# Patient Record
Sex: FEMALE | Race: ASIAN | NOT HISPANIC OR LATINO | Employment: OTHER | URBAN - METROPOLITAN AREA
[De-identification: names, ages, dates, MRNs, and addresses within clinical notes are randomized per-mention and may not be internally consistent; named-entity substitution may affect disease eponyms.]

---

## 2017-06-07 ENCOUNTER — OFFICE VISIT - HEALTHEAST (OUTPATIENT)
Dept: FAMILY MEDICINE | Facility: CLINIC | Age: 67
End: 2017-06-07

## 2017-06-07 DIAGNOSIS — Z00.00 HEALTHCARE MAINTENANCE: ICD-10-CM

## 2017-06-07 DIAGNOSIS — I10 ESSENTIAL HYPERTENSION, BENIGN: ICD-10-CM

## 2017-06-07 DIAGNOSIS — E11.9 TYPE 2 DIABETES MELLITUS (H): ICD-10-CM

## 2017-06-07 LAB
CHOLEST SERPL-MCNC: 220 MG/DL
FASTING STATUS PATIENT QL REPORTED: YES
HBA1C MFR BLD: 7.9 % (ref 3.5–6)
HDLC SERPL-MCNC: 42 MG/DL
LDLC SERPL CALC-MCNC: 147 MG/DL
TRIGL SERPL-MCNC: 157 MG/DL

## 2017-06-07 ASSESSMENT — MIFFLIN-ST. JEOR: SCORE: 977.52

## 2017-06-08 ENCOUNTER — AMBULATORY - HEALTHEAST (OUTPATIENT)
Dept: FAMILY MEDICINE | Facility: CLINIC | Age: 67
End: 2017-06-08

## 2017-06-08 DIAGNOSIS — E11.9 TYPE 2 DIABETES MELLITUS (H): ICD-10-CM

## 2017-07-21 ENCOUNTER — RECORDS - HEALTHEAST (OUTPATIENT)
Dept: ADMINISTRATIVE | Facility: OTHER | Age: 67
End: 2017-07-21

## 2017-10-11 ENCOUNTER — AMBULATORY - HEALTHEAST (OUTPATIENT)
Dept: FAMILY MEDICINE | Facility: CLINIC | Age: 67
End: 2017-10-11

## 2017-10-11 ENCOUNTER — OFFICE VISIT - HEALTHEAST (OUTPATIENT)
Dept: FAMILY MEDICINE | Facility: CLINIC | Age: 67
End: 2017-10-11

## 2017-10-11 DIAGNOSIS — Z23 NEED FOR INFLUENZA VACCINATION: ICD-10-CM

## 2017-10-11 DIAGNOSIS — E11.9 TYPE 2 DIABETES MELLITUS TREATED WITHOUT INSULIN (H): ICD-10-CM

## 2017-10-11 DIAGNOSIS — I10 ESSENTIAL HYPERTENSION WITH GOAL BLOOD PRESSURE LESS THAN 130/85: ICD-10-CM

## 2017-10-11 DIAGNOSIS — I10 ESSENTIAL HYPERTENSION, BENIGN: ICD-10-CM

## 2017-10-11 LAB — HBA1C MFR BLD: 7 % (ref 3.5–6)

## 2017-10-11 ASSESSMENT — MIFFLIN-ST. JEOR: SCORE: 945.77

## 2017-10-18 ENCOUNTER — COMMUNICATION - HEALTHEAST (OUTPATIENT)
Dept: FAMILY MEDICINE | Facility: CLINIC | Age: 67
End: 2017-10-18

## 2017-11-07 ENCOUNTER — RECORDS - HEALTHEAST (OUTPATIENT)
Dept: ADMINISTRATIVE | Facility: OTHER | Age: 67
End: 2017-11-07

## 2017-12-27 ENCOUNTER — AMBULATORY - HEALTHEAST (OUTPATIENT)
Dept: NURSING | Facility: CLINIC | Age: 67
End: 2017-12-27

## 2017-12-27 ENCOUNTER — AMBULATORY - HEALTHEAST (OUTPATIENT)
Dept: FAMILY MEDICINE | Facility: CLINIC | Age: 67
End: 2017-12-27

## 2017-12-27 DIAGNOSIS — E11.9 TYPE 2 DIABETES MELLITUS TREATED WITHOUT INSULIN (H): ICD-10-CM

## 2017-12-27 DIAGNOSIS — I10 ESSENTIAL HYPERTENSION, BENIGN: ICD-10-CM

## 2018-01-11 ENCOUNTER — OFFICE VISIT - HEALTHEAST (OUTPATIENT)
Dept: FAMILY MEDICINE | Facility: CLINIC | Age: 68
End: 2018-01-11

## 2018-01-11 ENCOUNTER — AMBULATORY - HEALTHEAST (OUTPATIENT)
Dept: FAMILY MEDICINE | Facility: CLINIC | Age: 68
End: 2018-01-11

## 2018-01-11 DIAGNOSIS — Z13.220 SCREENING FOR LIPID DISORDERS: ICD-10-CM

## 2018-01-11 DIAGNOSIS — E11.9 TYPE 2 DIABETES MELLITUS TREATED WITHOUT INSULIN (H): ICD-10-CM

## 2018-01-11 DIAGNOSIS — Z13.820 SCREENING FOR OSTEOPOROSIS: ICD-10-CM

## 2018-01-11 DIAGNOSIS — I10 ESSENTIAL HYPERTENSION, BENIGN: ICD-10-CM

## 2018-01-11 LAB
ALBUMIN SERPL-MCNC: 3.6 G/DL (ref 3.5–5)
ALP SERPL-CCNC: 99 U/L (ref 45–120)
ALT SERPL W P-5'-P-CCNC: 20 U/L (ref 0–45)
ANION GAP SERPL CALCULATED.3IONS-SCNC: 11 MMOL/L (ref 5–18)
AST SERPL W P-5'-P-CCNC: 20 U/L (ref 0–40)
BILIRUB SERPL-MCNC: 0.8 MG/DL (ref 0–1)
BUN SERPL-MCNC: 21 MG/DL (ref 8–22)
CALCIUM SERPL-MCNC: 9.5 MG/DL (ref 8.5–10.5)
CHLORIDE BLD-SCNC: 100 MMOL/L (ref 98–107)
CHOLEST SERPL-MCNC: 225 MG/DL
CO2 SERPL-SCNC: 30 MMOL/L (ref 22–31)
CREAT SERPL-MCNC: 0.96 MG/DL (ref 0.6–1.1)
FASTING STATUS PATIENT QL REPORTED: NO
GFR SERPL CREATININE-BSD FRML MDRD: 58 ML/MIN/1.73M2
GLUCOSE BLD-MCNC: 233 MG/DL (ref 70–125)
HBA1C MFR BLD: 7.2 % (ref 3.5–6)
HDLC SERPL-MCNC: 52 MG/DL
LDLC SERPL CALC-MCNC: 141 MG/DL
POTASSIUM BLD-SCNC: 3.9 MMOL/L (ref 3.5–5)
PROT SERPL-MCNC: 7.5 G/DL (ref 6–8)
SODIUM SERPL-SCNC: 141 MMOL/L (ref 136–145)
TRIGL SERPL-MCNC: 162 MG/DL

## 2018-01-11 ASSESSMENT — MIFFLIN-ST. JEOR: SCORE: 958.24

## 2018-10-03 ENCOUNTER — RECORDS - HEALTHEAST (OUTPATIENT)
Dept: MAMMOGRAPHY | Facility: CLINIC | Age: 68
End: 2018-10-03

## 2018-10-03 ENCOUNTER — RECORDS - HEALTHEAST (OUTPATIENT)
Dept: FAMILY MEDICINE | Facility: CLINIC | Age: 68
End: 2018-10-03

## 2018-10-03 ENCOUNTER — OFFICE VISIT - HEALTHEAST (OUTPATIENT)
Dept: FAMILY MEDICINE | Facility: CLINIC | Age: 68
End: 2018-10-03

## 2018-10-03 DIAGNOSIS — Z12.31 VISIT FOR SCREENING MAMMOGRAM: ICD-10-CM

## 2018-10-03 DIAGNOSIS — I10 ESSENTIAL HYPERTENSION, BENIGN: ICD-10-CM

## 2018-10-03 DIAGNOSIS — E11.9 TYPE 2 DIABETES MELLITUS TREATED WITHOUT INSULIN (H): ICD-10-CM

## 2018-10-03 DIAGNOSIS — Z23 NEED FOR INFLUENZA VACCINATION: ICD-10-CM

## 2018-10-03 DIAGNOSIS — Z12.31 ENCOUNTER FOR SCREENING MAMMOGRAM FOR MALIGNANT NEOPLASM OF BREAST: ICD-10-CM

## 2018-10-03 DIAGNOSIS — I10 ESSENTIAL HYPERTENSION WITH GOAL BLOOD PRESSURE LESS THAN 130/85: ICD-10-CM

## 2018-10-03 DIAGNOSIS — E78.5 HYPERLIPIDEMIA: ICD-10-CM

## 2018-10-03 DIAGNOSIS — F43.21 GRIEF REACTION: ICD-10-CM

## 2018-10-03 LAB — HBA1C MFR BLD: 8.8 % (ref 3.5–6)

## 2018-10-03 ASSESSMENT — MIFFLIN-ST. JEOR: SCORE: 954.56

## 2018-10-04 LAB
ANION GAP SERPL CALCULATED.3IONS-SCNC: 14 MMOL/L (ref 5–18)
BUN SERPL-MCNC: 17 MG/DL (ref 8–22)
CALCIUM SERPL-MCNC: 10.3 MG/DL (ref 8.5–10.5)
CHLORIDE BLD-SCNC: 99 MMOL/L (ref 98–107)
CHOLEST SERPL-MCNC: 231 MG/DL
CO2 SERPL-SCNC: 28 MMOL/L (ref 22–31)
CREAT SERPL-MCNC: 0.9 MG/DL (ref 0.6–1.1)
FASTING STATUS PATIENT QL REPORTED: ABNORMAL
GFR SERPL CREATININE-BSD FRML MDRD: >60 ML/MIN/1.73M2
GLUCOSE BLD-MCNC: 149 MG/DL (ref 70–125)
HDLC SERPL-MCNC: 47 MG/DL
LDLC SERPL CALC-MCNC: 150 MG/DL
POTASSIUM BLD-SCNC: 4.4 MMOL/L (ref 3.5–5)
SODIUM SERPL-SCNC: 141 MMOL/L (ref 136–145)
TRIGL SERPL-MCNC: 169 MG/DL

## 2018-10-10 ENCOUNTER — HOSPITAL ENCOUNTER (OUTPATIENT)
Dept: MAMMOGRAPHY | Facility: CLINIC | Age: 68
Discharge: HOME | End: 2018-10-10
Attending: FAMILY MEDICINE

## 2018-10-10 DIAGNOSIS — N64.89 BREAST ASYMMETRY: ICD-10-CM

## 2018-10-11 ENCOUNTER — RECORDS - HEALTHEAST (OUTPATIENT)
Dept: ADMINISTRATIVE | Facility: OTHER | Age: 68
End: 2018-10-11

## 2018-10-25 ENCOUNTER — HOSPITAL ENCOUNTER (OUTPATIENT)
Dept: MAMMOGRAPHY | Facility: CLINIC | Age: 68
Discharge: HOME | End: 2018-10-25
Attending: FAMILY MEDICINE | Admitting: RADIOLOGY

## 2018-10-25 ENCOUNTER — HOSPITAL ENCOUNTER (OUTPATIENT)
Dept: MAMMOGRAPHY | Facility: CLINIC | Age: 68
Discharge: HOME | End: 2018-10-25
Attending: FAMILY MEDICINE

## 2018-10-25 DIAGNOSIS — N63.20 LEFT BREAST MASS: ICD-10-CM

## 2018-10-25 DIAGNOSIS — R92.8 OTHER ABNORMAL AND INCONCLUSIVE FINDINGS ON DIAGNOSTIC IMAGING OF BREAST: ICD-10-CM

## 2018-10-26 ENCOUNTER — COMMUNICATION - HEALTHEAST (OUTPATIENT)
Dept: MAMMOGRAPHY | Facility: CLINIC | Age: 68
End: 2018-10-26

## 2018-10-26 LAB
LAB AP CHARGES (HE HISTORICAL CONVERSION): NORMAL
PATH REPORT.COMMENTS IMP SPEC: NORMAL
PATH REPORT.COMMENTS IMP SPEC: NORMAL
PATH REPORT.FINAL DX SPEC: NORMAL
PATH REPORT.GROSS SPEC: NORMAL
PATH REPORT.MICROSCOPIC SPEC OTHER STN: NORMAL
PATH REPORT.RELEVANT HX SPEC: NORMAL
RESULT FLAG (HE HISTORICAL CONVERSION): NORMAL

## 2018-11-30 ENCOUNTER — COMMUNICATION - HEALTHEAST (OUTPATIENT)
Dept: FAMILY MEDICINE | Facility: CLINIC | Age: 68
End: 2018-11-30

## 2018-11-30 DIAGNOSIS — I10 ESSENTIAL HYPERTENSION WITH GOAL BLOOD PRESSURE LESS THAN 130/85: ICD-10-CM

## 2019-04-09 ENCOUNTER — COMMUNICATION - HEALTHEAST (OUTPATIENT)
Dept: ADMINISTRATIVE | Facility: CLINIC | Age: 69
End: 2019-04-09

## 2019-04-30 ENCOUNTER — COMMUNICATION - HEALTHEAST (OUTPATIENT)
Dept: FAMILY MEDICINE | Facility: CLINIC | Age: 69
End: 2019-04-30

## 2019-05-16 ENCOUNTER — OFFICE VISIT - HEALTHEAST (OUTPATIENT)
Dept: FAMILY MEDICINE | Facility: CLINIC | Age: 69
End: 2019-05-16

## 2019-05-16 DIAGNOSIS — I10 ESSENTIAL HYPERTENSION, BENIGN: ICD-10-CM

## 2019-05-16 DIAGNOSIS — E11.9 TYPE 2 DIABETES MELLITUS TREATED WITHOUT INSULIN (H): ICD-10-CM

## 2019-05-16 DIAGNOSIS — E78.2 MIXED HYPERLIPIDEMIA: ICD-10-CM

## 2019-05-16 DIAGNOSIS — Z12.11 SCREENING FOR COLON CANCER: ICD-10-CM

## 2019-05-16 LAB
ALBUMIN SERPL-MCNC: 3.9 G/DL (ref 3.5–5)
ALP SERPL-CCNC: 93 U/L (ref 45–120)
ALT SERPL W P-5'-P-CCNC: 23 U/L (ref 0–45)
ANION GAP SERPL CALCULATED.3IONS-SCNC: 16 MMOL/L (ref 5–18)
AST SERPL W P-5'-P-CCNC: 21 U/L (ref 0–40)
BILIRUB SERPL-MCNC: 1.2 MG/DL (ref 0–1)
BUN SERPL-MCNC: 15 MG/DL (ref 8–28)
CALCIUM SERPL-MCNC: 9.3 MG/DL (ref 8.5–10.5)
CHLORIDE BLD-SCNC: 100 MMOL/L (ref 98–107)
CHOLEST SERPL-MCNC: 200 MG/DL
CO2 SERPL-SCNC: 23 MMOL/L (ref 22–31)
CREAT SERPL-MCNC: 0.91 MG/DL (ref 0.6–1.1)
CREAT UR-MCNC: 67.8 MG/DL
FASTING STATUS PATIENT QL REPORTED: NO
GFR SERPL CREATININE-BSD FRML MDRD: >60 ML/MIN/1.73M2
GLUCOSE BLD-MCNC: 252 MG/DL (ref 70–125)
HBA1C MFR BLD: 9.1 % (ref 3.5–6)
HDLC SERPL-MCNC: 45 MG/DL
LDLC SERPL CALC-MCNC: 121 MG/DL
MICROALBUMIN UR-MCNC: 13.85 MG/DL (ref 0–1.99)
MICROALBUMIN/CREAT UR: 204.3 MG/G
POTASSIUM BLD-SCNC: 3.7 MMOL/L (ref 3.5–5)
PROT SERPL-MCNC: 7.2 G/DL (ref 6–8)
SODIUM SERPL-SCNC: 139 MMOL/L (ref 136–145)
TRIGL SERPL-MCNC: 170 MG/DL

## 2019-05-16 ASSESSMENT — MIFFLIN-ST. JEOR: SCORE: 940.66

## 2019-05-31 ENCOUNTER — AMBULATORY - HEALTHEAST (OUTPATIENT)
Dept: PHARMACY | Facility: CLINIC | Age: 69
End: 2019-05-31

## 2019-05-31 DIAGNOSIS — E11.9 TYPE 2 DIABETES MELLITUS TREATED WITHOUT INSULIN (H): ICD-10-CM

## 2019-06-04 ENCOUNTER — AMBULATORY - HEALTHEAST (OUTPATIENT)
Dept: FAMILY MEDICINE | Facility: CLINIC | Age: 69
End: 2019-06-04

## 2019-06-04 DIAGNOSIS — E11.9 TYPE 2 DIABETES MELLITUS TREATED WITHOUT INSULIN (H): ICD-10-CM

## 2019-07-10 ENCOUNTER — COMMUNICATION - HEALTHEAST (OUTPATIENT)
Dept: ADMINISTRATIVE | Facility: CLINIC | Age: 69
End: 2019-07-10

## 2019-07-12 ENCOUNTER — COMMUNICATION - HEALTHEAST (OUTPATIENT)
Dept: FAMILY MEDICINE | Facility: CLINIC | Age: 69
End: 2019-07-12

## 2019-07-15 ENCOUNTER — COMMUNICATION - HEALTHEAST (OUTPATIENT)
Dept: ADMINISTRATIVE | Facility: CLINIC | Age: 69
End: 2019-07-15

## 2019-10-15 ENCOUNTER — COMMUNICATION - HEALTHEAST (OUTPATIENT)
Dept: FAMILY MEDICINE | Facility: CLINIC | Age: 69
End: 2019-10-15

## 2019-10-24 ENCOUNTER — COMMUNICATION - HEALTHEAST (OUTPATIENT)
Dept: FAMILY MEDICINE | Facility: CLINIC | Age: 69
End: 2019-10-24

## 2019-10-24 DIAGNOSIS — I10 ESSENTIAL HYPERTENSION, BENIGN: ICD-10-CM

## 2019-11-11 ENCOUNTER — COMMUNICATION - HEALTHEAST (OUTPATIENT)
Dept: FAMILY MEDICINE | Facility: CLINIC | Age: 69
End: 2019-11-11

## 2019-11-11 DIAGNOSIS — I10 ESSENTIAL HYPERTENSION, BENIGN: ICD-10-CM

## 2020-01-27 ENCOUNTER — COMMUNICATION - HEALTHEAST (OUTPATIENT)
Dept: FAMILY MEDICINE | Facility: CLINIC | Age: 70
End: 2020-01-27

## 2020-05-10 ENCOUNTER — COMMUNICATION - HEALTHEAST (OUTPATIENT)
Dept: FAMILY MEDICINE | Facility: CLINIC | Age: 70
End: 2020-05-10

## 2020-05-10 DIAGNOSIS — I10 ESSENTIAL HYPERTENSION, BENIGN: ICD-10-CM

## 2020-07-20 ENCOUNTER — COMMUNICATION - HEALTHEAST (OUTPATIENT)
Dept: FAMILY MEDICINE | Facility: CLINIC | Age: 70
End: 2020-07-20

## 2020-07-20 DIAGNOSIS — I10 ESSENTIAL HYPERTENSION, BENIGN: ICD-10-CM

## 2020-07-21 ENCOUNTER — AMBULATORY - HEALTHEAST (OUTPATIENT)
Dept: FAMILY MEDICINE | Facility: CLINIC | Age: 70
End: 2020-07-21

## 2020-07-21 DIAGNOSIS — I10 ESSENTIAL HYPERTENSION, BENIGN: ICD-10-CM

## 2020-08-05 ENCOUNTER — COMMUNICATION - HEALTHEAST (OUTPATIENT)
Dept: FAMILY MEDICINE | Facility: CLINIC | Age: 70
End: 2020-08-05

## 2020-08-07 ENCOUNTER — COMMUNICATION - HEALTHEAST (OUTPATIENT)
Dept: FAMILY MEDICINE | Facility: CLINIC | Age: 70
End: 2020-08-07

## 2020-08-07 DIAGNOSIS — I10 ESSENTIAL HYPERTENSION, BENIGN: ICD-10-CM

## 2020-10-27 ENCOUNTER — OFFICE VISIT - HEALTHEAST (OUTPATIENT)
Dept: FAMILY MEDICINE | Facility: CLINIC | Age: 70
End: 2020-10-27

## 2020-10-27 ENCOUNTER — COMMUNICATION - HEALTHEAST (OUTPATIENT)
Dept: FAMILY MEDICINE | Facility: CLINIC | Age: 70
End: 2020-10-27

## 2020-10-27 DIAGNOSIS — R41.3 MEMORY PROBLEM: ICD-10-CM

## 2020-10-27 DIAGNOSIS — I10 ESSENTIAL HYPERTENSION, BENIGN: ICD-10-CM

## 2020-10-27 DIAGNOSIS — Z23 NEED FOR IMMUNIZATION AGAINST INFLUENZA: ICD-10-CM

## 2020-10-27 DIAGNOSIS — Z00.00 ROUTINE GENERAL MEDICAL EXAMINATION AT A HEALTH CARE FACILITY: ICD-10-CM

## 2020-10-27 DIAGNOSIS — E78.2 MIXED HYPERLIPIDEMIA: ICD-10-CM

## 2020-10-27 DIAGNOSIS — E11.9 TYPE 2 DIABETES MELLITUS TREATED WITHOUT INSULIN (H): ICD-10-CM

## 2020-10-27 LAB
ALBUMIN SERPL-MCNC: 4.3 G/DL (ref 3.5–5)
ALP SERPL-CCNC: 115 U/L (ref 45–120)
ALT SERPL W P-5'-P-CCNC: 29 U/L (ref 0–45)
ANION GAP SERPL CALCULATED.3IONS-SCNC: 15 MMOL/L (ref 5–18)
AST SERPL W P-5'-P-CCNC: 22 U/L (ref 0–40)
BILIRUB SERPL-MCNC: 1.2 MG/DL (ref 0–1)
BUN SERPL-MCNC: 18 MG/DL (ref 8–28)
CALCIUM SERPL-MCNC: 9.5 MG/DL (ref 8.5–10.5)
CHLORIDE BLD-SCNC: 95 MMOL/L (ref 98–107)
CHOLEST SERPL-MCNC: 188 MG/DL
CO2 SERPL-SCNC: 28 MMOL/L (ref 22–31)
CREAT SERPL-MCNC: 1.32 MG/DL (ref 0.6–1.1)
CREAT UR-MCNC: 35.8 MG/DL
FASTING STATUS PATIENT QL REPORTED: YES
GFR SERPL CREATININE-BSD FRML MDRD: 40 ML/MIN/1.73M2
GLUCOSE BLD-MCNC: 507 MG/DL (ref 70–125)
HBA1C MFR BLD: 11.4 %
HDLC SERPL-MCNC: 43 MG/DL
LDLC SERPL CALC-MCNC: 113 MG/DL
MICROALBUMIN UR-MCNC: 16.35 MG/DL (ref 0–1.99)
MICROALBUMIN/CREAT UR: 456.7 MG/G
POTASSIUM BLD-SCNC: 4.6 MMOL/L (ref 3.5–5)
PROT SERPL-MCNC: 7.8 G/DL (ref 6–8)
SODIUM SERPL-SCNC: 138 MMOL/L (ref 136–145)
TRIGL SERPL-MCNC: 160 MG/DL

## 2020-10-27 RX ORDER — GLUCOSAMINE HCL/CHONDROITIN SU 500-400 MG
1 CAPSULE ORAL 2 TIMES DAILY
Qty: 200 STRIP | Refills: 11 | Status: SHIPPED | OUTPATIENT
Start: 2020-10-27 | End: 2021-10-05

## 2020-10-27 ASSESSMENT — ANXIETY QUESTIONNAIRES
2. NOT BEING ABLE TO STOP OR CONTROL WORRYING: NOT AT ALL
1. FEELING NERVOUS, ANXIOUS, OR ON EDGE: NOT AT ALL

## 2020-10-27 ASSESSMENT — MIFFLIN-ST. JEOR: SCORE: 936.41

## 2020-10-28 ENCOUNTER — COMMUNICATION - HEALTHEAST (OUTPATIENT)
Dept: FAMILY MEDICINE | Facility: CLINIC | Age: 70
End: 2020-10-28

## 2020-10-28 ENCOUNTER — AMBULATORY - HEALTHEAST (OUTPATIENT)
Dept: FAMILY MEDICINE | Facility: CLINIC | Age: 70
End: 2020-10-28

## 2020-10-28 DIAGNOSIS — E11.9 TYPE 2 DIABETES MELLITUS TREATED WITHOUT INSULIN (H): ICD-10-CM

## 2020-11-06 ENCOUNTER — COMMUNICATION - HEALTHEAST (OUTPATIENT)
Dept: FAMILY MEDICINE | Facility: CLINIC | Age: 70
End: 2020-11-06

## 2020-11-06 DIAGNOSIS — I10 ESSENTIAL HYPERTENSION, BENIGN: ICD-10-CM

## 2020-11-12 ENCOUNTER — OFFICE VISIT - HEALTHEAST (OUTPATIENT)
Dept: PHARMACY | Facility: CLINIC | Age: 70
End: 2020-11-12

## 2020-11-12 DIAGNOSIS — Z79.4 TYPE 2 DIABETES MELLITUS WITHOUT COMPLICATION, WITH LONG-TERM CURRENT USE OF INSULIN (H): ICD-10-CM

## 2020-11-12 DIAGNOSIS — E11.9 TYPE 2 DIABETES MELLITUS WITHOUT COMPLICATION, WITH LONG-TERM CURRENT USE OF INSULIN (H): ICD-10-CM

## 2020-12-03 ENCOUNTER — AMBULATORY - HEALTHEAST (OUTPATIENT)
Dept: OTHER | Facility: CLINIC | Age: 70
End: 2020-12-03

## 2020-12-03 ENCOUNTER — OFFICE VISIT - HEALTHEAST (OUTPATIENT)
Dept: FAMILY MEDICINE | Facility: CLINIC | Age: 70
End: 2020-12-03

## 2020-12-03 DIAGNOSIS — I10 ESSENTIAL HYPERTENSION, BENIGN: ICD-10-CM

## 2020-12-03 DIAGNOSIS — E78.5 HYPERLIPIDEMIA, UNSPECIFIED HYPERLIPIDEMIA TYPE: ICD-10-CM

## 2020-12-03 DIAGNOSIS — E11.9 TYPE 2 DIABETES MELLITUS TREATED WITHOUT INSULIN (H): ICD-10-CM

## 2020-12-03 DIAGNOSIS — R94.4 ABNORMAL RENAL FUNCTION TEST: ICD-10-CM

## 2020-12-03 LAB
ANION GAP SERPL CALCULATED.3IONS-SCNC: 16 MMOL/L (ref 5–18)
BUN SERPL-MCNC: 21 MG/DL (ref 8–28)
CALCIUM SERPL-MCNC: 9.5 MG/DL (ref 8.5–10.5)
CHLORIDE BLD-SCNC: 107 MMOL/L (ref 98–107)
CO2 SERPL-SCNC: 22 MMOL/L (ref 22–31)
CREAT SERPL-MCNC: 0.93 MG/DL (ref 0.6–1.1)
GFR SERPL CREATININE-BSD FRML MDRD: 59 ML/MIN/1.73M2
GLUCOSE BLD-MCNC: 185 MG/DL (ref 70–125)
POTASSIUM BLD-SCNC: 3.9 MMOL/L (ref 3.5–5)
SODIUM SERPL-SCNC: 145 MMOL/L (ref 136–145)

## 2020-12-03 ASSESSMENT — MIFFLIN-ST. JEOR: SCORE: 932.16

## 2021-01-05 ENCOUNTER — OFFICE VISIT - HEALTHEAST (OUTPATIENT)
Dept: FAMILY MEDICINE | Facility: CLINIC | Age: 71
End: 2021-01-05

## 2021-01-05 DIAGNOSIS — Z13.29 SCREENING FOR THYROID DISORDER: ICD-10-CM

## 2021-01-05 DIAGNOSIS — I10 ESSENTIAL HYPERTENSION, BENIGN: ICD-10-CM

## 2021-01-05 DIAGNOSIS — E11.9 TYPE 2 DIABETES MELLITUS TREATED WITHOUT INSULIN (H): ICD-10-CM

## 2021-01-05 DIAGNOSIS — R41.3 MEMORY PROBLEM: ICD-10-CM

## 2021-01-05 LAB
ANION GAP SERPL CALCULATED.3IONS-SCNC: 15 MMOL/L (ref 5–18)
BUN SERPL-MCNC: 30 MG/DL (ref 8–28)
CALCIUM SERPL-MCNC: 9.6 MG/DL (ref 8.5–10.5)
CHLORIDE BLD-SCNC: 98 MMOL/L (ref 98–107)
CO2 SERPL-SCNC: 26 MMOL/L (ref 22–31)
CREAT SERPL-MCNC: 1.07 MG/DL (ref 0.6–1.1)
GFR SERPL CREATININE-BSD FRML MDRD: 51 ML/MIN/1.73M2
GLUCOSE BLD-MCNC: 289 MG/DL (ref 70–125)
HBA1C MFR BLD: 8.6 %
POTASSIUM BLD-SCNC: 4 MMOL/L (ref 3.5–5)
SODIUM SERPL-SCNC: 139 MMOL/L (ref 136–145)
TSH SERPL DL<=0.005 MIU/L-ACNC: 1.49 UIU/ML (ref 0.3–5)

## 2021-01-05 ASSESSMENT — MIFFLIN-ST. JEOR: SCORE: 882.83

## 2021-01-14 ENCOUNTER — COMMUNICATION - HEALTHEAST (OUTPATIENT)
Dept: FAMILY MEDICINE | Facility: CLINIC | Age: 71
End: 2021-01-14

## 2021-02-23 ENCOUNTER — OFFICE VISIT - HEALTHEAST (OUTPATIENT)
Dept: FAMILY MEDICINE | Facility: CLINIC | Age: 71
End: 2021-02-23

## 2021-02-23 DIAGNOSIS — R41.3 MEMORY PROBLEM: ICD-10-CM

## 2021-02-23 DIAGNOSIS — E78.2 MIXED HYPERLIPIDEMIA: ICD-10-CM

## 2021-02-23 DIAGNOSIS — I10 ESSENTIAL HYPERTENSION, BENIGN: ICD-10-CM

## 2021-02-23 DIAGNOSIS — E11.9 TYPE 2 DIABETES MELLITUS TREATED WITHOUT INSULIN (H): ICD-10-CM

## 2021-02-23 ASSESSMENT — MIFFLIN-ST. JEOR: SCORE: 909.19

## 2021-02-27 ENCOUNTER — AMBULATORY - HEALTHEAST (OUTPATIENT)
Dept: NURSING | Facility: CLINIC | Age: 71
End: 2021-02-27

## 2021-03-09 ENCOUNTER — RECORDS - HEALTHEAST (OUTPATIENT)
Dept: MAMMOGRAPHY | Facility: CLINIC | Age: 71
End: 2021-03-09

## 2021-03-09 DIAGNOSIS — Z12.31 ENCOUNTER FOR SCREENING MAMMOGRAM FOR MALIGNANT NEOPLASM OF BREAST: ICD-10-CM

## 2021-03-16 ENCOUNTER — HOSPITAL ENCOUNTER (OUTPATIENT)
Dept: MAMMOGRAPHY | Facility: CLINIC | Age: 71
Discharge: HOME | End: 2021-03-16
Attending: FAMILY MEDICINE

## 2021-03-16 DIAGNOSIS — N64.89 BREAST ASYMMETRY: ICD-10-CM

## 2021-03-20 ENCOUNTER — AMBULATORY - HEALTHEAST (OUTPATIENT)
Dept: NURSING | Facility: CLINIC | Age: 71
End: 2021-03-20

## 2021-04-20 ENCOUNTER — COMMUNICATION - HEALTHEAST (OUTPATIENT)
Dept: FAMILY MEDICINE | Facility: CLINIC | Age: 71
End: 2021-04-20

## 2021-04-20 ENCOUNTER — OFFICE VISIT - HEALTHEAST (OUTPATIENT)
Dept: FAMILY MEDICINE | Facility: CLINIC | Age: 71
End: 2021-04-20

## 2021-04-20 DIAGNOSIS — Z91.148 NONCOMPLIANCE WITH MEDICATION REGIMEN: ICD-10-CM

## 2021-04-20 DIAGNOSIS — R41.3 MEMORY PROBLEM: ICD-10-CM

## 2021-04-20 DIAGNOSIS — E78.5 HYPERLIPIDEMIA, UNSPECIFIED HYPERLIPIDEMIA TYPE: ICD-10-CM

## 2021-04-20 DIAGNOSIS — E11.9 TYPE 2 DIABETES MELLITUS TREATED WITHOUT INSULIN (H): ICD-10-CM

## 2021-04-20 LAB
ALBUMIN SERPL-MCNC: 3.8 G/DL (ref 3.5–5)
ALP SERPL-CCNC: 106 U/L (ref 45–120)
ALT SERPL W P-5'-P-CCNC: 17 U/L (ref 0–45)
ANION GAP SERPL CALCULATED.3IONS-SCNC: 9 MMOL/L (ref 5–18)
AST SERPL W P-5'-P-CCNC: 17 U/L (ref 0–40)
BILIRUB SERPL-MCNC: 1.1 MG/DL (ref 0–1)
BUN SERPL-MCNC: 18 MG/DL (ref 8–28)
CALCIUM SERPL-MCNC: 9 MG/DL (ref 8.5–10.5)
CHLORIDE BLD-SCNC: 103 MMOL/L (ref 98–107)
CHOLEST SERPL-MCNC: 156 MG/DL
CO2 SERPL-SCNC: 29 MMOL/L (ref 22–31)
CREAT SERPL-MCNC: 1.14 MG/DL (ref 0.6–1.1)
FASTING STATUS PATIENT QL REPORTED: NO
GFR SERPL CREATININE-BSD FRML MDRD: 47 ML/MIN/1.73M2
GLUCOSE BLD-MCNC: 344 MG/DL (ref 70–125)
HBA1C MFR BLD: 9.5 %
HDLC SERPL-MCNC: 50 MG/DL
LDLC SERPL CALC-MCNC: 77 MG/DL
POTASSIUM BLD-SCNC: 4.5 MMOL/L (ref 3.5–5)
PROT SERPL-MCNC: 7.3 G/DL (ref 6–8)
SODIUM SERPL-SCNC: 141 MMOL/L (ref 136–145)
TRIGL SERPL-MCNC: 147 MG/DL

## 2021-04-20 ASSESSMENT — MIFFLIN-ST. JEOR: SCORE: 932.72

## 2021-05-25 ENCOUNTER — OFFICE VISIT - HEALTHEAST (OUTPATIENT)
Dept: FAMILY MEDICINE | Facility: CLINIC | Age: 71
End: 2021-05-25

## 2021-05-25 DIAGNOSIS — E11.9 TYPE 2 DIABETES MELLITUS TREATED WITHOUT INSULIN (H): ICD-10-CM

## 2021-05-25 DIAGNOSIS — R41.3 MEMORY PROBLEM: ICD-10-CM

## 2021-05-25 DIAGNOSIS — E78.2 MIXED HYPERLIPIDEMIA: ICD-10-CM

## 2021-05-25 DIAGNOSIS — I10 ESSENTIAL HYPERTENSION, BENIGN: ICD-10-CM

## 2021-05-25 RX ORDER — ATORVASTATIN CALCIUM 20 MG/1
20 TABLET, FILM COATED ORAL AT BEDTIME
Qty: 90 TABLET | Refills: 1 | Status: ON HOLD | OUTPATIENT
Start: 2021-05-25 | End: 2021-08-19

## 2021-05-25 RX ORDER — ATENOLOL 50 MG/1
50 TABLET ORAL DAILY
Qty: 90 TABLET | Refills: 1 | Status: ON HOLD | OUTPATIENT
Start: 2021-05-25 | End: 2021-07-30

## 2021-05-25 RX ORDER — LISINOPRIL 20 MG/1
TABLET ORAL
Qty: 90 TABLET | Refills: 1 | Status: ON HOLD | OUTPATIENT
Start: 2021-05-25 | End: 2021-08-02

## 2021-05-25 ASSESSMENT — MIFFLIN-ST. JEOR: SCORE: 953.14

## 2021-05-26 ENCOUNTER — RECORDS - HEALTHEAST (OUTPATIENT)
Dept: ADMINISTRATIVE | Facility: CLINIC | Age: 71
End: 2021-05-26

## 2021-05-27 ENCOUNTER — RECORDS - HEALTHEAST (OUTPATIENT)
Dept: ADMINISTRATIVE | Facility: CLINIC | Age: 71
End: 2021-05-27

## 2021-05-27 VITALS — DIASTOLIC BLOOD PRESSURE: 86 MMHG | SYSTOLIC BLOOD PRESSURE: 148 MMHG | HEART RATE: 68 BPM

## 2021-05-27 NOTE — TELEPHONE ENCOUNTER
Called to reschedule no show DM, HTN, Lipid appt with Dr. Smith on 04/09/2019 no answer and no vm will reattempted at a later date to reschedule.

## 2021-05-28 ENCOUNTER — RECORDS - HEALTHEAST (OUTPATIENT)
Dept: ADMINISTRATIVE | Facility: CLINIC | Age: 71
End: 2021-05-28

## 2021-05-28 NOTE — PROGRESS NOTES
ASSESMENT AND PLAN:  Diagnoses and all orders for this visit:    Type 2 diabetes mellitus treated without insulin (H)  -     Glycosylated Hemoglobin A1c  -     Microalbumin, Random Urine  -     Ambulatory referral to Ophthalmology  A1c 9.1 today, was 8.8 in 10/19.  Instructed to take metformin 500 mg twice a day, not once a day.  Does not want to check blood sugar at home.  Follow-up in 3 months.    Mixed hyperlipidemia  -     Lipid Clare  Recheck lipids today.    Benign Essential Hypertension  -     Comprehensive Metabolic Panel  Controlled.  No med changes.    Screening for colon cancer  -     Ambulatory referral for Colonoscopy    This transcription uses voice recognition software, which may contain typographical errors.      SUBJECTIVE: Paradise Yuan is here for follow up.    DM- last A1c was 8.8 in October 2018.  Was prescribed metformin to take 500 mg twice a day but she is taking only once a day.  She is not taking blood sugar at home.  States she feels well.  No hypoglycemic symptoms since last visit.    HTN-on lisinopril 10 mg and atenolol 25 mg daily.  Has been on this regimen for several years before she started coming to this clinic.  No medication side effects reported.    Hyperlipidemia- on Lipitor 20 mg daily.  No side effects reported including muscle aches and pain.    Past Medical History:   Diagnosis Date     Hypertension      Patient Active Problem List   Diagnosis     Stomatitis Herpetiformis     Benign Essential Hypertension     Type 2 diabetes mellitus treated without insulin (H)     Hyperlipidemia       Allergies:  No Known Allergies    Social History     Tobacco Use   Smoking Status Never Smoker   Smokeless Tobacco Never Used       Review of systems otherwise negative except as listed in HPI.   Social History     Tobacco Use   Smoking Status Never Smoker   Smokeless Tobacco Never Used       OBJECTICE: BP (!) 172/106 (Patient Site: Left Arm, Patient Position: Sitting, Cuff Size: Adult  "Regular)   Pulse 95   Temp 98.2  F (36.8  C) (Oral)   Ht 5' 0.25\" (1.53 m)   Wt 111 lb 6 oz (50.5 kg)   SpO2 97%   BMI 21.57 kg/m      DATA REVIEWED:  Additional History from Old Records Summarized (2):   Labs Reviewed or Ordered (1):      GEN-alert,  in no apparent distress.  HEENT-mucous membranes are moist, neck is supple.  CV-regular rate and rhythm with no murmur.   RESP-lungs clear to auscultation .  ABDOMEN- Soft , not tender.  EXTREM- Sensation intact. No open lesions or ulcers.   SKIN-normal        Laurel Sarah   5/16/2019   "

## 2021-05-28 NOTE — TELEPHONE ENCOUNTER
Doing DM quality and pt has apt on 5/16/19.  Has cx or ns for last 3 scheduled apts.      Pt NEEDS the following:      A1C  URINE ALBUMIN  COLONOSCOPY  EYE DM EXAM  SHINGRIX    Thanks.

## 2021-05-30 NOTE — TELEPHONE ENCOUNTER
Called pt to schedule appt with Diabetic educator for consult per Hanny Miller. She will be on vacation and would like us to call back in a month.    Per Ben Miller patient would benefit from visiting for additional diabetes help based on A1C. Unfortunately, patients current insurance does not cover MTM pharmacist visits, but recommend that they be seen by a diabetic educator in addition to their primary physician.

## 2021-05-30 NOTE — TELEPHONE ENCOUNTER
Per 7/3/19  fax from [facility], patient cancelled 6/19/19 appt and has not rescheduled.    Spoke with patient.  Requested she call [facility], next week if possible,  (closed now) at [phone] to reschedule the appointment.    Patient agreed to reschedule.  Informed her referral from Dr. Smith has already been sent to clinic.

## 2021-05-30 NOTE — TELEPHONE ENCOUNTER
Called and left message for patient.  If patient calls back see message below about DM educator and schedule.  Also please schedule for DM/ HTN/ Colon cancer screening with Dr. Smith.

## 2021-05-31 VITALS — BODY MASS INDEX: 23.46 KG/M2 | HEIGHT: 60 IN | WEIGHT: 119.5 LBS

## 2021-05-31 VITALS — BODY MASS INDEX: 22.09 KG/M2 | HEIGHT: 60 IN | WEIGHT: 112.5 LBS

## 2021-05-31 VITALS — WEIGHT: 115.25 LBS | HEIGHT: 60 IN | BODY MASS INDEX: 22.63 KG/M2

## 2021-05-31 NOTE — TELEPHONE ENCOUNTER
Called and spoke to patient.  Patient declined appointment due to going on a trip.  Patient will call and schedule once they return.

## 2021-06-02 VITALS — WEIGHT: 114.44 LBS | HEIGHT: 60 IN | BODY MASS INDEX: 22.47 KG/M2

## 2021-06-02 NOTE — TELEPHONE ENCOUNTER
See 7/12 and 8/20 telephone encounters.  Last PCP appointment 5/16/19.  No future appointments noted.    Left voice message asking Paradise to please call [facility]  [facility].  Included clinic phone number and date of call in message.    If call is returned, assist patient in scheduling appointment with PCP for diabetes follow up and AWV.    Unclear if patient has had [facility] appointment.

## 2021-06-02 NOTE — TELEPHONE ENCOUNTER
See message string.    Left voice message asking patient to call clinic re: diabetic eye exam and scheduling follow up appt with PCP.  No future appts noted.  Included clinic phone number, date of call, and new name in message.    When patient calls, request copy of eye exam faxed to [phone].  If referral needed, we can request from PCP.    Assist in scheduling F/U with PCP for HTN and diabetes.  Last visit May 2019.

## 2021-06-02 NOTE — TELEPHONE ENCOUNTER
Refill Approved    Rx renewed per Medication Renewal Policy. Medication was last renewed on 5/16/19  #90 R-1.    Last OV 5/16/19    Ayanna Bradshaw, [facility] Triage/Med Refill 10/25/2019     Requested Prescriptions   Pending Prescriptions Disp Refills     lisinopril (PRINIVIL,ZESTRIL) 20 MG tablet [Pharmacy Med Name: LISINOPRIL TABS 20MG] 90 tablet 4     Sig: TAKE 1 TABLET DAILY (DOSE INCREASE)       Ace Inhibitors Refill Protocol Passed - 10/24/2019 11:38 PM        Passed - PCP or prescribing provider visit in past 12 months       Last office visit with prescriber/PCP: 5/16/2019 Kodak Smith MD OR same dept: 5/16/2019 Kodak Smith MD OR same specialty: 5/16/2019 Kodak Smith MD  Last physical: Visit date not found Last MTM visit: Visit date not found   Next visit within 3 mo: Visit date not found  Next physical within 3 mo: Visit date not found  Prescriber OR PCP: Kodak Smith MD  Last diagnosis associated with med order: 1. Benign Essential Hypertension  - lisinopril (PRINIVIL,ZESTRIL) 20 MG tablet [Pharmacy Med Name: LISINOPRIL TABS 20MG]; TAKE 1 TABLET DAILY (DOSE INCREASE)  Dispense: 90 tablet; Refill: 4    If protocol passes may refill for 12 months if within 3 months of last provider visit (or a total of 15 months).             Passed - Serum Potassium in past 12 months     Lab Results   Component Value Date    Potassium 3.7 05/16/2019             Passed - Blood pressure filed in past 12 months     BP Readings from Last 1 Encounters:   05/16/19 148/86             Passed - Serum Creatinine in past 12 months     Creatinine   Date Value Ref Range Status   05/16/2019 0.91 0.60 - 1.10 mg/dL Final

## 2021-06-03 VITALS — WEIGHT: 111.38 LBS | BODY MASS INDEX: 21.87 KG/M2 | HEIGHT: 60 IN

## 2021-06-03 NOTE — TELEPHONE ENCOUNTER
Refill Approved    Rx renewed per Medication Renewal Policy. Medication was last renewed on 5/16/19.    Josy Snowden, [facility] Triage/Med Refill 11/12/2019     Requested Prescriptions   Pending Prescriptions Disp Refills     atenolol (TENORMIN) 25 MG tablet [Pharmacy Med Name: ATENOLOL TABS 25MG] 90 tablet 4     Sig: TAKE 1 TABLET DAILY       Beta-Blockers Refill Protocol Passed - 11/11/2019 11:37 PM        Passed - PCP or prescribing provider visit in past 12 months or next 3 months     Last office visit with prescriber/PCP: 5/16/2019 Kodak Smith MD OR same dept: 5/16/2019 Kodak Smith MD OR same specialty: 5/16/2019 Kodak Smith MD  Last physical: Visit date not found Last MTM visit: Visit date not found   Next visit within 3 mo: Visit date not found  Next physical within 3 mo: Visit date not found  Prescriber OR PCP: Kodak Smith MD  Last diagnosis associated with med order: 1. Benign Essential Hypertension  - atenolol (TENORMIN) 25 MG tablet [Pharmacy Med Name: ATENOLOL TABS 25MG]; TAKE 1 TABLET DAILY  Dispense: 90 tablet; Refill: 4    If protocol passes may refill for 12 months if within 3 months of last provider visit (or a total of 15 months).             Passed - Blood pressure filed in past 12 months     BP Readings from Last 1 Encounters:   05/16/19 148/86

## 2021-06-05 VITALS
WEIGHT: 111.31 LBS | OXYGEN SATURATION: 97 % | TEMPERATURE: 99 F | DIASTOLIC BLOOD PRESSURE: 94 MMHG | HEIGHT: 60 IN | HEART RATE: 140 BPM | SYSTOLIC BLOOD PRESSURE: 158 MMHG | BODY MASS INDEX: 21.85 KG/M2 | RESPIRATION RATE: 16 BRPM

## 2021-06-05 VITALS
WEIGHT: 110.38 LBS | HEART RATE: 102 BPM | HEIGHT: 60 IN | RESPIRATION RATE: 16 BRPM | SYSTOLIC BLOOD PRESSURE: 158 MMHG | OXYGEN SATURATION: 93 % | TEMPERATURE: 98.3 F | BODY MASS INDEX: 21.67 KG/M2 | DIASTOLIC BLOOD PRESSURE: 92 MMHG

## 2021-06-05 VITALS
SYSTOLIC BLOOD PRESSURE: 136 MMHG | OXYGEN SATURATION: 93 % | RESPIRATION RATE: 16 BRPM | BODY MASS INDEX: 21.69 KG/M2 | HEART RATE: 81 BPM | DIASTOLIC BLOOD PRESSURE: 88 MMHG | TEMPERATURE: 98.1 F | WEIGHT: 110.5 LBS | HEIGHT: 60 IN

## 2021-06-05 VITALS
HEIGHT: 60 IN | SYSTOLIC BLOOD PRESSURE: 128 MMHG | WEIGHT: 99.5 LBS | DIASTOLIC BLOOD PRESSURE: 86 MMHG | HEART RATE: 96 BPM | RESPIRATION RATE: 16 BRPM | OXYGEN SATURATION: 96 % | TEMPERATURE: 98 F | BODY MASS INDEX: 19.53 KG/M2

## 2021-06-05 VITALS
HEIGHT: 60 IN | HEART RATE: 93 BPM | TEMPERATURE: 98.1 F | WEIGHT: 105.31 LBS | DIASTOLIC BLOOD PRESSURE: 82 MMHG | BODY MASS INDEX: 20.68 KG/M2 | RESPIRATION RATE: 16 BRPM | OXYGEN SATURATION: 96 % | SYSTOLIC BLOOD PRESSURE: 138 MMHG

## 2021-06-09 NOTE — TELEPHONE ENCOUNTER
Patient Quality Outreach 2nd Attempt      Summary:      Type of outreach:    Phone, left message for patient/parent to call back. If patient calls back please offer telephone or video visit with     Next Steps:  Reach out within 90 days via Phone.    Max Number of attempts reached: Yes . Will try again in 90 days if patient still on fail list.    Questions for provider review:    None                                               Nini Bruno      Chart routed to Care Team.

## 2021-06-09 NOTE — TELEPHONE ENCOUNTER
Refill sent but needs follow up appt. Please help schedule office visit.    Dr. Kodak Smith  7/21/2020 9:34 AM

## 2021-06-09 NOTE — TELEPHONE ENCOUNTER
RN cannot approve Refill Request    RN can NOT refill this medication Protocol failed and NO refill given    Padmini Vazquez, [facility] Triage/Med Refill 7/21/2020    Requested Prescriptions   Pending Prescriptions Disp Refills     lisinopriL (PRINIVIL,ZESTRIL) 20 MG tablet [Pharmacy Med Name: LISINOPRIL TABS 20MG] 90 tablet 3     Sig: TAKE 1 TABLET DAILY (DOSE INCREASE)       Ace Inhibitors Refill Protocol Failed - 7/20/2020 11:45 PM        Failed - PCP or prescribing provider visit in past 12 months       Last office visit with prescriber/PCP: 5/16/2019 Kodak Smith MD OR same dept: Visit date not found OR same specialty: 5/16/2019 Kodak Smith MD  Last physical: Visit date not found Last MTM visit: Visit date not found   Next visit within 3 mo: Visit date not found  Next physical within 3 mo: Visit date not found  Prescriber OR PCP: Kodak Smith MD  Last diagnosis associated with med order: 1. Benign Essential Hypertension  - lisinopriL (PRINIVIL,ZESTRIL) 20 MG tablet [Pharmacy Med Name: LISINOPRIL TABS 20MG]; TAKE 1 TABLET DAILY (DOSE INCREASE)  Dispense: 90 tablet; Refill: 3    If protocol passes may refill for 12 months if within 3 months of last provider visit (or a total of 15 months).             Failed - Serum Potassium in past 12 months     No results found for: LN-POTASSIUM          Failed - Blood pressure filed in past 12 months     BP Readings from Last 1 Encounters:   05/16/19 148/86             Failed - Serum Creatinine in past 12 months     Creatinine   Date Value Ref Range Status   05/16/2019 0.91 0.60 - 1.10 mg/dL Final

## 2021-06-10 NOTE — TELEPHONE ENCOUNTER
Called patient left message to call clinic back. Please help schedule patient for telephone visit if patient does not want to come in. Thank you

## 2021-06-10 NOTE — TELEPHONE ENCOUNTER
RN cannot approve Refill Request    RN can NOT refill this medication Protocol failed and NO refill given. Last office visit: 5/16/2019 Kodak Smith MD Last Physical: Visit date not found Last MTM visit: Visit date not found Last visit same specialty: 5/16/2019 Kodak Smith MD.  Next visit within 3 mo: Visit date not found  Next physical within 3 mo: Visit date not found      Becky Donaldson, [facility] Triage/Med Refill 8/8/2020    Requested Prescriptions   Pending Prescriptions Disp Refills     atenoloL (TENORMIN) 25 MG tablet [Pharmacy Med Name: ATENOLOL TABS 25MG] 90 tablet 3     Sig: TAKE 1 TABLET DAILY       Beta-Blockers Refill Protocol Failed - 8/7/2020 11:47 PM        Failed - PCP or prescribing provider visit in past 12 months or next 3 months     Last office visit with prescriber/PCP: 5/16/2019 Kodak Smith MD OR same dept: Visit date not found OR same specialty: 5/16/2019 Kodak Smith MD  Last physical: Visit date not found Last MTM visit: Visit date not found   Next visit within 3 mo: Visit date not found  Next physical within 3 mo: Visit date not found  Prescriber OR PCP: Kodak Smith MD  Last diagnosis associated with med order: 1. Benign Essential Hypertension  - atenoloL (TENORMIN) 25 MG tablet [Pharmacy Med Name: ATENOLOL TABS 25MG]; TAKE 1 TABLET DAILY  Dispense: 90 tablet; Refill: 3    If protocol passes may refill for 12 months if within 3 months of last provider visit (or a total of 15 months).             Failed - Blood pressure filed in past 12 months     BP Readings from Last 1 Encounters:   05/16/19 148/86

## 2021-06-11 NOTE — PROGRESS NOTES
ASSESMENT AND PLAN:  Diagnoses and all orders for this visit:    Benign Essential Hypertension  -     Comprehensive Metabolic Panel  Continue lisinopril and atenolol current dose.  No refill needed today.    Type 2 diabetes mellitus  -     Glycosylated Hemoglobin A1c  -     Lipid Cascade  -     Microalbumin, Random Urine  -     Ambulatory referral to Optometry  Diet controlled up until she was last seen.  Discussed if A1c higher than previous number will likely need medication.  Patient agreed.  Will call with A1c results and instructions.    Healthcare maintenance  Declined mammogram today, last done in 2013.  Declined colonoscopy today, but will consider.  Will discuss again at next visit.      SUBJECTIVE: Paradise Yuan is a very pleasant 67-year-old female with history of hypertension and diabetes here to establish care.  I am seeing her for the first time today.  He was last seen by her previous PCP in September 2016.    She is taking lisinopril 10 mg and atenolol 25 mg for hypertension.  Has been on these medications for a while now.  No side effects reported.  She has been trying to cut back on salt intake.  No acute chest pain, acute vision changes, irritations or headaches.    Her diabetes was diet-controlled until she was last seen at her previous clinic.  A1c was 6.7 in September 2016.  Met with diabetic educator both times.  She has not been checking her blood sugar lately.  Denies hypoglycemic symptoms.  Denies episodes of abdominal pain, nausea, vomiting or dizziness.  Has been trying to watch her diet but has not been able to exercise regularly.  She is single, lives with her sister.  He does not smoke or drink alcohol.            Past Medical History:   Diagnosis Date     Hypertension      Patient Active Problem List   Diagnosis     Paroxysmal Supraventricular Tachycardia     Stomatitis Herpetiformis     Tachycardia     Benign Essential Hypertension       Allergies:  No Known Allergies    History  "  Smoking Status     Never Smoker   Smokeless Tobacco     Never Used       Review of systems otherwise negative except as listed in HPI.   History   Smoking Status     Never Smoker   Smokeless Tobacco     Never Used       OBJECTICE:   Vitals:    06/07/17 1019 06/07/17 1023   BP: 146/88 138/86   Pulse: 90    Resp: 14    Temp: 98.1  F (36.7  C)    TempSrc: Oral    SpO2: 94%    Weight: 119 lb 8 oz (54.2 kg)    Height: 5' 0.25\" (1.53 m)          DATA REVIEWED:    Labs Reviewed or Ordered (1):       GEN-alert,  in no apparent distress.  HEENT-mucous membranes are moist, neck is supple.  CV-regular rate and rhythm with no murmur.   RESP-lungs clear to auscultation .  ABDOMEN- Soft , not tender.  EXTREM- No edema.  Sensation intact.  No open lesions or ulcers.  SKIN-no unusual rashes.        Kodak Smith   6/7/2017   This transcription uses voice recognition software, which may contain typographical errors.      "

## 2021-06-12 NOTE — TELEPHONE ENCOUNTER
If she already stopped taking Metformin and using insulin, she can keep her appointment on 11/24/2020.  ( The reason for coming in early is to discuss plan since we were not able to reach her after multiple attempts)    Please update patient's contact numbers.  Please include the nephews number too.      Dr. Kodak Smith  11/3/2020 1:35 PM

## 2021-06-12 NOTE — TELEPHONE ENCOUNTER
Called pt and relayed PCP message.  Pt needs to update with contact numbers including her nephews as well,  Thanks,

## 2021-06-12 NOTE — PROGRESS NOTES
Assessment and Plan:     1. Routine general medical examination at a health care facility  Declined mammogram.    2. Type 2 diabetes mellitus treated without insulin (H)  A1c 11.4 today.  Off of Metformin for many months (unable to recall).  Restarted Metformin, last we will start with 500 mg twice daily and increase if kidney function is normal.  Will likely need additional medication in/or insulin.  Recent meter and testing supplies.  Spoke to her nephew who is also a diabetic.  Nephew will teach her how to use the meter.  Close follow-up in 4 weeks.  Restarted Lipitor.  - Glycosylated Hemoglobin A1C  - Comprehensive Metabolic Panel  - Microalbumin, Random Urine  - metFORMIN (GLUCOPHAGE) 500 MG tablet; Take 1 tablet (500 mg total) by mouth 2 (two) times a day with meals.  Dispense: 180 tablet; Refill: 3  - blood-glucose meter Misc; Test blood sugar twice a day  Dispense: 1 each; Refill: 0  - generic lancets; Use 1 each As Directed 2 (two) times a day. Test blood sugar twice a day  Dispense: 200 each; Refill: 11  - blood glucose test strips; Use 1 each As Directed 2 (two) times a day. Test blood sugar twice a day  Dispense: 200 strip; Refill: 11    3. Mixed hyperlipidemia  - Lipid [place] FASTING    4. Benign Essential Hypertension  Out of control.  Increase atenolol to 50 mg daily.  She will continue lisinopril 40 mg daily.  Follow-up in 4 weeks.  - atenoloL (TENORMIN) 50 MG tablet; Take 1 tablet (50 mg total) by mouth daily.  Dispense: 90 tablet; Refill: 1    5. Need for immunization against influenza   - Influenza,Quad,High Dose,PF 65 YR+    6.  Memory problems  Offered referral to memory clinic.  Patient declined.  Spoke to nephew who lives with the patient.  Nephew will discuss with patient's sister.  They will help her with her medications and help her monitor her blood sugar.    The patient's current medical problems were reviewed.    I have had an Advance Directives discussion with the patient.  The  following health maintenance schedule was reviewed with the patient and provided in printed form in the after visit summary:   Health Maintenance   Topic Date Due     HEPATITIS C SCREENING  1950     HEPATITIS B VACCINES (1 of 3 - Risk 3-dose series) 02/11/1968     ZOSTER VACCINES (1 of 2) 02/11/1999     DXA SCAN  02/11/2014     DIABETIC EYE EXAM  07/21/2018     COLORECTAL CANCER SCREENING  01/08/2019     DIABETIC FOOT EXAM  01/11/2019     MAMMOGRAM  10/10/2019     BMP  05/16/2020     LIPID  05/16/2020     MICROALBUMIN  05/16/2020     A1C  04/27/2021     ADVANCE CARE PLANNING  06/16/2021     MEDICARE ANNUAL WELLNESS VISIT  10/27/2021     FALL RISK ASSESSMENT  10/27/2021     TD 18+ HE  05/24/2022     Pneumococcal Vaccine: 65+ Years  Completed     INFLUENZA VACCINE RULE BASED  Completed     Pneumococcal Vaccine: Pediatrics (0 to 5 Years) and At-Risk Patients (6 to 64 Years)  Aged Out        Subjective:   Chief Complaint: Paradise Yuan is an 70 y.o. female here for an Annual Wellness visit.   HPI: The patient is a 70-year-old female with history of diabetes and hypertension here for follow-up.  She brought in to pill bottles, lisinopril 20 mg and atenolol only 5 mg.  She is not taking Lipitor.  She is not taking Metformin.  She was unable to recall 1 she ran out of Lipitor and Metformin.  Diabetic testing supplies were sent to her pharmacy last year.  She does not know if she still has it.  She is not checking her blood sugar.     She lives with her sister and nephew.  I spoke to her nephew who is also diabetic.  They are not sure if she has Metformin at home or not.  Her memory is been declining but not rapidly per nephew.  Nephew thinks that she has been forgetting to take her medications.    No known exposure to COVID-19.  No symptoms.    Review of Systems:   Please see above.  The rest of the review of systems are negative for all systems.    Patient Care Team:  Kodak Smith MD as PCP - General (Family  Medicine)  Kodak Smith MD as Assigned PCP     Patient Active Problem List   Diagnosis     Stomatitis Herpetiformis     Benign Essential Hypertension     Type 2 diabetes mellitus treated without insulin (H)     Hyperlipidemia     Past Medical History:   Diagnosis Date     Hypertension       No past surgical history on file.   Family History   Problem Relation Age of Onset     Breast cancer Neg Hx       Social History     Socioeconomic History     Marital status: Single     Spouse name: Not on file     Number of children: Not on file     Years of education: Not on file     Highest education level: Not on file   Occupational History     Not on file   Social Needs     Financial resource strain: Not on file     Food insecurity     Worry: Not on file     Inability: Not on file     Transportation needs     Medical: Not on file     Non-medical: Not on file   Tobacco Use     Smoking status: Never Smoker     Smokeless tobacco: Never Used   Substance and Sexual Activity     Alcohol use: No     Drug use: No     Sexual activity: Not on file   Lifestyle     Physical activity     Days per week: Not on file     Minutes per session: Not on file     Stress: Not on file   Relationships     Social connections     Talks on phone: Not on file     Gets together: Not on file     Attends Denominational service: Not on file     Active member of club or organization: Not on file     Attends meetings of clubs or organizations: Not on file     Relationship status: Not on file     Intimate partner violence     Fear of current or ex partner: Not on file     Emotionally abused: Not on file     Physically abused: Not on file     Forced sexual activity: Not on file   Other Topics Concern     Not on file   Social History Narrative     Not on file      Current Outpatient Medications   Medication Sig Dispense Refill     atenoloL (TENORMIN) 50 MG tablet Take 1 tablet (50 mg total) by mouth daily. 90 tablet 1     lisinopriL (PRINIVIL,ZESTRIL) 20 MG tablet Take  one tab daily 90 tablet 0     atorvastatin (LIPITOR) 20 MG tablet Take 1 tablet (20 mg total) by mouth at bedtime. 90 tablet 1     blood glucose test strips Use 1 each As Directed as needed. Dispense brand per patient's insurance at pharmacy discretion. 100 each 11     blood glucose test strips Use 1 each As Directed 2 (two) times a day. Test blood sugar twice a day 200 strip 11     blood-glucose meter Misc Test blood glucose twice a day 1 each 0     blood-glucose meter Misc Test blood sugar twice a day 1 each 0     generic lancets Use 1 each As Directed as needed. Dispense brand per patient's insurance at pharmacy discretion. 100 each 11     generic lancets Use 1 each As Directed 2 (two) times a day. Test blood sugar twice a day 200 each 11     metFORMIN (GLUCOPHAGE) 500 MG tablet Take 1 tablet (500 mg total) by mouth 2 (two) times a day with meals. 180 tablet 3     No current facility-administered medications for this visit.       Objective:   Vital Signs:   Vitals:    10/27/20 0758 10/27/20 1105 10/27/20 1106   BP: (!) 174/98 (!) 162/92 (!) 158/94   Patient Site: Left Arm     Patient Position: Sitting     Cuff Size: Adult Regular     Pulse: (!) 140     Resp: 16     Temp: 99  F (37.2  C)     TempSrc: Oral     SpO2: 97%     Weight: 111 lb 5 oz (50.5 kg)     Height: 5' (1.524 m)         VisionScreening:      PHYSICAL EXAM  Gen - alert, orientated, NAD  Eyes - fundascopic exam limited by the undialated pupil but looks symmetric  ENT - oropharynx clear, TMs clear  Neck - supple, no palpable mass or lymphadenopathy  CV - RRR, no murmur  Breast - no dominant mass on either side, no axillary mass.  Resp - lungs CTA  Ab - soft, nontender, no palpable mass or organomegaly   - Declined.   Extrem - warm, no edema  Neuro - CN II-XII intact, strength, sensation, reflexes intact and symmetric  Skin - no rash.  No atypical appearing lesions seen.       Assessment Results 10/27/2020   Activities of Daily Living No help needed    Instrumental Activities of Daily Living No help needed   Mini Cog Total Score 4   Some recent data might be hidden     A Mini-Cog score of 0-2 suggests the possibility of dementia, score of 3-5 suggests no dementia        Identified Health Risks:     She is at risk for lack of exercise and has been provided with information to increase physical activity for the benefit of her well-being.  Information regarding advance directives (living tong), including where she can download the appropriate form, was provided to the patient via the AVS.

## 2021-06-12 NOTE — TELEPHONE ENCOUNTER
Please send a letter to call the clinic to make appt ASAP.    Dr. Kodak Smith  11/2/2020 2:14 PM

## 2021-06-12 NOTE — TELEPHONE ENCOUNTER
Called and left message. ( called pt and sister )    Metformin restarted yesterday but kidney function test came back abnormal.  Needs to STOP metformin and start insulin, lantus 10 U daily. New rx for insulin sent.  Please call patient.    Thank you,    Dr. Kodak Smith  10/28/2020 9:10 AM

## 2021-06-12 NOTE — TELEPHONE ENCOUNTER
Patient Returning Call  Reason for call:  Return call   Information relayed to patient:  Patient and nephew was informed of the message below.   Patient has additional questions:  Yes  If YES, what are your questions/concerns:  Patient and nephew stated she had already stopped the metformin and taking insulin as stated below. wally Telles stated that she already has an appointment scheduled for 11/24/2020 and if Kodak Smith MD needs to see her sooner to please call back.  Okay to leave a detailed message?: No

## 2021-06-13 NOTE — TELEPHONE ENCOUNTER
Pt has apt with PCP on 11/24.  Have been unable to reach pt.  Or pt nephew.  Closing task. Thanks.

## 2021-06-13 NOTE — PROGRESS NOTES
ASSESMENT AND PLAN:  Diagnoses and all orders for this visit:    Type 2 diabetes mellitus treated without insulin (H)  -     Basic Metabolic Panel  Recheck BMP today.   Continue lantus 10 U daily, will not restart metformin.    Abnormal renal function test  -     Basic Metabolic Panel    Hyperlipidemia, unspecified hyperlipidemia type    Benign Essential Hypertension  Did not take med this morning.  Will schedule virtual visit in 2 weeks.        SUBJECTIVE: Paradise Yuan is here to follow up on DM.  Insulin started in 10/20/20, Metformin discontinued due to abnormal creatinine.   Creatinine was 1.32, was normal in 5/2019.  She is here with her sister, who helps her check BG.BG in low 100 most days but was > 200 after thanksgiving dinner.   Patient states she is doing well, no concerns.   No fever, sore throat, cough or chest pain. No lost of taste or smell.    Past Medical History:   Diagnosis Date     Hypertension      Patient Active Problem List   Diagnosis     Stomatitis Herpetiformis     Benign Essential Hypertension     Type 2 diabetes mellitus treated without insulin (H)     Hyperlipidemia       Allergies:  No Known Allergies    Social History     Tobacco Use   Smoking Status Never Smoker   Smokeless Tobacco Never Used       Review of systems otherwise negative except as listed in HPI.   Social History     Tobacco Use   Smoking Status Never Smoker   Smokeless Tobacco Never Used       OBJECTICE:     Vitals:    12/03/20 1018 12/03/20 1102   BP: (!) 162/96 (!) 158/92   Patient Site: Left Arm    Patient Position: Sitting    Cuff Size: Adult Regular    Pulse: (!) 102    Resp: 16    Temp: 98.3  F (36.8  C)    TempSrc: Oral    SpO2: 93%    Weight: 110 lb 6 oz (50.1 kg)    Height: 5' (1.524 m)      DATA REVIEWED:    Labs Reviewed or Ordered (1):       GEN-alert,  in no apparent distress.  HEENT-neck is supple.  CV-regular rate and rhythm with no murmur.   RESP-lungs clear to auscultation .  ABDOMEN- Soft , not  tender.  EXTREM- No open lesions or ulcers.  SKIN-normal        Chappell Za   12/3/2020

## 2021-06-13 NOTE — PROGRESS NOTES
MTM Consult Encounter    Paradise Yuan is a 70 y.o. female referred for a clinical pharmacist consult from Dr. Smith for insulin teaching. Patients sister and nephew were also present during visit. Based on differing responses to questions from patient and family members, patient appeared confused throughout the visit today.   Medication adherence: Patients nephew sets up her mediations in a pillbox, her sister makes sure that she takes them as prescribed. Sometimes will forget to take her nighttime medications, but does not miss insulin doses.     Discussion: Patient states that she gets help with administering her insulin, sister administers her lantus 10 units daily. She is not taking her metformin, was told not to start this. Her sister helps with checking her BG. She just got a new glucometer - started using it about a week ago.   Nighttime sugars - 102, 139, 145, 165 - she has not been checking AM sugars yet.   She has cut down a lot on her sugar and rice intake. Denies any hypoglycemic episodes but aware of what to watch for.   Lab Results   Component Value Date    HGBA1C 11.4 (H) 10/27/2020    HGBA1C 9.1 (H) 05/16/2019    HGBA1C 8.8 (H) 10/03/2018     Lab Results   Component Value Date    GLUF 98 10/14/2013    MICROALBUR 16.35 (H) 10/27/2020    LDLCALC 113 10/27/2020    CREATININE 1.32 (H) 10/27/2020     BP Readings from Last 3 Encounters:   10/27/20 (!) 158/94   05/16/19 148/86   10/03/18 138/78     Plan:  1. [facility] pharmacist provided blood sugar monitoring booklet today  2. Patient to check blood sugar twice daily  3. Continue to hold metformin until kidney function improves    - If A1c elevated in the future, would recommend adding tradjenta 5mg daily or mealtime insulin.     Follow up:   As directed with PCP.     Ivon Marmolejo), PharmD  Medication Therapy Management Pharmacist  [facility]    Current Outpatient Medications   Medication Sig Dispense Refill     atenoloL  "(TENORMIN) 50 MG tablet Take 1 tablet (50 mg total) by mouth daily. 90 tablet 1     atorvastatin (LIPITOR) 20 MG tablet Take 1 tablet (20 mg total) by mouth at bedtime. 90 tablet 1     blood glucose test strips Use 1 each As Directed as needed. Dispense brand per patient's insurance at pharmacy discretion. 100 each 11     blood glucose test strips Use 1 each As Directed 2 (two) times a day. Test blood sugar twice a day 200 strip 11     blood-glucose meter Misc Test blood glucose twice a day 1 each 0     blood-glucose meter Misc Test blood sugar twice a day 1 each 0     generic lancets Use 1 each As Directed as needed. Dispense brand per patient's insurance at pharmacy discretion. 100 each 11     generic lancets Use 1 each As Directed 2 (two) times a day. Test blood sugar twice a day 200 each 11     insulin glargine (LANTUS SOLOSTAR PEN) 100 unit/mL (3 mL) pen Inject 10 Units under the skin at bedtime. 15 mL 11     lisinopriL (PRINIVIL,ZESTRIL) 20 MG tablet Take one tab daily 90 tablet 0     metFORMIN (GLUCOPHAGE) 500 MG tablet Take 1 tablet (500 mg total) by mouth 2 (two) times a day with meals. 180 tablet 3     pen needle, diabetic 31 gauge x 1/4\" Ndle Use one daily as directed 100 each 11     No current facility-administered medications for this visit.                         "

## 2021-06-13 NOTE — PROGRESS NOTES
"ASSESMENT AND PLAN:  Diagnoses and all orders for this visit:    Type 2 diabetes mellitus treated without insulin  -     Glycosylated Hemoglobin A1c  Will increase metformin if needed.  Does not want to be on lipid medication.  Recheck at next visit.    Benign Essential Hypertension  Out of meds, has refills.  Called pharmacy to deliver meds.  Continue lisinopril 10 mg and atenolol 25 mg     Need for influenza vaccination  -     Influenza High Dose, Seasonal 65+ yrs    Other orders  -     aspirin 81 MG EC tablet; Take 1 tablet (81 mg total) by mouth daily.  Dispense: 90 tablet; Refill: 2        SUBJECTIVE: Paradise Yuan is a very pleasant 67-year-old female with history of hypertension and diabetes here for follow-up.  She was started on metformin 500 XR mg daily about 3 months ago.  She is not checking her blood sugar at home.  States she is doing fine.  She is retired, has not been exercise regularly.  States she has not been able to watch her diet closely.  A1c 7.9 in June to starting metformin. Had eye exam in July 2017, recommended to follow-up in 1 year.      She takes lisinopril 10 mg and atenolol 25 mg for hypertension.  She ran out of her medications 2 days ago.  No acute headache, acute vision changes, chest pain or shortness of breath today.      No new problem today.    Past Medical History:   Diagnosis Date     Hypertension      Patient Active Problem List   Diagnosis     Stomatitis Herpetiformis     Benign Essential Hypertension     Type 2 diabetes mellitus treated without insulin       Allergies:  No Known Allergies    History   Smoking Status     Never Smoker   Smokeless Tobacco     Never Used       Review of systems otherwise negative except as listed in HPI.   History   Smoking Status     Never Smoker   Smokeless Tobacco     Never Used       OBJECTICE: BP (!) 150/98  Pulse 84  Temp 97.7  F (36.5  C) (Oral)   Resp 16  Ht 5' 0.25\" (1.53 m)  Wt 112 lb 8 oz (51 kg)  BMI 21.79 kg/m2    DATA " REVIEWED:  Labs Reviewed or Ordered (1):       GEN-alert,  in no apparent distress.  HEENT-mucous membranes are moist, neck is supple.  CV-regular rate and rhythm with no murmur.   RESP-lungs clear to auscultation .  ABDOMEN- Soft , not tender.  EXTREM- No open lesions or ulcers. Sensation intact.   SKIN-normal        [place] Sarah   10/11/2017

## 2021-06-14 NOTE — PROGRESS NOTES
ASSESMENT AND PLAN:  Diagnoses and all orders for this visit:    Type 2 diabetes mellitus treated without insulin (H)  -     Glycosylated Hemoglobin A1c  -     [facility] RED  -     Basic Metabolic Panel  A1c is 8.6 today, was 11.4 in 10/20.  Advised to use insulin daily and take all oral medications daily.    Benign Essential Hypertension  BP at target today.    Screening for thyroid disorder  -     Thyroid [place]    Memory problem  -     Ambulatory referral to Dementia/Memory Loss Clinic  Offered evaluation for PCA but patient declined.  She lives with her sister and sister is helping her with medications including insulin.    Advised to bring pill bottles and blood sugar log next time.  Follow-up in 2 weeks.      SUBJECTIVE: Paradise Yuan is here to follow-up on diabetes.  She is here with her sister who is her primary caregiver.  I also spoke to her nephew, Desmond who brought them to the clinic.  Per both sister and nephew, patient has been refusing to use insulin.  She has not been taking her oral medications for a few days now.  Sister states she was sleeping a lot last week, refusing to eat.  She is eating again now but has not restarted insulin yet. Off insulin for 1 week per sister. Her nephew was covid positive  ~ a month ago but patient was never tested.     She has been having significant problems with recent memories but very good remote memories per nephew.  This started after her sister passed away 2 years ago but getting worse lately.  She does not appear to be depressed per nephew and sister.    Past Medical History:   Diagnosis Date     Hypertension      Patient Active Problem List   Diagnosis     Stomatitis Herpetiformis     Benign Essential Hypertension     Type 2 diabetes mellitus treated without insulin (H)     Hyperlipidemia       Allergies:  No Known Allergies    Social History     Tobacco Use   Smoking Status Never Smoker   Smokeless Tobacco Never Used       Review of systems otherwise negative  except as listed in HPI.   Social History     Tobacco Use   Smoking Status Never Smoker   Smokeless Tobacco Never Used       OBJECTICE: /86 (Patient Site: Left Arm, Patient Position: Sitting, Cuff Size: Adult Regular)   Pulse 96   Temp 98  F (36.7  C) (Oral)   Resp 16   Ht 5' (1.524 m)   Wt 99 lb 8 oz (45.1 kg)   SpO2 96%   BMI 19.43 kg/m      DATA REVIEWED:  Additional History from Old Records Summarized (2):   Labs Reviewed or Ordered (1):       GEN-alert,  in no apparent distress.  HEENT-mucous membranes are moist, neck is supple.  CV-regular rate and rhythm with no murmur.   RESP-lungs clear to auscultation .  ABDOMEN- Soft , not tender.  EXTREM- Feet- No open lesions or ulcers but very dry.  SKIN- No rashes.      This transcription uses voice recognition software, which may contain typographical errors.      Kodak Smith   1/5/2021

## 2021-06-15 NOTE — PROGRESS NOTES
ASSESMENT AND PLAN:  Diagnoses and all orders for this visit:    Type 2 diabetes mellitus treated without insulin (H)  -     insulin glargine (LANTUS SOLOSTAR PEN) 100 unit/mL (3 mL) pen; Inject 10 Units under the skin at bedtime.  Dispense: 15 mL; Refill: 11  A1c at next visit.    Benign Essential Hypertension  -     atenoloL (TENORMIN) 50 MG tablet; Take 1 tablet (50 mg total) by mouth daily.  Dispense: 90 tablet; Refill: 1  -     lisinopriL (PRINIVIL,ZESTRIL) 20 MG tablet; Take one tab daily  Dispense: 90 tablet; Refill: 0    Mixed hyperlipidemia  -     atorvastatin (LIPITOR) 20 MG tablet; Take 1 tablet (20 mg total) by mouth at bedtime.  Dispense: 90 tablet; Refill: 1    Memory problem  Refer to memory clinic.  No appointment scheduled yet.  We will follow-up on that.     This transcription uses voice recognition software, which may contain typographical errors.      SUBJECTIVE: Paradise Yuan is a 71-year-old female with history of diabetes, hypertension, hyperlipidemia and significant memory problems here for follow-up.  She is here with her sister.  Sister checks her fasting blood sugar daily.  The numbers for the past 2 weeks are 156, 147, 144, 136, 136, 115, 103, 110, 117, 177, 105, 145, 145, 138, 122, 135 and 170.  Sister states she is having trouble cutting back on her daily rice.  Patient states she is doing well, no new concerns or complaints since last visit.  She did not bring her pill bottles but states she takes 3 medications daily.  She uses insulin 10 units daily.  Metformin was discontinued due to abnormal creatinine few months ago.    No known exposure to COVID-19 since last visit.  No COVID-19 symptoms.    Past Medical History:   Diagnosis Date     Hypertension      Patient Active Problem List   Diagnosis     Stomatitis Herpetiformis     Benign Essential Hypertension     Type 2 diabetes mellitus treated without insulin (H)     Hyperlipidemia     Memory problem       Allergies:  No Known  Allergies    Social History     Tobacco Use   Smoking Status Never Smoker   Smokeless Tobacco Never Used       Review of systems otherwise negative except as listed in HPI.   Social History     Tobacco Use   Smoking Status Never Smoker   Smokeless Tobacco Never Used       OBJECTICE:   Vitals:    02/23/21 1047 02/23/21 1118   BP: (!) 148/96 138/82   Patient Site: Left Arm    Patient Position: Sitting    Cuff Size: Adult Regular    Pulse: 93    Resp: 16    Temp: 98.1  F (36.7  C)    TempSrc: Oral    SpO2: 96%    Weight: 105 lb 5 oz (47.8 kg)    Height: 5' (1.524 m)          GEN-alert,  in no apparent distress.  HEENT- neck is supple.  CV-regular rate and rhythm with no murmur.   RESP-lungs clear to auscultation .  ABDOMEN- Soft , not tender.  EXTREM- Sensation intact. No open lesions or ulcers.         Kodak Smith   2/23/2021

## 2021-06-15 NOTE — PROGRESS NOTES
"ASSESMENT AND PLAN:  Diagnoses and all orders for this visit:    Benign Essential Hypertension  -     Comprehensive Metabolic Panel    Type 2 diabetes mellitus treated without insulin  -     Glycosylated Hemoglobin A1c    Screening for lipid disorders  -     Lipid Cascade    Screening for osteoporosis  -     DXA Bone Density Scan; Future; Expected date: 1/11/18    Continue all current meds for now. Will call if needs changes after test results.   F/U in 3 months.    SUBJECTIVE: Paradise Yuan  is a very pleasant 67-year-old female with history of hypertension and diabetes here for follow-up.    She is on metformin 500 XR  mg daily . She is not checking her blood sugar at home.  States she is doing fine.  She is retired, has been trying to watch her diet closely.  A1c 7.0 in 10/17.     Had eye exam in July 2017, recommended to follow-up in 1 year.      She takes lisinopril 10 mg and atenolol 25 mg for hypertension.  No medication SE reported. No acute headache, acute vision changes, chest pain or shortness of breath today.      No new concerns today.       Past Medical History:   Diagnosis Date     Hypertension      Patient Active Problem List   Diagnosis     Stomatitis Herpetiformis     Benign Essential Hypertension     Type 2 diabetes mellitus treated without insulin       Allergies:  No Known Allergies    History   Smoking Status     Never Smoker   Smokeless Tobacco     Never Used       Review of systems otherwise negative except as listed in HPI.   History   Smoking Status     Never Smoker   Smokeless Tobacco     Never Used       OBJECTICE: /84 (Patient Site: Left Arm, Patient Position: Sitting, Cuff Size: Adult Regular)  Pulse 92  Temp 98  F (36.7  C) (Oral)   Resp 16  Ht 5' 0.25\" (1.53 m)  Wt 115 lb 4 oz (52.3 kg)  BMI 22.32 kg/m2    DATA REVIEWED:    Labs Reviewed or Ordered (1):       GEN-alert,  in no apparent distress.  HEENT-mucous membranes are moist, neck is supple.  CV-regular rate and " rhythm with no murmur.   RESP-lungs clear to auscultation .  ABDOMEN- Soft , not tender.  EXTREM- No open lesions or ulcers. Sensation intact.   SKIN-normal        Darby Sarah   1/11/2018

## 2021-06-15 NOTE — PROGRESS NOTES
I met with Paradise Yuan at the request of Dr Smith to recheck her blood pressure.  Blood pressure medications on the MAR were reviewed with patient.    Patient has taken all medications as per usual regimen: Yes  Patient reports tolerating them without any issues or concerns: Yes    There were no vitals filed for this visit.    Blood pressure was taken, previous encounter was reviewed bp was above 140/90  Pt did not take bp meds this am. Will recheck bp in 5 minutes

## 2021-06-15 NOTE — PROGRESS NOTES
I met with Paradise Yuan at the request Mary Lou gallegos to recheck her blood pressure.  Blood pressure medications on the MAR were reviewed with patient.    Patient has taken all medications as per usual regimen: No forgot to take this am   Patient reports tolerating them without any issues or concerns: Yes    Vitals:    12/27/17 1039 12/27/17 1049   BP: 160/70 128/70   Pulse: (!) 117        After 5 minutes, the patient's blood pressure was < 140/90, the previous encounter was reviewed, recorded blood pressure below 140/90.  Patient was discharged and the note will be sent to the provider for final review.

## 2021-06-16 PROBLEM — E78.5 HYPERLIPIDEMIA: Status: ACTIVE | Noted: 2018-10-03

## 2021-06-16 PROBLEM — E11.9 TYPE 2 DIABETES MELLITUS TREATED WITHOUT INSULIN (H): Status: ACTIVE | Noted: 2017-10-11

## 2021-06-16 PROBLEM — R41.3 MEMORY PROBLEM: Status: ACTIVE | Noted: 2021-02-23

## 2021-06-16 NOTE — PROGRESS NOTES
ASSESMENT AND PLAN:  Diagnoses and all orders for this visit:    Type 2 diabetes mellitus treated without insulin (H)  -     Glycosylated Hemoglobin A1C  -     Comprehensive Metabolic Panel  -     Lipid Cascade RANDOM  -     insulin glargine (LANTUS SOLOSTAR PEN) 100 unit/mL (3 mL) pen; Inject 16 Units under the skin at bedtime.  Dispense: 15 mL; Refill: 11  Uncontrolled.  A1c 9.5 today.  Currently on Lantus 10 units daily.  Will increase Lantus to 16 units daily.  Continue other oral medications.  Follow-up with blood sugar log in 4 weeks.    Hyperlipidemia, unspecified hyperlipidemia type  Continue current medication.    Memory problem  -     Ambulatory referral to Dementia/Memory Loss Clinic    Noncompliance with medication regimen  Due to memory problems.  Her sister is helping her with insulin and regular blood sugar check.  Declined home care referral.    This transcription uses voice recognition software, which may contain typographical errors.      SUBJECTIVE: Paradise Yuan is 71-year-old female with history of uncontrolled diabetes, hypertension, hyperlipidemia and memory problem here for follow-up.  Her sister is managing her insulin, checking blood sugar and oral medications.  She tends to forget to take her medications due to recent worsening memory problem.  Sister has been giving her Lantus 10 units daily but not sure if she is taking her oral medications regularly.  Patient denied concerns or complaints today.  States she is doing well.  She was referred to memory clinic for her worsening memory problems.  No appointment scheduled yet.    Past Medical History:   Diagnosis Date     Hypertension      Patient Active Problem List   Diagnosis     Stomatitis Herpetiformis     Benign Essential Hypertension     Type 2 diabetes mellitus treated without insulin (H)     Hyperlipidemia     Memory problem       Allergies:  No Known Allergies    Social History     Tobacco Use   Smoking Status Never Smoker    Smokeless Tobacco Never Used       Review of systems otherwise negative except as listed in HPI.   Social History     Tobacco Use   Smoking Status Never Smoker   Smokeless Tobacco Never Used       OBJECTICE: /88 (Patient Site: Left Arm, Patient Position: Sitting, Cuff Size: Adult Regular)   Pulse 81   Temp 98.1  F (36.7  C) (Oral)   Resp 16   Ht 5' (1.524 m)   Wt 110 lb 8 oz (50.1 kg)   SpO2 93%   BMI 21.58 kg/m      DATA REVIEWED:    Labs Reviewed or Ordered (1):       GEN-alert,  in no apparent distress.  HEENT- neck is supple.  CV-regular rate and rhythm with no murmur.   RESP-lungs clear to auscultation .  ABDOMEN- Soft , not tender.  EXTREM- No open lesions or ulcers.  Sensation intact.  SKIN-normal        [facility]   4/20/2021

## 2021-06-16 NOTE — TELEPHONE ENCOUNTER
Telephone Encounter by Nini Bruno CMA at 1/27/2020  5:57 PM     Author: Nini Bruno CMA Service: -- Author Type: Certified Medical Assistant    Filed: 1/31/2020  4:43 PM Encounter Date: 1/27/2020 Status: Signed    : Nini Bruno CMA (Certified Medical Assistant)         Patient Quality Outreach      Summary:    Patient is due/failing the following:   A1C, Hypertension follow-up visit, Breast Cancer Screening - Mammogram, Annual Wellness and Immunizations    Type of outreach:    Phone, left message for patient to call back.    Questions for provider review:    None                                                                                   **Start Working phrase here:**  Patient has the following on her problem list:     Diabetes    Last A1C:   Lab Results   Component Value Date    HGBA1C 9.1 (H) 05/16/2019       Last LDL:   Lab Results   Component Value Date    LDLCALC 121 05/16/2019       Is the patient on a Statin? Yes            Is the patient on Aspirin? No    Medications     Antihyperlipidemic - HMG CoA Reductase Inhibitors (statins) Disp Start End     atorvastatin (LIPITOR) 20 MG tablet    90 tablet 5/16/2019     Sig: Take 1 tablet (20 mg total) by mouth at bedtime.    Route: Oral           Last three blood pressure readings:  BP Readings from Last 3 Encounters:   05/16/19 148/86   10/03/18 138/78   01/11/18 136/84            Tobacco Use      Smoking status: Never Smoker      Smokeless tobacco: Never Used  ,   Hypertension   Last three blood pressure readings:  BP Readings from Last 3 Encounters:   05/16/19 148/86   10/03/18 138/78   01/11/18 136/84     Blood pressure: Failed    HTN Guidelines:  ? 139/89 ,   Immunizations     Flu Vaccine(1) and   Composite cancer screening  Chart review shows that this patient is due/due soon for the following: Mammogram and Colonoscopy  Patient previously declined cancer screening? Yes                                                       Nini  Damion     Chart routed to Care Team.

## 2021-06-16 NOTE — TELEPHONE ENCOUNTER
She had metformin bottle with her when she came in, needs to stop metformin only.    Dr. Kodak Smith  4/22/2021 8:42 AM

## 2021-06-16 NOTE — TELEPHONE ENCOUNTER
----- Message from Kodak Smith MD sent at 4/20/2021  3:42 PM CDT -----  Kidney test worse than before. She needs to stop Metformin and use Insulin 16 U as discussed today. Please call the patient and advise tot stop metformin.    Dr. Kodak Smith  4/20/2021 3:42 PM

## 2021-06-16 NOTE — TELEPHONE ENCOUNTER
Pt returned call. I relay message below. Pt states she hasn't been taking the metformin for 6 months now. Can Dr. Smith clarify if there's other meds she's supposed to stop? Please advise.

## 2021-06-17 NOTE — PROGRESS NOTES
ASSESMENT AND PLAN:  Diagnoses and all orders for this visit:    Type 2 diabetes mellitus treated without insulin (H)  Fasting blood sugar readings improving after increasing Lantus to 16 units daily.  Metformin was discontinued due to abnormal kidney function.  Continue Lantus 16 units daily and follow-up in 2 months.    Mixed hyperlipidemia  -     atorvastatin (LIPITOR) 20 MG tablet; Take 1 tablet (20 mg total) by mouth at bedtime.  Dispense: 90 tablet; Refill: 1  Recheck lipid at next visit.    Benign Essential Hypertension  -     atenoloL (TENORMIN) 50 MG tablet; Take 1 tablet (50 mg total) by mouth daily.  Dispense: 90 tablet; Refill: 1  -     lisinopriL (PRINIVIL,ZESTRIL) 20 MG tablet; Take one tab daily  Dispense: 90 tablet; Refill: 1  Continue current medications.    Memory problem  We will follow-up on memory clinic referral.    This transcription uses voice recognition software, which may contain typographical errors.        SUBJECTIVE: Paradise Yuan is here to follow-up on diabetes.  She was last seen a month ago, Lantus was increased to 16 units from 10 units.  A1c was 9.5 at that time.  She is here with her sister who checks her blood sugar and gives her insulin.  Her fasting blood sugar are in low 100s to 150s most days after increasing insulin.  The highest was 170 in the past 2 weeks.  States she is feeling well, no concerns or complaints since last visit.  Her sister states that she cannot cut back on her rice portion.  She has significant memory problems, was referred to memory clinic.  No appointment scheduled yet.     Past Medical History:   Diagnosis Date     Hypertension      Patient Active Problem List   Diagnosis     Stomatitis Herpetiformis     Benign Essential Hypertension     Type 2 diabetes mellitus treated without insulin (H)     Hyperlipidemia     Memory problem       Allergies:  No Known Allergies    Social History     Tobacco Use   Smoking Status Never Smoker   Smokeless Tobacco  Never Used       Review of systems otherwise negative except as listed in HPI.   Social History     Tobacco Use   Smoking Status Never Smoker   Smokeless Tobacco Never Used       OBJECTICE: /90 (Patient Site: Left Arm, Patient Position: Sitting, Cuff Size: Adult Regular)   Pulse 81   Temp 98.5  F (36.9  C) (Oral)   Resp 16   Ht 5' (1.524 m)   Wt 115 lb (52.2 kg)   SpO2 94%   BMI 22.46 kg/m        GEN-alert,  in no apparent distress.  HEENT- neck is supple.  CV-regular rate and rhythm with no murmur.   RESP-lungs clear to auscultation .  ABDOMEN- Soft , not tender.  EXTREM- No open lesions or ulcers. Sensation intact.  SKIN-normal        [facility]   5/25/2021

## 2021-06-18 NOTE — PATIENT INSTRUCTIONS - HE
Patient Instructions by Kodak Smith MD at 10/27/2020  7:40 AM     Author: Kodak Smith MD Service: -- Author Type: Physician    Filed: 10/27/2020 11:02 AM Encounter Date: 10/27/2020 Status: Signed    : Kodak Smith MD (Physician)         Patient Education     Exercise for a Healthier Heart  You may wonder how you can improve the health of your heart. If youre thinking about exercise, youre on the right track. You dont need to become an athlete, but you do need a certain amount of brisk exercise to help strengthen your heart. If you have been diagnosed with a heart condition, your doctor may recommend exercise to help stabilize your condition. To help make exercise a habit, choose safe, fun activities.       Be sure to check with your health care provider before starting an exercise program.    Why exercise?  Exercising regularly offers many healthy rewards. It can help you do all of the following:    Improve your blood cholesterol levels to help prevent further heart trouble    Lower your blood pressure to help prevent a stroke or heart attack    Control diabetes, or reduce your risk of getting this disease    Improve your heart and lung function    Reach and maintain a healthy weight    Make your muscles stronger and more limber so you can stay active    Prevent falls and fractures by slowing the loss of bone mass (osteoporosis)    Manage stress better  Exercise tips  Ease into your routine. Set small goals. Then build on them.  Exercise on most days. Aim for a total of 150 or more minutes of moderate to  vigorous intensity activity each week. Consider 40 minutes, 3 to 4 times a week. For best results, activity should last for 40 minutes on average. It is OK to work up to the 40 minute period over time. Examples of moderate-intensity activity is walking one mile in 15 minutes or 30 to 45 minutes of yard work.  Step up your daily activity level. Along with your exercise program, try being more active throughout the  day. Walk instead of drive. Do more household tasks or yard work.  Choose one or more activities you enjoy. Walking is one of the easiest things you can do. You can also try swimming, riding a bike, or taking an exercise class.  Stop exercising and call your doctor if you:    Have chest pain or feel dizzy or lightheaded    Feel burning, tightness, pressure, or heaviness in your chest, neck, shoulders, back, or arms    Have unusual shortness of breath    Have increased joint or muscle pain    Have palpitations or an irregular heartbeat      7628-8391 [employer]. [address] [place] [address]2018. All rights reserved. This information is not intended as a substitute for professional medical care. Always follow your healthcare professional's instructions.         Patient Education   Understanding Advance Care Planning  Advance care planning is the process of deciding ones own future medical care. It helps ensure that if you cant speak for yourself, your wishes can still be carried out. The plan is a series of legal documents that note a persons wishes. The documents vary by state. Advance care planning may be done when a person has a serious illness that is expected to get worse. It may be done before major surgery. And it can help you and your family be prepared in case of a major illness or injury. Advance care planning helps with making decisions at these times.       A health care proxy is a person who acts as the voice of a patient when the patient cant speak for himself or herself. The name of this role varies by state. It may be called a Durable Medical Power of  or Durable Power of  for Healthcare. It may be called an agent, surrogate, or advocate. Or it may be called a representative or decision maker. It is an official duty that is identified by a legal document. The document also varies by state.    Why Is Advance Care Planning Important?  If a person communicates their  healthcare wishes:    They will be given medical care that matches their values and goals.    Their family members will not be forced to make decisions in a crisis with no guidance.  Creating a Plan  Making an advance care plan is often done in 3 steps:    Thinking about ones wishes. To create an advance care plan, you should think about what kind of medical treatment you would want if you lose the ability to communicate. Are there any situations in which you would refuse or stop treatment? Are there therapies you would want or not want? And whom do you want to make decisions for you? There are many places to learn more about how to plan for your care. Ask your doctor or  for resources.    Picking a health care proxy. This means choosing a trusted person to speak for you only when you cant speak for yourself. When you cannot make medical decisions, your proxy makes sure the instructions in your advance care plan are followed. A proxy does not make decisions based on his or her own opinions. They must put aside those opinions and values if needed, and carry out your wishes.    Filling out the legal documents. There are several kinds of legal documents for advance care planning. Each one tells health care providers your wishes. The documents may vary by state. They must be signed and may need to be witnessed or notarized. You can cancel or change them whenever you wish. Depending on your state, the documents may include a Healthcare Proxy form, Living Will, Durable Medical Power of , Advance Directive, or others.  The Familys Role  The best help a family can give is to support their loved ones wishes. Open and honest communication is vital. Family should express any concerns they have about the patients choices while the patient can still make decisions.    3748-5316 [employer]. [address] [place] [address], [place] [address]. All rights reserved. This information is not intended as a  substitute for professional medical care. Always follow your healthcare professional's instructions.         Also, [facility] offers a free, downloadable health care directive that allows you to share your treatment choices and personal preferences if you cannot communicate your wishes. It also allows you to appoint another person (called a health care agent) to make health care decisions if you are unable to do so. You can download an advance directive by going here: [email or web address]     Patient Education   Personalized Prevention Plan  You are due for the preventive services outlined below.  Your care team is available to assist you in scheduling these services.  If you have already completed any of these items, please share that information with your care team to update in your medical record.  Health Maintenance   Topic Date Due   ? HEPATITIS C SCREENING  1950   ? HEPATITIS B VACCINES (1 of 3 - Risk 3-dose series) 02/11/1968   ? ZOSTER VACCINES (1 of 2) 02/11/1999   ? DXA SCAN  02/11/2014   ? DIABETIC EYE EXAM  07/21/2018   ? COLORECTAL CANCER SCREENING  01/08/2019   ? DIABETIC FOOT EXAM  01/11/2019   ? MAMMOGRAM  10/10/2019   ? BMP  05/16/2020   ? LIPID  05/16/2020   ? MICROALBUMIN  05/16/2020   ? A1C  04/27/2021   ? ADVANCE CARE PLANNING  06/16/2021   ? MEDICARE ANNUAL WELLNESS VISIT  10/27/2021   ? FALL RISK ASSESSMENT  10/27/2021   ? TD 18+ HE  05/24/2022   ? Pneumococcal Vaccine: 65+ Years  Completed   ? INFLUENZA VACCINE RULE BASED  Completed   ? Pneumococcal Vaccine: Pediatrics (0 to 5 Years) and At-Risk Patients (6 to 64 Years)  Aged Out

## 2021-06-20 NOTE — LETTER
Letter by Kodak Smith MD at      Author: Kodak Smith MD Service: -- Author Type: --    Filed:  Encounter Date: 8/5/2020 Status: (Other)         Paradise Yuan  [address] [place] [address]                     August 5, 2020    Dear Paradise:    At the [facility], we care about you and your health. When diagnosed early, many diseases and illness can be treated and possibly prevented. That's why it is important to see your primary care provider regularly for routine examinations and to maintain your overall health.We are trying to contact you to ensure you receive the best level of care. Our records show that you are overdue for a medication check follow up appointment .  Please contact our office at [phone] to schedule an appointment.  If you are receiving care elsewhere, please contact us so that we can update our records.   Thank you for trusting us with your care.     If you have any questions or concerns, please don't hesitate to call.    Sincerely,        Electronically signed by Kodak Smith MD

## 2021-06-20 NOTE — PROGRESS NOTES
ASSESMENT AND PLAN:  Diagnoses and all orders for this visit:    Type 2 diabetes mellitus treated without insulin (H)  -     Lipid Cascade RANDOM  -     Basic Metabolic Panel  A1c 8.8 today, was 7.2 in 1/18.   Increased metformin to 1000 mg daily.  Advised to check blood sugar at least once a day.  Follow-up in 3 months with blood sugar log.    Benign Essential Hypertension  Controlled with current medications.    BMP today.    Hyperlipidemia  Started on Lipitor 20 mg daily.  Recheck in 3 months.    Grief reaction  Dates she is doing well.     Visit for screening mammogram  Mammogram today.    Need for influenza vaccination  -     Influenza High Dose, Seasonal 65+ yrs            SUBJECTIVE: Paradise Yuan is a 68-year-old female with history of diabetes, hypertension and hyperlipidemia here for follow-up.  She is currently on metformin  mg daily.  She is not checking her blood sugar at home, has meter.  She denied hypoglycemic symptoms.  She denied abdominal pain, nausea or vomiting.  She has been trying to watch her diet but unable to give a white rice.  She is not exercising regularly.    Her sister passed away a few weeks ago.  She states she is doing relatively well.  She is sleeping okay.  She is currently living with her other sister.    Past Medical History:   Diagnosis Date     Hypertension      Patient Active Problem List   Diagnosis     Stomatitis Herpetiformis     Benign Essential Hypertension     Type 2 diabetes mellitus treated without insulin (H)     Hyperlipidemia       Allergies:  No Known Allergies    History   Smoking Status     Never Smoker   Smokeless Tobacco     Never Used       Review of systems otherwise negative except as listed in HPI.   History   Smoking Status     Never Smoker   Smokeless Tobacco     Never Used       OBJECTICE:  Vitals:    10/03/18 0954 10/03/18 1030   BP: 148/90 138/78   Patient Site: Left Arm    Patient Position: Sitting    Cuff Size: Adult Regular    Pulse: 88   "  Resp: 16    Temp: 97.9  F (36.6  C)    TempSrc: Oral    Weight: 114 lb 7 oz (51.9 kg)    Height: 5' 0.25\" (1.53 m)      DATA REVIEWED:    Labs Reviewed or Ordered (1):       GEN-alert,  in no apparent distress.  HEENT-mucous membranes are moist, neck is supple.  CV-regular rate and rhythm with no murmur.   RESP-lungs clear to auscultation .  ABDOMEN- Soft , not tender.  EXTREM- No edema.  SKIN-no open lesions or ulcers.  Sensation intact.    This transcription uses voice recognition software, which may contain typographical errors.        Kodak Smith   10/3/2018     "

## 2021-06-21 NOTE — LETTER
Letter by Kodak Smith MD at      Author: Kodak Smith MD Service: -- Author Type: --    Filed:  Encounter Date: 1/14/2021 Status: (Other)         Paradise Yuan  [address] [place] [address]                     January 14, 2021    Dear Paradise:    We've been unable to reach you by telephone to reschedule your doctor's appointment with Dr. Kodak Smith.  Number is dissconnected.  Due to schedule changes, we are asking that you call back at your earliest convenience to reschedule your appointment on 01/19/2020 at 12:20pm.    Please disregard this letter if you've already reschedule.  Thank you for your cooperation.    If you have any questions or concerns, please don't hesitate to call.    Sincerely,        Electronically signed by Kodak Smith MD

## 2021-06-21 NOTE — LETTER
Letter by Kodak Smith MD at      Author: Kodak Smith MD Service: -- Author Type: --    Filed:  Encounter Date: 1/14/2021 Status: (Other)         Paradise Yuan  [address] [place] [address]                     January 14, 2021    Dear Paradise:    We've been unable to reach you by telephone to reschedule your doctor's appointment with Dr. Kodak Smith.  Your phone number on file is disconnected.  Due to schedule changes, we are asking that you call back at your earliest convenience to reschedule your appointment on 01/19/2021 at 12:20pm.    Please disregard this letter if you've already reschedule.  Thank you for your cooperation.    If you have any questions or concerns, please don't hesitate to call.    Sincerely,        Electronically signed by Kodak Smith MD

## 2021-06-21 NOTE — LETTER
Letter by Kodak Smith MD at      Author: Kodak Smith MD Service: -- Author Type: --    Filed:  Encounter Date: 10/27/2020 Status: (Other)         Paradisekenrick Yuan  [address] [place] [address]                 November 2, 2020       Dear Ms. Yuan,    Please call the clinic per Dr. Smith's request and make an in-person appointment no later than next week.      Please call with questions or contact us using [employer].      Sincerely,        Electronically signed by Kodak Smith MD

## 2021-06-26 ENCOUNTER — HEALTH MAINTENANCE LETTER (OUTPATIENT)
Age: 71
End: 2021-06-26

## 2021-07-03 NOTE — ADDENDUM NOTE
Addendum Note by Nini Bautista CMA at 10/3/2018  9:43 AM     Author: Nini Buatista CMA Service: -- Author Type: Certified Medical Assistant    Filed: 10/3/2018  9:43 AM Encounter Date: 10/3/2018 Status: Signed    : Nini Bautista CMA (Certified Medical Assistant)    Addended by: NINI BAUTISTA on: 10/3/2018 09:43 AM        Modules accepted: Orders

## 2021-07-06 VITALS
HEIGHT: 60 IN | HEART RATE: 81 BPM | RESPIRATION RATE: 16 BRPM | OXYGEN SATURATION: 94 % | BODY MASS INDEX: 22.58 KG/M2 | TEMPERATURE: 98.5 F | SYSTOLIC BLOOD PRESSURE: 146 MMHG | DIASTOLIC BLOOD PRESSURE: 84 MMHG | WEIGHT: 115 LBS

## 2021-07-22 ENCOUNTER — HOSPITAL ENCOUNTER (EMERGENCY)
Dept: MRI IMAGING | Facility: CLINIC | Age: 71
DRG: 065 | End: 2021-07-22
Attending: EMERGENCY MEDICINE
Payer: COMMERCIAL

## 2021-07-22 ENCOUNTER — HOSPITAL ENCOUNTER (EMERGENCY)
Dept: CT IMAGING | Facility: CLINIC | Age: 71
DRG: 065 | End: 2021-07-22
Attending: EMERGENCY MEDICINE
Payer: COMMERCIAL

## 2021-07-22 ENCOUNTER — HOSPITAL ENCOUNTER (INPATIENT)
Facility: HOSPITAL | Age: 71
LOS: 11 days | Discharge: NURSING, REHAB OR LONG-TERM CARE | DRG: 064 | End: 2021-08-02
Attending: HOSPITALIST | Admitting: HOSPITALIST
Payer: COMMERCIAL

## 2021-07-22 ENCOUNTER — HOSPITAL ENCOUNTER (INPATIENT)
Facility: CLINIC | Age: 71
LOS: 1 days | Discharge: ANOTHER HOSPITAL | DRG: 065 | End: 2021-07-22
Attending: EMERGENCY MEDICINE | Admitting: EMERGENCY MEDICINE
Payer: COMMERCIAL

## 2021-07-22 ENCOUNTER — OFFICE VISIT (OUTPATIENT)
Dept: FAMILY MEDICINE | Facility: CLINIC | Age: 71
End: 2021-07-22
Payer: COMMERCIAL

## 2021-07-22 VITALS
SYSTOLIC BLOOD PRESSURE: 164 MMHG | OXYGEN SATURATION: 99 % | TEMPERATURE: 98.2 F | RESPIRATION RATE: 16 BRPM | DIASTOLIC BLOOD PRESSURE: 84 MMHG | HEART RATE: 80 BPM

## 2021-07-22 VITALS
WEIGHT: 107.44 LBS | TEMPERATURE: 98 F | HEIGHT: 60 IN | HEART RATE: 68 BPM | RESPIRATION RATE: 20 BRPM | DIASTOLIC BLOOD PRESSURE: 78 MMHG | BODY MASS INDEX: 21.09 KG/M2 | SYSTOLIC BLOOD PRESSURE: 132 MMHG

## 2021-07-22 DIAGNOSIS — E11.9 TYPE 2 DIABETES MELLITUS TREATED WITHOUT INSULIN (H): ICD-10-CM

## 2021-07-22 DIAGNOSIS — I10 ESSENTIAL HYPERTENSION, BENIGN: ICD-10-CM

## 2021-07-22 DIAGNOSIS — E78.5 HYPERLIPIDEMIA, UNSPECIFIED HYPERLIPIDEMIA TYPE: ICD-10-CM

## 2021-07-22 DIAGNOSIS — F51.01 PRIMARY INSOMNIA: ICD-10-CM

## 2021-07-22 DIAGNOSIS — E11.9 TYPE 2 DIABETES MELLITUS TREATED WITHOUT INSULIN (H): Primary | ICD-10-CM

## 2021-07-22 DIAGNOSIS — K59.01 SLOW TRANSIT CONSTIPATION: ICD-10-CM

## 2021-07-22 DIAGNOSIS — R29.90 STROKE-LIKE SYMPTOMS: ICD-10-CM

## 2021-07-22 DIAGNOSIS — I63.9 ISCHEMIC STROKE (H): Primary | ICD-10-CM

## 2021-07-22 DIAGNOSIS — R41.3 MEMORY PROBLEM: ICD-10-CM

## 2021-07-22 DIAGNOSIS — R29.898 RIGHT LEG WEAKNESS: ICD-10-CM

## 2021-07-22 DIAGNOSIS — N18.30 STAGE 3 CHRONIC KIDNEY DISEASE, UNSPECIFIED WHETHER STAGE 3A OR 3B CKD (H): ICD-10-CM

## 2021-07-22 DIAGNOSIS — R29.898 RIGHT ARM WEAKNESS: ICD-10-CM

## 2021-07-22 DIAGNOSIS — Z11.59 SCREENING FOR VIRAL DISEASE: ICD-10-CM

## 2021-07-22 DIAGNOSIS — R29.810 FACIAL DROOP: ICD-10-CM

## 2021-07-22 DIAGNOSIS — I63.512 CEREBRAL INFARCTION DUE TO OCCLUSION OF LEFT MIDDLE CEREBRAL ARTERY (H): Primary | ICD-10-CM

## 2021-07-22 LAB
ALBUMIN SERPL-MCNC: 3.9 G/DL (ref 3.5–5)
ALP SERPL-CCNC: 78 U/L (ref 45–120)
ALT SERPL W P-5'-P-CCNC: 16 U/L (ref 0–45)
ANION GAP SERPL CALCULATED.3IONS-SCNC: 12 MMOL/L (ref 5–18)
ANION GAP SERPL CALCULATED.3IONS-SCNC: 15 MMOL/L (ref 5–18)
APTT PPP: 26 SECONDS (ref 22–38)
AST SERPL W P-5'-P-CCNC: 24 U/L (ref 0–40)
ATRIAL RATE - MUSE: 88 BPM
BILIRUB SERPL-MCNC: 1.4 MG/DL (ref 0–1)
BUN SERPL-MCNC: 29 MG/DL (ref 8–28)
BUN SERPL-MCNC: 30 MG/DL (ref 8–28)
CALCIUM SERPL-MCNC: 9.2 MG/DL (ref 8.5–10.5)
CALCIUM SERPL-MCNC: 9.7 MG/DL (ref 8.5–10.5)
CHLORIDE BLD-SCNC: 105 MMOL/L (ref 98–107)
CHLORIDE BLD-SCNC: 106 MMOL/L (ref 98–107)
CHOLEST SERPL-MCNC: 141 MG/DL
CO2 SERPL-SCNC: 23 MMOL/L (ref 22–31)
CO2 SERPL-SCNC: 23 MMOL/L (ref 22–31)
CREAT SERPL-MCNC: 1.15 MG/DL (ref 0.6–1.1)
CREAT SERPL-MCNC: 1.19 MG/DL (ref 0.6–1.1)
DIASTOLIC BLOOD PRESSURE - MUSE: 86 MMHG
ERYTHROCYTE [DISTWIDTH] IN BLOOD BY AUTOMATED COUNT: 13.1 % (ref 10–15)
FASTING STATUS PATIENT QL REPORTED: YES
GFR SERPL CREATININE-BSD FRML MDRD: 46 ML/MIN/1.73M2
GFR SERPL CREATININE-BSD FRML MDRD: 48 ML/MIN/1.73M2
GLUCOSE BLD-MCNC: 135 MG/DL (ref 70–125)
GLUCOSE BLD-MCNC: 142 MG/DL (ref 70–125)
GLUCOSE BLDC GLUCOMTR-MCNC: 165 MG/DL (ref 70–125)
GLUCOSE BLDC GLUCOMTR-MCNC: 216 MG/DL (ref 70–125)
GLUCOSE BLDC GLUCOMTR-MCNC: 232 MG/DL (ref 70–125)
HBA1C MFR BLD: 10 % (ref 0–5.6)
HCT VFR BLD AUTO: 40.8 % (ref 35–47)
HDLC SERPL-MCNC: 34 MG/DL
HGB BLD-MCNC: 13.3 G/DL (ref 11.7–15.7)
INR PPP: 1.03 (ref 0.85–1.15)
INTERPRETATION ECG - MUSE: NORMAL
LACTATE SERPL-SCNC: 1.1 MMOL/L (ref 0.7–2)
LDLC SERPL CALC-MCNC: 84 MG/DL
MAGNESIUM SERPL-MCNC: 2.1 MG/DL (ref 1.8–2.6)
MCH RBC QN AUTO: 29.2 PG (ref 26.5–33)
MCHC RBC AUTO-ENTMCNC: 32.6 G/DL (ref 31.5–36.5)
MCV RBC AUTO: 90 FL (ref 78–100)
P AXIS - MUSE: 78 DEGREES
PLATELET # BLD AUTO: 318 10E3/UL (ref 150–450)
POTASSIUM BLD-SCNC: 4.1 MMOL/L (ref 3.5–5)
POTASSIUM BLD-SCNC: 4.2 MMOL/L (ref 3.5–5)
PR INTERVAL - MUSE: 146 MS
PROT SERPL-MCNC: 7.1 G/DL (ref 6–8)
QRS DURATION - MUSE: 64 MS
QT - MUSE: 362 MS
QTC - MUSE: 438 MS
R AXIS - MUSE: 32 DEGREES
RBC # BLD AUTO: 4.56 10E6/UL (ref 3.8–5.2)
SARS-COV-2 RNA RESP QL NAA+PROBE: NEGATIVE
SODIUM SERPL-SCNC: 141 MMOL/L (ref 136–145)
SODIUM SERPL-SCNC: 143 MMOL/L (ref 136–145)
SYSTOLIC BLOOD PRESSURE - MUSE: 169 MMHG
T AXIS - MUSE: 67 DEGREES
TRIGL SERPL-MCNC: 117 MG/DL
TROPONIN I SERPL-MCNC: 0.01 NG/ML (ref 0–0.29)
TSH SERPL DL<=0.005 MIU/L-ACNC: 0.9 UIU/ML (ref 0.3–5)
VENTRICULAR RATE- MUSE: 88 BPM
WBC # BLD AUTO: 12.4 10E3/UL (ref 4–11)

## 2021-07-22 PROCEDURE — G0378 HOSPITAL OBSERVATION PER HR: HCPCS

## 2021-07-22 PROCEDURE — 80048 BASIC METABOLIC PNL TOTAL CA: CPT | Performed by: EMERGENCY MEDICINE

## 2021-07-22 PROCEDURE — 255N000002 HC RX 255 OP 636: Performed by: EMERGENCY MEDICINE

## 2021-07-22 PROCEDURE — 99285 EMERGENCY DEPT VISIT HI MDM: CPT | Mod: 25

## 2021-07-22 PROCEDURE — 99223 1ST HOSP IP/OBS HIGH 75: CPT | Performed by: PSYCHIATRY & NEUROLOGY

## 2021-07-22 PROCEDURE — 120N000001 HC R&B MED SURG/OB

## 2021-07-22 PROCEDURE — 80061 LIPID PANEL: CPT | Performed by: FAMILY MEDICINE

## 2021-07-22 PROCEDURE — 72125 CT NECK SPINE W/O DYE: CPT

## 2021-07-22 PROCEDURE — C9803 HOPD COVID-19 SPEC COLLECT: HCPCS

## 2021-07-22 PROCEDURE — 84484 ASSAY OF TROPONIN QUANT: CPT | Performed by: PSYCHIATRY & NEUROLOGY

## 2021-07-22 PROCEDURE — 83735 ASSAY OF MAGNESIUM: CPT | Performed by: EMERGENCY MEDICINE

## 2021-07-22 PROCEDURE — 70553 MRI BRAIN STEM W/O & W/DYE: CPT

## 2021-07-22 PROCEDURE — 36415 COLL VENOUS BLD VENIPUNCTURE: CPT | Performed by: EMERGENCY MEDICINE

## 2021-07-22 PROCEDURE — 80048 BASIC METABOLIC PNL TOTAL CA: CPT | Performed by: FAMILY MEDICINE

## 2021-07-22 PROCEDURE — 99222 1ST HOSP IP/OBS MODERATE 55: CPT | Performed by: HOSPITALIST

## 2021-07-22 PROCEDURE — 99207 PR FOOT EXAM NO CHARGE: CPT | Performed by: FAMILY MEDICINE

## 2021-07-22 PROCEDURE — A9585 GADOBUTROL INJECTION: HCPCS | Performed by: EMERGENCY MEDICINE

## 2021-07-22 PROCEDURE — 70450 CT HEAD/BRAIN W/O DYE: CPT

## 2021-07-22 PROCEDURE — 250N000012 HC RX MED GY IP 250 OP 636 PS 637: Performed by: HOSPITALIST

## 2021-07-22 PROCEDURE — 85730 THROMBOPLASTIN TIME PARTIAL: CPT | Performed by: EMERGENCY MEDICINE

## 2021-07-22 PROCEDURE — 83036 HEMOGLOBIN GLYCOSYLATED A1C: CPT | Performed by: FAMILY MEDICINE

## 2021-07-22 PROCEDURE — 36415 COLL VENOUS BLD VENIPUNCTURE: CPT | Performed by: PSYCHIATRY & NEUROLOGY

## 2021-07-22 PROCEDURE — 250N000013 HC RX MED GY IP 250 OP 250 PS 637: Performed by: HOSPITALIST

## 2021-07-22 PROCEDURE — 99214 OFFICE O/P EST MOD 30 MIN: CPT | Performed by: FAMILY MEDICINE

## 2021-07-22 PROCEDURE — 86803 HEPATITIS C AB TEST: CPT | Performed by: FAMILY MEDICINE

## 2021-07-22 PROCEDURE — 250N000011 HC RX IP 250 OP 636: Performed by: HOSPITALIST

## 2021-07-22 PROCEDURE — 85027 COMPLETE CBC AUTOMATED: CPT | Performed by: EMERGENCY MEDICINE

## 2021-07-22 PROCEDURE — 83605 ASSAY OF LACTIC ACID: CPT | Performed by: HOSPITALIST

## 2021-07-22 PROCEDURE — 36415 COLL VENOUS BLD VENIPUNCTURE: CPT | Performed by: FAMILY MEDICINE

## 2021-07-22 PROCEDURE — 85610 PROTHROMBIN TIME: CPT | Performed by: EMERGENCY MEDICINE

## 2021-07-22 PROCEDURE — 84443 ASSAY THYROID STIM HORMONE: CPT | Performed by: EMERGENCY MEDICINE

## 2021-07-22 PROCEDURE — 250N000012 HC RX MED GY IP 250 OP 636 PS 637

## 2021-07-22 PROCEDURE — 93005 ELECTROCARDIOGRAM TRACING: CPT | Performed by: EMERGENCY MEDICINE

## 2021-07-22 PROCEDURE — 87635 SARS-COV-2 COVID-19 AMP PRB: CPT | Performed by: EMERGENCY MEDICINE

## 2021-07-22 PROCEDURE — 36415 COLL VENOUS BLD VENIPUNCTURE: CPT | Performed by: HOSPITALIST

## 2021-07-22 RX ORDER — DEXTROSE MONOHYDRATE 25 G/50ML
25-50 INJECTION, SOLUTION INTRAVENOUS
Status: DISCONTINUED | OUTPATIENT
Start: 2021-07-22 | End: 2021-08-02 | Stop reason: HOSPADM

## 2021-07-22 RX ORDER — ASPIRIN 325 MG
325 TABLET ORAL ONCE
Status: DISCONTINUED | OUTPATIENT
Start: 2021-07-22 | End: 2021-07-22 | Stop reason: HOSPADM

## 2021-07-22 RX ORDER — AMOXICILLIN 250 MG
1 CAPSULE ORAL 2 TIMES DAILY PRN
Status: DISCONTINUED | OUTPATIENT
Start: 2021-07-22 | End: 2021-07-30

## 2021-07-22 RX ORDER — ATORVASTATIN CALCIUM 10 MG/1
20 TABLET, FILM COATED ORAL AT BEDTIME
Status: DISCONTINUED | OUTPATIENT
Start: 2021-07-22 | End: 2021-07-23

## 2021-07-22 RX ORDER — ONDANSETRON 4 MG/1
4 TABLET, ORALLY DISINTEGRATING ORAL EVERY 6 HOURS PRN
Status: DISCONTINUED | OUTPATIENT
Start: 2021-07-22 | End: 2021-08-02 | Stop reason: HOSPADM

## 2021-07-22 RX ORDER — LIDOCAINE 40 MG/G
CREAM TOPICAL
Status: DISCONTINUED | OUTPATIENT
Start: 2021-07-22 | End: 2021-08-02 | Stop reason: HOSPADM

## 2021-07-22 RX ORDER — AMOXICILLIN 250 MG
2 CAPSULE ORAL 2 TIMES DAILY PRN
Status: DISCONTINUED | OUTPATIENT
Start: 2021-07-22 | End: 2021-08-02 | Stop reason: HOSPADM

## 2021-07-22 RX ORDER — LIDOCAINE 40 MG/G
CREAM TOPICAL
Status: DISCONTINUED | OUTPATIENT
Start: 2021-07-22 | End: 2021-07-30

## 2021-07-22 RX ORDER — GADOBUTROL 604.72 MG/ML
5 INJECTION INTRAVENOUS ONCE
Status: COMPLETED | OUTPATIENT
Start: 2021-07-22 | End: 2021-07-22

## 2021-07-22 RX ORDER — ONDANSETRON 2 MG/ML
4 INJECTION INTRAMUSCULAR; INTRAVENOUS EVERY 6 HOURS PRN
Status: DISCONTINUED | OUTPATIENT
Start: 2021-07-22 | End: 2021-08-02 | Stop reason: HOSPADM

## 2021-07-22 RX ORDER — NICOTINE POLACRILEX 4 MG
15-30 LOZENGE BUCCAL
Status: DISCONTINUED | OUTPATIENT
Start: 2021-07-22 | End: 2021-08-02 | Stop reason: HOSPADM

## 2021-07-22 RX ADMIN — ENOXAPARIN SODIUM 40 MG: 100 INJECTION SUBCUTANEOUS at 23:32

## 2021-07-22 RX ADMIN — GADOBUTROL 5 ML: 604.72 INJECTION INTRAVENOUS at 13:01

## 2021-07-22 RX ADMIN — INSULIN GLARGINE 12 UNITS: 100 INJECTION, SOLUTION SUBCUTANEOUS at 22:25

## 2021-07-22 RX ADMIN — ATORVASTATIN CALCIUM 20 MG: 10 TABLET, FILM COATED ORAL at 23:32

## 2021-07-22 ASSESSMENT — MIFFLIN-ST. JEOR
SCORE: 962.21
SCORE: 918.83

## 2021-07-22 ASSESSMENT — ENCOUNTER SYMPTOMS
SPEECH DIFFICULTY: 1
SHORTNESS OF BREATH: 0
DYSURIA: 0
VOMITING: 0
DIARRHEA: 0
WEAKNESS: 1
HEADACHES: 0
FEVER: 0
NAUSEA: 0
ABDOMINAL PAIN: 0
CONFUSION: 0
COUGH: 0

## 2021-07-22 ASSESSMENT — ACTIVITIES OF DAILY LIVING (ADL): DEPENDENT_IADLS:: INDEPENDENT

## 2021-07-22 NOTE — ED NOTES
Patient son brought her food, she passed dysphagia screening prior to eating and drinking with no complaints.

## 2021-07-22 NOTE — ED TRIAGE NOTES
Pt is here with her sister and her nephew.  She speaks English but speaks Tigalo (phillipino) primarily.  She denies pain.  She has right arm deficit and dysphasia.  Her nephew states he noticed she was different on July 6th, when he returned from out of town.  Family took patient to PMD this AM and was sent here

## 2021-07-22 NOTE — CONSULTS
"[facility]      Assessment /Plan:    Subacute stroke on the left causing right-sided weakness    Start daily aspirin  Stroke work-up to be ordered--telemetry, echocardiogram, lipid panel    Therapies to start tomorrow, may be a good rehab candidate with mostly subcortical stroke  MRA of neck vessels is fine  Intracranially there is stenosis or occlusion of the distal M1 on the left which corresponds with the territory of ischemia.  Thank you for this consult, we will follow up tomorrow.    Subjective:    This patient is a 71-year-old woman seen at [facility] for neurologic evaluation.  She initially presented to [facility] earlier today with about 48 hours of right-sided weakness.  She has been unable to walk.  Family have been taking care of her at home, she presented to the hospital as symptoms were not improving significantly.  She is right-handed.  MRI was completed over at [facility] and shows some patchy subcortical ischemia on the left, acute to subacute.  MRA shows stenosis or occlusion of the distal M1 on the left, but no other significant intracranial stenoses.  She does not normally take a daily aspirin.  She has not had previous known strokes.  MRI and CT are consistent with a meningioma in the left posterior fossa that looks well calcified.      Objective:      BP (!) 169/88 (BP Location: Left arm)   Pulse 90   Temp 98.1  F (36.7  C) (Oral)   Resp 20   Ht 1.575 m (5' 2\")   SpO2 94%   BMI 19.65 kg/m       Supine in bed, no acute distress, breathing comfortably  Sclera are anicteric, no conjunctival injection  Oropharynx shows no lesions or exudate  Heart has regular rate and rhythm, no rub  Breath sounds are clear  Abdomen is soft and nontender, bowel sounds present  Skin shows no bruising or rash     Awake, alert, no aphasia, there is moderate dysarthria, but she can get words out okay  She clearly understands to follow commands.  Oriented x3, good remote " and recent recall, attention is fine  Cranial nerves II - XII tested and intact, fundi are normal, no nystagmus  Neck is supple, no bruits  There is 4- out of 5 strength throughout the right arm (proximal and distal)  There is mild weakness mainly proximally in the right leg.  She has normal strength and coordination in the left arm and leg  Reflexes are normal and symmetric  Finger nose finger testing is normal on the left  Rapid alternating movements are markedly diminished on the right  Tone is normal on both sides  Sensation is present on both sides        Patient Active Problem List   Diagnosis     Stomatitis Herpetiformis     Benign Essential Hypertension     Type 2 diabetes mellitus treated without insulin (H)     Hyperlipidemia     Memory problem     Chronic kidney disease, stage 3     Facial droop     Right leg weakness     Right arm weakness     Ischemic stroke (H)       No current outpatient medications on file.       Past Medical History:   Diagnosis Date     Hypertension        Social History     Socioeconomic History     Marital status: Single     Spouse name: Not on file     Number of children: Not on file     Years of education: Not on file     Highest education level: Not on file   Occupational History     Not on file   Tobacco Use     Smoking status: Never Smoker     Smokeless tobacco: Never Used   Substance and Sexual Activity     Alcohol use: No     Drug use: No     Sexual activity: Not on file   Other Topics Concern     Not on file   Social History Narrative     Not on file     Social Determinants of Health     Financial Resource Strain:      Difficulty of Paying Living Expenses:    Food Insecurity:      Worried About Running Out of Food in the Last Year:      Ran Out of Food in the Last Year:    Transportation Needs:      Lack of Transportation (Medical):      Lack of Transportation (Non-Medical):    Physical Activity:      Days of Exercise per Week:      Minutes of Exercise per Session:     Stress:      Feeling of Stress :    Social Connections:      Frequency of Communication with Friends and Family:      Frequency of Social Gatherings with Friends and Family:      Attends Yazidism Services:      Active Member of Clubs or Organizations:      Attends Club or Organization Meetings:      Marital Status:    Intimate Partner Violence:      Fear of Current or Ex-Partner:      Emotionally Abused:      Physically Abused:      Sexually Abused:        Family History   Problem Relation Age of Onset     Breast Cancer No family hx of        No Known Allergies    Review of Systems - Other than the above-mentioned symptoms a complete systems review is negative.

## 2021-07-22 NOTE — PROGRESS NOTES
"ASSESMENT AND PLAN:  Diagnoses and all orders for this visit:  Type 2 diabetes mellitus treated without insulin (H)  -     Adult Eye Referral; Future  -     FOOT EXAM  -     Hemoglobin A1c; Future  -     Comprehensive metabolic panel (BMP + Alb, Alk Phos, ALT, AST, Total. Bili, TP); Future        Stage 3 chronic kidney disease, unspecified whether stage 3a or 3b CKD  Recheck today.    Stroke-like symptoms  Slurred speech, right-sided weakness.  Symptoms started about 2 weeks ago per her sister.  Discussed with sister and her nephew.  Advised the patient to go to the hospital today.  Nephew will take her straight to the ED from here  Per chart review patient went to [facility] this morning.    Memory problem  Referred to memory clinic, appointment pending.    Benign Essential Hypertension  Uncontrolled.  No major adjustment today since patient is going to the ED today.    Hyperlipidemia, unspecified hyperlipidemia type  -     Lipid panel; Future    Screening for viral disease  -     Hepatitis C antibody; Future    This transcription uses voice recognition software, which may contain typographical errors.      SUBJECTIVE: Paradise Yuan is a 71-year-old female with history of uncontrolled diabetes, hypertension and hyperlipidemia here with a complaint \" weakness \" and slurred speech.  She is here with her sister, history obtained mainly from the sister.  The symptoms started about 2 weeks ago.  Sister thinks it is a side effect from her medications lisinopril and atorvastatin.  Memory is also declining significantly.  She has history of memory problems but significantly declined in the past few weeks.      Past Medical History:   Diagnosis Date     Hypertension      Patient Active Problem List   Diagnosis     Stomatitis Herpetiformis     Benign Essential Hypertension     Type 2 diabetes mellitus treated without insulin (H)     Hyperlipidemia     Memory problem     Chronic kidney disease, stage 3 "       Allergies:  No Known Allergies    History   Smoking Status     Never Smoker   Smokeless Tobacco     Never Used       Review of systems otherwise negative except as listed in HPI.   History   Smoking Status     Never Smoker   Smokeless Tobacco     Never Used       OBJECTICE: There were no vitals taken for this visit.    DATA REVIEWED:    Labs Reviewed or Ordered (1):       GEN-alert,  in no apparent distress. Slurred speech. Sitting in a wheelchair.  HEENT-neck is supple. Left angle mouth drooping.   CV-regular rate and rhythm with no murmur.   RESP-lungs clear to auscultation .  ABDOMEN- Soft , not tender.  EXTREM- strength 3/5 right upper and lower ext.   SKIN-normal        Kodak Smith MD   7/22/2021

## 2021-07-22 NOTE — PHARMACY-ADMISSION MEDICATION HISTORY
Medication history was completed at [facility] 7/22/21 earlier this morning. Please see previous note for additional information.     Ny Lam RPH  July 22, 2021 5:33 PM

## 2021-07-22 NOTE — CONSULTS
Care Management Initial Consult    General Information  Assessment completed with: Patient, Family, Fish  Type of CM/SW Visit: Initial Assessment    Primary Care Provider verified and updated as needed: Yes   Readmission within the last 30 days: no previous admission in last 30 days      Reason for Consult: discharge planning  Advance Care Planning: Advance Care Planning Reviewed: no concerns identified          Communication Assessment  Patient's communication style: spoken language (English or Bilingual) (Limited English)             Cognitive  Cognitive/Neuro/Behavioral: WDL  Level of Consciousness: alert     Orientation: oriented x 4  Mood/Behavior: flat affect, cooperative, calm, behavior appropriate to situation  Best Language: 0 - No aphasia  Speech: slow    Living Environment:   People in home: sibling(s)     Current living Arrangements: house      Able to return to prior arrangements: yes       Family/Social Support:  Care provided by: self  Provides care for: no one  Marital Status: Single  Sibling(s)          Description of Support System: Involved, Supportive    Support Assessment: Adequate social supports    Current Resources:   Patient receiving home care services: No     Community Resources: None  Equipment currently used at home: none  Supplies currently used at home: None    Employment/Financial:  Employment Status: retired        Financial Concerns:     Referral to Financial Counselor: No       Lifestyle & Psychosocial Needs:  Social Determinants of Health     Tobacco Use: Low Risk      Smoking Tobacco Use: Never Smoker     Smokeless Tobacco Use: Never Used   Alcohol Use:      Frequency of Alcohol Consumption:      Average Number of Drinks:      Frequency of Binge Drinking:    Financial Resource Strain:      Difficulty of Paying Living Expenses:    Food Insecurity:      Worried About Running Out of Food in the Last Year:      Ran Out of Food in the Last Year:    Transportation  Needs:      Lack of Transportation (Medical):      Lack of Transportation (Non-Medical):    Physical Activity:      Days of Exercise per Week:      Minutes of Exercise per Session:    Stress:      Feeling of Stress :    Social Connections:      Frequency of Communication with Friends and Family:      Frequency of Social Gatherings with Friends and Family:      Attends Yazdanism Services:      Active Member of Clubs or Organizations:      Attends Club or Organization Meetings:      Marital Status:    Intimate Partner Violence:      Fear of Current or Ex-Partner:      Emotionally Abused:      Physically Abused:      Sexually Abused:    Depression: Not at risk     PHQ-2 Score: 0   Housing Stability:      Unable to Pay for Housing in the Last Year:      Number of Places Lived in the Last Year:      Unstable Housing in the Last Year:        Functional Status:  Prior to admission patient needed assistance:   Dependent ADLs:: Independent  Dependent IADLs:: Independent  Assesssment of Functional Status: At functional baseline    Mental Health Status:  Mental Health Status: No Current Concerns       Chemical Dependency Status:  Chemical Dependency Status: No Current Concerns             Values/Beliefs:  Spiritual, Cultural Beliefs, Yazdanism Practices, Values that affect care:                 Additional Information:  JOJO assessed. Pt speaks Tigalo and limited english. She requires some processing time to respond to questions. Pt's sister is also present but is hard of hearing. Pt resides at home with her sister, Alec. Pt reports independence with ADLs at baseline, and her nephew, Elfego provides transportation. Pt denies using any medical equipment or supplies. Pt discharge goal to return home with nephew to transport. Likely to require TCU or home therapies.     Denice Nielson, LGSW

## 2021-07-22 NOTE — PHARMACY-ADMISSION MEDICATION HISTORY
"Pharmacy Note - Admission Medication History    Pertinent Provider Information: N/A     ______________________________________________________________________    Prior To Admission (PTA) med list completed and updated in EMR.       Prior to Admission Medications   Prescriptions Last Dose Informant Patient Reported? Taking?   atenoloL (TENORMIN) 50 MG tablet 7/21/2021 at PM  No Yes   Sig: [ATENOLOL (TENORMIN) 50 MG TABLET] Take 1 tablet (50 mg total) by mouth daily.   Patient taking differently: Take 25 mg by mouth 2 times daily    atorvastatin (LIPITOR) 20 MG tablet 7/21/2021 at PM  No Yes   Sig: [ATORVASTATIN (LIPITOR) 20 MG TABLET] Take 1 tablet (20 mg total) by mouth at bedtime.   blood glucose test strips   No No   Sig: [BLOOD GLUCOSE TEST STRIPS] Use 1 each As Directed 2 (two) times a day. Test blood sugar twice a day   blood-glucose meter Misc   No No   Sig: [BLOOD-GLUCOSE METER MISC] Test blood sugar twice a day   generic lancets   No No   Sig: [GENERIC LANCETS] Use 1 each As Directed 2 (two) times a day. Test blood sugar twice a day   insulin glargine (LANTUS SOLOSTAR PEN) 100 unit/mL (3 mL) pen 7/21/2021 at PM  No Yes   Sig: [INSULIN GLARGINE (LANTUS SOLOSTAR PEN) 100 UNIT/ML (3 ML) PEN] Inject 16 Units under the skin at bedtime.   lisinopriL (PRINIVIL,ZESTRIL) 20 MG tablet 7/21/2021 at AM  No Yes   Sig: [LISINOPRIL (PRINIVIL,ZESTRIL) 20 MG TABLET] Take one tab daily   pen needle, diabetic 31 gauge x 1/4\" Ndle   No No   Sig: [PEN NEEDLE, DIABETIC 31 GAUGE X 1/4\" NDLE] Use one daily as directed      Facility-Administered Medications: None       Information source(s): Family member and CareEverywhere/SureScripts  Method of interview communication: in-person    Summary of Changes to PTA Med List  New: N/A  Discontinued: N/A  Changed: atenolol dosing    Patient was asked about OTC/herbal products specifically.  PTA med list reflects this.    In the past week, patient estimated taking medication this percent of " the time:  greater than 90%.    Allergies were reviewed, assessed, and updated with the patient.      Patient does not use any multi-dose medications prior to admission.    The information provided in this note is only as accurate as the sources available at the time of the update(s).    Thank you for the opportunity to participate in the care of this patient.    Kevin Chong [facility]  7/22/2021 10:28 AM

## 2021-07-23 ENCOUNTER — APPOINTMENT (OUTPATIENT)
Dept: CARDIOLOGY | Facility: HOSPITAL | Age: 71
DRG: 064 | End: 2021-07-23
Attending: PSYCHIATRY & NEUROLOGY
Payer: COMMERCIAL

## 2021-07-23 ENCOUNTER — APPOINTMENT (OUTPATIENT)
Dept: PHYSICAL THERAPY | Facility: HOSPITAL | Age: 71
DRG: 064 | End: 2021-07-23
Attending: PSYCHIATRY & NEUROLOGY
Payer: COMMERCIAL

## 2021-07-23 ENCOUNTER — APPOINTMENT (OUTPATIENT)
Dept: OCCUPATIONAL THERAPY | Facility: HOSPITAL | Age: 71
DRG: 064 | End: 2021-07-23
Attending: PSYCHIATRY & NEUROLOGY
Payer: COMMERCIAL

## 2021-07-23 ENCOUNTER — APPOINTMENT (OUTPATIENT)
Dept: SPEECH THERAPY | Facility: HOSPITAL | Age: 71
DRG: 064 | End: 2021-07-23
Attending: PSYCHIATRY & NEUROLOGY
Payer: COMMERCIAL

## 2021-07-23 PROBLEM — D32.0: Status: ACTIVE | Noted: 2021-07-23

## 2021-07-23 PROBLEM — I63.512 CEREBRAL INFARCTION DUE TO OCCLUSION OF LEFT MIDDLE CEREBRAL ARTERY (H): Status: ACTIVE | Noted: 2021-07-22

## 2021-07-23 LAB
ANION GAP SERPL CALCULATED.3IONS-SCNC: 9 MMOL/L (ref 5–18)
BUN SERPL-MCNC: 18 MG/DL (ref 8–28)
CALCIUM SERPL-MCNC: 9.1 MG/DL (ref 8.5–10.5)
CHLORIDE BLD-SCNC: 110 MMOL/L (ref 98–107)
CO2 SERPL-SCNC: 25 MMOL/L (ref 22–31)
CREAT SERPL-MCNC: 0.99 MG/DL (ref 0.6–1.1)
ERYTHROCYTE [DISTWIDTH] IN BLOOD BY AUTOMATED COUNT: 13.1 % (ref 10–15)
GFR SERPL CREATININE-BSD FRML MDRD: 57 ML/MIN/1.73M2
GLUCOSE BLD-MCNC: 133 MG/DL (ref 70–125)
GLUCOSE BLDC GLUCOMTR-MCNC: 123 MG/DL (ref 70–125)
GLUCOSE BLDC GLUCOMTR-MCNC: 141 MG/DL (ref 70–125)
GLUCOSE BLDC GLUCOMTR-MCNC: 151 MG/DL (ref 70–125)
HCT VFR BLD AUTO: 41.4 % (ref 35–47)
HCV AB SERPL QL IA: NEGATIVE
HGB BLD-MCNC: 13 G/DL (ref 11.7–15.7)
MCH RBC QN AUTO: 28.3 PG (ref 26.5–33)
MCHC RBC AUTO-ENTMCNC: 31.4 G/DL (ref 31.5–36.5)
MCV RBC AUTO: 90 FL (ref 78–100)
PLATELET # BLD AUTO: 332 10E3/UL (ref 150–450)
POTASSIUM BLD-SCNC: 3.6 MMOL/L (ref 3.5–5)
RBC # BLD AUTO: 4.59 10E6/UL (ref 3.8–5.2)
SODIUM SERPL-SCNC: 144 MMOL/L (ref 136–145)
WBC # BLD AUTO: 10.8 10E3/UL (ref 4–11)

## 2021-07-23 PROCEDURE — 92526 ORAL FUNCTION THERAPY: CPT | Mod: GN

## 2021-07-23 PROCEDURE — 99233 SBSQ HOSP IP/OBS HIGH 50: CPT | Performed by: FAMILY MEDICINE

## 2021-07-23 PROCEDURE — 92523 SPEECH SOUND LANG COMPREHEN: CPT | Mod: GN

## 2021-07-23 PROCEDURE — 97162 PT EVAL MOD COMPLEX 30 MIN: CPT | Mod: GP

## 2021-07-23 PROCEDURE — 97116 GAIT TRAINING THERAPY: CPT | Mod: GP

## 2021-07-23 PROCEDURE — 97166 OT EVAL MOD COMPLEX 45 MIN: CPT | Mod: GO

## 2021-07-23 PROCEDURE — 36415 COLL VENOUS BLD VENIPUNCTURE: CPT | Performed by: HOSPITALIST

## 2021-07-23 PROCEDURE — 92610 EVALUATE SWALLOWING FUNCTION: CPT | Mod: GN

## 2021-07-23 PROCEDURE — 97535 SELF CARE MNGMENT TRAINING: CPT | Mod: GO

## 2021-07-23 PROCEDURE — 250N000012 HC RX MED GY IP 250 OP 636 PS 637: Performed by: FAMILY MEDICINE

## 2021-07-23 PROCEDURE — 80048 BASIC METABOLIC PNL TOTAL CA: CPT | Performed by: HOSPITALIST

## 2021-07-23 PROCEDURE — 92507 TX SP LANG VOICE COMM INDIV: CPT | Mod: GN

## 2021-07-23 PROCEDURE — 99232 SBSQ HOSP IP/OBS MODERATE 35: CPT | Performed by: PSYCHIATRY & NEUROLOGY

## 2021-07-23 PROCEDURE — 250N000013 HC RX MED GY IP 250 OP 250 PS 637: Performed by: FAMILY MEDICINE

## 2021-07-23 PROCEDURE — 250N000011 HC RX IP 250 OP 636: Performed by: HOSPITALIST

## 2021-07-23 PROCEDURE — 93306 TTE W/DOPPLER COMPLETE: CPT | Mod: 26 | Performed by: INTERNAL MEDICINE

## 2021-07-23 PROCEDURE — 85027 COMPLETE CBC AUTOMATED: CPT | Performed by: HOSPITALIST

## 2021-07-23 PROCEDURE — 258N000003 HC RX IP 258 OP 636: Performed by: PSYCHIATRY & NEUROLOGY

## 2021-07-23 PROCEDURE — 250N000013 HC RX MED GY IP 250 OP 250 PS 637: Performed by: PSYCHIATRY & NEUROLOGY

## 2021-07-23 PROCEDURE — 250N000012 HC RX MED GY IP 250 OP 636 PS 637: Performed by: HOSPITALIST

## 2021-07-23 PROCEDURE — 93306 TTE W/DOPPLER COMPLETE: CPT

## 2021-07-23 PROCEDURE — 120N000001 HC R&B MED SURG/OB

## 2021-07-23 RX ORDER — LISINOPRIL 20 MG/1
20 TABLET ORAL DAILY
Status: DISCONTINUED | OUTPATIENT
Start: 2021-07-23 | End: 2021-07-24

## 2021-07-23 RX ORDER — ASPIRIN 81 MG/1
81 TABLET ORAL DAILY
Status: DISCONTINUED | OUTPATIENT
Start: 2021-07-23 | End: 2021-08-02 | Stop reason: HOSPADM

## 2021-07-23 RX ORDER — ATORVASTATIN CALCIUM 40 MG/1
40 TABLET, FILM COATED ORAL AT BEDTIME
Status: DISCONTINUED | OUTPATIENT
Start: 2021-07-23 | End: 2021-08-02 | Stop reason: HOSPADM

## 2021-07-23 RX ORDER — SODIUM CHLORIDE 9 MG/ML
INJECTION, SOLUTION INTRAVENOUS CONTINUOUS
Status: DISCONTINUED | OUTPATIENT
Start: 2021-07-23 | End: 2021-07-24

## 2021-07-23 RX ORDER — CLOPIDOGREL BISULFATE 75 MG/1
75 TABLET ORAL DAILY
Status: DISCONTINUED | OUTPATIENT
Start: 2021-07-23 | End: 2021-08-02 | Stop reason: HOSPADM

## 2021-07-23 RX ADMIN — INSULIN ASPART 1 UNITS: 100 INJECTION, SOLUTION INTRAVENOUS; SUBCUTANEOUS at 12:09

## 2021-07-23 RX ADMIN — INSULIN ASPART 1 UNITS: 100 INJECTION, SOLUTION INTRAVENOUS; SUBCUTANEOUS at 07:45

## 2021-07-23 RX ADMIN — CLOPIDOGREL BISULFATE 75 MG: 75 TABLET ORAL at 08:16

## 2021-07-23 RX ADMIN — ENOXAPARIN SODIUM 40 MG: 100 INJECTION SUBCUTANEOUS at 22:46

## 2021-07-23 RX ADMIN — ATORVASTATIN CALCIUM 40 MG: 40 TABLET, FILM COATED ORAL at 21:38

## 2021-07-23 RX ADMIN — SODIUM CHLORIDE: 9 INJECTION, SOLUTION INTRAVENOUS at 23:59

## 2021-07-23 RX ADMIN — SODIUM CHLORIDE: 9 INJECTION, SOLUTION INTRAVENOUS at 07:44

## 2021-07-23 RX ADMIN — INSULIN ASPART 1 UNITS: 100 INJECTION, SOLUTION INTRAVENOUS; SUBCUTANEOUS at 21:38

## 2021-07-23 RX ADMIN — INSULIN GLARGINE 16 UNITS: 100 INJECTION, SOLUTION SUBCUTANEOUS at 21:38

## 2021-07-23 RX ADMIN — INSULIN ASPART 1 UNITS: 100 INJECTION, SOLUTION INTRAVENOUS; SUBCUTANEOUS at 17:47

## 2021-07-23 RX ADMIN — ASPIRIN 81 MG: 81 TABLET, COATED ORAL at 08:16

## 2021-07-23 RX ADMIN — LISINOPRIL 20 MG: 20 TABLET ORAL at 17:49

## 2021-07-23 ASSESSMENT — ACTIVITIES OF DAILY LIVING (ADL): DEPENDENT_IADLS:: INDEPENDENT

## 2021-07-23 NOTE — PROGRESS NOTES
NEUROLOGY INPATIENT PROGRESS NOTE       [facility] NEUROLOGY [place]  [address] [place] [address]  Tel: [phone]  Fax: [phone]  [email or web address]     ASSESSMENT & PLAN   Hospital Day#: 1  Visit diagnosis: CVA    Left MCA infarction  71-year-old female with history of CKD stage III, HLD, DM, HTN brought to [facility] with 48 hours of right-sided weakness with inability to walk.  CT of the head did not show any ischemia a left cerebellar meningioma noted  MRI of the brain shows infarct in the left MCA territory involving the frontal white matter and cortex at the frontoparietal junction also involving left corona radiata and deep white matter.  There was diminished flow within the left MCA beyond distal M1 segment suggesting occlusion  Echocardiogram  Patient was not a candidate for thrombolytics due to time of onset contraindication dual antiplatelet therapy with aspirin and Plavix  LDL is 84, patient started on Lipitor 20 mg daily.  Goal LDL less than 70  Telemetry monitoring and if no cardiac arrhythmias noted during current hospitalization schedule patient for outpatient 30-day event monitor.  If that is unrevealing consider implantable loop recorder  IV hydration with normal saline.  Keep systolic blood pressure less than 160  Physical, occupational, speech and pharmacy consult.  Due to significant weakness I suspect she will need inpatient rehab    Left cerebellar meningioma  Patient noted to have incidentally left cerebellar meningioma.  No intervention needed at present.  She can follow-up with neurosurgery as an outpatient    Neurology Discharge Planning :   LUIGI Daniels MD  [facility]  ([facility], [facility]     PROBLEM LIST      Patient Active Problem List   Diagnosis Code     Stomatitis Herpetiformis K12.0     Benign Essential Hypertension I10     Type 2 diabetes mellitus treated without  insulin (H) E11.9     Hyperlipidemia E78.5     Memory problem R41.3     Chronic kidney disease, stage 3 N18.30     Facial droop R29.810     Right leg weakness R29.898     Right arm weakness R29.898     Cerebral infarction due to occlusion of left middle cerebral artery (H) I63.512     Left cerebellar meningioma D32.0       Past Medical History:   Patient  has a past medical history of Hypertension.     SUBJECTIVE     Patient continues to have right-sided weakness.  Denies any new complaint     OBJECTIVE     Vital signs in last 24 hours  Temp:  [97.4  F (36.3  C)-98.2  F (36.8  C)] 97.5  F (36.4  C)  Pulse:  [68-93] 93  Resp:  [7-28] 22  BP: (132-169)/() 161/89  SpO2:  [94 %-99 %] 95 %    Weight:   Wt Readings from Last 1 Encounters:   07/22/21 49.9 kg (110 lb)         I/O last 24 hours  Intake/Output Summary (Last 24 hours) at 7/23/2021 0654  Last data filed at 7/22/2021 1840  Gross per 24 hour   Intake 120 ml   Output --   Net 120 ml     Review of Systems   Pertinent items are noted in HPI.    General Physical Exam: Patient is alert and oriented x 3. Vital signs were reviewed and are documented in EMR. Neck was supple, no Carotid bruit, thyromegaly, JVD or lymphadenopathy noted.  Neurological Exam:  Patient is alert and oriented x3.  No aphasia but patient has moderate dysarthria.  Comprehension is intact.  Cranial nerves II through XII are intact she has 2/5 strength on the right 5/5 on the left.  In the right lower extremity strength is 3/5 left lower extremity 5/5.  Reflexes 1+ upgoing toe on the right downgoing toe on the left.  Sensation intact with no double simultaneous extinction.  Minimal dysmetria on right finger-nose testing.  Gait testing deferred.     DIAGNOSTIC STUDIES     Pertinent Radiology   Following imaging studies were reviewed:    CT  1.  Presumed large partially calcified meningioma in the left side of the posterior fossa measuring 3.4 x 3.0 cm.  2.  No CT evidence for acute intracranial  process.  3.  Brain atrophy and presumed chronic microvascular ischemic changes as above.    MRI  HEAD MRI:  1.  There are recent infarcts of varying age within the left middle cerebral artery territory affecting the deep frontal white matter and cortex at the frontoparietal junction.  2.  Infarcts involving the left corona radiata/deep white matter and to lesser extent frontoparietal cortex appear to be acute to early subacute in age.  3.  Infarct affecting the left insula and to lesser extent frontoparietal cortex appear to represent slightly older subacute infarcts.  4.  No acute hemorrhage.  5.  A 3 cm calcified mass within the left cerebellum could represent a meningioma or cavernoma. It demonstrates at most minimal associated enhancement. No adjacent edema.     HEAD MRA:  1.  Diminished flow within the left middle cerebral artery beyond the distal M1 segment favored to reflect occlusion or high-grade stenosis of the superior/anterior division and slowed flow within the posterior/inferior division.  2.  Otherwise patent intracranial circulation.     NECK MRA:  1.  No evidence for dissection or hemodynamically significant narrowing in the neck based on NASCET criteria.    Echocardiogram  No results found.     Pertinent Labs   Lab Results: Personally Reviewed  Recent Results (from the past 24 hour(s))   Hemoglobin A1c    Collection Time: 07/22/21  7:23 AM   Result Value Ref Range    Hemoglobin A1C 10.0 (H) 0.0 - 5.6 %   Lipid panel    Collection Time: 07/22/21  7:23 AM   Result Value Ref Range    Cholesterol 141 <=199 mg/dL    Triglycerides 117 <=149 mg/dL    Direct Measure HDL 34 (L) >=50 mg/dL    LDL Cholesterol Calculated 84 <=129 mg/dL    Patient Fasting > 8hrs? Yes    Comprehensive metabolic panel (BMP + Alb, Alk Phos, ALT, AST, Total. Bili, TP)    Collection Time: 07/22/21  7:23 AM   Result Value Ref Range    Sodium 143 136 - 145 mmol/L    Potassium 4.1 3.5 - 5.0 mmol/L    Chloride 105 98 - 107 mmol/L     Carbon Dioxide (CO2) 23 22 - 31 mmol/L    Anion Gap 15 5 - 18 mmol/L    Urea Nitrogen 30 (H) 8 - 28 mg/dL    Creatinine 1.19 (H) 0.60 - 1.10 mg/dL    Calcium 9.7 8.5 - 10.5 mg/dL    Glucose 142 (H) 70 - 125 mg/dL    Alkaline Phosphatase 78 45 - 120 U/L    AST 24 0 - 40 U/L    ALT 16 0 - 45 U/L    Protein Total 7.1 6.0 - 8.0 g/dL    Albumin 3.9 3.5 - 5.0 g/dL    Bilirubin Total 1.4 (H) 0.0 - 1.0 mg/dL    GFR Estimate 46 (L) >60 mL/min/1.73m2   ECG 12-LEAD WITH MUSE (LHE)    Collection Time: 07/22/21  8:28 AM   Result Value Ref Range    Systolic Blood Pressure 169 mmHg    Diastolic Blood Pressure 86 mmHg    Ventricular Rate 88 BPM    Atrial Rate 88 BPM    IN Interval 146 ms    QRS Duration 64 ms     ms    QTc 438 ms    P Axis 78 degrees    R AXIS 32 degrees    T Axis 67 degrees    Interpretation ECG       Sinus rhythm  Normal ECG  When compared with ECG of 08-MAR-2003 17:49,  Vent. rate has decreased BY  59 BPM  Confirmed by SEE ED PROVIDER NOTE FOR, ECG INTERPRETATION ([id number]),  PRACHI FLORES ([id number]) on 7/22/2021 9:01:22 PM     CBC (+ platelets, no diff)    Collection Time: 07/22/21  9:47 AM   Result Value Ref Range    WBC Count 12.4 (H) 4.0 - 11.0 10e3/uL    RBC Count 4.56 3.80 - 5.20 10e6/uL    Hemoglobin 13.3 11.7 - 15.7 g/dL    Hematocrit 40.8 35.0 - 47.0 %    MCV 90 78 - 100 fL    MCH 29.2 26.5 - 33.0 pg    MCHC 32.6 31.5 - 36.5 g/dL    RDW 13.1 10.0 - 15.0 %    Platelet Count 318 150 - 450 10e3/uL   Basic metabolic panel    Collection Time: 07/22/21  9:47 AM   Result Value Ref Range    Sodium 141 136 - 145 mmol/L    Potassium 4.2 3.5 - 5.0 mmol/L    Chloride 106 98 - 107 mmol/L    Carbon Dioxide (CO2) 23 22 - 31 mmol/L    Anion Gap 12 5 - 18 mmol/L    Urea Nitrogen 29 (H) 8 - 28 mg/dL    Creatinine 1.15 (H) 0.60 - 1.10 mg/dL    Calcium 9.2 8.5 - 10.5 mg/dL    Glucose 135 (H) 70 - 125 mg/dL    GFR Estimate 48 (L) >60 mL/min/1.73m2   INR    Collection Time: 07/22/21  9:47 AM   Result Value Ref Range     INR 1.03 0.85 - 1.15   PTT    Collection Time: 07/22/21  9:47 AM   Result Value Ref Range    aPTT 26 22 - 38 Seconds   Magnesium    Collection Time: 07/22/21  9:47 AM   Result Value Ref Range    Magnesium 2.1 1.8 - 2.6 mg/dL   TSH with free T4 reflex    Collection Time: 07/22/21  9:47 AM   Result Value Ref Range    TSH 0.90 0.30 - 5.00 uIU/mL   SARS-COV2 (COVID-19) Virus RT-PCR    Collection Time: 07/22/21  2:42 PM    Specimen: Nasopharyngeal; Swab   Result Value Ref Range    SARS CoV2 PCR Negative Negative   Troponin I    Collection Time: 07/22/21  6:41 PM   Result Value Ref Range    Troponin I 0.01 0.00 - 0.29 ng/mL   Glucose by meter    Collection Time: 07/22/21  6:47 PM   Result Value Ref Range    GLUCOSE BY METER POCT 216 (H) 70 - 125 mg/dL   Lactic Acid STAT    Collection Time: 07/22/21  8:43 PM   Result Value Ref Range    Lactic Acid 1.1 0.7 - 2.0 mmol/L   Glucose by meter    Collection Time: 07/22/21  9:25 PM   Result Value Ref Range    GLUCOSE BY METER POCT 232 (H) 70 - 125 mg/dL   Glucose by meter    Collection Time: 07/22/21 11:30 PM   Result Value Ref Range    GLUCOSE BY METER POCT 165 (H) 70 - 125 mg/dL   Basic metabolic panel    Collection Time: 07/23/21  4:40 AM   Result Value Ref Range    Sodium 144 136 - 145 mmol/L    Potassium 3.6 3.5 - 5.0 mmol/L    Chloride 110 (H) 98 - 107 mmol/L    Carbon Dioxide (CO2) 25 22 - 31 mmol/L    Anion Gap 9 5 - 18 mmol/L    Urea Nitrogen 18 8 - 28 mg/dL    Creatinine 0.99 0.60 - 1.10 mg/dL    Calcium 9.1 8.5 - 10.5 mg/dL    Glucose 133 (H) 70 - 125 mg/dL    GFR Estimate 57 (L) >60 mL/min/1.73m2   CBC with platelets    Collection Time: 07/23/21  4:40 AM   Result Value Ref Range    WBC Count 10.8 4.0 - 11.0 10e3/uL    RBC Count 4.59 3.80 - 5.20 10e6/uL    Hemoglobin 13.0 11.7 - 15.7 g/dL    Hematocrit 41.4 35.0 - 47.0 %    MCV 90 78 - 100 fL    MCH 28.3 26.5 - 33.0 pg    MCHC 31.4 (L) 31.5 - 36.5 g/dL    RDW 13.1 10.0 - 15.0 %    Platelet Count 332 150 - 450 10e3/uL          HOSPITAL MEDICATIONS       aspirin  81 mg Oral Daily     atorvastatin  20 mg Oral At Bedtime     clopidogrel  75 mg Oral Daily     enoxaparin ANTICOAGULANT  40 mg Subcutaneous Q24H     insulin aspart   Subcutaneous Daily with supper     insulin aspart   Subcutaneous Daily with lunch     insulin aspart   Subcutaneous QAM AC     insulin aspart  1-7 Units Subcutaneous TID AC     insulin aspart  1-5 Units Subcutaneous At Bedtime     insulin glargine  12 Units Subcutaneous At Bedtime     sodium chloride (PF)  3 mL Intracatheter Q8H     sodium chloride (PF)  3 mL Intracatheter Q8H        Total time spent for face to face visit, reviewing labs/imaging studies, counseling and coordination of care was: 30 Minutes More than 50% of this time was spent on counseling and coordination of care.      This note was dictated using voice recognition software.  Any grammatical or context distortions are unintentional and inherent to the software.

## 2021-07-23 NOTE — PROGRESS NOTES
Occupational Therapy     07/23/21 1340       Present yes   Language tigalo   Living Environment   People in home other relative(s)  (Pt. lives with her sister)   Current Living Arrangements house   Home Accessibility stairs within home   Number of Stairs, Within Home, Primary greater than 10 stairs  (multi-level split home, bedroom/bath on top 3rd floor. )   Stair Railings, Within Home, Primary railings safe and in good condition   Transportation Anticipated family or friend will provide   Living Environment Comments Pt. has a tub/shower combo no grab bars or chair, standard toilet   Self-Care   Usual Activity Tolerance good   Current Activity Tolerance fair   Instrumental Activities of Daily Living (IADL)   IADL Comments Pt. was independent with functional mobililty wiithout device, ADL's and IADL's   Disability/Function   Hearing Difficulty or Deaf no   Wear Glasses or Blind yes   Vision Management glasses   Difficulty Communicating no   Walking or Climbing Stairs Difficulty no   Dressing/Bathing Difficulty no   Toileting issues no   General Information   Onset of Illness/Injury or Date of Surgery 07/22/21   Referring Physician Dr. Jose Blevins   Patient/Family Therapy Goal Statement (OT) None stated   Additional Occupational Profile Info/Pertinent History of Current Problem moderate complexity   Existing Precautions/Restrictions fall   General Observations and Info Admit with rt. side weakness, L MCA CVA, left posterior fossa meningioma   Cognitive Status Examination   Orientation Status person;place   Affect/Mental Status (Cognitive) WFL   Follows Commands WFL   Visual Perception   Visual Impairment/Limitations WFL  (Pt. able to track in all planes, pt. reports no changes. )   Pain Assessment   Patient Currently in Pain No   Posture   Posture not impaired   Range of Motion Comprehensive   General Range of Motion upper extremity range of motion deficits identified;hand range of motion deficits  identified   Comment, General Range of Motion partial range against gravity shd. elevation, flex, abduction.     General Upper Extremity Assessment (Range of Motion)   Comment: Upper Extremity ROM partial range against gravity shd. elevation, flex, abduction.  Elb. flex/ext full range gravity eliminated, partial range gravity eliminated supination/pronation, wrist flex/ext.    General Hand Assessment (Range of Motion)   Comment, Hand ROM impaired digit flexion/extenion   Strength Comprehensive (MMT)   Comment, General Manual Muscle Testing (MMT) Assessment RUE shd, elblow 2+/5.  Wrist 2/5   Coordination   Upper Extremity Coordination Right UE impaired   Gross Motor Coordination impaired RUE   Fine Motor Coordination impaired RUE   Coordination Comments Patient is left hand dominant   Bed Mobility   Bed Mobility rolling left;supine-sit   Rolling Left [place] (Bed Mobility) verbal cues;minimum assist (75% patient effort)   Supine-Sit [place] (Bed Mobility) verbal cues;minimum assist (75% patient effort)   Assistive Device (Bed Mobility) bed rails   Comment (Bed Mobility) HOB elevated   Transfers   Transfers bed-chair transfer;other (see comments)  (commode)   Transfer Skill: Bed to Chair/Chair to Bed   Bed-Chair [place] (Transfers) verbal cues;moderate assist (50% patient effort)   Transfer Comments Hand held assist and moderate support to maintain balance and facilitate weight shift     Activities of Daily Living   BADL Assessment lower body dressing;toileting;grooming   Lower Body Dressing Assessment   [place] Level (Lower Body Dressing) minimum assist (75% patient effort)   Position (Lower Body Dressing)   (sitting in chair)   Comment (Lower Body Dressing) cues for jana technique, limited by decreased coordination and strength.    Grooming Assessment   [place] Level (Grooming) minimum assist (75% patient effort)   Position (Grooming) unsupported sitting   Comment (Grooming) assist to comb  hair and place ponytail    Toileting   [place] Level (Toileting) moderate assist (50% patient effort)   Assistive Devices (Toileting) commode chair   Position (Toileting) unsupported sitting   Clinical Impression   Criteria for Skilled Therapeutic Interventions Met (OT) yes   OT Diagnosis impaired self cares and functional mobility   OT Problem List-Impairments impacting ADL activity tolerance impaired;balance;coordination;mobility;motor control;range of motion (ROM);strength   Assessment of Occupational Performance 5 or more Performance Deficits   Identified Performance Deficits grooming, dressing, toileting, transfers, home management   Planned Therapy Interventions (OT) ADL retraining;fine motor coordination training;motor coordination training;neuromuscular re-education;strengthening;ROM;transfer training   Clinical Decision Making Complexity (OT) moderate complexity   Therapy Frequency (OT) 2x/day   Predicted Duration of Therapy 7 days   Anticipated Equipment Needs Upon Discharge (OT) shower chair   Risk & Benefits of therapy have been explained evaluation/treatment results reviewed;care plan/treatment goals reviewed   OT Discharge Planning    OT Discharge Recommendation (DC Rec) Acute Rehab Center-Motivated patient will benefit from intensive, interdisciplinary therapy.  Anticipate will be able to tolerate 3 hours of therapy per day   OT Rationale for DC Rec Patient is active and independent at her baseline.  Patient presents with impaired balance, impaired coordination, RUE weakness and decreased activity tolerance hindering self cares and functional mobility.  Further rehab is indicated after discharge.    Total Evaluation Time (Minutes)   Total Evaluation Time (Minutes) 15

## 2021-07-23 NOTE — PHARMACY-CONSULT NOTE
Pharmacy Consult to evaluate for medication related stroke core measures    Paradise Yuan, 71 year old female admitted for L MCA infarction on 7/22/2021.    Thrombolytic was not given because of Time from onset contraindications    VTE Prophylaxis Enoxaparin given on 7/22 as appropriate prior to end of hospital day 2.    Antithrombotic: aspirin and clopidogrel started on 7/23, as appropriate by end of hospital day 2. Continue antithrombotic therapy on discharge to meet quality measures, unless contraindicated.    Anticoagulation if history of A-fib/flutter: Patient does not have history of A-fib/flutter - anticoagulation not required for medication related stroke core measures.     LDL Cholesterol Calculated   Date Value Ref Range Status   07/22/2021 84 <=129 mg/dL Final       Patient currently receiving Lipitor (atorvastatin) continue statin on discharge to meet quality measures, unless contraindicated.    Recommendations: None at this time    Thank you for the consult.    Armand Green [facility] 7/23/2021 10:57 AM

## 2021-07-23 NOTE — H&P
"[facility]    History and Physical - Hospitalist Service       Date of Admission:  7/22/2021    Assessment & Plan      Paradise Yuan is a 71 year old female admitted on 7/22/2021. She     Stroke: First symptoms about 2 weeks ago then appears to have more recent strokes on imaging as well.  Quite a bit of right sided weakness.  Appreciate neurology consultation.  Echo, lipids    Type 2 diabetes: A1c of 10.  Give glargine 12 units tonight, generally on 16 at home.  1: 15 carbohydrate count and sliding scale insulin as needed tonight.    Hypertension: Allow for permissive hypertension, hold home lisinopril 20 and atenolol 25 twice daily for now.    Hyperlipidemia: Continue atorvastatin 20 for now while awaiting lipids       Diet: Combination Diet Regular Diet Adult    DVT Prophylaxis: Enoxaparin (Lovenox) SQ  Choi Catheter: Not present  Central Lines: None  Code Status: Full Code    Risk Factors Present on Admission                   Disposition Plan   Expected discharge:  recommended to prior living arrangement once .     The patient's care was discussed with the patient and sister.    Jose Blevins MD  [facility]  Securely message with the Vocera Web Console (learn more here)  Text page via [employer] Paging/Directory      ______________________________________________________________________    Chief Complaint   stroke    History is obtained from the patient    History of Present Illness   Paradise Yuan is a 71 year old female who as per ER \"Since returning from a vacation on 7/6/21, nephew reports patient has had slurred speech, right sided facial droop, and weakness of her arms and legs. Patient's family reports they had not noticed patient's symptoms before the vacation and are unsure when symptoms first started.  Several members of the family were home during this time of early July but none of them noticed the symptoms until the nephew returned.  Patient " "was seeing her PCP earlier this morning and was referred to the ED for probably stroke. Nephew reports he believes patient is currently seeing a neurologist for memory issues. Patient reports a fall on 7/11/21, and states she thinks it was due to tripping. Patient denies headache, chest pain, abdominal pain, and infectious symptoms. She has no other complaints at this time.   \"    Patient interviewed with her sister and [place] citizen speaking nurse at bedside.  She is known about the same as admission.  She complains of weakness mostly in her right side.  She is adamant that she is full code.  No other complaints at this time.  No fevers no chills.    Review of Systems    The 5 point Review of Systems is negative other than noted in the HPI or here.     Past Medical History    I have reviewed this patient's medical history and updated it with pertinent information if needed.   Past Medical History:   Diagnosis Date     Hypertension        Past Surgical History   I have reviewed this patient's surgical history and updated it with pertinent information if needed.  Past Surgical History:   Procedure Laterality Date     IR MISCELLANEOUS PROCEDURE  3/8/2003     IR MISCELLANEOUS PROCEDURE  3/8/2003     IR MISCELLANEOUS PROCEDURE  3/8/2003     IR MISCELLANEOUS PROCEDURE  3/8/2003     IR MISCELLANEOUS PROCEDURE  3/8/2003     IR MISCELLANEOUS PROCEDURE  3/10/2003     IR MISCELLANEOUS PROCEDURE  3/11/2003     IR MISCELLANEOUS PROCEDURE  3/11/2003     IR MISCELLANEOUS PROCEDURE  3/11/2003     IR MISCELLANEOUS PROCEDURE  3/11/2003     IR MISCELLANEOUS PROCEDURE  3/12/2003     IR MISCELLANEOUS PROCEDURE  3/12/2003     IR MISCELLANEOUS PROCEDURE  3/12/2003     IR MISCELLANEOUS PROCEDURE  3/12/2003     IR MISCELLANEOUS PROCEDURE  3/16/2003     IR MISCELLANEOUS PROCEDURE  3/16/2003     IR MISCELLANEOUS PROCEDURE  3/16/2003     IR MISCELLANEOUS PROCEDURE  3/16/2003     IR MISCELLANEOUS PROCEDURE  3/16/2003     IR MISCELLANEOUS PROCEDURE  " 3/16/2003     IR MISCELLANEOUS PROCEDURE  3/16/2003     IR MISCELLANEOUS PROCEDURE  3/16/2003     IR SPINAL ANGIOGRAM  3/16/2003     IR SPINAL ANGIOGRAM  3/16/2003     IR SPINAL ANGIOGRAM  3/16/2003     IR SPINAL ANGIOGRAM  3/16/2003     IR SPINAL ANGIOGRAM  3/16/2003     IR SPINAL ANGIOGRAM  3/16/2003     IR SPINAL ANGIOGRAM  3/16/2003     IR SPINAL ANGIOGRAM  3/16/2003     IR THORACIC AORTOGRAM  3/16/2003     IR VISCERAL ANGIOGRAM  3/8/2003     IR VISCERAL ANGIOGRAM  3/8/2003     IR VISCERAL ANGIOGRAM  3/10/2003     IR VISCERAL ANGIOGRAM  3/11/2003       Social History   I have reviewed this patient's social history and updated it with pertinent information if needed.  Social History     Tobacco Use     Smoking status: Never Smoker     Smokeless tobacco: Never Used   Substance Use Topics     Alcohol use: No     Drug use: No       Family History     No significant family history, including no history of.     Prior to Admission Medications   Prior to Admission Medications   Prescriptions Last Dose Informant Patient Reported? Taking?   atenoloL (TENORMIN) 50 MG tablet   No No   Sig: [ATENOLOL (TENORMIN) 50 MG TABLET] Take 1 tablet (50 mg total) by mouth daily.   Patient taking differently: Take 25 mg by mouth 2 times daily    atorvastatin (LIPITOR) 20 MG tablet   No No   Sig: [ATORVASTATIN (LIPITOR) 20 MG TABLET] Take 1 tablet (20 mg total) by mouth at bedtime.   blood glucose test strips   No No   Sig: [BLOOD GLUCOSE TEST STRIPS] Use 1 each As Directed 2 (two) times a day. Test blood sugar twice a day   blood-glucose meter Misc   No No   Sig: [BLOOD-GLUCOSE METER MISC] Test blood sugar twice a day   generic lancets   No No   Sig: [GENERIC LANCETS] Use 1 each As Directed 2 (two) times a day. Test blood sugar twice a day   insulin glargine (LANTUS SOLOSTAR PEN) 100 unit/mL (3 mL) pen   No No   Sig: [INSULIN GLARGINE (LANTUS SOLOSTAR PEN) 100 UNIT/ML (3 ML) PEN] Inject 16 Units under the skin at bedtime.   lisinopriL  "(PRINIVIL,ZESTRIL) 20 MG tablet   No No   Sig: [LISINOPRIL (PRINIVIL,ZESTRIL) 20 MG TABLET] Take one tab daily   pen needle, diabetic 31 gauge x 1/4\" Ndle   No No   Sig: [PEN NEEDLE, DIABETIC 31 GAUGE X 1/4\" NDLE] Use one daily as directed      Facility-Administered Medications: None     Allergies   No Known Allergies    Physical Exam   Vital Signs: Temp: 98  F (36.7  C) Temp src: Oral BP: 134/77 Pulse: 80   Resp: 28 SpO2: 95 % O2 Device: None (Room air)    Weight: 110 lbs 0 oz    General appearance: alert, appears stated age, cooperative and in no distress  ENT: moist oral mucosa, no adenopathy  Lungs: clear to auscultation bilaterally, good air movement throughout, no wheezing or crackles  Heart: regular rate and rhythm, no murmur appreciated  Abdomen: active bowel sounds, soft, non-tender, non-distended  Extremities: warm and well perfused, strong and symmetric dorsalis pedal pulses, no lower extremity edema  Neuro: Right side weakness, unable to grasp my hands although once I elevate her arm myself she is able to hold it there with some ability.            Data   Data reviewed today: I reviewed all medications, new labs and imaging results over the last 24 hours. I personally reviewed ekg, mri.    Recent Labs   Lab 07/22/21  2125 07/22/21  1847 07/22/21  0947 07/22/21  0723   WBC  --   --  12.4*  --    HGB  --   --  13.3  --    MCV  --   --  90  --    PLT  --   --  318  --    INR  --   --  1.03  --    NA  --   --  141 143   POTASSIUM  --   --  4.2 4.1   CHLORIDE  --   --  106 105   CO2  --   --  23 23   BUN  --   --  29* 30*   CR  --   --  1.15* 1.19*   ANIONGAP  --   --  12 15   CHELA  --   --  9.2 9.7   * 216* 135* 142*   ALBUMIN  --   --   --  3.9   PROTTOTAL  --   --   --  7.1   BILITOTAL  --   --   --  1.4*   ALKPHOS  --   --   --  78   ALT  --   --   --  16   AST  --   --   --  24     "

## 2021-07-23 NOTE — PROGRESS NOTES
07/23/21 0935   General Information   Onset of Illness/Injury or Date of Surgery 07/22/21   Referring Physician Oma Kern   Patient/Family Therapy Goal Statement (SLP) denies difficulty   Pertinent History of Current Problem Paradise Yuan is a 71 year old old female with h/o uncontrolled diabetes, hypertension hyperlipidemia presenting with stuttering stroke symptoms for approximately 2 weeks.  Found to have subacute stroke on the left causing right-sided weakness. Left posterior fossa meningioma.   Past History of Dysphagia none       Present no   Language Some English; native lang is [place], interp not avail   Type of Evaluation   Type of Evaluation Swallow Evaluation;Speech, Language, Cognition   Oral Motor   Dentition (Oral Motor) natural dentition   Facial Symmetry (Oral Motor)   Facial Symmetry (Oral Motor) right side impairment   Right Side Facial Asymmetry moderate impairment   Comment, Facial Symmetry (Oral Motor) Right facial droop, weakness   Lip Function (Oral Motor)   Lip Range of Motion (Oral Motor) protrusion impairment;retraction impairment   Lip Strength (Oral Motor) right side;mild impairment   Lip Coordination (Oral Motor) right side;minimal impairment   Tongue Function (Oral Motor)   Tongue ROM (Oral Motor) lateralization is impaired   Lateralization, Tongue ROM Impairment (Oral Motor) right side;moderate impairment   Tongue Strength (Oral Motor) right side;moderate impairment   Tongue Coordination/Speed (Oral Motor) reduced rate   Jaw Function (Oral Motor)   Jaw Function (Oral Motor) WNL   Vocal Quality/Secretion Management (Oral Motor)   Vocal Quality (Oral Motor)   (quiet volume)   General Swallowing Observations   Current Diet/Method of Nutritional Intake (General Swallowing Observations, NIS) regular diet;thin liquids (level 0)   Swallowing Evaluation Clinical swallow evaluation   Clinical Swallow Evaluation   Feeding Assistance minimal assistance required    Clinical Swallow Evaluation Textures Trialed thin liquids;solid foods   Clinical Swallow Eval: Thin Liquid Texture Trial   Mode of Presentation, Thin Liquids straw;self-fed   Volume of Liquid or Food Presented 3 oz   Oral Phase of Swallow WFL   Pharyngeal Phase of Swallow intact;other (see comments)  (no overt s/s aspiration, able to draw from straw)   Diagnostic Statement no overt s/s aspiration when drinking single sip from straw   Clinical Swallow Evaluation: Solid Food Texture Trial   Mode of Presentation self-fed   Volume Presented 1 saltine cracker   Oral Phase poor AP movement;residue in oral cavity   Oral Residue hard palate;right anterior lateral sulci;other (see comments)  (tongue)   Pharyngeal Phase intact   Diagnostic Statement no overt s/s aspiration, took several bites before swallowing overloading oral cavity.    Swallowing Recommendations   Diet Consistency Recommendations thin liquids (level 0);soft & bite-sized (level 6)   Supervision Level for Intake distant supervision needed   Speech   Speech Intelligibility (Motor Speech) word level;spontaneous speech level;conversational level   Phonation (motor speech) Loudness (soft)   Articulation (Motor Speech) imprecise articulation   Speech Fluency (Motor Speech) WNL   Vocal Loudness (Motor Speech) reduced loudness   Breath Support (Motor Speech) intact   Comment, Motor Speech Assessment moderate dysarthria with right side weakness   Word Level, Speech Intelligibility (Motor Speech) intelligible with some difficulty   Spontaneous speech Level, Speech Intelligibility (Motor Speech) intelligible with some difficulty   Conversational Level, Speech Intelligibility (Motor Speech) intelligible with some difficulty   Auditory Comprehension   Follows Commands (Auditory Comprehension) WNL   Yes/No Questions (Auditory Comprehension) WNL   Object Identification (Auditory Comprehension) WNL   Verbal Expression   Confrontational Naming (Verbal Expression) WNL    Word Finding Skills (Verbal Expression) WNL   Conversational Speech (Verbal Expression) WNL  (no obvious word finding deficits)   Pragmatic Language   Nonverbal Skills (Pragmatic Language) WNL   Cognition   Cognitive Status Exam Comments oriented to self, place   General Therapy Interventions   Planned Therapy Interventions Dysphagia Treatment;Communication   Dysphagia treatment Modified diet education;Instruction of safe swallow strategies   Communication Improve speech intelligibility   SLP Therapy Assessment/Plan   Criteria for Skilled Therapeutic Interventions Met (SLP Eval) yes   SLP Diagnosis dyarthria, oral dysphagia   Rehab Potential (SLP Eval) good, to achieve stated therapy goals   Predicted Duration of Therapy Intervention (SLP Eval) 1 wk   Therapy Plan Review/Discharge Plan (SLP)   Therapy Plan Review (SLP) evaluation/treatment results reviewed;patient   SLP Discharge Planning    SLP Discharge Recommendation (DC Rec)   (ongoing speech therapy at next level of care)   SLP Rationale for DC Rec new communication/swallow deficits    Total Evaluation Time   Total Evaluation Time (Minutes) 20

## 2021-07-23 NOTE — PROGRESS NOTES
07/23/21 1415   Quick Adds   Type of Visit Initial PT Evaluation       Present no   Language pt speaks Tagalog and some English, able to participate in PT w English only today   Living Environment   People in home sibling(s)  (sister, present for PT session)   Living Environment Comments see OT eval   Self-Care   Equipment Currently Used at Home none  (pt reports she does not have any gait aids)   General Information   Onset of Illness/Injury or Date of Surgery 07/22/21   Referring Physician Norman Martin MD   Pertinent History of Current Problem (include personal factors and/or comorbidities that impact the POC) CVA w R weakness   Cognition   Affect/Mental Status (Cognition) WFL   Follows Commands (Cognition)   (language barrier, confusion about right/left)   Cognitive Status Comments pt is quiet, reserved, generally cooperative. confused about right/left, possibly some R neglect.    Pain Assessment   Patient Currently in Pain No   Strength   Strength Comments L LE MMT screen 5/5 for all. R LE MMT screen: decreased dorsiflexion AROM compared to L, knee and hip flexion 4/5, knee extension 5/5   Transfers   Transfers sit-stand transfer   Transfer Safety Comments required cues and reminders for hand placement   Sit-Stand Transfer   Sit-Stand Page (Transfers) contact guard   Assistive Device (Sit-Stand Transfers) walker, jana;cane, quad   Sit/Stand Transfer Comments verbal and manual cues for hand placement   Gait/Stairs (Locomotion)   Page Level (Gait) contact guard;verbal cues;nonverbal cues (demo/gesture);1 person to manage equipment   Assistive Device (Gait) walker, jana;cane, quad   Distance in Feet (Required for LE Total Joints) 20' w hemiwalker, 2 steps forward 2 steps back w quad cane   Pattern (Gait) step-to   Deviations/Abnormal Patterns (Gait) right sided deviations   Right Sided Gait Deviations   (externally rotated)   Comment (Gait/Stairs) inconsistent sequencing  w gait aids. recommend continuing to work w quad cane.   Clinical Impression   Criteria for Skilled Therapeutic Intervention yes, treatment indicated   PT Diagnosis (PT) impaired mobility, coordination   Influenced by the following impairments right sided weakness   Functional limitations due to impairments requires assist w transfers, gait   Clinical Presentation Stable/Uncomplicated   Clinical Presentation Rationale presents as diagnosed   Clinical Decision Making (Complexity) low complexity   Therapy Frequency (PT) Daily   Predicted Duration of Therapy Intervention (days/wks) 7 days   Planned Therapy Interventions (PT) balance training;gait training;motor coordination training;neuromuscular re-education;patient/family education;strengthening;transfer training;progressive activity/exercise   Anticipated Equipment Needs at Discharge (PT) cane, quad   Risk & Benefits of therapy have been explained evaluation/treatment results reviewed;care plan/treatment goals reviewed;participants voiced agreement with care plan;participants included;patient;sibling   PT Discharge Planning    PT Discharge Recommendation (DC Rec) Acute Rehab Center-Motivated patient will benefit from intensive, interdisciplinary therapy.  Anticipate will be able to tolerate 3 hours of therapy per day   PT Rationale for DC Rec patient very independent at baseline, would benefit from intensive therapy during acute stroke recovery   PT Brief overview of current status  pt walked very well for their first time up, follows directions well   Total Evaluation Time   Total Evaluation Time (Minutes) 10

## 2021-07-23 NOTE — PLAN OF CARE
Problem: Functional Ability Impaired (Stroke, Ischemic/Transient Ischemic Attack)  Goal: Optimal Functional Ability  Intervention: Optimize Functional Ability  Recent Flowsheet Documentation  Taken 7/23/2021 0415 by Pari Carcamo RN  Activity Management: activity encouraged  Taken 7/23/2021 0015 by Pari Carcamo, RN  Activity Management: activity encouraged   She has scored a 7 on her NIH due to right arm & leg drift, ataxia, facial droop & dysarthria.  She also couldn't tell me the month or her age, stating she was 69 instead of 71 (per ).  At 04 she scored basically the same except she could correctly state her age.

## 2021-07-23 NOTE — CONSULTS
Care Management Follow Up Note    Length of Stay (days) 1    Patient plan of care discussed at Interdisciplinary Rounds: yes                Expected Discharge Date: 7/25/21      Concerns to be Addressed / Barriers to Discharge: Stroke workup in progress (outside the window for tPA). Neurology following. PT, OT and speech to follow.     Anticipated Discharge Disposition: Discharge goal is home pending response to treatment, medical needs and mobility closer to discharge.   Anticipated Discharge Services:  To be determined by destination, patient/family preferences, medical needs and mobility status closer to the time of discharge.  Anticipated Discharge DME: Per therapy.     Plan:  Patient speaks Tagalog and limited english. She requires some processing time to respond to questions. Patient resides at home with her sister, Alec and states independence with ADLs at baseline except for transportation. Her nephew, Elfego, provides transportation. Patient does not use any medical equipment or supplies. Patient plans to discharge goal to return home with nephew to transport. CM will meet with patient after therapy today to determine patient's discharge planning goals and preferences.   11:06 AM:  Left a message for patient's sister Jade to call with nephew's phone number (not on file).   4:05 PM:  Received a message from PT who stated that they are currently recommending acute rehab for patient. Will need [facility] authorization.     Sharmaine Meng RN    Abbreviation  Code:  [facility] Wheelchair ([facility] WC), [facility] Stretcher ([facility] STR), Patient (pt), Transitional Care Unit (TCU), Skilled Nursing Facility (SNF), Long Term Care (LTC), Assisted Living ([facility] or AL), Care Management (CM), Physical Therapy (PT), Occupational Therapy (OT), Speech Therapy (ST), Respiratory Therapy (RT).

## 2021-07-23 NOTE — PROGRESS NOTES
[facility]    PROGRESS NOTE - Hospitalist Service    Assessment and Plan  Paradise Yuan is a 71 year old old female with h/o uncontrolled diabetes, hypertension, hyperlipidemia presenting with stuttering stroke symptoms for approximately 2 weeks.  Found to have subacute stroke on the left causing right-sided weakness    Subacute CVA  ---Presenting with weakness  ---MRI neck normal  ---PT, OT recommending acute stroke rehab  ---Speech saw; has slurred speech ahd facial droop; on soft and bite sized diet with thin liquis  ---increased statin below  --on ASA and plavix  --neuro recommending 30 day event monitor on discontinue  ---echo with EF 55-60% no significant valvular disease    Meningioma  --Left posterior fossa; stable; seen on imaging    Uncontrolled diabetes  ---A1c 10  ---on 16 units at home - will increase to this  ---continue meal time and sliding scale insulin    Hypertension  ---Allowing permissive hypertension  --- Holding home lisinopril and atenolol  ---with stroke subacute and sx for about two weeks start low dose lisinopril today as neuro states keep sbp less than 160    Hyperlipidemia  --- LDL 84  --- was on statin 20mg at home so increased to 40mg today    VTE prophylaxis:  Enoxaparin (Lovenox) SQ  DIET: Orders Placed This Encounter      Combination Diet Regular Diet Adult  Disposition/Barriers to discharge: stroke therapies  Code Status: Full Code    Subjective:  Patient seen in the presence of the Aki .  She is seen with her family member.  She does speak some English.  She denies headache or pain.  Weakness is about the same.  They are asking me how long they will be in the hospital and overall discharge planning.    PHYSICAL EXAM  B/P: 148/78, T: 98.2, P: 84, R: 18     Intake/Output Summary (Last 24 hours) at 7/23/2021 0755  Last data filed at 7/22/2021 1840  Gross per 24 hour   Intake 120 ml   Output --   Net 120 ml      Vitals:    07/22/21 1644    Weight: 49.9 kg (110 lb)       General Appearance: pleasant, not slurred speech with   HEENT: Facial droop noted on the right.  Respiratory: Regular rate and rhythm  Cardiovascular: Clear to auscultation bilaterally  GI: Soft, nontender without hepatosplenomegaly  Skin: No rashes.  Musculoskeletal: No significant lower extremity edema  Neurologic: Dysarthria noted.  Right weakness in both upper and lower extremity noted.  Finger-to-nose testing mildly off on the right compared to the left.  Saw her walking with assistance of PT today using cane with right-sided weakness noted      PERTINENT LABS/IMAGING:  Recent Labs   Lab 21  0724 21  0440 21  2330 21  0947 21  0723 21  07   WBC  --  10.8  --  12.4*  --   --    HGB  --  13.0  --  13.3  --   --    MCV  --  90  --  90  --   --    PLT  --  332  --  318  --   --    INR  --   --   --  1.03  --   --    NA  --  144  --  141  --  143   POTASSIUM  --  3.6  --  4.2  --  4.1   CHLORIDE  --  110*  --  106  --  105   CO2  --  25  --  23  --  23   BUN  --  18  --  29*  --  30*   CR  --  0.99  --  1.15*  --  1.19*   ANIONGAP  --  9  --  12  --  15   CHELA  --  9.1  --  9.2  --  9.7   * 133* 165* 135*   < > 142*   ALBUMIN  --   --   --   --   --  3.9   PROTTOTAL  --   --   --   --   --  7.1   BILITOTAL  --   --   --   --   --  1.4*   ALKPHOS  --   --   --   --   --  78   ALT  --   --   --   --   --  16   AST  --   --   --   --   --  24    < > = values in this interval not displayed.     Recent Results (from the past 24 hour(s))   Echocardiogram Complete - For age > 60 yrs    Narrative    [id number]  [id number]  [id number]  [id number]^BARBARA^[address] [address]     Name: NAVJOT GARCIA  MRN: [id number]  : 1950  Study Date: 2021 10:46 AM  Age: 71 yrs  Gender: Female  Patient Location: [facility]  Reason For Study: Cerebrovascular Incident  Ordering Physician: BARBARA  GARY  Referring Physician: MORIAH CROWE  Performed By: SHRADDHA     BSA: 1.5 m2  Height: 62 in  Weight: 110 lb  HR: 84  BP: 132/78 mmHg  ______________________________________________________________________________  ______________________________________________________________________________  Interpretation Summary     1. Normal left ventricular size and systolic performance with a visually  estimated ejection fraction of 55-60%.  2. No significant valvular heart disease is identified on this study.  3. Normal right ventricular size and systolic performance.  ______________________________________________________________________________  Left ventricle:  Normal left ventricular size and systolic performance with a visually  estimated ejection fraction of 55-60%. There is normal regional wall motion.  Left ventricular wall thickness is normal.     Assessment of LV Diastolic Function: The cumulative findings suggest normal  diastolic filling [The septal e' velocity is > 7 cm/s & lateral e' velocity  is  < 10 cm/s. The average E/e' is < 14. The TR velocity cannot be determined due  to insufficient tricuspid insufficiency signal. Left atrial volume index is  less than 34 mL/mÂ ].     Right ventricle:  Normal right ventricular size and systolic performance.     Left atrium:  The left atrium is of normal size.     Right atrium:  The right atrium is of normal size.     IVC:  The IVC is of normal caliber.     Aortic valve:  The aortic valve is comprised of three cusps. No significant aortic stenosis  or aortic insufficiency is detected on this study.     Mitral valve:  The mitral valve appears morphologically normal. There is trace mitral  insufficiency.     Tricuspid valve:  The tricuspid valve is grossly morphologically normal. There is trace  tricuspid insufficiency.     Pulmonic valve:  The pulmonic valve is grossly morphologically normal.     Thoracic aorta:  The aortic root and proximal ascending aorta are of  normal dimension.     Pericardium:  There is no significant pericardial effusion.  ______________________________________________________________________________  ______________________________________________________________________________  MMode/2D Measurements & Calculations  RVDd: 2.8 cm  IVSd: 1.1 cm  LVIDd: 2.7 cm  LVIDs: 1.7 cm  LVPWd: 1.0 cm  FS: 35.3 %  LV mass(C)d: 76.7 grams  LV mass(C)dI: 51.7 grams/m2  Ao root diam: 3.1 cm  LA dimension: 2.6 cm  asc Aorta Diam: 3.4 cm  LA/Ao: 0.83  LVOT diam: 1.8 cm  LVOT area: 2.6 cm2  LA Volume Indexed (AL/bp): 21.2 ml/m2     Time Measurements  Aortic HR: 90.0 BPM  MM HR: 84.0 BPM     Doppler Measurements & Calculations  MV E max nakia: 54.8 cm/sec  MV A max nakia: 76.7 cm/sec  MV E/A: 0.71  MV dec slope: 188.8 cm/sec2  MV dec time: 0.29 sec  LV V1 max P.0 mmHg  LV V1 max: 111.6 cm/sec  LV V1 VTI: 20.5 cm  CO(LVOT): 4.8 l/min  CI(LVOT): 3.3 l/min/m2  SV(LVOT): 53.8 ml  SI(LVOT): 36.3 ml/m2  PA acc time: 0.10 sec  E/E' av.6  Lateral E/e': 5.5  Medial E/e': 5.7     ______________________________________________________________________________  Report approved by: Nini Briones 2021 11:52 AM             Oma Kern MD  [facility] Medicine Service  [phone]

## 2021-07-23 NOTE — PLAN OF CARE
Problem: Cognitive Impairment (Stroke, Ischemic/Transient Ischemic Attack)  Goal: Optimal Cognitive Function  Outcome: No Change     Problem: Communication Impairment (Stroke, Ischemic/Transient Ischemic Attack)  Goal: Improved Communication Skills  Outcome: No Change     Problem: Functional Ability Impaired (Stroke, Ischemic/Transient Ischemic Attack)  Goal: Optimal Functional Ability  Outcome: No Change  Intervention: Optimize Functional Ability  Recent Flowsheet Documentation  Taken 7/23/2021 0846 by Aroldo Westbrook, RN  Activity Management:   activity adjusted per tolerance   activity encouraged   Scored NIH for slurred speech and facial droop. Attended PT  well. Able to communicates needs. Asked for help appropriately for bathroom using.

## 2021-07-23 NOTE — PLAN OF CARE
Patient denied pain. Patient scored on NIH for facial droop, dysarthria, limb ataxia, and right-sided weakness. Patient assist of 1 with cares. NSR on telemetry. Sepsis protocol fired; Lactic acid was 1.1. Patient reminded to use call light to ask for assistance. Call light within reach.    Problem: Cognitive Impairment (Stroke, Ischemic/Transient Ischemic Attack)  Goal: Optimal Cognitive Function  Outcome: No Change     Problem: Communication Impairment (Stroke, Ischemic/Transient Ischemic Attack)  Goal: Improved Communication Skills  Outcome: No Change     Problem: Functional Ability Impaired (Stroke, Ischemic/Transient Ischemic Attack)  Goal: Optimal Functional Ability  Outcome: No Change  Intervention: Optimize Functional Ability  Recent Flowsheet Documentation  Taken 7/22/2021 2039 by Agueda Monroe, RN  Activity Management: ambulated to bathroom  Taken 7/22/2021 1722 by Agueda Monroe, RN  Activity Management: ambulated to bathroom

## 2021-07-24 ENCOUNTER — APPOINTMENT (OUTPATIENT)
Dept: OCCUPATIONAL THERAPY | Facility: HOSPITAL | Age: 71
DRG: 064 | End: 2021-07-24
Attending: HOSPITALIST
Payer: COMMERCIAL

## 2021-07-24 ENCOUNTER — APPOINTMENT (OUTPATIENT)
Dept: SPEECH THERAPY | Facility: HOSPITAL | Age: 71
DRG: 064 | End: 2021-07-24
Attending: HOSPITALIST
Payer: COMMERCIAL

## 2021-07-24 LAB
CREAT SERPL-MCNC: 1.01 MG/DL (ref 0.6–1.1)
GFR SERPL CREATININE-BSD FRML MDRD: 56 ML/MIN/1.73M2
GLUCOSE BLDC GLUCOMTR-MCNC: 109 MG/DL (ref 70–125)
GLUCOSE BLDC GLUCOMTR-MCNC: 152 MG/DL (ref 70–125)
GLUCOSE BLDC GLUCOMTR-MCNC: 156 MG/DL (ref 70–125)
GLUCOSE BLDC GLUCOMTR-MCNC: 188 MG/DL (ref 70–125)

## 2021-07-24 PROCEDURE — 250N000013 HC RX MED GY IP 250 OP 250 PS 637: Performed by: FAMILY MEDICINE

## 2021-07-24 PROCEDURE — 36415 COLL VENOUS BLD VENIPUNCTURE: CPT | Performed by: HOSPITALIST

## 2021-07-24 PROCEDURE — 250N000012 HC RX MED GY IP 250 OP 636 PS 637: Performed by: HOSPITALIST

## 2021-07-24 PROCEDURE — 99232 SBSQ HOSP IP/OBS MODERATE 35: CPT | Performed by: PSYCHIATRY & NEUROLOGY

## 2021-07-24 PROCEDURE — 92507 TX SP LANG VOICE COMM INDIV: CPT | Mod: GN

## 2021-07-24 PROCEDURE — 82565 ASSAY OF CREATININE: CPT | Performed by: HOSPITALIST

## 2021-07-24 PROCEDURE — 92526 ORAL FUNCTION THERAPY: CPT | Mod: GN

## 2021-07-24 PROCEDURE — 97535 SELF CARE MNGMENT TRAINING: CPT | Mod: GO

## 2021-07-24 PROCEDURE — 258N000003 HC RX IP 258 OP 636: Performed by: PSYCHIATRY & NEUROLOGY

## 2021-07-24 PROCEDURE — 97110 THERAPEUTIC EXERCISES: CPT | Mod: GO

## 2021-07-24 PROCEDURE — 99233 SBSQ HOSP IP/OBS HIGH 50: CPT | Performed by: FAMILY MEDICINE

## 2021-07-24 PROCEDURE — 250N000012 HC RX MED GY IP 250 OP 636 PS 637: Performed by: FAMILY MEDICINE

## 2021-07-24 PROCEDURE — 250N000013 HC RX MED GY IP 250 OP 250 PS 637: Performed by: PSYCHIATRY & NEUROLOGY

## 2021-07-24 PROCEDURE — 250N000011 HC RX IP 250 OP 636: Performed by: HOSPITALIST

## 2021-07-24 PROCEDURE — 120N000001 HC R&B MED SURG/OB

## 2021-07-24 RX ORDER — LISINOPRIL 5 MG/1
10 TABLET ORAL DAILY
Status: DISCONTINUED | OUTPATIENT
Start: 2021-07-25 | End: 2021-07-24

## 2021-07-24 RX ORDER — ATENOLOL 25 MG/1
25 TABLET ORAL 2 TIMES DAILY
Status: DISCONTINUED | OUTPATIENT
Start: 2021-07-24 | End: 2021-07-24

## 2021-07-24 RX ORDER — LISINOPRIL 20 MG/1
20 TABLET ORAL DAILY
Status: DISCONTINUED | OUTPATIENT
Start: 2021-07-25 | End: 2021-07-25

## 2021-07-24 RX ORDER — ATENOLOL 25 MG/1
25 TABLET ORAL 2 TIMES DAILY
Status: DISCONTINUED | OUTPATIENT
Start: 2021-07-24 | End: 2021-08-01

## 2021-07-24 RX ADMIN — SODIUM CHLORIDE: 9 INJECTION, SOLUTION INTRAVENOUS at 14:58

## 2021-07-24 RX ADMIN — CLOPIDOGREL BISULFATE 75 MG: 75 TABLET ORAL at 08:10

## 2021-07-24 RX ADMIN — INSULIN GLARGINE 16 UNITS: 100 INJECTION, SOLUTION SUBCUTANEOUS at 21:17

## 2021-07-24 RX ADMIN — ATORVASTATIN CALCIUM 40 MG: 40 TABLET, FILM COATED ORAL at 21:18

## 2021-07-24 RX ADMIN — ASPIRIN 81 MG: 81 TABLET, COATED ORAL at 08:10

## 2021-07-24 RX ADMIN — INSULIN ASPART 3 UNITS: 100 INJECTION, SOLUTION INTRAVENOUS; SUBCUTANEOUS at 18:47

## 2021-07-24 RX ADMIN — ATENOLOL 25 MG: 25 TABLET ORAL at 16:53

## 2021-07-24 RX ADMIN — INSULIN ASPART 1 UNITS: 100 INJECTION, SOLUTION INTRAVENOUS; SUBCUTANEOUS at 18:07

## 2021-07-24 RX ADMIN — LISINOPRIL 20 MG: 20 TABLET ORAL at 08:10

## 2021-07-24 RX ADMIN — INSULIN ASPART 1 UNITS: 100 INJECTION, SOLUTION INTRAVENOUS; SUBCUTANEOUS at 12:43

## 2021-07-24 RX ADMIN — ATENOLOL 25 MG: 25 TABLET ORAL at 21:18

## 2021-07-24 RX ADMIN — INSULIN ASPART 3 UNITS: 100 INJECTION, SOLUTION INTRAVENOUS; SUBCUTANEOUS at 10:10

## 2021-07-24 RX ADMIN — ENOXAPARIN SODIUM 40 MG: 100 INJECTION SUBCUTANEOUS at 23:50

## 2021-07-24 NOTE — PLAN OF CARE
Problem: Adult Inpatient Plan of Care  Goal: Absence of Hospital-Acquired Illness or Injury  Intervention: Identify and Manage Fall Risk  Recent Flowsheet Documentation  Taken 7/24/2021 0800 by Aroldo Westbrook RN  Safety Promotion/Fall Prevention:   activity supervised   assistive device/personal items within reach   bed alarm on   chair alarm on   Asking for assistance appropriately for bathroom using  Problem: Fall Injury Risk  Goal: Absence of Fall and Fall-Related Injury  Outcome: Improving  Intervention: Promote Injury-Free Environment  Recent Flowsheet Documentation  Taken 7/24/2021 0800 by Aroldo Westbrook RN  Safety Promotion/Fall Prevention:   activity supervised   assistive device/personal items within reach   bed alarm on   chair alarm on     Problem: Hyperglycemia  Goal: Blood Glucose Level Within Targeted Range  Outcome: No Change   Blood sugar has been in the range of 100-150

## 2021-07-24 NOTE — PLAN OF CARE
Problem: Cognitive Impairment (Stroke, Ischemic/Transient Ischemic Attack)  Goal: Optimal Cognitive Function  Outcome: No Change     Problem: Fall Injury Risk  Goal: Absence of Fall and Fall-Related Injury  Outcome: No Change  Intervention: Promote Injury-Free Environment  Recent Flowsheet Documentation  Taken 7/24/2021 0000 by Adrianna Lawler RN  Safety Promotion/Fall Prevention:   activity supervised   bed alarm on   clutter free environment maintained   fall prevention program maintained   Call light within reach, bed alarm on, 2 siderails up, non-skid slippers on feet.    Problem: Adult Inpatient Plan of Care  Goal: Absence of Hospital-Acquired Illness or Injury  Intervention: Identify and Manage Fall Risk  Recent Flowsheet Documentation  Taken 7/24/2021 0000 by Adrianna Lawler RN  Safety Promotion/Fall Prevention:   activity supervised   bed alarm on   clutter free environment maintained   fall prevention program maintained  Intervention: Prevent Skin Injury  Recent Flowsheet Documentation  Taken 7/24/2021 0000 by Adrianna Lawler RN  Body Position:   supine   position changed independently     Problem: Functional Ability Impaired (Stroke, Ischemic/Transient Ischemic Attack)  Goal: Optimal Functional Ability  Intervention: Optimize Functional Ability  Recent Flowsheet Documentation  Taken 7/24/2021 0000 by Adrianna Lawler RN  Activity Management: up ad ryan   NIHSS every 4 hours, scoring 3 and 2 for facial droop and mild aphasia.

## 2021-07-24 NOTE — PROGRESS NOTES
NEUROLOGY PROGRESS NOTE     ASSESSMENT & PLAN   Visit diagnosis: Left MCA infarct.  Left cerebellar meningioma.    71-year-old woman with history of CKD, hyperlipidemia, diabetes and hypertension who presented with 2-day history of right-sided weakness and difficulties with gait.  CT of the head did not show any ischemia a left cerebellar meningioma noted  MRI of the brain shows infarct in the left MCA territory involving the frontal white matter and cortex at the frontoparietal junction also involving left corona radiata and deep white matter.  There was diminished flow within the left MCA beyond distal M1 segment suggesting occlusion  Echocardiogram shows no significant valvular disease.  Unremarkable.  Patient was not a candidate for thrombolytics due to time of onset contraindication dual antiplatelet therapy with aspirin and Plavix  LDL is 84, patient started on Lipitor 20 mg daily.  Goal LDL less than 70.  Telemetry monitoring and if no cardiac arrhythmias noted during current hospitalization schedule patient for outpatient 30-day event monitor.  If that is unrevealing consider implantable loop recorder.  Goal SBP less than 160  Physical, occupational, speech and pharmacy consulted.  Patient will likely need inpatient rehabilitation upon discharge.  Follow-up with neurosurgery as outpatient for the meningioma.  Neurology will sign off. Follow-up with outpatient Neurology in one month.      Neurology Discharge Planning :  TBD    Patient Active Problem List    Diagnosis Date Noted     Left cerebellar meningioma 07/23/2021     Priority: Medium     Chronic kidney disease, stage 3 07/22/2021     Priority: Medium     Facial droop 07/22/2021     Priority: Medium     Right leg weakness 07/22/2021     Priority: Medium     Right arm weakness 07/22/2021     Priority: Medium     Cerebral infarction due to occlusion of left middle cerebral artery (H) 07/22/2021     Priority: Medium     Memory problem 02/23/2021      Priority: Medium     Hyperlipidemia 10/03/2018     Priority: Medium     Type 2 diabetes mellitus treated without insulin (H) 10/11/2017     Priority: Medium     Benign Essential Hypertension      Priority: Medium     Created by Conversion         Stomatitis Herpetiformis      Priority: Medium     Created by Conversion         Medical History  Past Medical History:   Diagnosis Date     Hypertension         SUBJECTIVE     Paradise says she is feeling better. She still has weakness, especially in the Right arm.      OBJECTIVE     Vital signs in last 24 hours  Temp:  [97  F (36.1  C)-98.4  F (36.9  C)] 98.2  F (36.8  C)  Pulse:  [] 87  Resp:  [18-20] 18  BP: (124-161)/() 140/66  SpO2:  [95 %-98 %] 96 %    Weight:   [unfilled]    Review of Systems   A comprehensive review of systems was negative.    General Physical Exam: Patient is alert and oriented x 3.  Vital signs were reviewed and are documented in EMR. RRR. Neck was supple, no Carotid bruit, thyromegaly, JVD or lymphadenopathy noted.  Neurological Exam:  Mental status: Alert, attentive.   Speech: Mild dysarthria.  Cranial nerves:  II-XII intact.  Motor: 2+/5 in RUE, 4/5 RLE. Normal strength on the Left. Right hand-dominant.  Reflexes: 1+, symmetric. Up-going toes on Right.   Sensory: Intact throughout.  Coordination: Normal on the Left. Cannot do finger-tapping on the Right.  Gait: Deferred for safety.     DIAGNOSTIC STUDIES     Pertinent Radiology   Radiology Results: Personally reviewed image/s      MRI  Brain/MRA (7.22.21):  CONCLUSION:  HEAD MRI:  1.  There are recent infarcts of varying age within the left middle cerebral artery territory affecting the deep frontal white matter and cortex at the frontoparietal junction.  2.  Infarcts involving the left corona radiata/deep white matter and to lesser extent frontoparietal cortex appear to be acute to early subacute in age.  3.  Infarct affecting the left insula and to lesser extent frontoparietal  cortex appear to represent slightly older subacute infarcts.  4.  No acute hemorrhage.  5.  A 3 cm calcified mass within the left cerebellum could represent a meningioma or cavernoma. It demonstrates at most minimal associated enhancement. No adjacent edema.     HEAD MRA:  1.  Diminished flow within the left middle cerebral artery beyond the distal M1 segment favored to reflect occlusion or high-grade stenosis of the superior/anterior division and slowed flow within the posterior/inferior division.  2.  Otherwise patent intracranial circulation.     NECK MRA:  1.  No evidence for dissection or hemodynamically significant narrowing in the neck based on NASCET criteria.    Echocardiogram  (7.22.21):  Interpretation Summary     1. Normal left ventricular size and systolic performance with a visually  estimated ejection fraction of 55-60%.  2. No significant valvular heart disease is identified on this study.  3. Normal right ventricular size and systolic performance.      Pertinent Labs   Lab Results: personally reviewed.   Recent Results (from the past 24 hour(s))   Glucose by meter    Collection Time: 07/23/21 11:11 AM   Result Value Ref Range    GLUCOSE BY METER POCT 123 70 - 125 mg/dL   Glucose by meter    Collection Time: 07/23/21  5:02 PM   Result Value Ref Range    GLUCOSE BY METER POCT 151 (H) 70 - 125 mg/dL   Glucose by meter    Collection Time: 07/24/21  7:20 AM   Result Value Ref Range    GLUCOSE BY METER POCT 109 70 - 125 mg/dL         HOSPITAL MEDICATIONS       aspirin  81 mg Oral Daily     atorvastatin  40 mg Oral At Bedtime     clopidogrel  75 mg Oral Daily     enoxaparin ANTICOAGULANT  40 mg Subcutaneous Q24H     insulin aspart   Subcutaneous Daily with supper     insulin aspart   Subcutaneous Daily with lunch     insulin aspart   Subcutaneous QAM AC     insulin aspart  1-7 Units Subcutaneous TID AC     insulin aspart  1-5 Units Subcutaneous At Bedtime     insulin glargine  16 Units Subcutaneous At  Bedtime     lisinopril  20 mg Oral Daily     sodium chloride (PF)  3 mL Intracatheter Q8H     sodium chloride (PF)  3 mL Intracatheter Q8H        Total time spent for face to face visit, reviewing labs/imaging studies, counseling and coordination of care was: 30 Minutes More than 50% of this time was spent on counseling and coordination of care.    Zora Hurst MD  [facility] Neurology [facility] - [address], [address]  [place] [address]

## 2021-07-24 NOTE — PLAN OF CARE
Problem: Cognitive Impairment (Stroke, Ischemic/Transient Ischemic Attack)  Goal: Optimal Cognitive Function  Outcome: No Change   NIH scoring for slightly slurred speech and facial droop.     Assist of 1 with gait belt to bathroom. Ivf infusing. Denies pain or nausea. Was up in chair most of shift and tolerated well. Falls precaution.

## 2021-07-24 NOTE — PROGRESS NOTES
[facility]    Medicine Progress Note - Hospitalist Service       Date of Admission:  7/22/2021    Assessment & Plan           Paradise Yuan is a 71 year old old female with h/o uncontrolled diabetes, hypertension, hyperlipidemia presenting with stuttering stroke symptoms for approximately 2 weeks.  Found to have subacute stroke on the left causing right-sided weakness, partial aphasia     Subacute CVA  2-week history of right sided facial droop, partial right hemiparesis.  MRI of head shows recent infarcts of varying age within the left MCA territory affecting the deep frontal white matter and cortex at the frontoparietal junction. Also in the left corona radiata and deep white matter and to a lesser extent of the frontoparietal cortex. Also infarct affecting the left insula and to lesser extent frontal parietal complex. No acute hemorrhage.  MRA of the head shows diminished flow within the left MCA beyond the distal M1 segment, normal MRA of the neck  Seen by speech therapy, on soft diet with thin liquids, tolerating  Seen by neurology, on aspirin and Plavix, statin increased  Cardiac echocardiogram shows EF of 55 to 60% with no signs of thrombus or significant valvular disease  Given multifocal infarcts neurology is recommending a 30-day event monitor at discharge.  ET OT  Will benefit by acute rehab     Meningioma  --Left posterior fossa; stable; seen on imaging     Uncontrolled diabetes  A1c 10  Blood sugars controlled with home Lantus, 16 units, continue carb counting in medium sliding scale. Question if she was really taking her insulin at home.     Hypertension, essential  ---Allowing permissive hypertension  --- Holding home lisinopril and atenolol  ---with stroke subacute and sx for about two weeks neuro states keep sbp less than 160  Will continue lisinopril at lower dose and restart home atenolol    Atrial tachycardia/SVT-  No A. fib. Restart atenolol.     Hyperlipidemia  ---  LDL 84  --- was on statin 20mg at home so increased to 40mg      VTE prophylaxis:  Enoxaparin (Lovenox) SQ  DIET: Orders Placed This Encounter      Combination Diet Regular Diet Adult  Disposition/Barriers to discharge: stroke therapies  Code Status: Full Code          Diet: Combination Diet Soft and Bite Sized Diet (level 6); Thin Liquids (level 0)    DVT Prophylaxis: Heparin SQ  Choi Catheter: Not present  Central Lines: None  Code Status: Full Code      Disposition Plan   Expected discharge: 07/25/2021 recommended to Acute rehab versus TCU once safe disposition plan/ TCU bed available.     The patient's care was discussed with the Bedside Nurse, Care Coordinator/, Patient and Patient's Family. for total time 38 minutes with greater than 50% of total time spent in counseling and coordination of care.    CHARLI HERRERA MD  Hospitalist Service  [facility]  Securely message with the Vocera Web Console (learn more here)  Text page via [employer] Paging/Directory        Risk Factors Present on Admission               ______________________________________________________________________    Interval History   Remainder of 12 point review of systems negative except as noted below    Subjective:  Patient doing okay. Still has difficulty talking, right arm and right leg weakness. No chest pain or shortness of breath. Patient and sister did not want an . No choking or coughing with food    Data reviewed today: I reviewed all medications, new labs and imaging results over the last 24 hours.     Physical Exam   Vital Signs: Temp: 98.3  F (36.8  C) Temp src: Oral BP: (!) 164/86 Pulse: 89   Resp: 18 SpO2: 96 % O2 Device: None (Room air)    Weight: 110 lbs 0 oz  Physical Exam:  Temp:  [97  F (36.1  C)-98.4  F (36.9  C)] 98.3  F (36.8  C)  Pulse:  [] 89  Resp:  [18-20] 18  BP: (124-164)/() 164/86  SpO2:  [95 %-98 %] 96 %    BP (!) 164/86 (BP Location: Left arm)   Pulse 89   " Temp 98.3  F (36.8  C) (Oral)   Resp 18   Ht 1.575 m (5' 2\")   Wt 49.9 kg (110 lb)   SpO2 96%   BMI 20.12 kg/m    General appearance: alert, appears stated age and cooperative  Head: Normocephalic, without obvious abnormality, atraumatic  Eyes: Clear conjuctiva  Neck: no JVD and supple, symmetrical, trachea midline  Lungs: clear to auscultation bilaterally  Heart: regular rate and rhythm, S1, S2 normal, no murmur, click, rub or gallop  Abdomen: soft, non-tender; bowel sounds normal; no masses,  no organomegaly  Extremities: Ariadna's sign is negative, no sign of DVT  Skin: Skin color, texture, turgor normal. No rashes or lesions  Neurologic: Mental status: alertness: alert  Cranial nerves: II: visual acuity normal bilaterally, II: visual field normal, II: pupils equal, round, reactive to light and accommodation, III,IV,VI: extraocular muscles extra-ocular motions intact  Sensory: normal  Motor:Right  strength 2 out of 5, right hip flexor 4 out of 5. Mild right facial droop          Data   Recent Labs   Lab 07/24/21  1213 07/24/21  0720 07/23/21  1702 07/23/21  0440 07/22/21  0947 07/22/21  0723 07/22/21  0723   WBC  --   --   --  10.8 12.4*  --   --    HGB  --   --   --  13.0 13.3  --   --    MCV  --   --   --  90 90  --   --    PLT  --   --   --  332 318  --   --    INR  --   --   --   --  1.03  --   --    NA  --   --   --  144 141  --  143   POTASSIUM  --   --   --  3.6 4.2  --  4.1   CHLORIDE  --   --   --  110* 106  --  105   CO2  --   --   --  25 23  --  23   BUN  --   --   --  18 29*  --  30*   CR  --   --   --  0.99 1.15*  --  1.19*   ANIONGAP  --   --   --  9 12  --  15   CHELA  --   --   --  9.1 9.2  --  9.7   * 109 151* 133* 135*   < > 142*   ALBUMIN  --   --   --   --   --   --  3.9   PROTTOTAL  --   --   --   --   --   --  7.1   BILITOTAL  --   --   --   --   --   --  1.4*   ALKPHOS  --   --   --   --   --   --  78   ALT  --   --   --   --   --   --  16   AST  --   --   --   --   --   --  " 24    < > = values in this interval not displayed.     No results found for this or any previous visit (from the past 24 hour(s)).  Medications     - MEDICATION INSTRUCTIONS -       - MEDICATION INSTRUCTIONS -         aspirin  81 mg Oral Daily     atenolol  25 mg Oral BID     atorvastatin  40 mg Oral At Bedtime     clopidogrel  75 mg Oral Daily     enoxaparin ANTICOAGULANT  40 mg Subcutaneous Q24H     insulin aspart   Subcutaneous Daily with supper     insulin aspart   Subcutaneous Daily with lunch     insulin aspart   Subcutaneous QAM AC     insulin aspart  1-7 Units Subcutaneous TID AC     insulin glargine  16 Units Subcutaneous At Bedtime     [START ON 7/25/2021] lisinopril  10 mg Oral Daily     sodium chloride (PF)  3 mL Intracatheter Q8H     sodium chloride (PF)  3 mL Intracatheter Q8H

## 2021-07-24 NOTE — PROGRESS NOTES
Care Management Follow Up    Length of Stay (days): 2    Expected Discharge Date: 07/25/2021     Concerns to be Addressed:       Patient plan of care discussed at interdisciplinary rounds: Yes    Anticipated Discharge Disposition:       Anticipated Discharge Services:    Anticipated Discharge DME:      Patient/family educated on Medicare website which has current facility and service quality ratings:    Education Provided on the Discharge Plan:    Patient/Family in Agreement with the Plan:      Referrals Placed by CM/SW:    Private pay costs discussed: Not applicable    Additional Information:  Message left for pt's nephew, Elfego [phone] to discuss discharge plans.      BRE Yusuf

## 2021-07-25 ENCOUNTER — APPOINTMENT (OUTPATIENT)
Dept: OCCUPATIONAL THERAPY | Facility: HOSPITAL | Age: 71
DRG: 064 | End: 2021-07-25
Attending: HOSPITALIST
Payer: COMMERCIAL

## 2021-07-25 ENCOUNTER — APPOINTMENT (OUTPATIENT)
Dept: PHYSICAL THERAPY | Facility: HOSPITAL | Age: 71
DRG: 064 | End: 2021-07-25
Attending: HOSPITALIST
Payer: COMMERCIAL

## 2021-07-25 ENCOUNTER — APPOINTMENT (OUTPATIENT)
Dept: SPEECH THERAPY | Facility: HOSPITAL | Age: 71
DRG: 064 | End: 2021-07-25
Attending: HOSPITALIST
Payer: COMMERCIAL

## 2021-07-25 LAB
ANION GAP SERPL CALCULATED.3IONS-SCNC: 7 MMOL/L (ref 5–18)
BUN SERPL-MCNC: 14 MG/DL (ref 8–28)
CALCIUM SERPL-MCNC: 9.6 MG/DL (ref 8.5–10.5)
CHLORIDE BLD-SCNC: 109 MMOL/L (ref 98–107)
CO2 SERPL-SCNC: 26 MMOL/L (ref 22–31)
CREAT SERPL-MCNC: 0.87 MG/DL (ref 0.6–1.1)
GFR SERPL CREATININE-BSD FRML MDRD: 67 ML/MIN/1.73M2
GLUCOSE BLD-MCNC: 123 MG/DL (ref 70–125)
GLUCOSE BLDC GLUCOMTR-MCNC: 122 MG/DL (ref 70–125)
GLUCOSE BLDC GLUCOMTR-MCNC: 171 MG/DL (ref 70–125)
GLUCOSE BLDC GLUCOMTR-MCNC: 173 MG/DL (ref 70–125)
GLUCOSE BLDC GLUCOMTR-MCNC: 212 MG/DL (ref 70–125)
MAGNESIUM SERPL-MCNC: 1.9 MG/DL (ref 1.8–2.6)
PLATELET # BLD AUTO: 323 10E3/UL (ref 150–450)
POTASSIUM BLD-SCNC: 4.1 MMOL/L (ref 3.5–5)
SODIUM SERPL-SCNC: 142 MMOL/L (ref 136–145)

## 2021-07-25 PROCEDURE — 36415 COLL VENOUS BLD VENIPUNCTURE: CPT | Performed by: FAMILY MEDICINE

## 2021-07-25 PROCEDURE — 250N000013 HC RX MED GY IP 250 OP 250 PS 637: Performed by: FAMILY MEDICINE

## 2021-07-25 PROCEDURE — 250N000012 HC RX MED GY IP 250 OP 636 PS 637: Performed by: FAMILY MEDICINE

## 2021-07-25 PROCEDURE — 80048 BASIC METABOLIC PNL TOTAL CA: CPT | Performed by: FAMILY MEDICINE

## 2021-07-25 PROCEDURE — 99233 SBSQ HOSP IP/OBS HIGH 50: CPT | Performed by: FAMILY MEDICINE

## 2021-07-25 PROCEDURE — 85049 AUTOMATED PLATELET COUNT: CPT | Performed by: HOSPITALIST

## 2021-07-25 PROCEDURE — 250N000013 HC RX MED GY IP 250 OP 250 PS 637: Performed by: PSYCHIATRY & NEUROLOGY

## 2021-07-25 PROCEDURE — 120N000001 HC R&B MED SURG/OB

## 2021-07-25 PROCEDURE — 83735 ASSAY OF MAGNESIUM: CPT | Performed by: FAMILY MEDICINE

## 2021-07-25 PROCEDURE — 99207 PR CDG-CUT & PASTE-POTENTIAL IMPACT ON LEVEL: CPT | Performed by: FAMILY MEDICINE

## 2021-07-25 PROCEDURE — 92507 TX SP LANG VOICE COMM INDIV: CPT | Mod: GN

## 2021-07-25 PROCEDURE — 97116 GAIT TRAINING THERAPY: CPT | Mod: GP

## 2021-07-25 PROCEDURE — 97110 THERAPEUTIC EXERCISES: CPT | Mod: GO

## 2021-07-25 PROCEDURE — 97112 NEUROMUSCULAR REEDUCATION: CPT | Mod: GO

## 2021-07-25 RX ORDER — LISINOPRIL 5 MG/1
10 TABLET ORAL 2 TIMES DAILY
Status: DISCONTINUED | OUTPATIENT
Start: 2021-07-25 | End: 2021-08-02 | Stop reason: HOSPADM

## 2021-07-25 RX ADMIN — INSULIN ASPART 4 UNITS: 100 INJECTION, SOLUTION INTRAVENOUS; SUBCUTANEOUS at 13:54

## 2021-07-25 RX ADMIN — ASPIRIN 81 MG: 81 TABLET, COATED ORAL at 08:41

## 2021-07-25 RX ADMIN — INSULIN ASPART 1 UNITS: 100 INJECTION, SOLUTION INTRAVENOUS; SUBCUTANEOUS at 12:49

## 2021-07-25 RX ADMIN — INSULIN ASPART 2 UNITS: 100 INJECTION, SOLUTION INTRAVENOUS; SUBCUTANEOUS at 17:23

## 2021-07-25 RX ADMIN — ATENOLOL 25 MG: 25 TABLET ORAL at 20:43

## 2021-07-25 RX ADMIN — ATORVASTATIN CALCIUM 40 MG: 40 TABLET, FILM COATED ORAL at 20:43

## 2021-07-25 RX ADMIN — INSULIN ASPART 1 UNITS: 100 INJECTION, SOLUTION INTRAVENOUS; SUBCUTANEOUS at 18:16

## 2021-07-25 RX ADMIN — ATENOLOL 25 MG: 25 TABLET ORAL at 08:41

## 2021-07-25 RX ADMIN — LISINOPRIL 10 MG: 5 TABLET ORAL at 20:43

## 2021-07-25 RX ADMIN — LISINOPRIL 10 MG: 5 TABLET ORAL at 08:41

## 2021-07-25 RX ADMIN — CLOPIDOGREL BISULFATE 75 MG: 75 TABLET ORAL at 08:41

## 2021-07-25 RX ADMIN — INSULIN GLARGINE 16 UNITS: 100 INJECTION, SOLUTION SUBCUTANEOUS at 20:43

## 2021-07-25 NOTE — PLAN OF CARE
Problem: Communication Impairment (Stroke, Ischemic/Transient Ischemic Attack)  Goal: Improved Communication Skills  Outcome: No Change   Still have slurred speech, but verbally communication needs.  Problem: Functional Ability Impaired (Stroke, Ischemic/Transient Ischemic Attack)  Goal: Optimal Functional Ability  Outcome: No Change  Intervention: Optimize Functional Ability  Recent Flowsheet Documentation  Taken 7/25/2021 1000 by Aroldo Westbrook RN  Activity Management:   activity adjusted per tolerance   ambulated to bathroom   up in chair   up ad ryan   up to bedside commode   Attend PT/OT.

## 2021-07-25 NOTE — PROGRESS NOTES
Care Management Follow Up    Length of Stay (days): 3    Expected Discharge Date:       Concerns to be Addressed:       Patient plan of care discussed at interdisciplinary rounds: Yes    Anticipated Discharge Disposition:       Anticipated Discharge Services:    Anticipated Discharge DME:      Patient/family educated on Medicare website which has current facility and service quality ratings:    Education Provided on the Discharge Plan:    Patient/Family in Agreement with the Plan:      Referrals Placed by CM/SW:    Private pay costs discussed: Not applicable    Additional Information:  Met with pt, her sister and nephew Katty to discuss recommendation for TCU.  Pt and family agreeing, provided [facility] list.    TCU preferences are  1. [facility]  2. [facility].    Referrals sent to above facilities.      BRE Yusuf

## 2021-07-25 NOTE — PLAN OF CARE
Problem: Functional Ability Impaired (Stroke, Ischemic/Transient Ischemic Attack)  Goal: Optimal Functional Ability  Intervention: Optimize Functional Ability  Recent Flowsheet Documentation  Taken 7/25/2021 0000 by Kelly Vazquez, RN  Activity Management: ambulated in room  Taken 7/24/2021 2045 by Kelly Vazquez RN  Activity Management: ambulated in room   Pt able to hold RUE up for total of 10 sec at 0400 assessment.  Hand at wrist continues to be flaccid.  Slight slurred speech per ; Lyndsey, [id number].  Right facial droop.    OOB with assist of one, using the BSC.  NSR on telemetry    Kelly Vazquez, RN

## 2021-07-25 NOTE — PROGRESS NOTES
[facility]    Medicine Progress Note - Hospitalist Service       Date of Admission:  7/22/2021    Assessment & Plan           Paradise Yuan is a 71 year old old female with h/o uncontrolled diabetes, hypertension, hyperlipidemia presenting with stuttering stroke symptoms for approximately 2 weeks.  Found to have subacute stroke on the left causing right-sided weakness, partial aphasia     Subacute CVA  2-week history of right sided facial droop, partial right hemiparesis.  MRI of head shows recent infarcts of varying age within the left MCA territory affecting the deep frontal white matter and cortex at the frontoparietal junction. Also in the left corona radiata and deep white matter and to a lesser extent of the frontoparietal cortex. Also infarct affecting the left insula and to lesser extent frontal parietal complex. No acute hemorrhage.  MRA of the head shows diminished flow within the left MCA beyond the distal M1 segment, normal MRA of the neck  Seen by speech therapy, on soft diet with thin liquids, tolerating  Seen by neurology, on aspirin and Plavix, statin increased  Cardiac echocardiogram shows EF of 55 to 60% with no signs of thrombus or significant valvular disease  Given multifocal infarcts neurology is recommending a 30-day event monitor at discharge.  ET OT  Will benefit by acute rehab versus TCU.   looking into this.     Meningioma  Left posterior fossa; stable; seen on imaging     Uncontrolled diabetes  A1c 10  Blood sugars controlled with home Lantus, 16 units, continue carb counting in medium sliding scale. Question if she was really taking her insulin at home.     Hypertension, essential  initially held home lisinopril and atenolol.  Subacute stroke and symptoms for 2 weeks, neuro now says to keep systolic blood pressure less than 160.  Continue lisinopril, restarted home atenolol.  Blood pressure better controlled.  Erratic.    Atrial  tachycardia/SVT-  No A. fib. Restart atenolol.  No further arrhythmias arrhythmias     Hyperlipidemia  --- LDL 84  --- was on statin 20mg at home so increased to 40mg      VTE prophylaxis:  Enoxaparin (Lovenox) SQ  DIET: Orders Placed This Encounter      Combination Diet Regular Diet Adult  Disposition/Barriers to discharge: stroke therapies  Code Status: Full Code          Diet: Combination Diet Soft and Bite Sized Diet (level 6); Thin Liquids (level 0)    DVT Prophylaxis: Heparin SQ  Choi Catheter: Not present  Central Lines: None  Code Status: Full Code      Disposition Plan   Expected discharge:  recommended to Acute rehab versus TCU once safe disposition plan/ TCU bed available.     The patient's care was discussed with the Bedside Nurse, Care Coordinator/, Patient and Patient's Family. for total time 38 minutes with greater than 50% of total time spent in counseling and coordination of care.    CHARLI HERRERA MD  Hospitalist Service  [facility]  Securely message with the Vocera Web Console (learn more here)  Text page via [employer] Paging/Directory        Risk Factors Present on Admission               ______________________________________________________________________    Interval History   Remainder of 12 point review of systems negative except as noted below    Subjective:  Patient seems stronger, able to express herself more.  No chest pain or shortness of breath.  Sister present, agrees patient has improved.    Data reviewed today: I reviewed all medications, new labs and imaging results over the last 24 hours.     Physical Exam   Vital Signs: Temp: 98  F (36.7  C) Temp src: Oral BP: 126/80 Pulse: 86   Resp: 16 SpO2: 96 % O2 Device: None (Room air)    Weight: 110 lbs 0 oz  Physical Exam:  Temp:  [97.6  F (36.4  C)-98.7  F (37.1  C)] 98  F (36.7  C)  Pulse:  [] 86  Resp:  [16-20] 16  BP: (126-186)/() 126/80  SpO2:  [95 %-97 %] 96 %    /80 (BP Location:  "Left arm)   Pulse 86   Temp 98  F (36.7  C) (Oral)   Resp 16   Ht 1.575 m (5' 2\")   Wt 49.9 kg (110 lb)   SpO2 96%   BMI 20.12 kg/m    General appearance: alert, appears stated age and cooperative  Head: Normocephalic, without obvious abnormality, atraumatic  Eyes: Clear conjuctiva  Neck: no JVD and supple, symmetrical, trachea midline  Lungs: clear to auscultation bilaterally  Heart: regular rate and rhythm, S1, S2 normal, no murmur, click, rub or gallop  Abdomen: soft, non-tender; bowel sounds normal; no masses,  no organomegaly  Extremities: Ariadna's sign is negative, no sign of DVT  Skin: Skin color, texture, turgor normal. No rashes or lesions  Neurologic: Mental status: alertness: alert  Cranial nerves: II: visual acuity normal bilaterally, II: visual field normal, II: pupils equal, round, reactive to light and accommodation, III,IV,VI: extraocular muscles extra-ocular motions intact  Sensory: normal  Motor:Right  strength 2 out of 5, right hip flexor 4 out of 5. Mild right facial droop          Data   Recent Labs   Lab 07/25/21  1246 07/25/21  1056 07/25/21  0838 07/24/21  2323 07/23/21  0440 07/22/21  0947 07/22/21  0723 07/22/21  0723   WBC  --   --   --   --  10.8 12.4*  --   --    HGB  --   --   --   --  13.0 13.3  --   --    MCV  --   --   --   --  90 90  --   --    PLT  --  323  --   --  332 318  --   --    INR  --   --   --   --   --  1.03  --   --    NA  --  142  --   --  144 141  --  143   POTASSIUM  --  4.1  --   --  3.6 4.2  --  4.1   CHLORIDE  --  109*  --   --  110* 106  --  105   CO2  --  26  --   --  25 23  --  23   BUN  --  14  --   --  18 29*  --  30*   CR  --  0.87  --  1.01 0.99 1.15*  --  1.19*   ANIONGAP  --  7  --   --  9 12  --  15   CHELA  --  9.6  --   --  9.1 9.2  --  9.7   * 123 122  --  133* 135*   < > 142*   ALBUMIN  --   --   --   --   --   --   --  3.9   PROTTOTAL  --   --   --   --   --   --   --  7.1   BILITOTAL  --   --   --   --   --   --   --  1.4*   ALKPHOS  " --   --   --   --   --   --   --  78   ALT  --   --   --   --   --   --   --  16   AST  --   --   --   --   --   --   --  24    < > = values in this interval not displayed.     No results found for this or any previous visit (from the past 24 hour(s)).  Medications     - MEDICATION INSTRUCTIONS -       - MEDICATION INSTRUCTIONS -         aspirin  81 mg Oral Daily     atenolol  25 mg Oral BID     atorvastatin  40 mg Oral At Bedtime     clopidogrel  75 mg Oral Daily     enoxaparin ANTICOAGULANT  40 mg Subcutaneous Q24H     insulin aspart   Subcutaneous Daily with supper     insulin aspart   Subcutaneous Daily with lunch     insulin aspart   Subcutaneous QAM AC     insulin aspart  1-7 Units Subcutaneous TID AC     insulin glargine  16 Units Subcutaneous At Bedtime     lisinopril  10 mg Oral BID     sodium chloride (PF)  3 mL Intracatheter Q8H     sodium chloride (PF)  3 mL Intracatheter Q8H

## 2021-07-26 ENCOUNTER — APPOINTMENT (OUTPATIENT)
Dept: OCCUPATIONAL THERAPY | Facility: HOSPITAL | Age: 71
DRG: 064 | End: 2021-07-26
Payer: COMMERCIAL

## 2021-07-26 ENCOUNTER — APPOINTMENT (OUTPATIENT)
Dept: PHYSICAL THERAPY | Facility: HOSPITAL | Age: 71
DRG: 064 | End: 2021-07-26
Attending: HOSPITALIST
Payer: COMMERCIAL

## 2021-07-26 ENCOUNTER — APPOINTMENT (OUTPATIENT)
Dept: SPEECH THERAPY | Facility: HOSPITAL | Age: 71
DRG: 064 | End: 2021-07-26
Attending: HOSPITALIST
Payer: COMMERCIAL

## 2021-07-26 LAB
GLUCOSE BLDC GLUCOMTR-MCNC: 129 MG/DL (ref 70–125)
GLUCOSE BLDC GLUCOMTR-MCNC: 151 MG/DL (ref 70–125)
GLUCOSE BLDC GLUCOMTR-MCNC: 173 MG/DL (ref 70–125)
GLUCOSE BLDC GLUCOMTR-MCNC: 180 MG/DL (ref 70–125)

## 2021-07-26 PROCEDURE — 97535 SELF CARE MNGMENT TRAINING: CPT | Mod: GO

## 2021-07-26 PROCEDURE — 97530 THERAPEUTIC ACTIVITIES: CPT | Mod: GP

## 2021-07-26 PROCEDURE — 250N000013 HC RX MED GY IP 250 OP 250 PS 637: Performed by: FAMILY MEDICINE

## 2021-07-26 PROCEDURE — 97110 THERAPEUTIC EXERCISES: CPT | Mod: GP

## 2021-07-26 PROCEDURE — 97110 THERAPEUTIC EXERCISES: CPT | Mod: GO

## 2021-07-26 PROCEDURE — 99207 PR CDG-CODE INCORRECT PER BILLING BASED ON TIME: CPT | Performed by: FAMILY MEDICINE

## 2021-07-26 PROCEDURE — 99232 SBSQ HOSP IP/OBS MODERATE 35: CPT | Performed by: FAMILY MEDICINE

## 2021-07-26 PROCEDURE — 250N000012 HC RX MED GY IP 250 OP 636 PS 637: Performed by: FAMILY MEDICINE

## 2021-07-26 PROCEDURE — 250N000013 HC RX MED GY IP 250 OP 250 PS 637: Performed by: PSYCHIATRY & NEUROLOGY

## 2021-07-26 PROCEDURE — 97116 GAIT TRAINING THERAPY: CPT | Mod: GP

## 2021-07-26 PROCEDURE — 99207 PR CDG-MDM COMPONENT: MEETS MODERATE - DOWN CODED: CPT | Performed by: FAMILY MEDICINE

## 2021-07-26 PROCEDURE — 120N000001 HC R&B MED SURG/OB

## 2021-07-26 PROCEDURE — 92507 TX SP LANG VOICE COMM INDIV: CPT | Mod: GN

## 2021-07-26 PROCEDURE — 250N000011 HC RX IP 250 OP 636: Performed by: HOSPITALIST

## 2021-07-26 PROCEDURE — 92526 ORAL FUNCTION THERAPY: CPT | Mod: GN

## 2021-07-26 RX ADMIN — ATENOLOL 25 MG: 25 TABLET ORAL at 21:40

## 2021-07-26 RX ADMIN — ATORVASTATIN CALCIUM 40 MG: 40 TABLET, FILM COATED ORAL at 21:40

## 2021-07-26 RX ADMIN — INSULIN ASPART 1 UNITS: 100 INJECTION, SOLUTION INTRAVENOUS; SUBCUTANEOUS at 12:35

## 2021-07-26 RX ADMIN — INSULIN ASPART 4 UNITS: 100 INJECTION, SOLUTION INTRAVENOUS; SUBCUTANEOUS at 12:35

## 2021-07-26 RX ADMIN — LISINOPRIL 10 MG: 5 TABLET ORAL at 08:57

## 2021-07-26 RX ADMIN — ENOXAPARIN SODIUM 40 MG: 100 INJECTION SUBCUTANEOUS at 00:02

## 2021-07-26 RX ADMIN — INSULIN ASPART 3 UNITS: 100 INJECTION, SOLUTION INTRAVENOUS; SUBCUTANEOUS at 18:15

## 2021-07-26 RX ADMIN — ATENOLOL 25 MG: 25 TABLET ORAL at 08:57

## 2021-07-26 RX ADMIN — INSULIN ASPART 1 UNITS: 100 INJECTION, SOLUTION INTRAVENOUS; SUBCUTANEOUS at 17:38

## 2021-07-26 RX ADMIN — INSULIN ASPART: 100 INJECTION, SOLUTION INTRAVENOUS; SUBCUTANEOUS at 12:34

## 2021-07-26 RX ADMIN — INSULIN GLARGINE 16 UNITS: 100 INJECTION, SOLUTION SUBCUTANEOUS at 21:40

## 2021-07-26 RX ADMIN — LISINOPRIL 10 MG: 5 TABLET ORAL at 21:41

## 2021-07-26 RX ADMIN — CLOPIDOGREL BISULFATE 75 MG: 75 TABLET ORAL at 08:57

## 2021-07-26 RX ADMIN — ASPIRIN 81 MG: 81 TABLET, COATED ORAL at 08:57

## 2021-07-26 RX ADMIN — ENOXAPARIN SODIUM 40 MG: 100 INJECTION SUBCUTANEOUS at 23:07

## 2021-07-26 NOTE — INTERIM SUMMARY
[facility] Pre-Admission Screen    Referral Source:  [facility] P1 -95  Admit date to referring facility: 7/22/2021    Physical Medicine and Rehab Consult Completed: No    Rehab Diagnosis:    Stroke Ischemic 01.2 (R) Body Involvement (L) Brain: L MCA infarct    Justification for Acute Inpatient Rehabilitation  Paradise Yuan is a 71 year old old female who presented with stuttering stroke symptoms for approximately 2 weeks. Pt was found to have subacute stroke on the left causing right-sided weakness, partial aphasia. MRI of head shows recent infarcts of varying age within the left MCA territory affecting the deep frontal white matter and cortex at the frontoparietal junction, left corona radiata and deep white matter and to a lesser extent of the frontoparietal cortex and the left insula and to lesser extent frontal parietal complex. No acute hemorrhage. Cardiac echocardiogram shows EF of 55 to 60% with no signs of thrombus or significant valvular disease. Given multifocal infarcts neurology is recommending a 30-day event monitor at discharge, will be placed after her IRF stay. Also with an incidental finding of a meningioma in the L posterior fossa, which is stable. Pt is currently stable to transfer to inpatient acute rehab.     Patient requires an intensive inpatient rehab program to address the following acute impairments:impaired ROM, impaired strength, impaired activity tolerance, impaired balance, impaired weight shifting, dysphagia and aphasia.     Current Active Medical Management Needs/Risks for Clinical Complications  The patient requires the high level of rehabilitation physician supervision that accompanies the provision of intensive rehabilitation therapy.  The patient needs the services of the rehabilitation physician to assess the patient medically and functionally and to modify the course of treatment as needed to maximize the patient's  capacity to benefit from the rehabilitation process. Patient requires ongoing medical management and assessment of the following:    Diabetes Mellitus type II - A1c 10; Blood sugars controlled with home Lantus, 16 units, continue carb counting in medium sliding scale. Team question if she was really taking her insulin at home; pt will need ongoing education and management of this while on acute rehab to ensure good control.     Dysphagia - oral dysphagia post stroke and at risk for dehydration and aspiration; pt is currently on a soft and bite sized (level 6) and thin liquids (level 0) diet;     Neurologic - pt will need ongoing assessment for changes in neuro status and intervention as indicated as pt is at risk for further strokes; LDL is 84, patient started on Lipitor 20 mg daily. Goal LDL less than 70. Pt will need 30 day event monitor as well.     Hypertension - Subacute stroke and symptoms for 2 weeks, neuro now says to keep systolic blood pressure less than 160. Continue lisinopril and restarted home atenolol. Blood pressure better controlled but still erratic and will need ongoing assessment and adjustment of blood pressure medications as indicated.     Anticoagulation/antiplatelet therapy - pt will need ongoing management of this; currently on lovenox injections (40mg daily) and dual antiplatelet therapy with ASA (81mg) and Plavix (75mg);     COVID-19 - pt tested positive on 7/30/2021; assess for changes in respiratory needs, at higher risk for thrombolytic event    Patient is at risk for falls with injury, further strokes, and mood/sleep disturbances post stroke.     Past Medical/Surgical History   Surgery in the past 100 days: No   Additional relevant past medical history: CKD, hyperlipidemia, uncontrolled diabetes, and hypertension     Level of Functioning Prior to Admission:    LIVING ENVIRONMENT   People in home: sibling(s) (sister, present for PT session)   Current Living Arrangements: house   Home  Accessibility: stairs within home   Number of Stairs, Within Home, Primary: greater than 10 stairs (multi-level split home, bedroom/bath on top 3rd floor. )   Stair Railings, Within Home, Primary: railings safe and in good condition   Transportation Anticipated: family or friend will provide   Living Environment Comments: Pt has a tub/shower combo, no grab bars or chair, and standard toilet    SELF-CARE   Usual Activity Tolerance: good   Equipment Currently Used at Home: none (pt reports she does not have any gait aids)      Additional Comments: Pt was independent with functional mobililty without device, ADL's and IADL's.    Level of Function: GG Scale (Section GG Functional Ability and Goals; CMS's ARIAS Version 3.0 Manual effective 10.1.2019):  PT Current Function Goals for Rehab   Bed Rolling 3 Partial/moderate assistance 6 Independent   Supine to Sit 3 Partial/moderate assistance 6 Independent   Sit to Stand 4 Supervision or touching assitance 6 Independent   Transfer 3 Partial/moderate assistance 6 Independent   Ambulation 4 Supervision or touching assitance 6 Independent   Stairs Not completed 4 Supervision or touching assitance     OT Current Function Goals for Rehab   Feeding 4 Supervision or touching assitance 6 Independent   Grooming 3 Partial/moderate assistance 6 Independent   Bathing Not completed 4 Supervision or touching assitance   Upper Body Dressing Not completed 6 Independent   Lower Body Dressing 2 Substantial/maximal assistance 6 Independent   Toileting 3 Partial/moderate assistance 6 Independent   Toilet Transfer 3 Partial/moderate assistance 6 Independent   Tub/Shower Transfer Not completed 4 Supervision or touching assitance   Cognition Not Assessed Supervision     SLP Current Function Goals for Rehab   Swallow Impaired Tolerate least restrictive diet without signs & symptoms of aspiration and adhere to safe swallow strategies   Communication Impaired Communicate basic needs effectively        Current Diet: thin liquids (level 0);soft & bite-sized (level 6) - distant supervision needed    Summary Statement:  Patient is participating in daily PT/OT/SLP and is making progress. Currently, pt with moderate dysarthria with right side weakness and oral dysphagia. Pt requires Max A to don Depends - pt able to lift feet off floor and tries to A with bringing Depends over hips and min A to complete grooming/hygiene tasks - pt fatigued trying to comb out hair knots, requesting therapist to finish. Pt is min A using grab bars and quad cane to complete toilet transfer. Patient was able to ambulate 1x up to 160ft with CGA of 1 and cues for sequencing, quad cane placement, frequent cues to lead with right toes to encourage foot clearance and heel strike but has not been able to duplicate that and is not able to ambulate more than 15ft at this time.   Pt does desat to 88% with activities, recovers with rest and cues for pursed lip breathing as indicated.    Expected Therapies and Services Required During Inpatient Rehab Admission  Intensity of Therapy: Patient requires intensive therapies not available in a lesser level of care. Patient is motivated, making gains, and can tolerate 3 hours of therapy a day.  Physical Therapy: 60 minutes per day, 7 days a week for 14 days  Occupational Therapy: 60 minutes per day, 7 days a week for 14 days  Speech and Language Therapy: 60 minutes per day, 7 days a week for 14 days  Rehabilitation Nursing Needs: Patient requires 24 hour Rehab Nursing to manage vitals, medication education, carryover of new rehab techniques, care coordination, skin integrity, blood sugar management, diabetes education, assess neurologic status, stroke education, provide safe environment for patient at falls risk and monitor nutritional intake.    Precautions/restrictions/special needs:   Precautions: fall precautions   Restrictions: none   Special Needs: none    Expected Level of Improvement: mod I  with mobility, transfers, and ADLs; anticipate pt will need assist with IADLs.   Expected Length of time to achieve: 14 days    Anticipated Discharge Needs:  Anticipated Discharge Destination: Home  Anticipated Discharge Support: Family member  24/7 support available : Yes  Identified caregiver(s):  Sister  Anticipated Discharge Needs: Home with outpatient therapy; Follow-up with neurosurgery as outpatient for the meningioma    Identified challenges/barriers:  None    Liaison Signature/date/time:       Physician statement of review and agreement:  I have reviewed and am in agreement of the need for IRF stay to address above functional and medical needs. In addition to above statements address, Patient requires intensive active and ongoing therapeutic intervention and multiple therapies; Patient requires medical supervision; Expected to actively participate in the intensive rehab program; Sufficiently stable to actively participate; Expectation for measurable improvement in functional capacity or adaption to impairments.    MD signature/date/time:

## 2021-07-26 NOTE — PLAN OF CARE
Right side weakness continues able to lift right arm up to chair and   Commode with assist of one and gait belt, sister here takes   Long time to eat small bites no trouble swallowing.  Looking for tcu at discharge. Blood sugars 124 173  NSR no headache.

## 2021-07-26 NOTE — PROGRESS NOTES
"Spiritual Assessment:     Hoping to regain her strength    Continuing to trust God through difficulties    Support from friend whom she lives with    Temple - unable to identify Rastafarian     Welcomes prayers    Care Provided:   Empathic listening and presence  Normalized/validated her feelings of being overwhelmed  Prayer shared   Brought rosary    Full Spiritual Care Note: Saw Paradise due to admission questionairre response. She was accompanied by her friend and roommate. Paradise states that her main concern today is regaining her strength. She lifted her right arm to show me that she has not lost all strength. When asked if there is any specific area or ability that she is most concerned about it, she says \"All of it.\" She states that she believes in God and is able to trust Him in the midst of uncertainty. She is Temple and welcomes prayers. She welcomed my offer to notify her Rastafarian of her admission, but neither Paradise or her friend (who is hard of hearing) is able to tell me the name of her Rastafarian. Her friend shares that Paradise lives with her and her son. Clarified that we are still looking for a TCU bed for her and made her aware that a  is available to see her tomorrow if she would like. Prayer shared. Brought Paradise lavender oil and a rosary.    Plan of Care: Will remain available for further support as patient/family needs/desires.    Jenny Han M.Div.  Staff   [phone]    "

## 2021-07-26 NOTE — PROGRESS NOTES
Care Management Follow Up    Length of Stay (days): 4    Expected Discharge Date:  07/27/2020     Concerns to be Addressed:  Placement:  Acute Rehabilitation Medical Insurance Prior Authorization  Patient plan of care discussed at interdisciplinary rounds: Yes    Anticipated Discharge Disposition:  Acute Rehabilitation     Anticipated Discharge Services:  Acute Rehabilitation  Anticipated Discharge DME:      Patient/family educated on Medicare website which has current facility and service quality ratings:  Not Applicable  Education Provided on the Discharge Plan:  Yes   Patient/Family in Agreement with the Plan:  Yes    Referrals Placed by CM/SW:  [facility] TCU, [facility]CU,  [facility]  Private pay costs discussed: transportation costs    Additional Information:  Therapy is recommending Acute Rehabilitation at discharge.    This writer informed the patient and her sister, Jade, of the above Therapy recommendation, with which they agree.    This writer sent and called a Referral to [facility] and spoke with Morenita Zendejas, who accepted the patient for 07/27/2021 pending [employer] medical insurance prior authorization.    This writer informed the patient and Jade of the above Discharge Plan, with which they agree.    The patient prefers [facility] Wheel[facility] transportation to Acute Rehabilitation and agrees to the Private Pay Transport cost.    Care Management to follow.      Brigida Julian CM

## 2021-07-26 NOTE — PROGRESS NOTES
[facility]    Medicine Progress Note - Hospitalist Service       Date of Admission:  7/22/2021    Assessment & Plan           Paradise Yuan is a 71 year old old female with h/o uncontrolled diabetes, hypertension, hyperlipidemia presenting with stuttering stroke symptoms for approximately 2 weeks.  Found to have subacute stroke on the left causing right-sided weakness, partial aphasia     Subacute CVA  2-week history of right sided facial droop, partial right hemiparesis, partial expressive aphasia.  MRI of head shows recent infarcts of varying age within the left MCA territory affecting the deep frontal white matter and cortex at the frontoparietal junction. Also in the left corona radiata and deep white matter and to a lesser extent of the frontoparietal cortex. Also infarct affecting the left insula and to lesser extent frontal parietal complex. No acute hemorrhage.  MRA of the head shows diminished flow within the left MCA beyond the distal M1 segment, normal MRA of the neck  on aspirin and Plavix, statin increased  Cardiac echocardiogram shows EF of 55 to 60% with no signs of thrombus or significant valvular disease  Given multifocal infarcts neurology is recommending a 30-day event monitor at discharge.  Symptoms gradually improving  Discharge to acute rehab tomorrow     Meningioma  Left posterior fossa; stable; seen on imaging     Uncontrolled diabetes  A1c 10  Blood sugars well controlled with Lantus, 16 units, continue carb counting in medium sliding scale. Question if she was really taking her insulin at home.  Will need to transition to set mealtime dosing at discharge     Hypertension, essential  initially held home lisinopril and atenolol.  Subacute stroke and symptoms for 2 weeks, neuro now says to keep systolic blood pressure less than 160.  Continue lisinopril, restarted home atenolol.  Blood pressure better controlled.  Erratic.    Atrial tachycardia/SVT-  No A. fib.  Restart atenolol.  No further arrhythmias arrhythmias after restarting atenolol     Hyperlipidemia  --- LDL 84  --- was on statin 20mg at home so increased to 40mg      VTE prophylaxis:  Enoxaparin (Lovenox) SQ  DIET: Orders Placed This Encounter      Combination Diet Regular Diet Adult  Disposition/Barriers to discharge: stroke therapies  Code Status: Full Code        Diet: Combination Diet Soft and Bite Sized Diet (level 6); Thin Liquids (level 0)    DVT Prophylaxis: Heparin SQ  Choi Catheter: Not present  Central Lines: None  Code Status: Full Code      Disposition Plan   Expected discharge:  recommended to Rehab, acute once safe disposition plan/ TCU bed available.  Hopefully Tuesday if insurance approves     The patient's care was discussed with the Bedside Nurse, Care Coordinator/, Patient and Patient's Family. for total time 38 minutes with greater than 50% of total time spent in counseling and coordination of care.    CHARLI HERRERA MD  Hospitalist Service  [facility]  Securely message with the Vocera Web Console (learn more here)  Text page via [employer] Paging/Directory        Risk Factors Present on Admission               ______________________________________________________________________    Interval History   Remainder of 12 point review of systems negative except as noted below    Subjective:  Patient continues to improve, able to use her right arm more, talking better. No chest pain or shortness of breath.  Sister present, agrees patient has improved.    Data reviewed today: I reviewed all medications, new labs and imaging results over the last 24 hours.     Physical Exam   Vital Signs: Temp: 98.5  F (36.9  C) Temp src: Oral BP: 120/74 Pulse: 73   Resp: 16 SpO2: 95 % O2 Device: None (Room air)    Weight: 110 lbs 0 oz  Physical Exam:  Temp:  [97.6  F (36.4  C)-98.5  F (36.9  C)] 98.5  F (36.9  C)  Pulse:  [61-78] 73  Resp:  [16] 16  BP: (119-162)/(73-85)  "120/74  SpO2:  [94 %-98 %] 95 %    /74 (BP Location: Right arm)   Pulse 73   Temp 98.5  F (36.9  C) (Oral)   Resp 16   Ht 1.575 m (5' 2\")   Wt 49.9 kg (110 lb)   SpO2 95%   BMI 20.12 kg/m    General appearance: alert, appears stated age and cooperative  Head: Normocephalic, without obvious abnormality, atraumatic  Eyes: Clear conjuctiva  Neck: no JVD and supple, symmetrical, trachea midline  Lungs: clear to auscultation bilaterally  Heart: regular rate and rhythm, S1, S2 normal, no murmur, click, rub or gallop  Abdomen: soft, non-tender; bowel sounds normal; no masses,  no organomegaly  Extremities: Ariadna's sign is negative, no sign of DVT  Skin: Skin color, texture, turgor normal. No rashes or lesions  Neurologic: Mental status: alertness: alert  Cranial nerves: II: visual acuity normal bilaterally, II: visual field normal, II: pupils equal, round, reactive to light and accommodation, III,IV,VI: extraocular muscles extra-ocular motions intact  Sensory: normal  Motor:Right  strength 3 out of 5, right hip flexor 4 out of 5. Mild right facial droop          Data   Recent Labs   Lab 07/26/21  1710 07/26/21  1221 07/26/21  0853 07/25/21  1056 07/24/21  2323 07/23/21  0440 07/22/21  0947 07/22/21  0723 07/22/21  0723   WBC  --   --   --   --   --  10.8 12.4*  --   --    HGB  --   --   --   --   --  13.0 13.3  --   --    MCV  --   --   --   --   --  90 90  --   --    PLT  --   --   --  323  --  332 318  --   --    INR  --   --   --   --   --   --  1.03  --   --    NA  --   --   --  142  --  144 141  --  143   POTASSIUM  --   --   --  4.1  --  3.6 4.2  --  4.1   CHLORIDE  --   --   --  109*  --  110* 106  --  105   CO2  --   --   --  26 -- 25 23  --  23   BUN  --   --   --  14  --  18 29*  --  30*   CR  --   --   --  0.87 1.01 0.99 1.15*  --  1.19*   ANIONGAP  --   --   --  7  --  9 12  --  15   CHELA  --   --   --  9.6  --  9.1 9.2  --  9.7   * 173* 129* 123  --  133* 135*   < > 142*   ALBUMIN  --  "  --   --   --   --   --   --   --  3.9   PROTTOTAL  --   --   --   --   --   --   --   --  7.1   BILITOTAL  --   --   --   --   --   --   --   --  1.4*   ALKPHOS  --   --   --   --   --   --   --   --  78   ALT  --   --   --   --   --   --   --   --  16   AST  --   --   --   --   --   --   --   --  24    < > = values in this interval not displayed.     No results found for this or any previous visit (from the past 24 hour(s)).  Medications     - MEDICATION INSTRUCTIONS -       - MEDICATION INSTRUCTIONS -         aspirin  81 mg Oral Daily     atenolol  25 mg Oral BID     atorvastatin  40 mg Oral At Bedtime     clopidogrel  75 mg Oral Daily     enoxaparin ANTICOAGULANT  40 mg Subcutaneous Q24H     insulin aspart   Subcutaneous Daily with supper     insulin aspart   Subcutaneous Daily with lunch     insulin aspart   Subcutaneous QAM AC     insulin aspart  1-7 Units Subcutaneous TID AC     insulin glargine  16 Units Subcutaneous At Bedtime     lisinopril  10 mg Oral BID     sodium chloride (PF)  3 mL Intracatheter Q8H     sodium chloride (PF)  3 mL Intracatheter Q8H

## 2021-07-26 NOTE — PLAN OF CARE
Problem: Functional Ability Impaired (Stroke, Ischemic/Transient Ischemic Attack)  Goal: Optimal Functional Ability  Intervention: Optimize Functional Ability  Recent Flowsheet Documentation  Taken 7/26/2021 0000 by Kelly Vazquez, RN  Activity Management: up ad ryan  Taken 7/25/2021 2030 by Kelly Vazquez, RN  Activity Management: up ad ryan   See NIH charting.    Pt tired throughout NOC.  Difficulty participating in NIH assessment due to drowsiness.    NSR on telemetry.      Kelly Vazquez, RN

## 2021-07-27 ENCOUNTER — APPOINTMENT (OUTPATIENT)
Dept: OCCUPATIONAL THERAPY | Facility: HOSPITAL | Age: 71
DRG: 064 | End: 2021-07-27
Attending: HOSPITALIST
Payer: COMMERCIAL

## 2021-07-27 ENCOUNTER — APPOINTMENT (OUTPATIENT)
Dept: PHYSICAL THERAPY | Facility: HOSPITAL | Age: 71
DRG: 064 | End: 2021-07-27
Attending: HOSPITALIST
Payer: COMMERCIAL

## 2021-07-27 ENCOUNTER — APPOINTMENT (OUTPATIENT)
Dept: SPEECH THERAPY | Facility: HOSPITAL | Age: 71
DRG: 064 | End: 2021-07-27
Attending: HOSPITALIST
Payer: COMMERCIAL

## 2021-07-27 LAB
GLUCOSE BLDC GLUCOMTR-MCNC: 111 MG/DL (ref 70–125)
GLUCOSE BLDC GLUCOMTR-MCNC: 186 MG/DL (ref 70–125)
GLUCOSE BLDC GLUCOMTR-MCNC: 201 MG/DL (ref 70–125)
GLUCOSE BLDC GLUCOMTR-MCNC: 228 MG/DL (ref 70–125)

## 2021-07-27 PROCEDURE — 92507 TX SP LANG VOICE COMM INDIV: CPT | Mod: GN

## 2021-07-27 PROCEDURE — 250N000012 HC RX MED GY IP 250 OP 636 PS 637: Performed by: FAMILY MEDICINE

## 2021-07-27 PROCEDURE — 97535 SELF CARE MNGMENT TRAINING: CPT | Mod: GO

## 2021-07-27 PROCEDURE — 120N000001 HC R&B MED SURG/OB

## 2021-07-27 PROCEDURE — 250N000013 HC RX MED GY IP 250 OP 250 PS 637: Performed by: FAMILY MEDICINE

## 2021-07-27 PROCEDURE — 97110 THERAPEUTIC EXERCISES: CPT | Mod: GO

## 2021-07-27 PROCEDURE — 250N000013 HC RX MED GY IP 250 OP 250 PS 637: Performed by: PSYCHIATRY & NEUROLOGY

## 2021-07-27 PROCEDURE — 99233 SBSQ HOSP IP/OBS HIGH 50: CPT | Performed by: INTERNAL MEDICINE

## 2021-07-27 PROCEDURE — 97112 NEUROMUSCULAR REEDUCATION: CPT | Mod: GP

## 2021-07-27 PROCEDURE — 250N000011 HC RX IP 250 OP 636: Performed by: HOSPITALIST

## 2021-07-27 PROCEDURE — 97530 THERAPEUTIC ACTIVITIES: CPT | Mod: GP

## 2021-07-27 RX ADMIN — ASPIRIN 81 MG: 81 TABLET, COATED ORAL at 08:34

## 2021-07-27 RX ADMIN — ATORVASTATIN CALCIUM 40 MG: 40 TABLET, FILM COATED ORAL at 21:04

## 2021-07-27 RX ADMIN — ATENOLOL 25 MG: 25 TABLET ORAL at 08:34

## 2021-07-27 RX ADMIN — INSULIN GLARGINE 16 UNITS: 100 INJECTION, SOLUTION SUBCUTANEOUS at 21:20

## 2021-07-27 RX ADMIN — LISINOPRIL 10 MG: 5 TABLET ORAL at 21:03

## 2021-07-27 RX ADMIN — CLOPIDOGREL BISULFATE 75 MG: 75 TABLET ORAL at 08:34

## 2021-07-27 RX ADMIN — ATENOLOL 25 MG: 25 TABLET ORAL at 21:04

## 2021-07-27 RX ADMIN — INSULIN ASPART 1 UNITS: 100 INJECTION, SOLUTION INTRAVENOUS; SUBCUTANEOUS at 13:16

## 2021-07-27 RX ADMIN — INSULIN ASPART 2 UNITS: 100 INJECTION, SOLUTION INTRAVENOUS; SUBCUTANEOUS at 17:12

## 2021-07-27 RX ADMIN — LISINOPRIL 10 MG: 5 TABLET ORAL at 08:35

## 2021-07-27 RX ADMIN — ENOXAPARIN SODIUM 40 MG: 100 INJECTION SUBCUTANEOUS at 22:27

## 2021-07-27 RX ADMIN — INSULIN ASPART 2 UNITS: 100 INJECTION, SOLUTION INTRAVENOUS; SUBCUTANEOUS at 18:19

## 2021-07-27 NOTE — PLAN OF CARE
Right side weakness continues needs assist of one and cane when  Up, sitting in chair most of day. NIH score 5 twice, blood sugars 111,183  Eats very slow no trouble swallowing denies pain or headache.  Waiting for TCU bed, sister here.

## 2021-07-27 NOTE — PLAN OF CARE
Problem: Fall Injury Risk  Goal: Absence of Fall and Fall-Related Injury  Intervention: Promote Injury-Free Environment  Recent Flowsheet Documentation  Taken 7/27/2021 0030 by Ailyn Carlos RN  Safety Promotion/Fall Prevention: bed alarm on     Problem: Functional Ability Impaired (Stroke, Ischemic/Transient Ischemic Attack)  Goal: Optimal Functional Ability  Intervention: Optimize Functional Ability  Recent Flowsheet Documentation  Taken 7/27/2021 0030 by Ailyn Carlos RN  Activity Management: activity encouraged     Problem: Adult Inpatient Plan of Care  Goal: Absence of Hospital-Acquired Illness or Injury  Intervention: Identify and Manage Fall Risk  Recent Flowsheet Documentation  Taken 7/27/2021 0030 by Ailyn Carlos, RN  Safety Promotion/Fall Prevention: bed alarm on   Pt is in bed now sleeping. Was in chair and had declined to go to bed till around 0200. With encouragement, pt was checked and changed and assisted to bed. She seemed content and thankful when she got in bed. Will cont to monitor.

## 2021-07-27 NOTE — PROGRESS NOTES
[facility]    Medicine Progress Note - Hospitalist Service       Date of Admission:  7/22/2021    Assessment & Plan           Paradise Yuan is a 71 year old old female with h/o uncontrolled diabetes, hypertension, hyperlipidemia presenting with stuttering stroke symptoms for approximately 2 weeks.  Found to have subacute stroke on the left causing right-sided weakness, partial aphasia     Subacute CVA  2-week history of right sided facial droop, partial right hemiparesis, partial expressive aphasia.  MRI of head shows recent infarcts of varying age within the left MCA territory affecting the deep frontal white matter and cortex at the frontoparietal junction. Also in the left corona radiata and deep white matter and to a lesser extent of the frontoparietal cortex. Also infarct affecting the left insula and to lesser extent frontal parietal complex. No acute hemorrhage.  MRA of the head shows diminished flow within the left MCA beyond the distal M1 segment, normal MRA of the neck.  On aspirin and Plavix, statin.  Cardiac echocardiogram shows EF of 55 to 60% with no signs of thrombus or significant valvular disease  Given multifocal infarcts neurology is recommending a 30-day event monitor at discharge.  Symptoms gradually improving  Discharge to acute rehab, awaiting insurance approval.     Meningioma  Left posterior fossa; stable; seen on imaging     Uncontrolled diabetes  A1c 10  Blood sugars well controlled with Lantus, 16 units, continue carb counting in medium sliding scale. Question if she was really taking her insulin at home.  Will need to transition to set mealtime dosing at discharge.     Hypertension, essential  initially held home lisinopril and atenolol.  Subacute stroke and symptoms for 2 weeks, neuro now says to keep systolic blood pressure less than 160.  Continue lisinopril, restarted home atenolol.  Blood pressure better controlled.    Atrial tachycardia/SVT-  No A. fib.  Restart atenolol.  No further arrhythmias arrhythmias after restarting atenolol     Hyperlipidemia  --- LDL 84  --- was on statin 20mg at home so increased to 40mg      VTE prophylaxis:  Enoxaparin (Lovenox) SQ  DIET: Orders Placed This Encounter      Combination Diet Regular Diet Adult  Disposition/Barriers to discharge: stroke therapies  Code Status: Full Code        Diet: Combination Diet Soft and Bite Sized Diet (level 6); Thin Liquids (level 0)    DVT Prophylaxis: Heparin SQ  Choi Catheter: Not present  Central Lines: None  Code Status: Full Code      Disposition Plan   Expected discharge:  recommended to Rehab, acute once safe disposition plan/ TCU bed available.  Ending insurance approval.     The patient's care was discussed with the Bedside Nurse, Care Coordinator/, Patient and Patient's Family. for total time 38 minutes with greater than 50% of total time spent in counseling and coordination of care.    Loraine Lay MD  Hospitalist Service  [facility]  Securely message with the Vocera Web Console (learn more here)  Text page via [employer] Paging/Directory    Risk Factors Present on Admission             _____________________________________________________________________    Interval History   Remainder of 12 point review of systems negative except as noted below    Subjective:  Patient is new to me.  Chart reviewed.  Patient seen and examined.  Ongoing right-sided hemiplegia.  Continues to improve, able to use her right arm more, talking better. No chest pain or shortness of breath.  Nursing, patient requiring assistance of 1 in transfer from bed to chair and ambulating very short distance in her room, with assistance of 1.  She continues to be unsteady.  O2 patient insurance for requested one-to-one peer review.  Insurance MD stated that she denied coverage for acute rehab, as per PT notes on 7/25 documented patient was ambulating 150 feet with guard assist.  This  "information has been consistent by patient's bedside nurse, and with assessment patient requires assist of 1 for transfers and ambulation and patient ambulating only very short distance.    Data reviewed today: I reviewed all medications, new labs and imaging results over the last 24 hours.     Physical Exam   Vital Signs: Temp: 98  F (36.7  C) Temp src: Oral BP: 127/69 Pulse: 78   Resp: 16 SpO2: 95 % O2 Device: None (Room air)    Weight: 110 lbs 0 oz  Physical Exam:  Temp:  [97.6  F (36.4  C)-98.5  F (36.9  C)] 98  F (36.7  C)  Pulse:  [68-78] 78  Resp:  [16-18] 16  BP: (113-144)/(67-82) 127/69  SpO2:  [95 %-98 %] 95 %    /69 (BP Location: Right arm)   Pulse 78   Temp 98  F (36.7  C) (Oral)   Resp 16   Ht 1.575 m (5' 2\")   Wt 49.9 kg (110 lb)   SpO2 95%   BMI 20.12 kg/m    General appearance: alert, appears stated age and cooperative  Head: Normocephalic, without obvious abnormality, atraumatic  Eyes: Clear conjuctiva  Neck: no JVD and supple, symmetrical, trachea midline  Lungs: clear to auscultation bilaterally  Heart: regular rate and rhythm, S1, S2 normal, no murmur, click, rub or gallop  Abdomen: soft, non-tender; bowel sounds normal; no masses,  no organomegaly  Extremities: Ariadna's sign is negative, no sign of DVT  Skin: Skin color, texture, turgor normal. No rashes or lesions  Neurologic: Mental status: alertness: alert  Cranial nerves: II: visual acuity normal bilaterally, II: visual field normal, II: pupils equal, round, reactive to light and accommodation, III,IV,VI: extraocular muscles extra-ocular motions intact  Sensory: normal  Motor:Right  strength 3 out of 5, right hip flexor 4 out of 5. Mild right facial droop          Data   Recent Labs   Lab 07/27/21  1310 07/27/21  0832 07/26/21  2120 07/25/21  1056 07/24/21  2323 07/23/21  0440 07/22/21  0947 07/22/21 0723 07/22/21 0723   WBC  --   --   --   --   --  10.8 12.4*  --   --    HGB  --   --   --   --   --  13.0 13.3  --   --  "   MCV  --   --   --   --   --  90 90  --   --    PLT  --   --   --  323  --  332 318  --   --    INR  --   --   --   --   --   --  1.03  --   --    NA  --   --   --  142  --  144 141  --  143   POTASSIUM  --   --   --  4.1  --  3.6 4.2  --  4.1   CHLORIDE  --   --   --  109*  --  110* 106  --  105   CO2  --   --   --  26  --  25 23  --  23   BUN  --   --   --  14  --  18 29*  --  30*   CR  --   --   --  0.87 1.01 0.99 1.15*  --  1.19*   ANIONGAP  --   --   --  7  --  9 12  --  15   CHELA  --   --   --  9.6  --  9.1 9.2  --  9.7   * 111 180* 123  --  133* 135*   < > 142*   ALBUMIN  --   --   --   --   --   --   --   --  3.9   PROTTOTAL  --   --   --   --   --   --   --   --  7.1   BILITOTAL  --   --   --   --   --   --   --   --  1.4*   ALKPHOS  --   --   --   --   --   --   --   --  78   ALT  --   --   --   --   --   --   --   --  16   AST  --   --   --   --   --   --   --   --  24    < > = values in this interval not displayed.     No results found for this or any previous visit (from the past 24 hour(s)).  Medications     - MEDICATION INSTRUCTIONS -       - MEDICATION INSTRUCTIONS -         aspirin  81 mg Oral Daily     atenolol  25 mg Oral BID     atorvastatin  40 mg Oral At Bedtime     clopidogrel  75 mg Oral Daily     enoxaparin ANTICOAGULANT  40 mg Subcutaneous Q24H     insulin aspart   Subcutaneous Daily with supper     insulin aspart   Subcutaneous Daily with lunch     insulin aspart   Subcutaneous QAM AC     insulin aspart  1-7 Units Subcutaneous TID AC     insulin glargine  16 Units Subcutaneous At Bedtime     lisinopril  10 mg Oral BID     sodium chloride (PF)  3 mL Intracatheter Q8H     sodium chloride (PF)  3 mL Intracatheter Q8H

## 2021-07-27 NOTE — PLAN OF CARE
Problem: Functional Ability Impaired (Stroke, Ischemic/Transient Ischemic Attack)  Goal: Optimal Functional Ability  Outcome: Improving  Intervention: Optimize Functional Ability  Recent Flowsheet Documentation  Taken 7/26/2021 1811 by Cherry Hanley RN  Activity Management: up to bedside commode  Taken 7/26/2021 1600 by Cherry Hanley, RN  Activity Management: up in chair     Problem: Hyperglycemia  Goal: Blood Glucose Level Within Targeted Range  Outcome: Improving  Pt is able to make her needs known. Pt denies any pain. Pt's BG was 151 and 180, received due coverage.

## 2021-07-27 NOTE — PROGRESS NOTES
Physical Therapy Progress Note      Paradise is very motivated to participate in therapy. At baseline, she is quite active. She lives with her sister and reports that she was previously independent with all ADLs/IADLs.    On 2021, Paradise walked 160' with a quad cane, but required skilled hands-on assistance with near-constant cueing for quality and safety of ambulation. She is not ready to ambulate household distances with an aide or family member. We have not attempted to repeat this distance due to Paradise's exhaustion afterward. On subsequent days, she has required seated rest breaks after walking 15' or less. Stephys mobility is far below her baseline, and she remains at a high risk of falling due to the variability of her strength and awareness of right UE and LE. She is excited to work toward rebuilding her strength and endurance. She would most likely require a long stay at transitional care before discharging safely home, which puts her at a higher risk of health care associated sequelae, such as infections and future hospitalizations. Maximizing her rehab potential at an acute rehab facility during this period of neuroplasticity with 3 hours of therapies per day will both help her progress toward being able to safely discharge home sooner, and increase her total long term gains and success toward reaching independence. PT strongly recommends acute rehab.    Swati Montgomery, SPT, 2021    Direct onsite supervision of therapy and co-signature completed by Nini Mancini, PT, DPT on 2021    Addendum 2021     Patient completed a Timed Up and Go (TUG) assessment today, which is an objective measure during which a patient stands, ambulates 10 feet, turns around, ambulates 10 feet, and returns to their seat.    Paradise completed three trials of the TU seconds  49 seconds  60 seconds  Average: 58 seconds    Times greater than 13.5 seconds indicate an increased risk of falls. Tamie  performance on the TUG indicates that she is at a high risk of falls and is likely to be dependent on a gait aid.    Swati Montgomery, SPT, 7/29/2021  Flakita Montero, PT 7/29/2021

## 2021-07-28 ENCOUNTER — APPOINTMENT (OUTPATIENT)
Dept: SPEECH THERAPY | Facility: HOSPITAL | Age: 71
DRG: 064 | End: 2021-07-28
Attending: HOSPITALIST
Payer: COMMERCIAL

## 2021-07-28 ENCOUNTER — APPOINTMENT (OUTPATIENT)
Dept: PHYSICAL THERAPY | Facility: HOSPITAL | Age: 71
DRG: 064 | End: 2021-07-28
Attending: HOSPITALIST
Payer: COMMERCIAL

## 2021-07-28 ENCOUNTER — APPOINTMENT (OUTPATIENT)
Dept: OCCUPATIONAL THERAPY | Facility: HOSPITAL | Age: 71
DRG: 064 | End: 2021-07-28
Attending: HOSPITALIST
Payer: COMMERCIAL

## 2021-07-28 LAB
ALBUMIN SERPL-MCNC: 3.1 G/DL (ref 3.5–5)
ALP SERPL-CCNC: 69 U/L (ref 45–120)
ALT SERPL W P-5'-P-CCNC: 23 U/L (ref 0–45)
ANION GAP SERPL CALCULATED.3IONS-SCNC: 9 MMOL/L (ref 5–18)
AST SERPL W P-5'-P-CCNC: 22 U/L (ref 0–40)
BILIRUB SERPL-MCNC: 0.8 MG/DL (ref 0–1)
BUN SERPL-MCNC: 49 MG/DL (ref 8–28)
CALCIUM SERPL-MCNC: 9.3 MG/DL (ref 8.5–10.5)
CHLORIDE BLD-SCNC: 104 MMOL/L (ref 98–107)
CO2 SERPL-SCNC: 27 MMOL/L (ref 22–31)
CREAT SERPL-MCNC: 1.03 MG/DL (ref 0.6–1.1)
GFR SERPL CREATININE-BSD FRML MDRD: 54 ML/MIN/1.73M2
GLUCOSE BLD-MCNC: 187 MG/DL (ref 70–125)
GLUCOSE BLDC GLUCOMTR-MCNC: 130 MG/DL (ref 70–125)
GLUCOSE BLDC GLUCOMTR-MCNC: 144 MG/DL (ref 70–125)
GLUCOSE BLDC GLUCOMTR-MCNC: 167 MG/DL (ref 70–125)
GLUCOSE BLDC GLUCOMTR-MCNC: 195 MG/DL (ref 70–125)
MAGNESIUM SERPL-MCNC: 2.1 MG/DL (ref 1.8–2.6)
PLATELET # BLD AUTO: 347 10E3/UL (ref 150–450)
POTASSIUM BLD-SCNC: 4.5 MMOL/L (ref 3.5–5)
PROT SERPL-MCNC: 6.5 G/DL (ref 6–8)
SODIUM SERPL-SCNC: 140 MMOL/L (ref 136–145)

## 2021-07-28 PROCEDURE — 85049 AUTOMATED PLATELET COUNT: CPT | Performed by: HOSPITALIST

## 2021-07-28 PROCEDURE — 82040 ASSAY OF SERUM ALBUMIN: CPT | Performed by: INTERNAL MEDICINE

## 2021-07-28 PROCEDURE — 83735 ASSAY OF MAGNESIUM: CPT | Performed by: INTERNAL MEDICINE

## 2021-07-28 PROCEDURE — 120N000001 HC R&B MED SURG/OB

## 2021-07-28 PROCEDURE — 99232 SBSQ HOSP IP/OBS MODERATE 35: CPT | Performed by: INTERNAL MEDICINE

## 2021-07-28 PROCEDURE — 250N000012 HC RX MED GY IP 250 OP 636 PS 637: Performed by: FAMILY MEDICINE

## 2021-07-28 PROCEDURE — 97535 SELF CARE MNGMENT TRAINING: CPT | Mod: GO

## 2021-07-28 PROCEDURE — 36415 COLL VENOUS BLD VENIPUNCTURE: CPT | Performed by: INTERNAL MEDICINE

## 2021-07-28 PROCEDURE — 250N000013 HC RX MED GY IP 250 OP 250 PS 637: Performed by: PSYCHIATRY & NEUROLOGY

## 2021-07-28 PROCEDURE — 250N000013 HC RX MED GY IP 250 OP 250 PS 637: Performed by: FAMILY MEDICINE

## 2021-07-28 PROCEDURE — 250N000011 HC RX IP 250 OP 636: Performed by: HOSPITALIST

## 2021-07-28 PROCEDURE — 258N000003 HC RX IP 258 OP 636

## 2021-07-28 PROCEDURE — 258N000003 HC RX IP 258 OP 636: Performed by: INTERNAL MEDICINE

## 2021-07-28 PROCEDURE — 97112 NEUROMUSCULAR REEDUCATION: CPT | Mod: GP

## 2021-07-28 RX ORDER — CODEINE PHOSPHATE AND GUAIFENESIN 10; 100 MG/5ML; MG/5ML
5 SOLUTION ORAL 3 TIMES DAILY
Status: DISCONTINUED | OUTPATIENT
Start: 2021-07-28 | End: 2021-07-28

## 2021-07-28 RX ORDER — SODIUM CHLORIDE 9 MG/ML
INJECTION, SOLUTION INTRAVENOUS CONTINUOUS
Status: ACTIVE | OUTPATIENT
Start: 2021-07-28 | End: 2021-07-29

## 2021-07-28 RX ADMIN — SODIUM CHLORIDE, POTASSIUM CHLORIDE, SODIUM LACTATE AND CALCIUM CHLORIDE 1000 ML: 600; 310; 30; 20 INJECTION, SOLUTION INTRAVENOUS at 22:24

## 2021-07-28 RX ADMIN — INSULIN ASPART 3 UNITS: 100 INJECTION, SOLUTION INTRAVENOUS; SUBCUTANEOUS at 13:53

## 2021-07-28 RX ADMIN — ASPIRIN 81 MG: 81 TABLET, COATED ORAL at 08:19

## 2021-07-28 RX ADMIN — ENOXAPARIN SODIUM 40 MG: 100 INJECTION SUBCUTANEOUS at 21:39

## 2021-07-28 RX ADMIN — SODIUM CHLORIDE, PRESERVATIVE FREE: 5 INJECTION INTRAVENOUS at 13:54

## 2021-07-28 RX ADMIN — INSULIN ASPART 2 UNITS: 100 INJECTION, SOLUTION INTRAVENOUS; SUBCUTANEOUS at 09:49

## 2021-07-28 RX ADMIN — INSULIN ASPART 1 UNITS: 100 INJECTION, SOLUTION INTRAVENOUS; SUBCUTANEOUS at 17:55

## 2021-07-28 RX ADMIN — ATORVASTATIN CALCIUM 40 MG: 40 TABLET, FILM COATED ORAL at 21:39

## 2021-07-28 RX ADMIN — INSULIN ASPART 4 UNITS: 100 INJECTION, SOLUTION INTRAVENOUS; SUBCUTANEOUS at 18:35

## 2021-07-28 RX ADMIN — INSULIN ASPART 1 UNITS: 100 INJECTION, SOLUTION INTRAVENOUS; SUBCUTANEOUS at 12:37

## 2021-07-28 RX ADMIN — INSULIN ASPART 2 UNITS: 100 INJECTION, SOLUTION INTRAVENOUS; SUBCUTANEOUS at 08:18

## 2021-07-28 RX ADMIN — CLOPIDOGREL BISULFATE 75 MG: 75 TABLET ORAL at 08:19

## 2021-07-28 RX ADMIN — INSULIN GLARGINE 16 UNITS: 100 INJECTION, SOLUTION SUBCUTANEOUS at 21:39

## 2021-07-28 NOTE — PLAN OF CARE
Pt slept well overnight. Up to bedside commode with assist x1, belt and cane x1. NIH score of 4 for right sided deficits, facial droop and slurred speech. Tele- NSR. VSS.

## 2021-07-28 NOTE — PROGRESS NOTES
[facility]    Medicine Progress Note - Hospitalist Service       Date of Admission:  7/22/2021    Assessment & Plan           Paradise Yuan is a 71 year old old female with h/o uncontrolled diabetes, hypertension, hyperlipidemia presenting with stuttering stroke symptoms for approximately 2 weeks.  Found to have subacute stroke on the left causing right-sided weakness, partial aphasia     Subacute CVA  2-week history of right sided facial droop, partial right hemiparesis, partial expressive aphasia.  MRI of head shows recent infarcts of varying age within the left MCA territory affecting the deep frontal white matter and cortex at the frontoparietal junction. Also in the left corona radiata and deep white matter and to a lesser extent of the frontoparietal cortex. Also infarct affecting the left insula and to lesser extent frontal parietal complex. No acute hemorrhage.  MRA of the head shows diminished flow within the left MCA beyond the distal M1 segment, normal MRA of the neck.  On aspirin and Plavix, statin.  Cardiac echocardiogram shows EF of 55 to 60% with no signs of thrombus or significant valvular disease  Given multifocal infarcts neurology is recommending a 30-day event monitor at discharge.  Symptoms gradually improving, mostly speech.  PT recommending discharge to acute rehab, awaiting insurance approval.     Meningioma  Left posterior fossa; stable; seen on imaging     Uncontrolled diabetes  A1c 10  Blood sugars as inpatient well controlled with Lantus, 16 units, continue carb counting in medium sliding scale. Question if she was really taking her insulin at home.    Will need to transition to set mealtime dosing of NovoLog at discharge.     Essential hypertension.  Initially held home lisinopril and atenolol, for permissive hypertension.  Subacute stroke and symptoms for 2 weeks, neuro now says to keep systolic blood pressure less than 160.  Resumed home antihypertensive  regimen.  Blood pressure better controlled.    Atrial tachycardia/SVT-  No A. fib. Restarted atenolol.  No further arrhythmias arrhythmias after restarting atenolol.     Hyperlipidemia  --- LDL 84  --- was on statin 20mg at home so increased to 40mg      VTE prophylaxis:  Enoxaparin (Lovenox) SQ  DIET: Orders Placed This Encounter      Combination Diet Regular Diet Adult  Disposition/Barriers to discharge: stroke therapies  Code Status: Full Code        Diet: Combination Diet Soft and Bite Sized Diet (level 6); Thin Liquids (level 0)    DVT Prophylaxis: Heparin SQ  Choi Catheter: Not present  Central Lines: None  Code Status: Full Code      Disposition Plan   Expected discharge:  recommended to Rehab, acute once bed available/insurance approval received.     The patient's care was discussed with the Bedside Nurse, Care Coordinator/, Patient and Patient's Family. for total time 38 minutes with greater than 50% of total time spent in counseling and coordination of care.    Loraine Lay MD  Hospitalist Service  [facility]  Securely message with the Voc[employer] Web Console (learn more here)  Text page via [employer] Paging/Directory    Risk Factors Present on Admission             _____________________________________________________________________    Interval History   Remainder of 12 point review of systems negative except as noted below    Subjective:  Patient sister visiting today.  Patient seen and examined.  Ongoing right-sided hemiplegia.  She is able to lift Her right arm slightly, no  in the right hand.  Patient is left-handed.  Requires assist of 1 to transfer from bed to chair, ambulation.  Able to ambulate very short distance, per nursing.  Patient denies chest pain, cough, dyspnea at rest, fever, abdominal pain, nausea.  Noted slight increased creatinine, and substantial increased BUN.  She doesn't feel thirsty, he has 2 cups of water on the table, taking small sips of  "water.    Data reviewed today: I reviewed all medications, new labs and imaging results over the last 24 hours.     Physical Exam   Vital Signs: Temp: 97.8  F (36.6  C) Temp src: Oral BP: 99/55 Pulse: 89   Resp: 16 SpO2: 98 % O2 Device: None (Room air)    Weight: 110 lbs 0 oz  Physical Exam:  Temp:  [97.5  F (36.4  C)-98.1  F (36.7  C)] 97.8  F (36.6  C)  Pulse:  [65-89] 89  Resp:  [14-18] 16  BP: ()/(55-73) 99/55  SpO2:  [94 %-98 %] 98 %    BP 99/55 (BP Location: Left arm)   Pulse 89   Temp 97.8  F (36.6  C) (Oral)   Resp 16   Ht 1.575 m (5' 2\")   Wt 49.9 kg (110 lb)   SpO2 98%   BMI 20.12 kg/m    General appearance: alert, appears stated age and cooperative  Head: Normocephalic, without obvious abnormality, atraumatic  Eyes: Clear conjuctiva  Neck: no JVD and supple, symmetrical, trachea midline  Lungs: clear to auscultation bilaterally  Heart: regular rate and rhythm, S1, S2 normal, no murmur, click, rub or gallop  Abdomen: soft, non-tender; bowel sounds normal; no masses,  no organomegaly  Extremities: Ariadna's sign is negative, no sign of DVT  Skin: Skin color, texture, turgor normal. No rashes or lesions  Neurologic: Mental status: alertness: alert  Cranial nerves: II: visual acuity normal bilaterally, II: visual field normal, II: pupils equal, round, reactive to light and accommodation, III,IV,VI: extraocular muscles extra-ocular motions intact  Sensory: normal  Motor:Right  strength 3 out of 5, right hip flexor 4 out of 5. Mild right facial droop          Data   Recent Labs   Lab 07/28/21  1227 07/28/21  0842 07/28/21  0800 07/25/21  1056 07/24/21  2323 07/23/21  0440 07/22/21  0947 07/22/21  0723 07/22/21  0723   WBC  --   --   --   --   --  10.8 12.4*  --   --    HGB  --   --   --   --   --  13.0 13.3  --   --    MCV  --   --   --   --   --  90 90  --   --    PLT  --  347  --  323  --  332 318   < >  --    INR  --   --   --   --   --   --  1.03  --   --    NA  --  140  --  142  --  144 141  " --  143   POTASSIUM  --  4.5  --  4.1  --  3.6 4.2  --  4.1   CHLORIDE  --  104  --  109*  --  110* 106  --  105   CO2  --  27  --  26  --  25 23  --  23   BUN  --  49*  --  14  --  18 29*  --  30*   CR  --  1.03  --  0.87 1.01 0.99 1.15*  --  1.19*   ANIONGAP  --  9  --  7  --  9 12  --  15   CHELA  --  9.3  --  9.6  --  9.1 9.2  --  9.7   * 187* 195* 123  --  133* 135*   < > 142*   ALBUMIN  --  3.1*  --   --   --   --   --   --  3.9   PROTTOTAL  --  6.5  --   --   --   --   --   --  7.1   BILITOTAL  --  0.8  --   --   --   --   --   --  1.4*   ALKPHOS  --  69  --   --   --   --   --   --  78   ALT  --  23  --   --   --   --   --   --  16   AST  --  22  --   --   --   --   --   --  24    < > = values in this interval not displayed.     No results found for this or any previous visit (from the past 24 hour(s)).  Medications     - MEDICATION INSTRUCTIONS -       - MEDICATION INSTRUCTIONS -       sodium chloride 75 mL/hr at 07/28/21 1354       aspirin  81 mg Oral Daily     atenolol  25 mg Oral BID     atorvastatin  40 mg Oral At Bedtime     clopidogrel  75 mg Oral Daily     enoxaparin ANTICOAGULANT  40 mg Subcutaneous Q24H     insulin aspart   Subcutaneous Daily with supper     insulin aspart   Subcutaneous Daily with lunch     insulin aspart   Subcutaneous QAM AC     insulin aspart  1-7 Units Subcutaneous TID AC     insulin glargine  16 Units Subcutaneous At Bedtime     lisinopril  10 mg Oral BID     sodium chloride (PF)  3 mL Intracatheter Q8H     sodium chloride (PF)  3 mL Intracatheter Q8H

## 2021-07-28 NOTE — PLAN OF CARE
Problem: Adult Inpatient Plan of Care  Goal: Plan of Care Review  Outcome: Improving  Flowsheets (Taken 7/28/2021 1026)  Plan of Care Reviewed With:   patient   family  Goal: Patient-Specific Goal (Individualized)  Outcome: Improving     Problem: Risk for Delirium  Goal: Improved Sleep  Outcome: Improving     Problem: Cognitive Impairment (Stroke, Ischemic/Transient Ischemic Attack)  Goal: Optimal Cognitive Function  Outcome: Improving     Problem: Functional Ability Impaired (Stroke, Ischemic/Transient Ischemic Attack)  Goal: Optimal Functional Ability  Outcome: Improving     Problem: Hyperglycemia  Goal: Blood Glucose Level Within Targeted Range  Outcome: Improving   Pt is alert and oriented x3. Pt is med complaint. Pt denies pain. Pt was up on the chair for couple of hours. Continue to monitor pt.

## 2021-07-28 NOTE — PLAN OF CARE
Problem: Functional Ability Impaired (Stroke, Ischemic/Transient Ischemic Attack)  Goal: Optimal Functional Ability  Outcome: Improving  Intervention: Optimize Functional Ability  Recent Flowsheet Documentation  Taken 7/27/2021 2000 by Cherry Hanley, RN  Activity Management: up in chair  Taken 7/27/2021 1600 by Cherry Hanley, RN  Activity Management: up in chair   Pt denies any pain, continues to improve her talking and mobility functions. Pt's care needs are met.

## 2021-07-29 ENCOUNTER — APPOINTMENT (OUTPATIENT)
Dept: SPEECH THERAPY | Facility: HOSPITAL | Age: 71
DRG: 064 | End: 2021-07-29
Attending: HOSPITALIST
Payer: COMMERCIAL

## 2021-07-29 ENCOUNTER — APPOINTMENT (OUTPATIENT)
Dept: OCCUPATIONAL THERAPY | Facility: HOSPITAL | Age: 71
DRG: 064 | End: 2021-07-29
Attending: HOSPITALIST
Payer: COMMERCIAL

## 2021-07-29 ENCOUNTER — APPOINTMENT (OUTPATIENT)
Dept: PHYSICAL THERAPY | Facility: HOSPITAL | Age: 71
DRG: 064 | End: 2021-07-29
Attending: HOSPITALIST
Payer: COMMERCIAL

## 2021-07-29 LAB
ANION GAP SERPL CALCULATED.3IONS-SCNC: 7 MMOL/L (ref 5–18)
BUN SERPL-MCNC: 32 MG/DL (ref 8–28)
CALCIUM SERPL-MCNC: 9 MG/DL (ref 8.5–10.5)
CHLORIDE BLD-SCNC: 110 MMOL/L (ref 98–107)
CO2 SERPL-SCNC: 27 MMOL/L (ref 22–31)
CREAT SERPL-MCNC: 0.97 MG/DL (ref 0.6–1.1)
GFR SERPL CREATININE-BSD FRML MDRD: 59 ML/MIN/1.73M2
GLUCOSE BLD-MCNC: 140 MG/DL (ref 70–125)
GLUCOSE BLDC GLUCOMTR-MCNC: 107 MG/DL (ref 70–125)
GLUCOSE BLDC GLUCOMTR-MCNC: 133 MG/DL (ref 70–125)
GLUCOSE BLDC GLUCOMTR-MCNC: 147 MG/DL (ref 70–125)
GLUCOSE BLDC GLUCOMTR-MCNC: 168 MG/DL (ref 70–125)
POTASSIUM BLD-SCNC: 4.6 MMOL/L (ref 3.5–5)
SODIUM SERPL-SCNC: 144 MMOL/L (ref 136–145)

## 2021-07-29 PROCEDURE — 97116 GAIT TRAINING THERAPY: CPT | Mod: GP

## 2021-07-29 PROCEDURE — 250N000012 HC RX MED GY IP 250 OP 636 PS 637: Performed by: FAMILY MEDICINE

## 2021-07-29 PROCEDURE — 250N000011 HC RX IP 250 OP 636: Performed by: HOSPITALIST

## 2021-07-29 PROCEDURE — 250N000013 HC RX MED GY IP 250 OP 250 PS 637: Performed by: FAMILY MEDICINE

## 2021-07-29 PROCEDURE — 258N000003 HC RX IP 258 OP 636: Performed by: INTERNAL MEDICINE

## 2021-07-29 PROCEDURE — 36415 COLL VENOUS BLD VENIPUNCTURE: CPT | Performed by: INTERNAL MEDICINE

## 2021-07-29 PROCEDURE — 120N000001 HC R&B MED SURG/OB

## 2021-07-29 PROCEDURE — 92526 ORAL FUNCTION THERAPY: CPT | Mod: GN | Performed by: SPEECH-LANGUAGE PATHOLOGIST

## 2021-07-29 PROCEDURE — 97535 SELF CARE MNGMENT TRAINING: CPT | Mod: GO

## 2021-07-29 PROCEDURE — 80048 BASIC METABOLIC PNL TOTAL CA: CPT | Performed by: INTERNAL MEDICINE

## 2021-07-29 PROCEDURE — 97112 NEUROMUSCULAR REEDUCATION: CPT | Mod: GP

## 2021-07-29 PROCEDURE — 250N000013 HC RX MED GY IP 250 OP 250 PS 637: Performed by: PSYCHIATRY & NEUROLOGY

## 2021-07-29 PROCEDURE — 99233 SBSQ HOSP IP/OBS HIGH 50: CPT | Performed by: INTERNAL MEDICINE

## 2021-07-29 RX ADMIN — INSULIN GLARGINE 16 UNITS: 100 INJECTION, SOLUTION SUBCUTANEOUS at 22:51

## 2021-07-29 RX ADMIN — CLOPIDOGREL BISULFATE 75 MG: 75 TABLET ORAL at 09:04

## 2021-07-29 RX ADMIN — ATENOLOL 25 MG: 25 TABLET ORAL at 22:50

## 2021-07-29 RX ADMIN — INSULIN ASPART 2 UNITS: 100 INJECTION, SOLUTION INTRAVENOUS; SUBCUTANEOUS at 09:57

## 2021-07-29 RX ADMIN — LISINOPRIL 10 MG: 5 TABLET ORAL at 22:51

## 2021-07-29 RX ADMIN — ASPIRIN 81 MG: 81 TABLET, COATED ORAL at 09:03

## 2021-07-29 RX ADMIN — SODIUM CHLORIDE, PRESERVATIVE FREE: 5 INJECTION INTRAVENOUS at 07:26

## 2021-07-29 RX ADMIN — LISINOPRIL 10 MG: 5 TABLET ORAL at 09:03

## 2021-07-29 RX ADMIN — ATENOLOL 25 MG: 25 TABLET ORAL at 09:04

## 2021-07-29 RX ADMIN — INSULIN ASPART 2 UNITS: 100 INJECTION, SOLUTION INTRAVENOUS; SUBCUTANEOUS at 17:24

## 2021-07-29 RX ADMIN — INSULIN ASPART 3 UNITS: 100 INJECTION, SOLUTION INTRAVENOUS; SUBCUTANEOUS at 14:10

## 2021-07-29 RX ADMIN — ATORVASTATIN CALCIUM 40 MG: 40 TABLET, FILM COATED ORAL at 22:50

## 2021-07-29 RX ADMIN — ENOXAPARIN SODIUM 40 MG: 100 INJECTION SUBCUTANEOUS at 22:51

## 2021-07-29 RX ADMIN — INSULIN ASPART 1 UNITS: 100 INJECTION, SOLUTION INTRAVENOUS; SUBCUTANEOUS at 17:24

## 2021-07-29 NOTE — SIGNIFICANT EVENT
Significant Event Note    Time of event: 9:52 PM July 28, 2021    Description of event:  House was called for BP of 85/50 on multiple checks. Patient noted dizziness with ambulation however this quickly resolved with laying back in bed. She is no longer dizzy and feels well. BP meds were held per nurse. Echo today showed EF of 55-60%. No other symptoms. Remaining vitals stable.    Plan:  1 L LR given and recheck vitals following administration    Discussed with: bedside nurse    Trista Siegel MD

## 2021-07-29 NOTE — PLAN OF CARE
Problem: Functional Ability Impaired (Stroke, Ischemic/Transient Ischemic Attack)  Goal: Optimal Functional Ability  Outcome: Improving  Intervention: Optimize Functional Ability  Recent Flowsheet Documentation  Taken 7/29/2021 0400 by Delia Dorsey, RN  Activity Management:   activity adjusted per tolerance   activity encouraged   up to commode  Nih score 4- slow, slurred speech. Right sided deficits and right sided facial droop present. Assist x1 with gait belt and cane to commode. Continent.        Problem: Adult Inpatient Plan of Care  Goal: Readiness for Transition of Care  Outcome: Improving   VSS- IVF infusing- BP's improved since LR bolus at 2300 last evening. Tele- NSR.

## 2021-07-29 NOTE — PLAN OF CARE
Pt is alert and oriented x3, denies pain this shift. Pt's sister was at bedside and helped with interpreting. NIHSS score was 5.   Pt was up in the chair for most of the evening shift and went back to bed afterwards. Pt had low BP later in the night, 84/50 and recheck was 81/46. House resident was paged, LR 1000ml bolus was ordered and given. NOC RN was notified in report to recheck BP and follow up.   Pt's PIV infiltrated earlier in the shift, new PIV placed in right wrist.     Problem: Fall Injury Risk  Goal: Absence of Fall and Fall-Related Injury  Outcome: Improving  Intervention: Promote Injury-Free Environment  Recent Flowsheet Documentation  Taken 7/28/2021 1800 by Mitzi Weaver RN  Safety Promotion/Fall Prevention:   activity supervised   assistive device/personal items within reach   bed alarm on   chair alarm on     Problem: Hyperglycemia  Goal: Blood Glucose Level Within Targeted Range  Outcome: Improving     Problem: Adult Inpatient Plan of Care  Goal: Absence of Hospital-Acquired Illness or Injury  Intervention: Identify and Manage Fall Risk  Recent Flowsheet Documentation  Taken 7/28/2021 1800 by Mitzi Weaver, RN  Safety Promotion/Fall Prevention:   activity supervised   assistive device/personal items within reach   bed alarm on   chair alarm on

## 2021-07-29 NOTE — PLAN OF CARE
Problem: Hyperglycemia  Goal: Blood Glucose Level Within Targeted Range  Outcome: Improving     Problem: Cognitive Impairment (Stroke, Ischemic/Transient Ischemic Attack)  Goal: Optimal Cognitive Function  Outcome: Improving   Patient's blood glucose checks WNL no sliding scale insulin for breakfast check and lunch.   Patient able to communicate and answer questions for NIH assessment and follow commands for evaluation.   Patient sitting up in recliner chair for meals and working with therapy today. Patient denies any pain or discomfort during shift today.  Required two person with gait belt and ramirez to pivot transfer to bedside commode today. Right sided weakness r/t stroke and needs setup with meals.

## 2021-07-29 NOTE — PROGRESS NOTES
"CLINICAL NUTRITION SERVICES - ASSESSMENT NOTE     Nutrition Prescription    Malnutrition Status:    Patient does not meet two of the established criteria necessary for diagnosing malnutrition    Recommendations already ordered by Registered Dietitian (RD):  None at this time    Future/Additional Recommendations:  Monitor adequacy of PO intake vs need for additional discussion regarding oral nutrition supplements - pt declining at this time      REASON FOR ASSESSMENT  Paradise Yuan is a/an 71 year old female assessed by the dietitian for LOS    Per chart review, PMH significant for HTN, HLD and T2DM. Admitted 2/2 stroke sx for ~2 weeks found to have subacute stroke on the left causing right-sided weakness and partial aphasia.     NUTRITION HISTORY  - Not previously known to clinical nutrition services.   - No known food allergies noted.   - Spoke with patient at bedside this morning who notes that her appetite is good at baseline. Eats 3 meals/day. Lives in a home with her sister - they share cooking/grocery shopping responsibilities.     CURRENT NUTRITION ORDERS  Diet: Soft and Bite Sized Diet (IDDSI level 6) + Thin Liquids   Intake/Tolerance: Variable PO intake documented over admission (0-100% of meals). Receiving 2-3 (mostly 3 meals) daily over admission from the kitchen. Patient reports her appetite is good -- was eating lunch during my visit that consisted of baked chicken, pureed rice with gravy, green beans and vanilla ice cream.     LABS  Labs reviewed  - BUN 32 (H)  - BG x 24 hrs = 107-167  - Hgb A1c 10 (7/22)     MEDICATIONS  Medications reviewed  - medium resistance sliding scale insulin   - novolog 1:15 insulin to CHO ratio with meals   - 16 units lantus daily     ANTHROPOMETRICS  Height: 157.5 cm (5' 2\")  Most Recent Weight: 49.9 kg (110 lb)    IBW: 50 kg  BMI: Normal BMI  Weight History: Weight appears to be relatively stable over the past several months with some fluctuations.   Wt Readings from " Last 10 Encounters:   07/22/21 49.9 kg (110 lb)   07/22/21 48.7 kg (107 lb 7 oz)   05/25/21 52.2 kg (115 lb)   04/20/21 50.1 kg (110 lb 8 oz)   02/23/21 47.8 kg (105 lb 5 oz)   01/05/21 45.1 kg (99 lb 8 oz)   12/03/20 50.1 kg (110 lb 6 oz)   10/27/20 50.5 kg (111 lb 5 oz)   05/16/19 50.5 kg (111 lb 6 oz)   10/03/18 51.9 kg (114 lb 7 oz)     Dosing Weight: 50 kg (based on actual wt 7/22)    ASSESSED NUTRITION NEEDS  Estimated Energy Needs: 1209-5255 kcals/day (25 - 30 kcals/kg)  Justification: Maintenance  Estimated Protein Needs: 50-60 grams protein/day (1 - 1.2 grams of pro/kg)  Justification: Maintenance  Estimated Fluid Needs: 1190-5431 mL/day (1 mL/kcal)   Justification: Maintenance    PHYSICAL FINDINGS  See malnutrition section below.    From chart:  - Last BM was yesterday, WDL   - no edema noted   - skin WDL    MALNUTRITION  % Intake: < 75% for > 7 days (non-severe)  % Weight Loss: None noted  Subcutaneous Fat Loss: None observed - some age related losses though does not meet malnutrition criteria   Muscle Loss: None observed - some age related losses though does not meet malnutrition criteria   Fluid Accumulation/Edema: None noted  Malnutrition Diagnosis: Patient does not meet two of the established criteria necessary for diagnosing malnutrition    NUTRITION DIAGNOSIS  Predicted inadequate nutrient intake (protein-calorie) related to patient stating she has a good appetite and PO intake though variable intakes documented in flowsheets over admission      INTERVENTIONS  Implementation  Nutrition Education: Discussed current diet and intake/tolerance with pt at bedside. Encouraged patient to continue consuming 3 meals daily. Additionally reviewed oral nutrition supplements and indications for use - patient declined at this time as waiting for insurance approval to discharge to ARU.      Goals  Patient to consume % of nutritionally adequate meal trays TID, or the equivalent with supplements/snacks.      Monitoring/Evaluation  Progress toward goals will be monitored and evaluated per protocol.    Margaret Shelton RD, LD

## 2021-07-29 NOTE — PROGRESS NOTES
[facility]    Medicine Progress Note - Hospitalist Service       Date of Admission:  7/22/2021    Assessment & Plan           Paradise Yuan is a 71 year old old female with h/o uncontrolled diabetes, hypertension, hyperlipidemia presenting with stuttering stroke symptoms for approximately 2 weeks.  Found to have subacute stroke on the left causing right-sided weakness, partial aphasia     Subacute CVA  2-week history of right sided facial droop, partial right hemiparesis, partial expressive aphasia.  MRI of head shows recent infarcts of varying age within the left MCA territory affecting the deep frontal white matter and cortex at the frontoparietal junction. Also in the left corona radiata and deep white matter and to a lesser extent of the frontoparietal cortex. Also infarct affecting the left insula and to lesser extent frontal parietal complex. No acute hemorrhage.  MRA of the head shows diminished flow within the left MCA beyond the distal M1 segment, normal MRA of the neck.  On aspirin and Plavix, statin.  Cardiac echocardiogram shows EF of 55 to 60% with no signs of thrombus or significant valvular disease  Given multifocal infarcts neurology is recommending a 30-day event monitor at discharge.  Symptoms gradually improving, mostly speech.  PT recommending discharge to acute rehab, awaiting insurance approval.     Meningioma  Left posterior fossa; stable; seen on imaging     Uncontrolled diabetes  A1c 10  Blood sugars as inpatient well controlled with Lantus, 16 units, continue carb counting in medium sliding scale. Question if she was really taking her insulin at home.    Will need to transition to set mealtime dosing of NovoLog at discharge.     Essential hypertension.  Initially held home lisinopril and atenolol, for permissive hypertension.  Subacute stroke and symptoms for 2 weeks, neuro now says to keep systolic blood pressure less than 160.  Resumed home antihypertensive  regimen.  Blood pressure better controlled.    Atrial tachycardia/SVT-  No A. fib. Restarted atenolol.  No further arrhythmias arrhythmias after restarting atenolol.     Hyperlipidemia  --- LDL 84  --- was on statin 20mg at home so increased to 40mg      VTE prophylaxis:  Enoxaparin (Lovenox) SQ  DIET: Orders Placed This Encounter      Combination Diet Regular Diet Adult  Disposition/Barriers to discharge: stroke therapies  Code Status: Full Code        Diet: Combination Diet Soft and Bite Sized Diet (level 6); Thin Liquids (level 0)    DVT Prophylaxis: Heparin SQ  Choi Catheter: Not present  Central Lines: None  Code Status: Full Code      Disposition Plan   Expected discharge:  recommended to Rehab, acute once bed available/insurance approval received.     The patient's care was discussed with the Bedside Nurse, Care Coordinator/, Patient and Patient's Family. for total time 38 minutes with greater than 50% of total time spent in counseling and coordination of care.    Loraine Lay MD  Hospitalist Service  [facility]  Securely message with the Vocera Web Console (learn more here)  Text page via [employer] Paging/Directory    Clinically Significant Risk Factors Present on Admission             _____________________________________________________________________    Interval History   Remainder of 12 point review of systems negative except as noted below    Subjective:  Noted hypotension 85/50 tachycardia fever last night.  S/p IVF.  Normal BP today.  Noted PT note about patient having some stool and debris.  Questioning incontinence outpatient note asking to help with the bathroom.    Patient seen and examined.  Assisted the bedside.  Language line  used for the visit.  Ongoing right-sided hemiplegia.  Strength and right arm slightly improved, able to make fist, that she was not able to do yesterday.  Good appetite.  Increased p.o. water.  D/W patient nurse, requires  "today assistance of 2 to transfer from bed to chair and walk short distance in the room.  Patient denies chest pain, cough, dyspnea at rest, fever, abdominal pain, nausea.   Reviewed.  BUN/creatinine improved.  Per nursing, intermittent fecal incontinence, onset during this hospitalization.  Good BG control, -1 40s on 16 units of Lantus.  D/W patient sister at the bedside-she checks fingerstick BG and administers insulins at home.  Patient sister lives in the same household.    Data reviewed today: I reviewed all medications, new labs and imaging results over the last 24 hours.     Physical Exam   Vital Signs: Temp: 98.2  F (36.8  C) Temp src: Oral BP: 101/58 Pulse: 72   Resp: 16 SpO2: 98 % O2 Device: None (Room air)    Weight: 110 lbs 0 oz  Physical Exam:  Temp:  [97.5  F (36.4  C)-98.2  F (36.8  C)] 98.2  F (36.8  C)  Pulse:  [72-89] 72  Resp:  [16-18] 16  BP: ()/(46-74) 101/58  SpO2:  [95 %-98 %] 98 %    /58 (BP Location: Left arm)   Pulse 72   Temp 98.2  F (36.8  C) (Oral)   Resp 16   Ht 1.575 m (5' 2\")   Wt 49.9 kg (110 lb)   SpO2 98%   BMI 20.12 kg/m    General appearance: alert, appears stated age and cooperative  Head: Normocephalic, without obvious abnormality, atraumatic  Eyes: Clear conjuctiva  Neck: no JVD and supple, symmetrical, trachea midline  Lungs: clear to auscultation bilaterally  Heart: regular rate and rhythm, S1, S2 normal, no murmur, click, rub or gallop  Abdomen: soft, non-tender; bowel sounds normal; no masses,  no organomegaly  Extremities: Ariadna's sign is negative, no sign of DVT  Skin: Skin color, texture, turgor normal. No rashes or lesions  Neurologic: Mental status: alertness: alert  Cranial nerves: II: visual acuity normal bilaterally, II: visual field normal, II: pupils equal, round, reactive to light and accommodation, III,IV,VI: extraocular muscles extra-ocular motions intact  Sensory: normal  Motor:Right  strength 3 out of 5, right hip flexor 4 out of " 5. Mild right facial droop          Data   Recent Labs   Lab 07/29/21  1256 07/29/21  0942 07/29/21  0910 07/28/21  0842 07/25/21  1056 07/23/21  0724 07/23/21  0440   WBC  --   --   --   --   --   --  10.8   HGB  --   --   --   --   --   --  13.0   MCV  --   --   --   --   --   --  90   PLT  --   --   --  347 323  --  332   NA  --  144  --  140 142  --  144   POTASSIUM  --  4.6  --  4.5 4.1  --  3.6   CHLORIDE  --  110*  --  104 109*  --  110*   CO2  --  27  --  27 26  --  25   BUN  --  32*  --  49* 14  --  18   CR  --  0.97  --  1.03 0.87   < > 0.99   ANIONGAP  --  7  --  9 7  --  9   CHELA  --  9.0  --  9.3 9.6  --  9.1    140* 133* 187* 123  --  133*   ALBUMIN  --   --   --  3.1*  --   --   --    PROTTOTAL  --   --   --  6.5  --   --   --    BILITOTAL  --   --   --  0.8  --   --   --    ALKPHOS  --   --   --  69  --   --   --    ALT  --   --   --  23  --   --   --    AST  --   --   --  22  --   --   --     < > = values in this interval not displayed.     No results found for this or any previous visit (from the past 24 hour(s)).  Medications     - MEDICATION INSTRUCTIONS -       - MEDICATION INSTRUCTIONS -         aspirin  81 mg Oral Daily     atenolol  25 mg Oral BID     atorvastatin  40 mg Oral At Bedtime     clopidogrel  75 mg Oral Daily     enoxaparin ANTICOAGULANT  40 mg Subcutaneous Q24H     insulin aspart   Subcutaneous Daily with supper     insulin aspart   Subcutaneous Daily with lunch     insulin aspart   Subcutaneous QAM AC     insulin aspart  1-7 Units Subcutaneous TID AC     insulin glargine  16 Units Subcutaneous At Bedtime     lisinopril  10 mg Oral BID     sodium chloride (PF)  3 mL Intracatheter Q8H     sodium chloride (PF)  3 mL Intracatheter Q8H

## 2021-07-30 ENCOUNTER — APPOINTMENT (OUTPATIENT)
Dept: RADIOLOGY | Facility: HOSPITAL | Age: 71
DRG: 064 | End: 2021-07-30
Attending: INTERNAL MEDICINE
Payer: COMMERCIAL

## 2021-07-30 ENCOUNTER — APPOINTMENT (OUTPATIENT)
Dept: OCCUPATIONAL THERAPY | Facility: HOSPITAL | Age: 71
DRG: 064 | End: 2021-07-30
Attending: HOSPITALIST
Payer: COMMERCIAL

## 2021-07-30 ENCOUNTER — APPOINTMENT (OUTPATIENT)
Dept: PHYSICAL THERAPY | Facility: HOSPITAL | Age: 71
DRG: 064 | End: 2021-07-30
Attending: HOSPITALIST
Payer: COMMERCIAL

## 2021-07-30 PROBLEM — U07.1 INFECTION DUE TO 2019 NOVEL CORONAVIRUS: Status: ACTIVE | Noted: 2021-07-30

## 2021-07-30 LAB
GLUCOSE BLDC GLUCOMTR-MCNC: 104 MG/DL (ref 70–125)
GLUCOSE BLDC GLUCOMTR-MCNC: 118 MG/DL (ref 70–125)
GLUCOSE BLDC GLUCOMTR-MCNC: 173 MG/DL (ref 70–125)
GLUCOSE BLDC GLUCOMTR-MCNC: 264 MG/DL (ref 70–125)
GLUCOSE BLDC GLUCOMTR-MCNC: 82 MG/DL (ref 70–125)
SARS-COV-2 RNA RESP QL NAA+PROBE: POSITIVE

## 2021-07-30 PROCEDURE — 250N000013 HC RX MED GY IP 250 OP 250 PS 637: Performed by: FAMILY MEDICINE

## 2021-07-30 PROCEDURE — 71045 X-RAY EXAM CHEST 1 VIEW: CPT

## 2021-07-30 PROCEDURE — 250N000011 HC RX IP 250 OP 636: Performed by: HOSPITALIST

## 2021-07-30 PROCEDURE — 97110 THERAPEUTIC EXERCISES: CPT | Mod: GO

## 2021-07-30 PROCEDURE — 250N000013 HC RX MED GY IP 250 OP 250 PS 637: Performed by: PSYCHIATRY & NEUROLOGY

## 2021-07-30 PROCEDURE — 97112 NEUROMUSCULAR REEDUCATION: CPT | Mod: GP

## 2021-07-30 PROCEDURE — 97530 THERAPEUTIC ACTIVITIES: CPT | Mod: GP

## 2021-07-30 PROCEDURE — 250N000012 HC RX MED GY IP 250 OP 636 PS 637: Performed by: FAMILY MEDICINE

## 2021-07-30 PROCEDURE — 250N000012 HC RX MED GY IP 250 OP 636 PS 637: Performed by: INTERNAL MEDICINE

## 2021-07-30 PROCEDURE — 120N000001 HC R&B MED SURG/OB

## 2021-07-30 PROCEDURE — 87635 SARS-COV-2 COVID-19 AMP PRB: CPT | Performed by: INTERNAL MEDICINE

## 2021-07-30 PROCEDURE — 99233 SBSQ HOSP IP/OBS HIGH 50: CPT | Performed by: INTERNAL MEDICINE

## 2021-07-30 PROCEDURE — 97535 SELF CARE MNGMENT TRAINING: CPT | Mod: GO

## 2021-07-30 RX ORDER — AMOXICILLIN 250 MG
1 CAPSULE ORAL 2 TIMES DAILY PRN
Qty: 30 TABLET | Refills: 0 | Status: SHIPPED | OUTPATIENT
Start: 2021-07-30 | End: 2021-08-29

## 2021-07-30 RX ORDER — ATENOLOL 25 MG/1
25 TABLET ORAL 2 TIMES DAILY
Qty: 60 TABLET | Refills: 0 | Status: SHIPPED | OUTPATIENT
Start: 2021-07-30 | End: 2021-08-02

## 2021-07-30 RX ORDER — CLOPIDOGREL BISULFATE 75 MG/1
75 TABLET ORAL DAILY
Qty: 30 TABLET | Refills: 0 | Status: ON HOLD | OUTPATIENT
Start: 2021-07-31 | End: 2021-08-19

## 2021-07-30 RX ADMIN — ATORVASTATIN CALCIUM 40 MG: 40 TABLET, FILM COATED ORAL at 21:39

## 2021-07-30 RX ADMIN — INSULIN GLARGINE 16 UNITS: 100 INJECTION, SOLUTION SUBCUTANEOUS at 21:40

## 2021-07-30 RX ADMIN — CLOPIDOGREL BISULFATE 75 MG: 75 TABLET ORAL at 08:21

## 2021-07-30 RX ADMIN — ASPIRIN 81 MG: 81 TABLET, COATED ORAL at 08:21

## 2021-07-30 RX ADMIN — LISINOPRIL 10 MG: 5 TABLET ORAL at 21:39

## 2021-07-30 RX ADMIN — ATENOLOL 25 MG: 25 TABLET ORAL at 08:21

## 2021-07-30 RX ADMIN — INSULIN ASPART 3 UNITS: 100 INJECTION, SOLUTION INTRAVENOUS; SUBCUTANEOUS at 09:50

## 2021-07-30 RX ADMIN — LISINOPRIL 10 MG: 5 TABLET ORAL at 08:20

## 2021-07-30 RX ADMIN — ENOXAPARIN SODIUM 40 MG: 100 INJECTION SUBCUTANEOUS at 21:39

## 2021-07-30 RX ADMIN — ATENOLOL 25 MG: 25 TABLET ORAL at 21:39

## 2021-07-30 NOTE — PROGRESS NOTES
Care Management Discharge Note    Discharge Date: 07/31/2021       Discharge Disposition:  [facility] Acute Rehab.     Discharge Services:  Acute Rehab    Discharge DME:  Per therapy (if indicated).    Discharge Transportation: other (see comments) ([facility] Wheelchair)    Private pay costs discussed: transportation costs (previous RN CM).     PAS Confirmation Code:  NA  Patient/family educated on Medicare website which has current facility and service quality ratings:  Per previous CM.    Education Provided on the Discharge Plan:  yes  Persons Notified of Discharge Plans: yes  Patient/Family in Agreement with the Plan:  Yes     Handoff Referral Completed:  To be determined (No BPA fired)    Additional Information:  Per previous notes, goal is acute rehab at [facility] pending [facility] authorization. Received an update from [facility] that patient's transfer to acute rehab has been authorized for today (good for 72 hours): [id number]. Ride had been set for 1400 today: confirmed plan with admissions for [facility] Acute [facility]. Text page sent to MD. Will update nursing staff and patient's nephew.   10:34 AM:  Update provided to primary bedside RN, BHARGAV and patient. Writer also left a message for patient's sister Jade, per patient's request, and nephew (he did not answer his phone).   11:47 AM:  Received an update from patient's bedside nurse that patient has tested positive for COVID-19. Update provided to admissions at [facility]. Unfortunately, they are requesting that the discharge be cancelled. They do accept Covid patient's but will need to change their staffing. They will consider patient for admission on Monday 8/2/21. Ride has been cancelled, will update family and text page MD.   12:02 PM:  Update provided to Jade and Art in the hallway. They expressed understanding and plan to seek testing for themselves as well. Text page update was provided to MD.     Sharmaine Meng RN

## 2021-07-30 NOTE — PLAN OF CARE
Patient denies pain this shift.  Continues to have right sided weakness but is improving.  Tested positive for COVID which delayed discharge.  Bed alarm in place, call light within reach.

## 2021-07-30 NOTE — PLAN OF CARE
Problem: Adult Inpatient Plan of Care  Goal: Absence of Hospital-Acquired Illness or Injury  Outcome: Improving  Intervention: Identify and Manage Fall Risk  Recent Flowsheet Documentation  Taken 7/29/2021 1630 by Sharmaine Parks RN  Safety Promotion/Fall Prevention:   chair alarm on   bed alarm on   activity supervised   assistive device/personal items within reach   fall prevention program maintained   nonskid shoes/slippers when out of bed   room door open   safety round/check completed  Intervention: Prevent Skin Injury  Recent Flowsheet Documentation  Taken 7/29/2021 2256 by Sharmaine Parks RN  Body Position:   left   side-lying  Goal: Optimal Comfort and Wellbeing  Outcome: Improving  Goal: Readiness for Transition of Care  Outcome: Improving     Problem: Risk for Delirium  Goal: Optimal Coping  Outcome: Improving  Goal: Improved Behavioral Control  Outcome: Improving  Goal: Improved Attention and Thought Clarity  Outcome: Improving     Problem: Cognitive Impairment (Stroke, Ischemic/Transient Ischemic Attack)  Goal: Optimal Cognitive Function  Outcome: Improving     Problem: Communication Impairment (Stroke, Ischemic/Transient Ischemic Attack)  Goal: Improved Communication Skills  Outcome: Improving     Problem: Functional Ability Impaired (Stroke, Ischemic/Transient Ischemic Attack)  Goal: Optimal Functional Ability  Outcome: Improving  Intervention: Optimize Functional Ability  Recent Flowsheet Documentation  Taken 7/29/2021 2256 by Sharmaine Parks, RN  Activity Management: bedrest  Taken 7/29/2021 2245 by Sharmaine Parks, RN  Activity Management:   up to commode   standing at bedside  Taken 7/29/2021 1700 by Sharmaine Parks RN  Activity Management: up in chair     Problem: Fall Injury Risk  Goal: Absence of Fall and Fall-Related Injury  Outcome: Improving  Intervention: Promote Injury-Free Environment  Recent Flowsheet Documentation  Taken 7/29/2021 1630 by Sharmaine Parks RN  Safety Promotion/Fall  Prevention:   chair alarm on   bed alarm on   activity supervised   assistive device/personal items within reach   fall prevention program maintained   nonskid shoes/slippers when out of bed   room door open   safety round/check completed     Problem: Hyperglycemia  Goal: Blood Glucose Level Within Targeted Range  Outcome: Improving   Pt a/o to self, sister, location, situation. Forgetful re date. Pleasant, cooperative. Up in chair most of shift. Denied pain. Ate all of supper. Sister in room visiting most of shift. Pt incont of urine & voided in bedside commode. Transferred to bed & commode w/ 2 assist and gait belt. Right sided weakness noted. See flowsheets. Reminded pt to use call light for needs. Continue to monitor pt.

## 2021-07-30 NOTE — PROGRESS NOTES
[facility]    Medicine Progress Note - Hospitalist Service       Date of Admission:  7/22/2021    Assessment & Plan           Paradise Yuan is a 71 year old old female with h/o uncontrolled diabetes, hypertension, hyperlipidemia presenting with stuttering stroke symptoms for approximately 2 weeks.  Found to have subacute stroke on the left causing right-sided weakness, partial aphasia     Subacute CVA  2-week history of right sided facial droop, partial right hemiparesis, partial expressive aphasia.  MRI of head shows recent infarcts of varying age within the left MCA territory affecting the deep frontal white matter and cortex at the frontoparietal junction. Also in the left corona radiata and deep white matter and to a lesser extent of the frontoparietal cortex. Also infarct affecting the left insula and to lesser extent frontal parietal complex. No acute hemorrhage.  MRA of the head shows diminished flow within the left MCA beyond the distal M1 segment, normal MRA of the neck.  On aspirin and Plavix, statin.  Cardiac echocardiogram shows EF of 55 to 60% with no signs of thrombus or significant valvular disease  Given multifocal infarcts neurology is recommending a 30-day event monitor at discharge.  Continue telemetry during hospitalization.  Symptoms gradually improving, mostly speech.  PT recommending discharge to acute rehab.     COVID-19 infection.  Tested positive on 7/28/2021, prior to discharge test.  On admission on 7/22/2021 Covid 19 was negative.  Patient is asymptomatic.  Precautions implemented.  Monitor respiratory symptoms closely.    Meningioma  Left posterior fossa; stable; seen on imaging     Uncontrolled diabetes  A1c 10  Blood sugars as inpatient well controlled with Lantus, 16 units, continue carb counting in medium sliding scale. Question if she was really taking her insulin at home.    Will need to transition to set mealtime dosing of NovoLog at  discharge.     Essential hypertension.  Initially held home lisinopril and atenolol, for permissive hypertension.  Subacute stroke and symptoms for 2 weeks, neuro now says to keep systolic blood pressure less than 160.  Resumed home antihypertensive regimen.  Blood pressure better controlled.    Atrial tachycardia/SVT-  No A. fib. Restarted atenolol.  No further arrhythmias arrhythmias after restarting atenolol.     Hyperlipidemia  --- LDL 84  --- was on statin 20mg at home so increased to 40mg      VTE prophylaxis:  Enoxaparin (Lovenox) SQ  DIET: Orders Placed This Encounter      Combination Diet Regular Diet Adult  Disposition/Barriers to discharge: stroke therapies  Code Status: Full Code        Diet: Combination Diet Soft and Bite Sized Diet (level 6); Thin Liquids (level 0)  Advance Diet as Tolerated    DVT Prophylaxis: Heparin SQ  Choi Catheter: Not present  Central Lines: None  Code Status: Full Code      Disposition Plan   Expected discharge: To acute rehab, in 1-2 days.       The patient's care was discussed with the Bedside Nurse, Care Coordinator/, Patient and Patient's Family.  For total 35 minutes.  Loraine Lay MD  Hospitalist Service  [facility]  Securely message with the [employer]oc[employer] Web Console (learn more here)  Text page via [employer] Paging/Directory    Clinically Significant Risk Factors Present on Admission             _____________________________________________________________________    Interval History   Remainder of 12 point review of systems negative except as noted below    Subjective:    Patient seen and examined. Language line  used for the visit.  Ongoing right-sided hemiplegia.  He is left-handed, able to feed himself.  Labs reviewed.  Patient was prepared for discharge today, was accepted to acute rehab at [place].  Predischarge Covid test came back positive.  Previous test was negative on admission 7/22.  Patient denies cough, fever.   "Not hypoxic.  Patient was discussed with ID control-acute rehab unable to take patient today, given care new positive Covid status.    Data reviewed today: I reviewed all medications, new labs and imaging results over the last 24 hours.     Physical Exam   Vital Signs: Temp: 98.4  F (36.9  C) Temp src: Oral BP: 104/60 Pulse: 76   Resp: 16 SpO2: 97 % O2 Device: None (Room air)    Weight: 110 lbs 0 oz  Physical Exam:  Temp:  [97.6  F (36.4  C)-98.4  F (36.9  C)] 98.4  F (36.9  C)  Pulse:  [72-82] 76  Resp:  [16-18] 16  BP: (104-132)/(55-72) 104/60  SpO2:  [95 %-97 %] 97 %    /60 (BP Location: Left arm)   Pulse 76   Temp 98.4  F (36.9  C) (Oral)   Resp 16   Ht 1.575 m (5' 2\")   Wt 49.9 kg (110 lb)   SpO2 97%   BMI 20.12 kg/m    General appearance: alert, appears stated age and cooperative  Head: Normocephalic, without obvious abnormality, atraumatic  Eyes: Clear conjuctiva  Neck: no JVD and supple, symmetrical, trachea midline  Lungs: clear to auscultation bilaterally  Heart: regular rate and rhythm, S1, S2 normal, no murmur, click, rub or gallop  Abdomen: soft, non-tender; bowel sounds normal; no masses,  no organomegaly  Extremities: Ariadna's sign is negative, no sign of DVT  Skin: Skin color, texture, turgor normal. No rashes or lesions  Neurologic: Mental status: alertness: alert  Cranial nerves: II: visual acuity normal bilaterally, II: visual field normal, II: pupils equal, round, reactive to light and accommodation, III,IV,VI: extraocular muscles extra-ocular motions intact  Sensory: normal  Motor:Right  strength 3 out of 5, right hip flexor 4 out of 5. Mild right facial droop          Data   Recent Labs   Lab 07/30/21  1159 07/30/21  0821 07/29/21  2156 07/29/21  0942 07/28/21  0842 07/25/21  1056   PLT  --   --   --   --  347 323   NA  --   --   --  144 140 142   POTASSIUM  --   --   --  4.6 4.5 4.1   CHLORIDE  --   --   --  110* 104 109*   CO2  --   --   --  27 27 26   BUN  --   --   --  32* " 49* 14   CR  --   --   --  0.97 1.03 0.87   ANIONGAP  --   --   --  7 9 7   CHELA  --   --   --  9.0 9.3 9.6   * 118 168* 140* 187* 123   ALBUMIN  --   --   --   --  3.1*  --    PROTTOTAL  --   --   --   --  6.5  --    BILITOTAL  --   --   --   --  0.8  --    ALKPHOS  --   --   --   --  69  --    ALT  --   --   --   --  23  --    AST  --   --   --   --  22  --      No results found for this or any previous visit (from the past 24 hour(s)).  Medications     - MEDICATION INSTRUCTIONS -       - MEDICATION INSTRUCTIONS -         aspirin  81 mg Oral Daily     atenolol  25 mg Oral BID     atorvastatin  40 mg Oral At Bedtime     clopidogrel  75 mg Oral Daily     enoxaparin ANTICOAGULANT  40 mg Subcutaneous Q24H     insulin aspart  3 Units Subcutaneous TID w/meals     insulin aspart  1-7 Units Subcutaneous TID AC     insulin glargine  16 Units Subcutaneous At Bedtime     lisinopril  10 mg Oral BID     sodium chloride (PF)  3 mL Intracatheter Q8H     sodium chloride (PF)  3 mL Intracatheter Q8H

## 2021-07-31 ENCOUNTER — APPOINTMENT (OUTPATIENT)
Dept: SPEECH THERAPY | Facility: HOSPITAL | Age: 71
DRG: 064 | End: 2021-07-31
Attending: HOSPITALIST
Payer: COMMERCIAL

## 2021-07-31 LAB
GLUCOSE BLDC GLUCOMTR-MCNC: 108 MG/DL (ref 70–125)
GLUCOSE BLDC GLUCOMTR-MCNC: 109 MG/DL (ref 70–125)
GLUCOSE BLDC GLUCOMTR-MCNC: 120 MG/DL (ref 70–125)
GLUCOSE BLDC GLUCOMTR-MCNC: 76 MG/DL (ref 70–125)

## 2021-07-31 PROCEDURE — 92507 TX SP LANG VOICE COMM INDIV: CPT | Mod: GN

## 2021-07-31 PROCEDURE — 250N000013 HC RX MED GY IP 250 OP 250 PS 637: Performed by: PSYCHIATRY & NEUROLOGY

## 2021-07-31 PROCEDURE — 120N000001 HC R&B MED SURG/OB

## 2021-07-31 PROCEDURE — 99232 SBSQ HOSP IP/OBS MODERATE 35: CPT | Performed by: INTERNAL MEDICINE

## 2021-07-31 PROCEDURE — 250N000013 HC RX MED GY IP 250 OP 250 PS 637: Performed by: FAMILY MEDICINE

## 2021-07-31 PROCEDURE — 250N000012 HC RX MED GY IP 250 OP 636 PS 637: Performed by: FAMILY MEDICINE

## 2021-07-31 PROCEDURE — 250N000011 HC RX IP 250 OP 636: Performed by: HOSPITALIST

## 2021-07-31 RX ADMIN — INSULIN GLARGINE 16 UNITS: 100 INJECTION, SOLUTION SUBCUTANEOUS at 20:53

## 2021-07-31 RX ADMIN — CLOPIDOGREL BISULFATE 75 MG: 75 TABLET ORAL at 09:31

## 2021-07-31 RX ADMIN — ATENOLOL 25 MG: 25 TABLET ORAL at 09:28

## 2021-07-31 RX ADMIN — INSULIN ASPART 3 UNITS: 100 INJECTION, SOLUTION INTRAVENOUS; SUBCUTANEOUS at 17:01

## 2021-07-31 RX ADMIN — INSULIN ASPART 3 UNITS: 100 INJECTION, SOLUTION INTRAVENOUS; SUBCUTANEOUS at 09:36

## 2021-07-31 RX ADMIN — ENOXAPARIN SODIUM 40 MG: 100 INJECTION SUBCUTANEOUS at 20:54

## 2021-07-31 RX ADMIN — INSULIN ASPART 3 UNITS: 100 INJECTION, SOLUTION INTRAVENOUS; SUBCUTANEOUS at 13:01

## 2021-07-31 RX ADMIN — LISINOPRIL 10 MG: 5 TABLET ORAL at 09:32

## 2021-07-31 RX ADMIN — ASPIRIN 81 MG: 81 TABLET, COATED ORAL at 09:28

## 2021-07-31 RX ADMIN — ATORVASTATIN CALCIUM 40 MG: 40 TABLET, FILM COATED ORAL at 20:57

## 2021-07-31 NOTE — PLAN OF CARE
Problem: Adult Inpatient Plan of Care  Goal: Plan of Care Review  Outcome: Improving   Plan of care explained. She understands she needs to stay due to positive covid test. Will continue with therapy while she is here. Reminded to call for assistance with anything and not to get out of chair without help.  Problem: Functional Ability Impaired (Stroke, Ischemic/Transient Ischemic Attack)  Goal: Optimal Functional Ability  Intervention: Optimize Functional Ability  Recent Flowsheet Documentation  Taken 7/31/2021 0928 by Venecia Salas, RN  Activity Management:   activity adjusted per tolerance   up in chair  Taken 7/31/2021 0900 by Venecia Salas, RN  Activity Management: up in chair   Tolerates activities on room air. Continues to have signifigent right side weakness from cva.

## 2021-07-31 NOTE — PROGRESS NOTES
[facility]    Medicine Progress Note - Hospitalist Service       Date of Admission:  7/22/2021    Assessment & Plan           Paradise Yuan is a 71 year old old female with h/o uncontrolled diabetes, hypertension, hyperlipidemia presenting with stuttering stroke symptoms for approximately 2 weeks.  Found to have subacute stroke on the left causing right-sided weakness, partial aphasia     Asymptomatic COVID-19 infection.  Tested positive on 7/30/2021, prior to discharge test.  On admission on 7/22/2021 Covid 19 was negative.  Chest x-ray-no acute findings number different to differentiate given background of s/p left upper lobectomy, bronchiectases.  Precautions implemented.  Monitor respiratory symptoms closely.    Subacute CVA  2-week history of right sided facial droop, partial right hemiparesis, partial expressive aphasia.  MRI of head shows recent infarcts of varying age within the left MCA territory affecting the deep frontal white matter and cortex at the frontoparietal junction. Also in the left corona radiata and deep white matter and to a lesser extent of the frontoparietal cortex. Also infarct affecting the left insula and to lesser extent frontal parietal complex. No acute hemorrhage.  MRA of the head shows diminished flow within the left MCA beyond the distal M1 segment, normal MRA of the neck.  On aspirin and Plavix, statin.  Cardiac echocardiogram shows EF of 55 to 60% with no signs of thrombus or significant valvular disease  Given multifocal infarcts neurology is recommending a 30-day event monitor at discharge.  Continue telemetry during hospitalization.  Symptoms gradually improving, mostly speech.  PT recommending discharge to acute rehab.    Meningioma  Left posterior fossa; stable; seen on imaging     Uncontrolled diabetes  A1c 10  Blood sugars as inpatient well controlled with Lantus, 16 units, continue carb counting in medium sliding scale. Question if she was  really taking her insulin at home.    Will need to transition to set mealtime dosing of NovoLog at discharge.     Essential hypertension.  Initially held home lisinopril and atenolol, for permissive hypertension.  Subacute stroke and symptoms for 2 weeks, neuro now says to keep systolic blood pressure less than 160.  Resumed home antihypertensive regimen.  Blood pressure better controlled.    Atrial tachycardia/SVT-  No A. fib. Restarted atenolol.  No further arrhythmias arrhythmias after restarting atenolol.     Hyperlipidemia  --- LDL 84  --- was on statin 20mg at home so increased to 40mg      VTE prophylaxis:  Enoxaparin (Lovenox) SQ  DIET: Orders Placed This Encounter      Combination Diet Regular Diet Adult  Disposition/Barriers to discharge: stroke therapies  Code Status: Full Code        Diet: Combination Diet Soft and Bite Sized Diet (level 6); Thin Liquids (level 0)  Advance Diet as Tolerated    DVT Prophylaxis: Heparin SQ  Choi Catheter: Not present  Central Lines: None  Code Status: Full Code      Disposition Plan   Expected discharge: To acute rehab, in 1-2 days.       The patient's care was discussed with the Bedside Nurse, Care Coordinator/, Patient and Patient's Family.  For total 35 minutes.  Loraine Lay MD  Hospitalist Service  [facility]  Securely message with the [employer] Web Console (learn more here)  Text page via [employer] Paging/Directory    Clinically Significant Risk Factors Present on Admission             _____________________________________________________________________    Interval History   Remainder of 12 point review of systems negative except as noted below    Subjective:      Ongoing right-sided hemiplegia.  Patient is left-handed, able to feed himself.  Labs vitals reviewed.  Noted elevated -150, prior to a.m. meds.  After medicated with a.m. antihypertensives, SBP normal 120.  No fever, chills, cough, abdominal pain, diarrhea.  [facility]  "acute care rehab with reviewed patient's case on Monday, per my discussion with .    Data reviewed today: I reviewed all medications, new labs and imaging results over the last 24 hours.     Physical Exam   Vital Signs: Temp: 97.5  F (36.4  C) Temp src: Oral BP: 120/78 Pulse: 68   Resp: 16 SpO2: 100 % O2 Device: None (Room air)    Weight: 110 lbs 0 oz  Physical Exam:  Temp:  [97.5  F (36.4  C)-98.2  F (36.8  C)] 97.5  F (36.4  C)  Pulse:  [62-72] 68  Resp:  [16-18] 16  BP: (117-150)/(62-78) 120/78  SpO2:  [94 %-100 %] 100 %    /78 (BP Location: Left arm)   Pulse 68   Temp 97.5  F (36.4  C) (Oral)   Resp 16   Ht 1.575 m (5' 2\")   Wt 49.9 kg (110 lb)   SpO2 100%   BMI 20.12 kg/m    General appearance: alert, appears stated age and cooperative  Head: Normocephalic, without obvious abnormality, atraumatic  Eyes: Clear conjuctiva  Neck: no JVD and supple, symmetrical, trachea midline  Lungs: clear to auscultation bilaterally  Heart: regular rate and rhythm, S1, S2 normal, no murmur, click, rub or gallop  Abdomen: soft, non-tender; bowel sounds normal; no masses,  no organomegaly  Extremities: Ariadna's sign is negative, no sign of DVT  Skin: Skin color, texture, turgor normal. No rashes or lesions  Neurologic: Mental status: alertness: alert  Cranial nerves: II: visual acuity normal bilaterally, II: visual field normal, II: pupils equal, round, reactive to light and accommodation, III,IV,VI: extraocular muscles extra-ocular motions intact  Sensory: normal  Motor:Right  strength 3 out of 5, right hip flexor 4 out of 5. Mild right facial droop          Data   Recent Labs   Lab 07/31/21  1247 07/31/21  0925 07/30/21  2103 07/29/21  0942 07/28/21  0842 07/25/21  1056   PLT  --   --   --   --  347 323   NA  --   --   --  144 140 142   POTASSIUM  --   --   --  4.6 4.5 4.1   CHLORIDE  --   --   --  110* 104 109*   CO2  --   --   --  27 27 26   BUN  --   --   --  32* 49* 14   CR  --   --   --  0.97 " 1.03 0.87   ANIONGAP  --   --   --  7 9 7   CHELA  --   --   --  9.0 9.3 9.6    108 264* 140* 187* 123   ALBUMIN  --   --   --   --  3.1*  --    PROTTOTAL  --   --   --   --  6.5  --    BILITOTAL  --   --   --   --  0.8  --    ALKPHOS  --   --   --   --  69  --    ALT  --   --   --   --  23  --    AST  --   --   --   --  22  --      Recent Results (from the past 24 hour(s))   XR Chest Port 1 View    Narrative    EXAM: XR CHEST PORT 1 VIEW  LOCATION: [facility]  DATE/TIME: 7/30/2021 5:25 PM    INDICATION: covid infection  COMPARISON: None.      Impression    IMPRESSION:     Clips around the left hilum and aortic arch with opaque left apex likely related to previous left upper lobectomy. Related elevation of the left hemidiaphragm. Probable bronchiectasis of left lower lobe airways.    Cardiac silhouette is enlarged. Moderate atheromatous calcifications of the aortic arch and descending aorta.    Right upper lobe volume loss is also present with high position of the minor fissure. Well-circumscribed somewhat rounded lucencies around the right hilar structures could relate to bulla. Linear foci of subpleural scarring are present in both   paradiaphragmatic lungs.    Complex exam with a number of chronic features present which makes detection of superimposed acute process more challenging. Comparison with prior chest radiographs would be helpful.     Medications     - MEDICATION INSTRUCTIONS -       - MEDICATION INSTRUCTIONS -         aspirin  81 mg Oral Daily     atenolol  25 mg Oral BID     atorvastatin  40 mg Oral At Bedtime     clopidogrel  75 mg Oral Daily     enoxaparin ANTICOAGULANT  40 mg Subcutaneous Q24H     insulin aspart  3 Units Subcutaneous TID w/meals     insulin aspart  1-7 Units Subcutaneous TID AC     insulin glargine  16 Units Subcutaneous At Bedtime     lisinopril  10 mg Oral BID     sodium chloride (PF)  3 mL Intracatheter Q8H     sodium chloride (PF)  3 mL  Intracatheter Q8H

## 2021-07-31 NOTE — PLAN OF CARE
Problem: Risk for Delirium  Goal: Improved Sleep  Outcome: Improving   Pt slept almost all through the night, get up to use the bathroom and back in bed with assist of one.  Problem: Fall Injury Risk  Goal: Absence of Fall and Fall-Related Injury  Outcome: Improving   Pt  a/o x4, nonskid socks on, bed alarm on , and call light within reach, VSS, red normal sinus rhythm with no Pvc at start of shift, had one Pvc this morning with some artifacts which was adjusted as pt was walking in room and sited in chair. Pt asymptomatic. Had a BM last night.

## 2021-07-31 NOTE — PLAN OF CARE
Pt. Denies any pain this pm shift. Pt. Up in chair for meals and most of the shift. Pt. Encouraged to cough and deep breathe. Pt. On RA. VSS. BCG checked and insulin given per order. Medications given per order. Will continue to monitor.

## 2021-08-01 ENCOUNTER — APPOINTMENT (OUTPATIENT)
Dept: PHYSICAL THERAPY | Facility: HOSPITAL | Age: 71
DRG: 064 | End: 2021-08-01
Attending: HOSPITALIST
Payer: COMMERCIAL

## 2021-08-01 ENCOUNTER — APPOINTMENT (OUTPATIENT)
Dept: OCCUPATIONAL THERAPY | Facility: HOSPITAL | Age: 71
DRG: 064 | End: 2021-08-01
Attending: HOSPITALIST
Payer: COMMERCIAL

## 2021-08-01 LAB
GLUCOSE BLDC GLUCOMTR-MCNC: 108 MG/DL (ref 70–125)
GLUCOSE BLDC GLUCOMTR-MCNC: 120 MG/DL (ref 70–125)
GLUCOSE BLDC GLUCOMTR-MCNC: 130 MG/DL (ref 70–125)
GLUCOSE BLDC GLUCOMTR-MCNC: 205 MG/DL (ref 70–125)
GLUCOSE BLDC GLUCOMTR-MCNC: 79 MG/DL (ref 70–125)

## 2021-08-01 PROCEDURE — 250N000011 HC RX IP 250 OP 636: Performed by: HOSPITALIST

## 2021-08-01 PROCEDURE — 99232 SBSQ HOSP IP/OBS MODERATE 35: CPT | Performed by: INTERNAL MEDICINE

## 2021-08-01 PROCEDURE — 250N000013 HC RX MED GY IP 250 OP 250 PS 637: Performed by: FAMILY MEDICINE

## 2021-08-01 PROCEDURE — 120N000001 HC R&B MED SURG/OB

## 2021-08-01 PROCEDURE — 97110 THERAPEUTIC EXERCISES: CPT | Mod: GP | Performed by: PHYSICAL THERAPIST

## 2021-08-01 PROCEDURE — 250N000013 HC RX MED GY IP 250 OP 250 PS 637: Performed by: PSYCHIATRY & NEUROLOGY

## 2021-08-01 PROCEDURE — 97116 GAIT TRAINING THERAPY: CPT | Mod: GP | Performed by: PHYSICAL THERAPIST

## 2021-08-01 PROCEDURE — 250N000012 HC RX MED GY IP 250 OP 636 PS 637

## 2021-08-01 PROCEDURE — 97535 SELF CARE MNGMENT TRAINING: CPT | Mod: GO

## 2021-08-01 PROCEDURE — 97110 THERAPEUTIC EXERCISES: CPT | Mod: GO

## 2021-08-01 PROCEDURE — 250N000013 HC RX MED GY IP 250 OP 250 PS 637: Performed by: INTERNAL MEDICINE

## 2021-08-01 PROCEDURE — 250N000012 HC RX MED GY IP 250 OP 636 PS 637: Performed by: FAMILY MEDICINE

## 2021-08-01 RX ORDER — ATENOLOL 25 MG/1
25 TABLET ORAL AT BEDTIME
Status: DISCONTINUED | OUTPATIENT
Start: 2021-08-01 | End: 2021-08-02 | Stop reason: HOSPADM

## 2021-08-01 RX ADMIN — INSULIN GLARGINE 16 UNITS: 100 INJECTION, SOLUTION SUBCUTANEOUS at 22:16

## 2021-08-01 RX ADMIN — LISINOPRIL 10 MG: 5 TABLET ORAL at 22:16

## 2021-08-01 RX ADMIN — INSULIN ASPART 3 UNITS: 100 INJECTION, SOLUTION INTRAVENOUS; SUBCUTANEOUS at 18:54

## 2021-08-01 RX ADMIN — ENOXAPARIN SODIUM 40 MG: 100 INJECTION SUBCUTANEOUS at 22:16

## 2021-08-01 RX ADMIN — ATENOLOL 25 MG: 25 TABLET ORAL at 09:14

## 2021-08-01 RX ADMIN — LISINOPRIL 10 MG: 5 TABLET ORAL at 09:14

## 2021-08-01 RX ADMIN — ATENOLOL 25 MG: 25 TABLET ORAL at 22:17

## 2021-08-01 RX ADMIN — ATORVASTATIN CALCIUM 40 MG: 40 TABLET, FILM COATED ORAL at 22:16

## 2021-08-01 RX ADMIN — INSULIN ASPART 3 UNITS: 100 INJECTION, SOLUTION INTRAVENOUS; SUBCUTANEOUS at 09:16

## 2021-08-01 RX ADMIN — CLOPIDOGREL BISULFATE 75 MG: 75 TABLET ORAL at 09:15

## 2021-08-01 RX ADMIN — ASPIRIN 81 MG: 81 TABLET, COATED ORAL at 09:14

## 2021-08-01 RX ADMIN — INSULIN ASPART 3 UNITS: 100 INJECTION, SOLUTION INTRAVENOUS; SUBCUTANEOUS at 13:17

## 2021-08-01 NOTE — PLAN OF CARE
NIH deficits: R-facial droop with RUE and RLE weakness/drift. - Assist of 1 w/gait belt to the bedside commode.   Cardiac rhythm: NSR    Asymptomatic with covid- LS are clear in bilateral lobes, sating well RA, denies cough or discomfort.       Problem: Hyperglycemia  Goal: Blood Glucose Level Within Targeted Range  Outcome: No Change   HSB, received scheduled lantus.     BP in the 100's/50's at HS, anti-hypertensive meds held.     Pt had a medium- hard BM, 1 red streak noted.

## 2021-08-01 NOTE — PROGRESS NOTES
Care Management Follow Up    Length of Stay (days): 10    Expected Discharge Date: 08/02/2021  Expected Time of Departure: 1200  (on 8/2/21)  Concerns to be Addressed: all concerns addressed in this encounter     Patient plan of care discussed at interdisciplinary rounds: Yes    Anticipated Discharge Disposition: Acute Rehab     Anticipated Discharge Services:    Anticipated Discharge DME:      Patient/family educated on Medicare website which has current facility and service quality ratings:    Education Provided on the Discharge Plan:    Patient/Family in Agreement with the Plan: yes    Referrals Placed by CM/JOSE:    Private pay costs discussed: Not applicable    Additional Information:  JOSE spoke with Sharmaine at [facility] - pt medically stable and they can take her tomorrow.  JOSE set up ride for pt via [facility] (due to Covid) at 12 PM per facility request.  JOSE updated Dr Lay.      KODI Canales, LGSW 08/01/21 6:50 PM

## 2021-08-01 NOTE — PROGRESS NOTES
[facility]    Medicine Progress Note - Hospitalist Service       Date of Admission:  7/22/2021    Assessment & Plan           Paradise Yuan is a 71 year old old female with h/o uncontrolled diabetes, hypertension, hyperlipidemia presenting with stuttering stroke symptoms for approximately 2 weeks.  Found to have subacute stroke on the left causing right-sided weakness, partial aphasia     Asymptomatic COVID-19 infection.  Tested positive on 7/30/2021, prior to discharge test.  On admission on 7/22/2021 Covid 19 was negative.  Chest x-ray-no acute findings number different to differentiate given background of s/p left upper lobectomy, bronchiectases.  Precautions implemented.  Monitor respiratory symptoms closely.    Subacute CVA  2-week history of right sided facial droop, partial right hemiparesis, partial expressive aphasia.  MRI of head shows recent infarcts of varying age within the left MCA territory affecting the deep frontal white matter and cortex at the frontoparietal junction. Also in the left corona radiata and deep white matter and to a lesser extent of the frontoparietal cortex. Also infarct affecting the left insula and to lesser extent frontal parietal complex. No acute hemorrhage.  MRA of the head shows diminished flow within the left MCA beyond the distal M1 segment, normal MRA of the neck.  On aspirin and Plavix, statin.  Cardiac echocardiogram shows EF of 55 to 60% with no signs of thrombus or significant valvular disease  Given multifocal infarcts neurology is recommending a 30-day event monitor at discharge.  Continue telemetry during hospitalization, no A. fib/flutter noted so far.  Symptoms gradually improving, mostly speech and strength in the right hand.  PT recommending discharge to acute rehab and was accepted, supposed to be discharged on 7/30, but discharge was postponed till 8/2, since diagnosed with positive COVID-19 on the day of  discharge.    Meningioma  Left posterior fossa; stable; seen on imaging     Uncontrolled diabetes  A1c 10  Blood sugars as inpatient well controlled with Lantus, 16 units, continue carb counting in medium sliding scale. Question if she was really taking her insulin at home.  Units mealtime NovoLog added on 7/30, stable BG's.     Essential hypertension.  Initially held home lisinopril and atenolol, for permissive hypertension.  Subacute stroke and symptoms for 2 weeks, neuro now says to keep systolic blood pressure less than 160.  Resumed home antihypertensive regimen.  Blood pressure better controlled.    Atrial tachycardia/SVT-  No A. fib. Restarted atenolol.  No further arrhythmias arrhythmias after restarting atenolol.     Hyperlipidemia  --- LDL 84  --- was on statin 20mg at home so increased to 40mg     H/o left upper lobectomy.  Patient able to provide pertinent medical history of this.     VTE prophylaxis:  Enoxaparin (Lovenox) SQ  DIET: Orders Placed This Encounter      Combination Diet Regular Diet Adult  Disposition/Barriers to discharge: stroke therapies  Code Status: Full Code        Diet: Advance Diet as Tolerated  Regular Diet Adult    DVT Prophylaxis: Heparin SQ  Choi Catheter: Not present  Central Lines: None  Code Status: Full Code      Disposition Plan   Expected discharge: To acute rehab, in 1-2 days.       The patient's care was discussed with the Bedside Nurse, Care Coordinator/, Patient and Patient's Family.  For total 35 minutes.  Loraine Lay MD  Hospitalist Service  [facility]  Securely message with the [employer] Web Console (learn more here)  Text page via [employer] Paging/Directory    Clinically Significant Risk Factors Present on Admission             _____________________________________________________________________    Interval History   Remainder of 12 point review of systems negative except as noted below    Subjective:    Nothing acute overnight.  " No respiratory symptoms.  Good appetite.  No fever, chills, cough, abdominal pain, diarrhea.  He has been accepted to  acute care rehab, for 8/2/2021.    Data reviewed today: I reviewed all medications, new labs and imaging results over the last 24 hours.     Physical Exam   Vital Signs: Temp: 97.8  F (36.6  C) Temp src: Oral BP: 94/53 Pulse: 77   Resp: 18 SpO2: 97 % O2 Device: None (Room air)    Weight: 110 lbs 0 oz  Physical Exam:  Temp:  [97.8  F (36.6  C)-98.3  F (36.8  C)] 97.8  F (36.6  C)  Pulse:  [69-77] 77  Resp:  [16-20] 18  BP: ()/(53-86) 94/53  SpO2:  [94 %-97 %] 97 %    BP 94/53 (BP Location: Left arm)   Pulse 77   Temp 97.8  F (36.6  C) (Oral)   Resp 18   Ht 1.575 m (5' 2\")   Wt 49.9 kg (110 lb)   SpO2 97%   BMI 20.12 kg/m    General appearance: alert, appears stated age and cooperative  Head: Normocephalic, without obvious abnormality, atraumatic  Eyes: Clear conjuctiva  Neck: no JVD and supple, symmetrical, trachea midline  Lungs: clear to auscultation bilaterally  Heart: regular rate and rhythm, S1, S2 normal, no murmur, click, rub or gallop  Abdomen: soft, non-tender; bowel sounds normal; no masses,  no organomegaly  Extremities: Ariadna's sign is negative, no sign of DVT  Skin: Skin color, texture, turgor normal. No rashes or lesions  Neurologic: Mental status: alertness: alert  Cranial nerves: II: visual acuity normal bilaterally, II: visual field normal, II: pupils equal, round, reactive to light and accommodation, III,IV,VI: extraocular muscles extra-ocular motions intact  Sensory: normal  Motor:Right  strength 3 out of 5, right hip flexor 4 out of 5. Mild right facial droop          Data   Recent Labs   Lab 08/01/21  1311 08/01/21  0848 07/31/21 2053 07/29/21  0942 07/28/21  0842   PLT  --   --   --   --  347   NA  --   --   --  144 140   POTASSIUM  --   --   --  4.6 4.5   CHLORIDE  --   --   --  110* 104   CO2  --   --   --  27 27   BUN  --   --   --  32* 49*   CR  --   --  "  --  0.97 1.03   ANIONGAP  --   --   --  7 9   CHELA  --   --   --  9.0 9.3    108 120 140* 187*   ALBUMIN  --   --   --   --  3.1*   PROTTOTAL  --   --   --   --  6.5   BILITOTAL  --   --   --   --  0.8   ALKPHOS  --   --   --   --  69   ALT  --   --   --   --  23   AST  --   --   --   --  22     No results found for this or any previous visit (from the past 24 hour(s)).  Medications     - MEDICATION INSTRUCTIONS -       - MEDICATION INSTRUCTIONS -         aspirin  81 mg Oral Daily     atenolol  25 mg Oral BID     atorvastatin  40 mg Oral At Bedtime     clopidogrel  75 mg Oral Daily     enoxaparin ANTICOAGULANT  40 mg Subcutaneous Q24H     insulin aspart  3 Units Subcutaneous TID w/meals     insulin aspart  1-7 Units Subcutaneous TID AC     insulin glargine  16 Units Subcutaneous At Bedtime     lisinopril  10 mg Oral BID     sodium chloride (PF)  3 mL Intracatheter Q8H     sodium chloride (PF)  3 mL Intracatheter Q8H

## 2021-08-01 NOTE — PLAN OF CARE
Right arm weak with right facial droop alert cooperative   Sits in chair most of day denies pain or headache.  NIH SCORE 2. Blood sugars 108 120   Sinus chelsey  called to advance diet patient  Asking for different foods not offered on her diet.  No trouble swallowing needs assist with opening  Tray and meals cut up, patient is left handed.   Cooperative calm.

## 2021-08-02 ENCOUNTER — APPOINTMENT (OUTPATIENT)
Dept: SPEECH THERAPY | Facility: HOSPITAL | Age: 71
DRG: 064 | End: 2021-08-02
Attending: HOSPITALIST
Payer: COMMERCIAL

## 2021-08-02 ENCOUNTER — HOSPITAL ENCOUNTER (INPATIENT)
Facility: CLINIC | Age: 71
LOS: 18 days | Discharge: HOME HEALTH | DRG: 057 | End: 2021-08-20
Attending: PHYSICAL MEDICINE & REHABILITATION | Admitting: PHYSICAL MEDICINE & REHABILITATION
Payer: COMMERCIAL

## 2021-08-02 VITALS
TEMPERATURE: 98 F | WEIGHT: 110 LBS | BODY MASS INDEX: 20.24 KG/M2 | SYSTOLIC BLOOD PRESSURE: 112 MMHG | OXYGEN SATURATION: 95 % | HEART RATE: 72 BPM | RESPIRATION RATE: 18 BRPM | HEIGHT: 62 IN | DIASTOLIC BLOOD PRESSURE: 70 MMHG

## 2021-08-02 DIAGNOSIS — R41.3 MEMORY PROBLEM: ICD-10-CM

## 2021-08-02 DIAGNOSIS — I63.512 ACUTE ISCHEMIC LEFT MCA STROKE (H): Primary | ICD-10-CM

## 2021-08-02 DIAGNOSIS — R29.898 RIGHT LEG WEAKNESS: ICD-10-CM

## 2021-08-02 DIAGNOSIS — I63.512 CEREBRAL INFARCTION DUE TO OCCLUSION OF LEFT MIDDLE CEREBRAL ARTERY (H): ICD-10-CM

## 2021-08-02 DIAGNOSIS — R29.898 RIGHT ARM WEAKNESS: ICD-10-CM

## 2021-08-02 DIAGNOSIS — R41.89 IMPAIRED COGNITION: ICD-10-CM

## 2021-08-02 DIAGNOSIS — R29.810 FACIAL DROOP: ICD-10-CM

## 2021-08-02 DIAGNOSIS — K21.9 GASTROESOPHAGEAL REFLUX DISEASE, UNSPECIFIED WHETHER ESOPHAGITIS PRESENT: ICD-10-CM

## 2021-08-02 DIAGNOSIS — E11.9 TYPE 2 DIABETES MELLITUS TREATED WITHOUT INSULIN (H): ICD-10-CM

## 2021-08-02 DIAGNOSIS — I10 ESSENTIAL HYPERTENSION, BENIGN: ICD-10-CM

## 2021-08-02 LAB
ALBUMIN SERPL-MCNC: 3.4 G/DL (ref 3.5–5)
ALP SERPL-CCNC: 76 U/L (ref 45–120)
ALT SERPL W P-5'-P-CCNC: 17 U/L (ref 0–45)
ANION GAP SERPL CALCULATED.3IONS-SCNC: 7 MMOL/L (ref 5–18)
AST SERPL W P-5'-P-CCNC: 23 U/L (ref 0–40)
BASOPHILS # BLD AUTO: 0.1 10E3/UL (ref 0–0.2)
BASOPHILS NFR BLD AUTO: 1 %
BILIRUB SERPL-MCNC: 1.2 MG/DL (ref 0–1)
BUN SERPL-MCNC: 17 MG/DL (ref 8–28)
CALCIUM SERPL-MCNC: 9.3 MG/DL (ref 8.5–10.5)
CHLORIDE BLD-SCNC: 106 MMOL/L (ref 98–107)
CO2 SERPL-SCNC: 29 MMOL/L (ref 22–31)
CREAT SERPL-MCNC: 0.9 MG/DL (ref 0.6–1.1)
EOSINOPHIL # BLD AUTO: 0.1 10E3/UL (ref 0–0.7)
EOSINOPHIL NFR BLD AUTO: 2 %
ERYTHROCYTE [DISTWIDTH] IN BLOOD BY AUTOMATED COUNT: 13.5 % (ref 10–15)
GFR SERPL CREATININE-BSD FRML MDRD: 64 ML/MIN/1.73M2
GLUCOSE BLD-MCNC: 111 MG/DL (ref 70–125)
GLUCOSE BLDC GLUCOMTR-MCNC: 102 MG/DL (ref 70–125)
GLUCOSE BLDC GLUCOMTR-MCNC: 129 MG/DL (ref 70–99)
GLUCOSE BLDC GLUCOMTR-MCNC: 91 MG/DL (ref 70–99)
HCT VFR BLD AUTO: 33.1 % (ref 35–47)
HGB BLD-MCNC: 10.2 G/DL (ref 11.7–15.7)
HOLD SPECIMEN: NORMAL
HOLD SPECIMEN: NORMAL
IMM GRANULOCYTES # BLD: 0 10E3/UL
IMM GRANULOCYTES NFR BLD: 1 %
LYMPHOCYTES # BLD AUTO: 1.9 10E3/UL (ref 0.8–5.3)
LYMPHOCYTES NFR BLD AUTO: 24 %
MCH RBC QN AUTO: 28.9 PG (ref 26.5–33)
MCHC RBC AUTO-ENTMCNC: 30.8 G/DL (ref 31.5–36.5)
MCV RBC AUTO: 94 FL (ref 78–100)
MONOCYTES # BLD AUTO: 0.6 10E3/UL (ref 0–1.3)
MONOCYTES NFR BLD AUTO: 8 %
NEUTROPHILS # BLD AUTO: 5.3 10E3/UL (ref 1.6–8.3)
NEUTROPHILS NFR BLD AUTO: 64 %
NRBC # BLD AUTO: 0 10E3/UL
NRBC BLD AUTO-RTO: 0 /100
PLATELET # BLD AUTO: 407 10E3/UL (ref 150–450)
POTASSIUM BLD-SCNC: 4.3 MMOL/L (ref 3.5–5)
PROT SERPL-MCNC: 6.9 G/DL (ref 6–8)
RBC # BLD AUTO: 3.53 10E6/UL (ref 3.8–5.2)
SODIUM SERPL-SCNC: 142 MMOL/L (ref 136–145)
WBC # BLD AUTO: 8 10E3/UL (ref 4–11)

## 2021-08-02 PROCEDURE — 99221 1ST HOSP IP/OBS SF/LOW 40: CPT | Performed by: PHYSICAL MEDICINE & REHABILITATION

## 2021-08-02 PROCEDURE — 99239 HOSP IP/OBS DSCHRG MGMT >30: CPT | Performed by: INTERNAL MEDICINE

## 2021-08-02 PROCEDURE — 92526 ORAL FUNCTION THERAPY: CPT | Mod: GN

## 2021-08-02 PROCEDURE — 250N000013 HC RX MED GY IP 250 OP 250 PS 637: Performed by: PSYCHIATRY & NEUROLOGY

## 2021-08-02 PROCEDURE — 250N000012 HC RX MED GY IP 250 OP 636 PS 637: Performed by: PHYSICIAN ASSISTANT

## 2021-08-02 PROCEDURE — 80053 COMPREHEN METABOLIC PANEL: CPT | Performed by: INTERNAL MEDICINE

## 2021-08-02 PROCEDURE — 36415 COLL VENOUS BLD VENIPUNCTURE: CPT | Performed by: INTERNAL MEDICINE

## 2021-08-02 PROCEDURE — 250N000011 HC RX IP 250 OP 636: Performed by: PHYSICIAN ASSISTANT

## 2021-08-02 PROCEDURE — 128N000003 HC R&B REHAB

## 2021-08-02 PROCEDURE — 250N000013 HC RX MED GY IP 250 OP 250 PS 637: Performed by: PHYSICIAN ASSISTANT

## 2021-08-02 PROCEDURE — 85025 COMPLETE CBC W/AUTO DIFF WBC: CPT | Performed by: INTERNAL MEDICINE

## 2021-08-02 PROCEDURE — 250N000013 HC RX MED GY IP 250 OP 250 PS 637: Performed by: FAMILY MEDICINE

## 2021-08-02 RX ORDER — PANTOPRAZOLE SODIUM 40 MG/1
40 TABLET, DELAYED RELEASE ORAL
Status: DISCONTINUED | OUTPATIENT
Start: 2021-08-03 | End: 2021-08-20 | Stop reason: HOSPADM

## 2021-08-02 RX ORDER — ATENOLOL 25 MG/1
25 TABLET ORAL AT BEDTIME
Status: DISCONTINUED | OUTPATIENT
Start: 2021-08-02 | End: 2021-08-20 | Stop reason: HOSPADM

## 2021-08-02 RX ORDER — ASPIRIN 81 MG/1
81 TABLET ORAL DAILY
Status: DISCONTINUED | OUTPATIENT
Start: 2021-08-02 | End: 2021-08-20 | Stop reason: HOSPADM

## 2021-08-02 RX ORDER — CLOPIDOGREL BISULFATE 75 MG/1
75 TABLET ORAL DAILY
Status: DISCONTINUED | OUTPATIENT
Start: 2021-08-03 | End: 2021-08-13

## 2021-08-02 RX ORDER — LISINOPRIL 10 MG/1
10 TABLET ORAL 2 TIMES DAILY
Status: DISCONTINUED | OUTPATIENT
Start: 2021-08-02 | End: 2021-08-20 | Stop reason: HOSPADM

## 2021-08-02 RX ORDER — ACETAMINOPHEN 325 MG/1
325-650 TABLET ORAL EVERY 6 HOURS PRN
Status: DISCONTINUED | OUTPATIENT
Start: 2021-08-02 | End: 2021-08-20 | Stop reason: HOSPADM

## 2021-08-02 RX ORDER — ATORVASTATIN CALCIUM 40 MG/1
40 TABLET, FILM COATED ORAL AT BEDTIME
Status: DISCONTINUED | OUTPATIENT
Start: 2021-08-02 | End: 2021-08-20 | Stop reason: HOSPADM

## 2021-08-02 RX ORDER — DEXTROSE MONOHYDRATE 25 G/50ML
25-50 INJECTION, SOLUTION INTRAVENOUS
Status: DISCONTINUED | OUTPATIENT
Start: 2021-08-02 | End: 2021-08-20 | Stop reason: HOSPADM

## 2021-08-02 RX ORDER — POLYETHYLENE GLYCOL 3350 17 G/17G
17 POWDER, FOR SOLUTION ORAL DAILY PRN
Status: DISCONTINUED | OUTPATIENT
Start: 2021-08-02 | End: 2021-08-20 | Stop reason: HOSPADM

## 2021-08-02 RX ORDER — AMOXICILLIN 250 MG
1-4 CAPSULE ORAL 2 TIMES DAILY PRN
Status: DISCONTINUED | OUTPATIENT
Start: 2021-08-02 | End: 2021-08-20 | Stop reason: HOSPADM

## 2021-08-02 RX ORDER — LISINOPRIL 10 MG/1
10 TABLET ORAL DAILY
Qty: 30 TABLET | Refills: 0 | Status: ON HOLD | OUTPATIENT
Start: 2021-08-02 | End: 2021-08-19

## 2021-08-02 RX ORDER — NICOTINE POLACRILEX 4 MG
15-30 LOZENGE BUCCAL
Status: DISCONTINUED | OUTPATIENT
Start: 2021-08-02 | End: 2021-08-20 | Stop reason: HOSPADM

## 2021-08-02 RX ORDER — ATENOLOL 25 MG/1
25 TABLET ORAL AT BEDTIME
Qty: 30 TABLET | Refills: 0 | Status: SHIPPED | OUTPATIENT
Start: 2021-08-02 | End: 2021-10-05

## 2021-08-02 RX ADMIN — CLOPIDOGREL BISULFATE 75 MG: 75 TABLET ORAL at 09:25

## 2021-08-02 RX ADMIN — ATORVASTATIN CALCIUM 40 MG: 40 TABLET, FILM COATED ORAL at 21:55

## 2021-08-02 RX ADMIN — LISINOPRIL 10 MG: 5 TABLET ORAL at 09:25

## 2021-08-02 RX ADMIN — ENOXAPARIN SODIUM 40 MG: 40 INJECTION SUBCUTANEOUS at 21:55

## 2021-08-02 RX ADMIN — ASPIRIN 81 MG: 81 TABLET, COATED ORAL at 16:23

## 2021-08-02 RX ADMIN — INSULIN GLARGINE 16 UNITS: 100 INJECTION, SOLUTION SUBCUTANEOUS at 21:55

## 2021-08-02 RX ADMIN — INSULIN ASPART 3 UNITS: 100 INJECTION, SOLUTION INTRAVENOUS; SUBCUTANEOUS at 19:28

## 2021-08-02 RX ADMIN — INSULIN ASPART 3 UNITS: 100 INJECTION, SOLUTION INTRAVENOUS; SUBCUTANEOUS at 09:40

## 2021-08-02 ASSESSMENT — MIFFLIN-ST. JEOR: SCORE: 943.16

## 2021-08-02 NOTE — PLAN OF CARE
FOCUS/GOAL  Safety management    ASSESSMENT, INTERVENTIONS AND CONTINUING PLAN FOR GOAL:  Pt alert and oriented to self, and situation, confused to time, place. Pt second language is english but reports been speaking english since elementary school.  [place] is first language.  Oriented to room and agreed to use call light when getting up, alarm on in bed.  Pt is not depressed or suicidal. Soft and bite sized diet, thin liquids.  Passing rest of admit to evening shift.

## 2021-08-02 NOTE — PLAN OF CARE
FOCUS/GOAL  Bowel management, Bladder management, Medication management, Pain management, Medical management, Mobility, Skin integrity, and Safety management    ASSESSMENT, INTERVENTIONS AND CONTINUING PLAN FOR GOAL:    Patient arrived at ARU today. Active Covid patient. Pt disoriented to time and place. Did not void this shift,  at 8 pm and 169 at 11pm. Ax1 stand pivot transfers. Combination diet level 6 (soft, bite size), thin liquids. Pt has no skin issues except a few bruise marks. BP was 86/54 at 2154 and 116/52 at 2202, encouraged fluids. Denied pain, nausea and vomiting, numbness or tingling, SOB. Alarms on, call light within reach. Will continue with plan of care.

## 2021-08-02 NOTE — TREATMENT PLAN
[facility] called with report  Patient and family called about transfer.  Lungs clear alert cooperative. [facility]  Transport came took patient on strecher.

## 2021-08-02 NOTE — CONSULTS
21 1500   Living Arrangements   People in home sibling(s);other relative(s)  (Nephew (adult))   Able to Return to Prior Arrangements yes   Home Safety   Patient Feels Safe Living in Home? yes   CM/SW Discharge Planning   Patient/Family Anticipates Transition to home with family;home with help/services   Concerns to be Addressed cognitive/perceptual   Concerns Comments Family A with supervision and cognitive impairments PTA    Patient/family educated on Medicare website which has current facility and service quality ratings no   Transportation Anticipated family or friend will provide   Anticipated Discharge Disposition (CM/SW) Home;Home Care;Outpatient Rehab (PT, OT, SLP, Cardiac or Pulmonary)   Patient/Family in Agreement with Plan yes       Social Work: Initial Assessment with Discharge Plan    Patient Name: Paradise Yuan  : 1950  Age: 71 year old  MRN: [id number]  Completed assessment with: Chart review and interview with patient   Admitted to ARU: 2021    Presenting Information   Date of SW assessment: 2021  Health Care Directive: Health Care Directive Agent (if patient not able to make decisions)  Primary Health Care Agent: Patient/self-until further assessed   Secondary Health Care Agent: Pt brother and sister, collectively, NOK   Living Situation: Single, lives with sister and adult nephew in house with >10 stairs (bed/bath on 3rd floor). Cat in the home. Per pt, able to stay on the main level if needed.   Previous Functional Status: Pt was independent with all ADLs/IADLs, transfers, mobility and gait. She was not driving prior to admission. Per H&P--Memory problems  Per family, has had progressive memory issues for quite some time.  PCP had referred to neurology/memory clinic for further evaluation and management. Per PCP note fro 2021--Due to memory problems. Her sister is helping her with insulin and regular blood sugar check.  Pt stated that her sister manages her  finances. Pt not working and declined having an income.    DME available: None reported, see therapy evals for additional information.   Patient and family understanding of hospitalization: Per RN admissions, pt alert and oriented to self, and situation, confused to time, place. During BIMs, pt stated that it was Wed March 2003.   Cultural/Language/Spiritual Considerations: Tagalog is first language. She denies need for , and states that she has been speaking English since childhood and understands well.     Physical Health  Reason for admission: Stroke Ischemic 01.2 (R) Body Involvement (L) Brain: L MCA infarct     Paradise Yuan is a 71 year old Tagolog-speaking right hand dominant female with past medical history of diabetes mellitus type II, hypertension, hyperlipidemia, CKD stage III, left upper lobectomy, memory problems who presented to outside ED on 7/22/21 with a ~2-week history of right-sided weakness, right facial droop, dysarthria, as well as a fall ~10 days prior to admission.  CT head revealed no acute intracranial process, but incidentally showed presumed large partially calcified meningioma in the left side of the posterior fossa.  MRI brain demonstrated recent infarcts of varying age within left MCA territory, acute to early subacute infarcts involving left corona radiata/deep white matter, and 3cm calcified mass within left cerebellum.  MRA head/neck revealed diminished flow within left MCA beyond distal M1 segment favored to represent occlusion or high-grade stenosis.  Patient was not a candidate for thrombolytics due to timing of symptom onset and was transferred to [facility] for ongoing evaluation and management.  Echo demonstrated EF 55-60% and no significant valvular disease.  LDL was 84, patient's PTA statin was increased.  Patient was started on dual antiplatelet therapy with aspirin and Plavix.  Given multifocal infarcts and telemetry without evidence of afib/flutter,  "neurology recommends 30-day cardiac event monitor as an outpatient, or consideration of implantable loop recorder if that is unrevealing.       Patient's hospital course was complicated by diabetes management, hypertension, atrial tachycardia/SVT on telemetry (PTA atenolol restarted), and symptomatic orthostatic hypotension (improved s/p 1 L IV fluids ).  Additionally, patient was planning to discharge to ARU on  when pre-admission COVID test resulted as positive.  Patient remained at hospital for monitoring and to await isolation room, but remains asymptomatic and stable on room air at time of discharge.    Provider Information   Primary Care Physician:Kodak Smith--Saw PCP right before coming to ED for stroke symptoms.   [facility] [address], [place] [address]  PH: [phone]  : None reported. RN CC and SWer at clinic if needed for support.     Mental Health/Chemical Dependency:   Diagnosis: None reported, pt denied   Alcohol/Tobacco/Narcotis: None reported, pt denied   Support/Services in Place: None reported   Services Needed/Recommended: Supportive services available by consult (Health Psychology and [place] services)   Sexuality/Intimacy: Not discussed     Support System  Marital Status: Single/never .   Family support: Lives with sister, Alec who is support (but hard of hearing, per OSH SW note). Lives with nephew, Elfego as well. Elfego provides transportation for pt. Brother Art also listed as emergency contact. Parents are . Pt denied having any other siblings.   Other support available: None reported     Community Resources  Current in home services: None reported   Previous services: None reported     Financial/Employment/Education  Employment Status: Not working. When asked what pt did for work she expressed \"go back and fourth\". Unable to elaborate.   Income Source: Pt denied having income or SSI  Education: College, per pt "   Financial Concerns:  None reported   Insurance: [facility] MEDICARE ADVANTAGE      Discharge Plan   Patient and family discharge goal: Home with HC vs OP, pending progress.   Provided Education on discharge plan: Yes  Patient agreeable to discharge plan:  Yes  Provided education and attained signature for Medicare IM and IRF Patient Rights and Privacy Information provided to patient : YES (will discuss with family as well)   Provided patient with [facility] [place] [facility] Resources: YES-information in AVS. Will discuss, give flyer and offer referral once pt off COVID precautions.   Barriers to discharge: None identified at this time.     Discharge Recommendations   Disposition: See above   Transportation Needs: Family assistance   Name of Transportation Company and Phone: N/A     Additional comments   Discharge TBD, ELOS 14 days.     SW will remain available and continue to follow as needs arise.     Please invite to Care Conference:  Elfego lo [phone] and sister Alec    Jadyn Martinez, [facility], [facility] Acute [facility]   Phone: [phone]  I   Pager: [phone]

## 2021-08-02 NOTE — DISCHARGE SUMMARY
[facility]  Hospitalist Discharge Summary      Date of Admission:  7/22/2021  Date of Discharge:  8/2/2021  Discharging Provider: Loraine Lay MD    Discharge Diagnoses     Subacute stroke in left MCA territory, multifocal infarcts  And angioma  Controlled diabetes mellitus type 2  Essential hypertension  Paroxysm of SVT  Hyperlipidemia  History of left upper lobectomy    Asymptomatic COVID-19 infection, tested positive on 7/30/2021  Follow-ups Needed After Discharge   Follow-up Appointments     Follow Up and recommended labs and tests      Follow up with long term physician.  The following labs/tests are   recommended:  CBC, BMP, Mg in 1 week.  Follow up with neurology in 6-7 weeks.             Unresulted Labs Ordered in the Past 30 Days of this Admission     No orders found from 6/22/2021 to 7/23/2021.        Discharge Disposition   Discharged to home  [facility] acute rehab.  Condition at discharge: Stable    Hospital Course        Paradise Yuan is a 71 year old old female with h/o uncontrolled diabetes, hypertension, hyperlipidemia presenting with stuttering stroke symptoms for approximately 2 weeks.  Found to have subacute stroke on the left causing right-sided weakness, partial aphasia     Subacute CVA.  2-week prior to admission history of right sided facial droop, partial right hemiparesis, partial expressive aphasia.  MRI of head shows recent infarcts of varying age within the left MCA territory affecting the deep frontal white matter and cortex at the frontoparietal junction. Also in the left corona radiata and deep white matter and to a lesser extent of the frontoparietal cortex. Also infarct affecting the left insula and to lesser extent frontal parietal complex. No acute hemorrhage.  MRA of the head shows diminished flow within the left MCA beyond the distal M1 segment, normal MRA of the neck.  On aspirin and Plavix, statin.  Cardiac echocardiogram shows EF of 55 to 60%  with no signs of thrombus or significant valvular disease.  Continue telemetry during hospitalization, no A. fib/flutter noted so far.  Given multifocal infarcts neurology is recommending a 30-day event monitor at discharge.    Symptoms gradually improving, mostly speech and strength in the right hand.    Asymptomatic COVID-19 infection.  Tested positive on 7/30/2021, prior to discharge test.  On admission on 7/22/2021 Covid 19 was negative.  Chest x-ray-no acute findings number different to differentiate given background of s/p left upper lobectomy, bronchiectases.    Meningioma.  Left posterior fossa; stable; seen on imaging     Uncontrolled diabetes, A1c 10.  Blood sugars as inpatient well controlled with PTA Lantus, 16 units.    Essential hypertension. On atenolol, lisinopril.    Atrial tachycardia/SVT- No A. fib. On atenolol.       Hyperlipidemia, LDL 84.  On 40 mg of atorvastatin.      H/o left upper lobectomy I seen on CXR.  Patient able to provide pertinent medical history of this.  No information on this in EMR.    Consultations This Hospital Stay   NEUROLOGY IP CONSULT  SPEECH LANGUAGE PATH ADULT IP CONSULT  PHARMACY IP CONSULT  PHARMACY IP CONSULT  PHARMACY IP CONSULT  PHYSICAL THERAPY ADULT IP CONSULT  OCCUPATIONAL THERAPY ADULT IP CONSULT  CARE MANAGEMENT / SOCIAL WORK IP CONSULT  PHYSICAL THERAPY ADULT IP CONSULT  OCCUPATIONAL THERAPY ADULT IP CONSULT  PHYSICAL THERAPY ADULT IP CONSULT  SMOKING CESSATION PROGRAM IP CONSULT    Code Status   Full Code    Time Spent on this Encounter   I, Loraine Lay MD, personally saw the patient today and spent greater than 30 minutes discharging this patient.       Loraine Lay MD  [address] [address]  Phone: [phone]  Fax: [phone]  ______________________________________________________________________    Physical Exam   Vital Signs: Temp: 97.5  F (36.4  C) Temp src: Oral BP: 115/66  Pulse: 70   Resp: 18 SpO2: 95 % O2 Device: None (Room air)    Weight: 110 lbs 0 oz  General appearance: alert, appears stated age and cooperative  Head: Normocephalic, without obvious abnormality, atraumatic  Eyes: Clear conjuctiva  Neck: no JVD and supple, symmetrical, trachea midline  Lungs: clear to auscultation bilaterally  Heart: regular rate and rhythm, S1, S2 normal, no murmur, click, rub or gallop  Abdomen: soft, non-tender; bowel sounds normal; no masses,  no organomegaly  Extremities: Ariadna's sign is negative, no sign of DVT  Skin: Skin color, texture, turgor normal. No rashes or lesions  Neurologic: Mental status: alertness: alert  Cranial nerves: II: visual acuity normal bilaterally, II: visual field normal, II: pupils equal, round, reactive to light and accommodation, III,IV,VI: extraocular muscles extra-ocular motions intact  Sensory: normal  Motor:Right  strength 3 out of 5, right hip flexor 4 out of 5. Mild right facial droop       Primary Care Physician   Kodak Smith    Discharge Orders      General info for SNF    Length of Stay Estimate: Short Term Care: Estimated # of Days <30  Condition at Discharge: Improving  Level of care:skilled   Rehabilitation Potential: Good  Admission H&P remains valid and up-to-date: Yes  Recent Chemotherapy: N/A  Use Nursing Home Standing Orders: Yes     Mantoux instructions    Give two-step Mantoux (PPD) Per Facility Policy Yes     Follow Up and recommended labs and tests    Follow up with care home physician.  The following labs/tests are recommended:  CBC, BMP, Mg in 1 week.  Follow up with neurology in 6-7 weeks.     Reason for your hospital stay    Acute stroke     Glucose monitor nursing POCT    Before meals and at bedtime     Activity - Up ad ryan     Activity - Up with nursing assistance     Full Code     Fall precautions     Advance Diet as Tolerated    Follow this diet upon discharge:   Orders Placed This Encounter  Combination Diet Soft and Bite Sized  Diet (level 6); Thin Liquids (level 0)       Significant Results and Procedures   Results for orders placed or performed during the hospital encounter of 21   XR Chest Port 1 View    Narrative    EXAM: XR CHEST PORT 1 VIEW  LOCATION: [facility]  DATE/TIME: 2021 5:25 PM    INDICATION: covid infection  COMPARISON: None.      Impression    IMPRESSION:     Clips around the left hilum and aortic arch with opaque left apex likely related to previous left upper lobectomy. Related elevation of the left hemidiaphragm. Probable bronchiectasis of left lower lobe airways.    Cardiac silhouette is enlarged. Moderate atheromatous calcifications of the aortic arch and descending aorta.    Right upper lobe volume loss is also present with high position of the minor fissure. Well-circumscribed somewhat rounded lucencies around the right hilar structures could relate to bulla. Linear foci of subpleural scarring are present in both   paradiaphragmatic lungs.    Complex exam with a number of chronic features present which makes detection of superimposed acute process more challenging. Comparison with prior chest radiographs would be helpful.   Echocardiogram Complete - For age > 60 yrs    Narrative    [id number]  [id number]  [id number]  [id number]^[address]BARA^[place] [address]     Name: NAVJOT GARCIA  MRN: [id number]  : 1950  Study Date: 2021 10:46 AM  Age: 71 yrs  Gender: Female  Patient Location: [facility]  Reason For Study: Cerebrovascular Incident  Ordering Physician: GARY JIMENEZ  Referring Physician: MORIAH CROWE  Performed By: SHRADDHA     BSA: 1.5 m2  Height: 62 in  Weight: 110 lb  HR: 84  BP: 132/78 mmHg  ______________________________________________________________________________  ______________________________________________________________________________  Interpretation Summary     1. Normal left ventricular size and  systolic performance with a visually  estimated ejection fraction of 55-60%.  2. No significant valvular heart disease is identified on this study.  3. Normal right ventricular size and systolic performance.  ______________________________________________________________________________  Left ventricle:  Normal left ventricular size and systolic performance with a visually  estimated ejection fraction of 55-60%. There is normal regional wall motion.  Left ventricular wall thickness is normal.     Assessment of LV Diastolic Function: The cumulative findings suggest normal  diastolic filling [The septal e' velocity is > 7 cm/s & lateral e' velocity  is  < 10 cm/s. The average E/e' is < 14. The TR velocity cannot be determined due  to insufficient tricuspid insufficiency signal. Left atrial volume index is  less than 34 mL/mÂ ].     Right ventricle:  Normal right ventricular size and systolic performance.     Left atrium:  The left atrium is of normal size.     Right atrium:  The right atrium is of normal size.     IVC:  The IVC is of normal caliber.     Aortic valve:  The aortic valve is comprised of three cusps. No significant aortic stenosis  or aortic insufficiency is detected on this study.     Mitral valve:  The mitral valve appears morphologically normal. There is trace mitral  insufficiency.     Tricuspid valve:  The tricuspid valve is grossly morphologically normal. There is trace  tricuspid insufficiency.     Pulmonic valve:  The pulmonic valve is grossly morphologically normal.     Thoracic aorta:  The aortic root and proximal ascending aorta are of normal dimension.     Pericardium:  There is no significant pericardial effusion.  ______________________________________________________________________________  ______________________________________________________________________________  MMode/2D Measurements & Calculations  RVDd: 2.8 cm  IVSd: 1.1 cm  LVIDd: 2.7 cm  LVIDs: 1.7 cm  LVPWd: 1.0 cm  FS: 35.3  %  LV mass(C)d: 76.7 grams  LV mass(C)dI: 51.7 grams/m2  Ao root diam: 3.1 cm  LA dimension: 2.6 cm  asc Aorta Diam: 3.4 cm  LA/Ao: 0.83  LVOT diam: 1.8 cm  LVOT area: 2.6 cm2  LA Volume Indexed (AL/bp): 21.2 ml/m2     Time Measurements  Aortic HR: 90.0 BPM  MM HR: 84.0 BPM     Doppler Measurements & Calculations  MV E max nakia: 54.8 cm/sec  MV A max nakia: 76.7 cm/sec  MV E/A: 0.71  MV dec slope: 188.8 cm/sec2  MV dec time: 0.29 sec  LV V1 max P.0 mmHg  LV V1 max: 111.6 cm/sec  LV V1 VTI: 20.5 cm  CO(LVOT): 4.8 l/min  CI(LVOT): 3.3 l/min/m2  SV(LVOT): 53.8 ml  SI(LVOT): 36.3 ml/m2  PA acc time: 0.10 sec  E/E' av.6  Lateral E/e': 5.5  Medial E/e': 5.7     ______________________________________________________________________________  Report approved by: Nini Briones 2021 11:52 AM               Discharge Medications   Current Discharge Medication List      START taking these medications    Details   aspirin (ASA) 81 MG EC tablet Take 1 tablet (81 mg) by mouth daily  Qty: 30 tablet, Refills: 0    Associated Diagnoses: Cerebral infarction due to occlusion of left middle cerebral artery (H)      clopidogrel (PLAVIX) 75 MG tablet Take 1 tablet (75 mg) by mouth daily  Qty: 30 tablet, Refills: 0    Associated Diagnoses: Cerebral infarction due to occlusion of left middle cerebral artery (H)      insulin aspart (NOVOLOG PEN) 100 UNIT/ML pen Inject 3 Units Subcutaneous 3 times daily (with meals)  Qty: 2.7 mL, Refills: 0    Associated Diagnoses: Type 2 diabetes mellitus treated without insulin (H)      senna-docusate (SENOKOT-S/PERICOLACE) 8.6-50 MG tablet Take 1 tablet by mouth 2 times daily as needed for constipation  Qty: 30 tablet, Refills: 0    Associated Diagnoses: Slow transit constipation         CONTINUE these medications which have CHANGED    Details   atenolol (TENORMIN) 25 MG tablet Take 1 tablet (25 mg) by mouth At Bedtime  Qty: 30 tablet, Refills: 0    Associated Diagnoses: Essential  "hypertension, benign      lisinopril (ZESTRIL) 10 MG tablet Take 1 tablet (10 mg) by mouth daily  Qty: 30 tablet, Refills: 0    Associated Diagnoses: Essential hypertension, benign         CONTINUE these medications which have NOT CHANGED    Details   melatonin 1 MG TABS tablet Take 1 tablet (1 mg) by mouth nightly as needed for sleep  Qty: 30 tablet, Refills: 0    Associated Diagnoses: Primary insomnia      atorvastatin (LIPITOR) 20 MG tablet [ATORVASTATIN (LIPITOR) 20 MG TABLET] Take 1 tablet (20 mg total) by mouth at bedtime.  Qty: 90 tablet, Refills: 1    Associated Diagnoses: Mixed hyperlipidemia      !! blood glucose test strips [BLOOD GLUCOSE TEST STRIPS] Use 1 each As Directed 2 (two) times a day. Test blood sugar twice a day  Qty: 200 strip, Refills: 11    Comments: Dispense brand per patient's insurance at pharmacy discretion.  Associated Diagnoses: Type 2 diabetes mellitus treated without insulin (H)      !! blood-glucose meter Misc [BLOOD-GLUCOSE METER MISC] Test blood sugar twice a day  Qty: 1 each, Refills: 0    Comments: Dispense brand per patient's insurance at pharmacy discretion.  Associated Diagnoses: Type 2 diabetes mellitus treated without insulin (H)      generic lancets [GENERIC LANCETS] Use 1 each As Directed 2 (two) times a day. Test blood sugar twice a day  Qty: 200 each, Refills: 11    Comments: Dispense brand per patient's insurance at pharmacy discretion.  Associated Diagnoses: Type 2 diabetes mellitus treated without insulin (H)      insulin glargine (LANTUS SOLOSTAR PEN) 100 unit/mL (3 mL) pen [INSULIN GLARGINE (LANTUS SOLOSTAR PEN) 100 UNIT/ML (3 ML) PEN] Inject 16 Units under the skin at bedtime.  Qty: 15 mL, Refills: 11    Comments: If LANTUS is not covered by insurance, may substitute BASAGLAR at same dose and frequency.    Associated Diagnoses: Type 2 diabetes mellitus treated without insulin (H)      pen needle, diabetic 31 gauge x 1/4\" Ndle [PEN NEEDLE, DIABETIC 31 GAUGE X 1/4\" " NDLE] Use one daily as directed  Qty: 100 each, Refills: 11    Associated Diagnoses: Type 2 diabetes mellitus treated without insulin (H)       !! - Potential duplicate medications found. Please discuss with provider.        Allergies   No Known Allergies

## 2021-08-02 NOTE — PLAN OF CARE
Physical Therapy Discharge Summary    Reason for therapy discharge:    Discharged to acute rehabilitation facility.    Progress towards therapy goal(s). See [facility] on [facility] record for goal details.  Goals partially met.  Barriers to achieving goals:   discharge from facility.    Therapy recommendation(s):    Continued therapy is recommended.  Rationale/Recommendations:  Recommend continued PT at acute rehab facility.    Sonali Tang, PT  8/2/2021

## 2021-08-02 NOTE — PLAN OF CARE
Occupational Therapy Discharge Summary    Reason for therapy discharge:    Discharged to acute rehabilitation facility.    Progress towards therapy goal(s). See goals on Care Plan in [facility] electronic health record for goal details.  Goals partially met.  Barriers to achieving goals:   discharge from facility.    Therapy recommendation(s):    Recommend continued OT at rehab.

## 2021-08-02 NOTE — PLAN OF CARE
Speech Language Therapy Discharge Summary    Reason for therapy discharge:    Discharged to acute rehabilitation facility.    Progress towards therapy goal(s). See goals on Care Plan in [facility] electronic health record for goal details.  Goals partially met.  Barriers to achieving goals:   discharge from facility.    Therapy recommendation(s):    Continued therapy is recommended.  Rationale/Recommendations:  oral dysphagia.

## 2021-08-02 NOTE — H&P
[facility]   Acute Rehabilitation Unit  Admission History and Physical    CHIEF COMPLAINT   Stroke Ischemic 01.2 (R) Body Involvement (L) Brain: L MCA infarct     HISTORY OF PRESENT ILLNESS  Paradise Yuan is a 71 year old Tagolog-speaking right hand dominant female with past medical history of diabetes mellitus type II, hypertension, hyperlipidemia, CKD stage III, left upper lobectomy, memory problems who presented to outside ED on 7/22/21 with a ~2-week history of right-sided weakness, right facial droop, dysarthria, as well as a fall ~10 days prior to admission.  CT head revealed no acute intracranial process, but incidentally showed presumed large partially calcified meningioma in the left side of the posterior fossa.  MRI brain demonstrated recent infarcts of varying age within left MCA territory, acute to early subacute infarcts involving left corona radiata/deep white matter, and 3cm calcified mass within left cerebellum.  MRA head/neck revealed diminished flow within left MCA beyond distal M1 segment favored to represent occlusion or high-grade stenosis.  Patient was not a candidate for thrombolytics due to timing of symptom onset and was transferred to [facility] for ongoing evaluation and management.  Echo demonstrated EF 55-60% and no significant valvular disease.  LDL was 84, patient's PTA statin was increased.  Patient was started on dual antiplatelet therapy with aspirin and Plavix.  Given multifocal infarcts and telemetry without evidence of afib/flutter, neurology recommends 30-day cardiac event monitor as an outpatient, or consideration of implantable loop recorder if that is unrevealing.      Patient's hospital course was complicated by diabetes management, hypertension, atrial tachycardia/SVT on telemetry (PTA atenolol restarted), and symptomatic orthostatic hypotension (improved s/p 1 L IV fluids 7/28).  Additionally, patient was planning to  discharge to ARU on 7/30 when pre-admission COVID test resulted as positive.  Patient remained at hospital for monitoring and to await isolation room, but remains asymptomatic and stable on room air at time of discharge.    During acute hospitalization, patient was seen and evaluated by PT and OT, who collectively recommended that patient would benefit from ongoing therapies in the acute inpatient rehabilitation setting.      In review of the therapy notes, patient currently with moderate dysarthria with right side weakness and oral dysphagia. She requires max assist to don incontinence brief and min assist to complete grooming/hygiene tasks. Patient needs min assist using grab bars and quad cane to complete toilet transfer. Patient was able to ambulate 1x up to 160ft with contact guard assist of 1 and cues for sequencing, quad cane placement, frequent cues to lead with right toes to encourage foot clearance and heel strike but has not been able to duplicate that and is not able to ambulate more than 15ft at this time.   Patient does desat to 88% with activities, recovers with rest and cues for pursed lip breathing as indicated.    Upon arrival to the rehab unit, she reports that she is feeling well.  She denies need for , and states that she has been speaking English since childhood and understands well.  She notes some difficulties with using her right hand since her stroke, and weakness on right side compared to left.  She denies changes with speech, cognition, or swallowing compared to baseline.  She denies dizziness, headaches, visual changes.  She also denies shortness of breath or cough.  States she is sleeping well.    PAST MEDICAL HISTORY   Reviewed and updated in Epic.  Past Medical History:   Diagnosis Date     Hypertension        SURGICAL HISTORY  Reviewed and updated in Epic.  Past Surgical History:   Procedure Laterality Date     IR MISCELLANEOUS PROCEDURE  3/8/2003     IR MISCELLANEOUS  PROCEDURE  3/8/2003     IR MISCELLANEOUS PROCEDURE  3/8/2003     IR MISCELLANEOUS PROCEDURE  3/8/2003     IR MISCELLANEOUS PROCEDURE  3/8/2003     IR MISCELLANEOUS PROCEDURE  3/10/2003     IR MISCELLANEOUS PROCEDURE  3/11/2003     IR MISCELLANEOUS PROCEDURE  3/11/2003     IR MISCELLANEOUS PROCEDURE  3/11/2003     IR MISCELLANEOUS PROCEDURE  3/11/2003     IR MISCELLANEOUS PROCEDURE  3/12/2003     IR MISCELLANEOUS PROCEDURE  3/12/2003     IR MISCELLANEOUS PROCEDURE  3/12/2003     IR MISCELLANEOUS PROCEDURE  3/12/2003     IR MISCELLANEOUS PROCEDURE  3/16/2003     IR MISCELLANEOUS PROCEDURE  3/16/2003     IR MISCELLANEOUS PROCEDURE  3/16/2003     IR MISCELLANEOUS PROCEDURE  3/16/2003     IR MISCELLANEOUS PROCEDURE  3/16/2003     IR MISCELLANEOUS PROCEDURE  3/16/2003     IR MISCELLANEOUS PROCEDURE  3/16/2003     IR MISCELLANEOUS PROCEDURE  3/16/2003     IR SPINAL ANGIOGRAM  3/16/2003     IR SPINAL ANGIOGRAM  3/16/2003     IR SPINAL ANGIOGRAM  3/16/2003     IR SPINAL ANGIOGRAM  3/16/2003     IR SPINAL ANGIOGRAM  3/16/2003     IR SPINAL ANGIOGRAM  3/16/2003     IR SPINAL ANGIOGRAM  3/16/2003     IR SPINAL ANGIOGRAM  3/16/2003     IR THORACIC AORTOGRAM  3/16/2003     IR VISCERAL ANGIOGRAM  3/8/2003     IR VISCERAL ANGIOGRAM  3/8/2003     IR VISCERAL ANGIOGRAM  3/10/2003     IR VISCERAL ANGIOGRAM  3/11/2003       SOCIAL HISTORY  Reviewed and updated in Epic.  Marital Status: single  Living situation: lives with sister and adult nephew in house with >10 stairs (bed/bath on 3rd floor)  Family support: supportive sister and nephew  Vocational History: not working prior to admission  Tobacco use: denies  Alcohol use: denies  Illicit drug use: denies  Social History     Socioeconomic History     Marital status: Single     Spouse name: Not on file     Number of children: Not on file     Years of education: Not on file     Highest education level: Not on file   Occupational History     Not on file   Tobacco Use     Smoking status:  Never Smoker     Smokeless tobacco: Never Used   Substance and Sexual Activity     Alcohol use: No     Drug use: No     Sexual activity: Not on file   Other Topics Concern     Not on file   Social History Narrative     Not on file     Social Determinants of Health     Financial Resource Strain:      Difficulty of Paying Living Expenses:    Food Insecurity:      Worried About Running Out of Food in the Last Year:      Ran Out of Food in the Last Year:    Transportation Needs:      Lack of Transportation (Medical):      Lack of Transportation (Non-Medical):    Physical Activity:      Days of Exercise per Week:      Minutes of Exercise per Session:    Stress:      Feeling of Stress :    Social Connections:      Frequency of Communication with Friends and Family:      Frequency of Social Gatherings with Friends and Family:      Attends [employer] Services:      Active Member of Clubs or Organizations:      Attends Club or Organization Meetings:      Marital Status:    Intimate Partner Violence:      Fear of Current or Ex-Partner:      Emotionally Abused:      Physically Abused:      Sexually Abused:        FAMILY HISTORY  Reviewed and updated in Epic.  Family History   Problem Relation Age of Onset     Breast Cancer No family hx of          PRIOR FUNCTIONAL HISTORY   Pt was independent with all ADLs/IADLs, transfers, mobility and gait.  She was not driving prior to admission.     MEDICATIONS  Scheduled meds  Medications Prior to Admission   Medication Sig Dispense Refill Last Dose     aspirin (ASA) 81 MG EC tablet Take 1 tablet (81 mg) by mouth daily 30 tablet 0      atenolol (TENORMIN) 25 MG tablet Take 1 tablet (25 mg) by mouth At Bedtime 30 tablet 0      atorvastatin (LIPITOR) 20 MG tablet [ATORVASTATIN (LIPITOR) 20 MG TABLET] Take 1 tablet (20 mg total) by mouth at bedtime. 90 tablet 1      blood glucose test strips [BLOOD GLUCOSE TEST STRIPS] Use 1 each As Directed 2 (two) times a day. Test blood sugar twice a day  "200 strip 11      blood-glucose meter Misc [BLOOD-GLUCOSE METER MISC] Test blood sugar twice a day 1 each 0      clopidogrel (PLAVIX) 75 MG tablet Take 1 tablet (75 mg) by mouth daily 30 tablet 0      generic lancets [GENERIC LANCETS] Use 1 each As Directed 2 (two) times a day. Test blood sugar twice a day 200 each 11      insulin aspart (NOVOLOG PEN) 100 UNIT/ML pen Inject 3 Units Subcutaneous 3 times daily (with meals) 2.7 mL 0      insulin glargine (LANTUS SOLOSTAR PEN) 100 unit/mL (3 mL) pen [INSULIN GLARGINE (LANTUS SOLOSTAR PEN) 100 UNIT/ML (3 ML) PEN] Inject 16 Units under the skin at bedtime. 15 mL 11      lisinopril (ZESTRIL) 10 MG tablet Take 1 tablet (10 mg) by mouth daily 30 tablet 0      melatonin 1 MG TABS tablet Take 1 tablet (1 mg) by mouth nightly as needed for sleep 30 tablet 0      pen needle, diabetic 31 gauge x 1/4\" Ndle [PEN NEEDLE, DIABETIC 31 GAUGE X 1/4\" NDLE] Use one daily as directed 100 each 11      senna-docusate (SENOKOT-S/PERICOLACE) 8.6-50 MG tablet Take 1 tablet by mouth 2 times daily as needed for constipation 30 tablet 0        ALLERGIES   No Known Allergies      REVIEW OF SYSTEMS  A 10 point ROS was performed and negative unless otherwise noted in HPI.     PHYSICAL EXAM  VITAL SIGNS:  /62 (BP Location: Left arm)   Pulse 74   Temp 97.6  F (36.4  C) (Oral)   Resp 20   Ht 1.575 m (5' 2\")   Wt 48 kg (105 lb 12.8 oz)   SpO2 100%   BMI 19.35 kg/m    BMI:  Estimated body mass index is 20.12 kg/m  as calculated from the following:    Height as of 7/22/21: 1.575 m (5' 2\").    Weight as of 7/22/21: 49.9 kg (110 lb).     General: NAD, lying comfortably in bed  HEENT: NC/AT, MMM  Pulmonary: non-labored on room air, lungs CTA bilaterally  Cardiovascular: RRR, no murmurs  Abdominal: soft, non-tender, non-distended, bowel sounds present  Extremities: warm, well perfused, no edema in bilateral lower extremities, no tenderness in calves  MSK/neuro:   Mental Status:  Disoriented to " year (2000), able to name state but not city, oriented to general circumstance   Cranial Nerves:   1. 2nd CN: Pupils equal, round, reactive to light and visual fields intact to confrontation.   2. 3rd,4th,6th CN:  EOMI, appropriate pupillary responses  3. 5th CN: facial sensation intact   4. 7th CN: mild right facial droop  5. 8th CN: functional hearing bilaterally  6. 9th, 10th CN: palate elevates symmetrically   7. 11th CN: sternocleidomastoids and trapezii strong   8. 12th CN: tongue slight deviation to right and without fasciculations     Sensory: Normal to light touch in left upper and lower extremities.  Possible left neglect/inattention though sensory testing limited by language/cognition.    Strength: 5/5 in all muscle groups of left upper and lower extremities   SF  EF  EE  WE  G  HF  KE  DF  EHL  PF   R  4- 4- 4- 3 3 4 4 4 4 4    Reflexes: Present and symmetrical    Mcelroy's test: negative bilaterally   Babinski reflex: upgoing on right, downgoing on left   Tone per modified Blayne Scale: 0   Abnormal movements: None   Coordination: unable to perform finger tapping on right, finger to nose slowed and slight dysmetria on left, heel shin slides incoordinated on left   Speech: +dysarthria, naming intact, difficulty with repetition   Cognition: slowed responses, some difficulty following multistep commands   Gait: not tested  Skin: no ecchymoses, rashes, lesions on visible skin      LABS  CBC RESULTS: Recent Labs   Lab Test 08/02/21  0735 07/28/21  0842 07/25/21  1056 07/23/21  0440 07/22/21  0947   WBC 8.0  --   --  10.8 12.4*   RBC 3.53*  --   --  4.59 4.56   HGB 10.2*  --   --  13.0 13.3   HCT 33.1*  --   --  41.4 40.8   MCV 94  --   --  90 90   MCH 28.9  --   --  28.3 29.2   MCHC 30.8*  --   --  31.4* 32.6   RDW 13.5  --   --  13.1 13.1    347 323 332 318     Last Basic Metabolic Panel:  Recent Labs   Lab Test 08/02/21  0934 08/02/21  0735 08/01/21  2220 07/29/21  0942 07/28/21  0842   NA  --   142  --  144 140   POTASSIUM  --  4.3  --  4.6 4.5   CHLORIDE  --  106  --  110* 104   CO2  --  29  --  27 27   ANIONGAP  --  7  --  7 9    111 205* 140* 187*   BUN  --  17  --  32* 49*   CR  --  0.90  --  0.97 1.03   GFRESTIMATED  --  64  --  59* 54*   CHELA  --  9.3  --  9.0 9.3     Recent Labs   Lab 08/02/21  0934 08/02/21  0735 08/01/21  2220 08/01/21 2026 08/01/21  1658 08/01/21  1311    111 205* 79 130* 120     Hemoglobin A1C   Date Value Ref Range Status   07/22/2021 10.0 (H) 0.0 - 5.6 % Final     Comment:     Normal <5.7%   Prediabetes 5.7-6.4%    Diabetes 6.5% or higher     Note: Adopted from ADA consensus guidelines.     Cholesterol   Date Value Ref Range Status   07/22/2021 141 <=199 mg/dL Final   04/20/2021 156 <=199 mg/dL Final     Direct Measure HDL   Date Value Ref Range Status   07/22/2021 34 (L) >=50 mg/dL Final     Comment:     HDL Cholesterol Reference Range:     0-2 years:   No reference ranges established for patients under 2 years old  at [facility] for lipid analytes.    2-8 years:  Greater than 45 mg/dL     18 years and older:   Female: Greater than or equal to 50 mg/dL   Male:   Greater than or equal to 40 mg/dL   04/20/2021 50 >=50 mg/dL Final     LDL Cholesterol Calculated   Date Value Ref Range Status   07/22/2021 84 <=129 mg/dL Final   04/20/2021 77 <=129 mg/dL Final     Triglycerides   Date Value Ref Range Status   07/22/2021 117 <=149 mg/dL Final   04/20/2021 147 <=149 mg/dL Final     No results found for: CHOLHDLRATIO    CT HEAD W/O CONTRAST: 7/22/2021 9:26 AM  IMPRESSION:  1.  Presumed large partially calcified meningioma in the left side of the posterior fossa measuring 3.4 x 3.0 cm.  2.  No CT evidence for acute intracranial process.  3.  Brain atrophy and presumed chronic microvascular ischemic changes as above.    CT CERVICAL SPINE W/O CONTRAST  7/22/2021 9:25 AM  IMPRESSION:  1.  No fracture or posttraumatic subluxation.  2.  No high-grade spinal canal  or neural foraminal stenosis.    HEAD MRI WITHOUT AND WITH IV CONTRAST  HEAD MRA WITHOUT IV CONTRAST   NECK MRA WITHOUT AND WITH IV CONTRAST   7/22/2021 12:16 PM    CONCLUSION:  HEAD MRI:  1.  There are recent infarcts of varying age within the left middle cerebral artery territory affecting the deep frontal white matter and cortex at the frontoparietal junction.  2.  Infarcts involving the left corona radiata/deep white matter and to lesser extent frontoparietal cortex appear to be acute to early subacute in age.  3.  Infarct affecting the left insula and to lesser extent frontoparietal cortex appear to represent slightly older subacute infarcts.  4.  No acute hemorrhage.  5.  A 3 cm calcified mass within the left cerebellum could represent a meningioma or cavernoma. It demonstrates at most minimal associated enhancement. No adjacent edema.  HEAD MRA:  1.  Diminished flow within the left middle cerebral artery beyond the distal M1 segment favored to reflect occlusion or high-grade stenosis of the superior/anterior division and slowed flow within the posterior/inferior division.  2.  Otherwise patent intracranial circulation.  NECK MRA:  1.  No evidence for dissection or hemodynamically significant narrowing in the neck based on NASCET criteria.    XR CHEST PORT 1 VIEW   7/30/2021 5:25 PM  IMPRESSION:   Clips around the left hilum and aortic arch with opaque left apex likely related to previous left upper lobectomy. Related elevation of the left hemidiaphragm. Probable bronchiectasis of left lower lobe airways.  Cardiac silhouette is enlarged. Moderate atheromatous calcifications of the aortic arch and descending aorta.  Right upper lobe volume loss is also present with high position of the minor fissure. Well-circumscribed somewhat rounded lucencies around the right hilar structures could relate to bulla. Linear foci of subpleural scarring are present in both   paradiaphragmatic lungs.  Complex exam with a number  of chronic features present which makes detection of superimposed acute process more challenging. Comparison with prior chest radiographs would be helpful.    IMPRESSION/PLAN:  Paradise Yuan is a 71 year old right hand dominant female with a past medical history of diabetes mellitus type II, hypertension, hyperlipidemia, CKD stage III, let upper lobectomy, and memory problems who was admitted on 7/22 with subacute left MCA infarction with ongoing right hemiparesis, impaired balance, impaired coordination, right-sided facial weakness, dysarthria, and dysphagia, with hospital course complicated by hypertension, hyperglycemia, anemia, atrial tachycardia, asymptomatic COVID-19 infection, orthostatic hypotension, and incidental finding of left cerebellar meningioma.  She is admitted to acute rehab on 8/2 for multidisciplinary rehabilitation and ongoing medical management.      Admission to acute inpatient rehab 08/02/21.    Impairment group code: Stroke Ischemic 01.2 (R) Body Involvement (L) Brain: L MCA infarct      1. PT, OT and SLP 60 minutes of each on a daily basis, in addition to rehab nursing and close management of physiatrist.      2. Impairment of ADL's: Noted to have impaired ROM, impaired strength, impaired activity tolerance, impaired balance, impaired weight shifting, all affecting her ability to safely and independently perform basic ADLs.  Goal for mod I with basic ADLs, with exception of bathing and tub/shower transfers, for which we expect patient to require standby to contact guard assist.    3. Impairment of mobility:  Noted to have impaired ROM, impaired strength, impaired activity tolerance, impaired balance, impaired weight shifting, all affecting her ability to safely and independently perform basic mobility.  Goal for mod I with basic mobility and standby to contact guard assist with stairs.    4. Impairment of cognition/language/swallow:  Noted to have dysphagia, dysarthria, and aphasia with  goals for improved cognition/communication to meet basic needs, as well as safe tolerance of least restrictive diet.    5. Medical Conditions  Subacute left MCA infarction  Presented with 2-week history of right sided facial droop, partial right hemiparesis, partial expressive aphasia.  MRI of head shows recent infarcts of varying age within the left MCA territory affecting the deep frontal white matter and cortex at the frontoparietal junction.   MRA head shows diminished flow within the left MCA beyond the distal M1 segment, normal MRA of the neck.  - LDL 84, goal <70.  Continue Lipitor 40 mg (increased from PTA 20 mg this admission).  - SBP goal <160, management as below  - Continue dual antiplatelet therapy with ASA 81 mg and Plavix 75 mg daily.  Duration not specified, based on CHANCE and POINT trials, will plan on x3 weeks  - Given multifocal infarcts, neurology recommends outpatient 30-day event monitor.  If unrevealing, consider implantable loop recorder.  - Continue PT/OT/SLP  - Follow up with stroke neurology in 1 month    Asymptomatic COVID-19 infection  Negative on admission 7/22.  Repeated on 7/30 prior to planned ARU discharge and resulted positive.  Chest x-ray-no acute findings number different to differentiate given background of s/p left upper lobectomy, bronchiectases.  Precautions implemented.  No respiratory symptoms, stable on room air.  - Monitor respiratory symptoms closely, supplemental oxygen by NC PRN  - Special precautions     Meningioma, left posterior fossa  Revealed on imaging this admission.  No intervention needed this admission.  - Follow up with neurosurgery as outpatient     Diabetes mellitus, type II, uncontrolled  Had previously been on metformin, discontinued due to abnormal kidney function.  Had most recently been on Lantus 16 units daily.  Given A1c 10.0 on 7/22, question whether taking insulin as prescribed at home.  Blood glucose well-managed this admisison with Lantus 16  units daily, mealtime Novolog (added 7/30), and sliding scale insulin.  - Continue Lantus 16 units at bedtime  - Continue Aspart 3 units TID with meals  - Continue medium intensity sliding scale insulin  - Hypoglycemia protocol  - Monitor blood glucose QID AC & HS, adjust insulin regimen as indicated     Essential hypertension  Initially held home lisinopril and atenolol for permissive hypertension.  Per neurology, given subacute stroke and symptoms for 2 weeks, goal to keep SBP <160. Resumed home antihypertensive regimen and BP better controlled.  - Continue atenolol 25 mg daily  - Continue lisinopril 10 mg BID  - Monitor BP and adjust meds as indicated    CKD stage III  - Monitor BMP     Hyperlipidemia  PTA on Lipitor 20 mg.  LDL 84 this admission, increased statin dose.  - Continue Lipitor 40 mg daily    Normocytic anemia  Stable in 13s at admission, most recent Hgb 10.2 on 8/2.  - Trend CBC    Oral dysphagia  - Continue SLP  - Continue soft and bite-sized diet (IDDSI 6) and thin liquids      H/o left upper lobectomy    Memory problems  Per family, has had progressive memory issues for quite some time.  PCP had referred to neurology/memory clinic for further evaluation and management.    6. Adjustment to disability:  Clinical psychology to eval and treat if indicated  7. FEN: soft and bite-sized diet (IDDSI 6) and thin liquids (IDDSI 0)  8. Bowel: intermittent incontinence, monitor, PRN bowel meds available  9. Bladder: continent, monitor PVRs at admission and ISC as indicated  10. DVT Prophylaxis: Lovenox  11. GI Prophylaxis: Protonix  12. Code: full code  13. Disposition: goal for home  14. ELOS:  14 days  15. Rehab prognosis:  good  16. Follow up Appointments on Discharge: PCP, stroke neurology (1 month), neurosurgery, PM&R        Patient discussed with Dr. Rogelio Cota, PM&R staff physician     Pari Lazo PA-C  Physical Medicine & Rehabilitation

## 2021-08-02 NOTE — PROGRESS NOTES
Care Management Discharge Note    Discharge Date: 08/02/2021  Expected Time of Departure: 1200    Discharge Disposition: Acute Rehab    Discharge Services: None    Discharge DME: None    Discharge Transportation: other (see comments) ([facility] stretcher d/t COVID+)    Private pay costs discussed: Not applicable    PAS Confirmation Code:  N/A  Patient/family educated on Medicare website which has current facility and service quality ratings:  N/A    Education Provided on the Discharge Plan:    Persons Notified of Discharge Plans: [facility], [facility]  Patient/Family in Agreement with the Plan: yes    Handoff Referral Completed: Yes    Additional Information:  Per notes, at baseline, the patient resides with her sister, Jade, and is independent with ADLs, except transport. The patient has been accepted at [facility] for discharge, and will be transported there today, 8/2, via [facility] stretcher services. The patient needs stretcher because she is unable to take a cab due to lack of mobility, and she is COVID+. Of note, the patient will not need a Holter monitor until she is discharged from acute rehab. PCS complete.     Edison Alonzo RN

## 2021-08-03 ENCOUNTER — APPOINTMENT (OUTPATIENT)
Dept: OCCUPATIONAL THERAPY | Facility: CLINIC | Age: 71
DRG: 057 | End: 2021-08-03
Attending: PHYSICAL MEDICINE & REHABILITATION
Payer: COMMERCIAL

## 2021-08-03 ENCOUNTER — APPOINTMENT (OUTPATIENT)
Dept: PHYSICAL THERAPY | Facility: CLINIC | Age: 71
DRG: 057 | End: 2021-08-03
Attending: PHYSICAL MEDICINE & REHABILITATION
Payer: COMMERCIAL

## 2021-08-03 ENCOUNTER — APPOINTMENT (OUTPATIENT)
Dept: SPEECH THERAPY | Facility: CLINIC | Age: 71
DRG: 057 | End: 2021-08-03
Attending: PHYSICIAN ASSISTANT
Payer: COMMERCIAL

## 2021-08-03 LAB
ANION GAP SERPL CALCULATED.3IONS-SCNC: 2 MMOL/L (ref 3–14)
BUN SERPL-MCNC: 23 MG/DL (ref 7–30)
CALCIUM SERPL-MCNC: 8.7 MG/DL (ref 8.5–10.1)
CHLORIDE BLD-SCNC: 108 MMOL/L (ref 94–109)
CO2 SERPL-SCNC: 29 MMOL/L (ref 20–32)
CREAT SERPL-MCNC: 1.26 MG/DL (ref 0.52–1.04)
ERYTHROCYTE [DISTWIDTH] IN BLOOD BY AUTOMATED COUNT: 13.4 % (ref 10–15)
GFR SERPL CREATININE-BSD FRML MDRD: 43 ML/MIN/1.73M2
GLUCOSE BLD-MCNC: 116 MG/DL (ref 70–99)
GLUCOSE BLDC GLUCOMTR-MCNC: 103 MG/DL (ref 70–99)
GLUCOSE BLDC GLUCOMTR-MCNC: 105 MG/DL (ref 70–99)
GLUCOSE BLDC GLUCOMTR-MCNC: 150 MG/DL (ref 70–99)
GLUCOSE BLDC GLUCOMTR-MCNC: 160 MG/DL (ref 70–99)
GLUCOSE BLDC GLUCOMTR-MCNC: 181 MG/DL (ref 70–99)
HCT VFR BLD AUTO: 32.7 % (ref 35–47)
HGB BLD-MCNC: 10.1 G/DL (ref 11.7–15.7)
MCH RBC QN AUTO: 28.7 PG (ref 26.5–33)
MCHC RBC AUTO-ENTMCNC: 30.9 G/DL (ref 31.5–36.5)
MCV RBC AUTO: 93 FL (ref 78–100)
PLATELET # BLD AUTO: 419 10E3/UL (ref 150–450)
POTASSIUM BLD-SCNC: 4.6 MMOL/L (ref 3.4–5.3)
RBC # BLD AUTO: 3.52 10E6/UL (ref 3.8–5.2)
SARS-COV-2 RNA RESP QL NAA+PROBE: NEGATIVE
SODIUM SERPL-SCNC: 139 MMOL/L (ref 133–144)
WBC # BLD AUTO: 8.9 10E3/UL (ref 4–11)

## 2021-08-03 PROCEDURE — 85027 COMPLETE CBC AUTOMATED: CPT | Performed by: PHYSICIAN ASSISTANT

## 2021-08-03 PROCEDURE — 128N000003 HC R&B REHAB

## 2021-08-03 PROCEDURE — 87635 SARS-COV-2 COVID-19 AMP PRB: CPT | Performed by: INTERNAL MEDICINE

## 2021-08-03 PROCEDURE — 92523 SPEECH SOUND LANG COMPREHEN: CPT | Mod: GN

## 2021-08-03 PROCEDURE — 258N000003 HC RX IP 258 OP 636: Performed by: PHYSICIAN ASSISTANT

## 2021-08-03 PROCEDURE — 97535 SELF CARE MNGMENT TRAINING: CPT | Mod: GO | Performed by: OCCUPATIONAL THERAPIST

## 2021-08-03 PROCEDURE — 250N000013 HC RX MED GY IP 250 OP 250 PS 637: Performed by: PHYSICIAN ASSISTANT

## 2021-08-03 PROCEDURE — 80048 BASIC METABOLIC PNL TOTAL CA: CPT | Performed by: PHYSICIAN ASSISTANT

## 2021-08-03 PROCEDURE — 36415 COLL VENOUS BLD VENIPUNCTURE: CPT | Performed by: PHYSICIAN ASSISTANT

## 2021-08-03 PROCEDURE — 92610 EVALUATE SWALLOWING FUNCTION: CPT | Mod: GN

## 2021-08-03 PROCEDURE — 99232 SBSQ HOSP IP/OBS MODERATE 35: CPT | Performed by: PHYSICIAN ASSISTANT

## 2021-08-03 PROCEDURE — 250N000011 HC RX IP 250 OP 636: Performed by: PHYSICIAN ASSISTANT

## 2021-08-03 PROCEDURE — 97167 OT EVAL HIGH COMPLEX 60 MIN: CPT | Mod: GO | Performed by: OCCUPATIONAL THERAPIST

## 2021-08-03 PROCEDURE — 97162 PT EVAL MOD COMPLEX 30 MIN: CPT | Mod: GP | Performed by: PHYSICAL THERAPIST

## 2021-08-03 PROCEDURE — 97530 THERAPEUTIC ACTIVITIES: CPT | Mod: GP | Performed by: PHYSICAL THERAPIST

## 2021-08-03 RX ORDER — HEPARIN SODIUM 5000 [USP'U]/.5ML
5000 INJECTION, SOLUTION INTRAVENOUS; SUBCUTANEOUS EVERY 12 HOURS
Status: DISCONTINUED | OUTPATIENT
Start: 2021-08-03 | End: 2021-08-20 | Stop reason: HOSPADM

## 2021-08-03 RX ADMIN — SODIUM CHLORIDE 1000 ML: 9 INJECTION, SOLUTION INTRAVENOUS at 09:35

## 2021-08-03 RX ADMIN — INSULIN GLARGINE 16 UNITS: 100 INJECTION, SOLUTION SUBCUTANEOUS at 21:33

## 2021-08-03 RX ADMIN — INSULIN ASPART 3 UNITS: 100 INJECTION, SOLUTION INTRAVENOUS; SUBCUTANEOUS at 17:36

## 2021-08-03 RX ADMIN — PANTOPRAZOLE SODIUM 40 MG: 40 TABLET, DELAYED RELEASE ORAL at 06:31

## 2021-08-03 RX ADMIN — CLOPIDOGREL BISULFATE 75 MG: 75 TABLET, FILM COATED ORAL at 08:43

## 2021-08-03 RX ADMIN — ASPIRIN 81 MG: 81 TABLET, COATED ORAL at 08:43

## 2021-08-03 RX ADMIN — HEPARIN SODIUM 5000 UNITS: 5000 INJECTION, SOLUTION INTRAVENOUS; SUBCUTANEOUS at 21:33

## 2021-08-03 RX ADMIN — ATORVASTATIN CALCIUM 40 MG: 40 TABLET, FILM COATED ORAL at 21:33

## 2021-08-03 RX ADMIN — INSULIN ASPART 3 UNITS: 100 INJECTION, SOLUTION INTRAVENOUS; SUBCUTANEOUS at 08:43

## 2021-08-03 RX ADMIN — INSULIN ASPART 3 UNITS: 100 INJECTION, SOLUTION INTRAVENOUS; SUBCUTANEOUS at 12:10

## 2021-08-03 NOTE — PROGRESS NOTES
08/03/21 1527   General Information   Onset of Illness/Injury or Date of Surgery 08/02/21   Referring Physician Pari Lazo PA-C   Patient/Family Therapy Goal Statement (SLP) None stated   Pertinent History of Current Problem Paradise Yuan is a 71 year old right hand dominant female with a past medical history of diabetes mellitus type II, hypertension, hyperlipidemia, CKD stage III, let upper lobectomy, and memory problems who was admitted on 7/22 with subacute left MCA infarction with ongoing right hemiparesis, impaired balance, impaired coordination, right-sided facial weakness, dysarthria, and dysphagia, with hospital course complicated by hypertension, hyperglycemia, anemia, atrial tachycardia, asymptomatic COVID-19 infection, orthostatic hypotension, and incidental finding of left cerebellar meningioma.  She is admitted to acute rehab on 8/2 for multidisciplinary rehabilitation and ongoing medical management.   General Observations Pt was evaluated by SLP at previous facility with recommendations for thin liquids and soft and bite sized solids.    Past History of Dysphagia None apparent    Type of Evaluation   Type of Evaluation Swallow Evaluation   Oral Motor   Oral Musculature anomalies present   Structural Abnormalities none present   Mucosal Quality adequate   Dentition (Oral Motor)   Dentition (Oral Motor) some missing teeth   Facial Symmetry (Oral Motor)   Facial Symmetry (Oral Motor) right side impairment   Left Side Facial Asymmetry minimal impairment;moderate impairment   Lip Function (Oral Motor)   Lip Range of Motion (Oral Motor) protrusion impairment;retraction impairment   Tongue Function (Oral Motor)   Tongue ROM (Oral Motor) protrusion is impaired;retraction is impaired;lateralization is impaired  (right deviation )   Vocal Quality/Secretion Management (Oral Motor)   Vocal Quality (Oral Motor) WFL   Secretion Management (Oral Motor) WNL   General Swallowing Observations   Current  Diet/Method of Nutritional Intake (General Swallowing Observations, NIS) soft & bite-sized (level 6);thin liquids (level 0)   Swallowing Evaluation Clinical swallow evaluation   Respiratory Support (General Swallowing Observations) none   Clinical Swallow Evaluation   Feeding Assistance set up only required   Clinical Swallow Evaluation Textures Trialed thin liquids;pureed;solid foods   Clinical Swallow Eval: Thin Liquid Texture Trial   Mode of Presentation, Thin Liquids straw;self-fed   Volume of Liquid or Food Presented 2 oz   Oral Phase of Swallow WFL   Pharyngeal Phase of Swallow intact   Diagnostic Statement No overt s/sx of aspiration, no changes in breath sounds or vocal quality    Clinical Swallow Evaluation: Puree Solid Texture Trial   Mode of Presentation, Puree spoon;self-fed   Volume of Puree Presented 2oz    Oral Phase, Puree WFL   Pharyngeal Phase, Puree intact   Diagnostic Statement Adequate oral manipulation and clearance, no overt s/sx of aspiration    Clinical Swallow Evaluation: Solid Food Texture Trial   Mode of Presentation self-fed   Volume Presented 1 cracker    Oral Phase poor AP movement;residue in oral cavity   Oral Residue left anterior lateral sulci;right anterior lateral sulci   Pharyngeal Phase intact   Diagnostic Statement Prolonged and disorganized mastication and bolus formation. Mild-moderate amounts of oral residue. no overt s/sx of aspiration. Pt denied any difficulty    Esophageal Phase of Swallow   Patient reports or presents with symptoms of esophageal dysphagia No   Swallowing Recommendations   Diet Consistency Recommendations thin liquids (level 0);soft & bite-sized (level 6)   Supervision Level for Intake distant supervision needed   Mode of Delivery Recommendations bolus size, small;slow rate of intake   Swallowing Maneuver Recommendations alternate food and liquid intake;extra swallow   Monitoring/Assistance Required (Eating/Swallowing) check mouth frequently for oral  residue/pocketing;cue for finger/lingual sweep if oral pocketing present;stop eating activities when fatigue is present;monitor for cough or change in vocal quality with intake   Recommended Feeding/Eating Techniques (Swallow Eval) maintain upright sitting position for eating;maintain upright posture during/after eating for 30 minutes;minimize distractions during oral intake   Medication Administration Recommendations, Swallowing (SLP) As per pt preference    Instrumental Assessment Recommendations instrumental evaluation not recommended at this time   General Therapy Interventions   Planned Therapy Interventions Dysphagia Treatment   Dysphagia treatment Oropharyngeal exercise training;Modified diet education;Instruction of safe swallow strategies   SLP Therapy Assessment/Plan   Criteria for Skilled Therapeutic Interventions Met (SLP Eval) yes;treatment indicated   SLP Diagnosis Mild oropharyngeal dysphagia    Rehab Potential (SLP Eval) good, to achieve stated therapy goals   Therapy Frequency (SLP Eval) daily   Predicted Duration of Therapy Intervention (SLP Eval) 4 weeks   Comment, Therapy Assessment/Plan (SLP) Pt seen for clinical swallow evaluation. Reduced right sided strength and ROM, incoordination noted, mild lingual deviation to the right. No overt s/sx of aspiration with thin liquids via straw sip, no changes in breath sounds or vocal quality. Pt also tolerated 2 oz of puree and 1 cracker without overt s/sx of aspiration. However, mastication and bolus formation with the cracker was prolonged and disorganized deeming soft solids most appropriate. Mild oropharyngeal dysphagia secondary to right sided lingual and labial weakness and incoordination. Recommend soft and bite sized diet (6) and thin liquids with upright position, slow rate, alternate solids and liquids, and small bites/sips. Initiate SLP services for dysphagia.    Therapy Plan Review/Discharge Plan (SLP)   Therapy Plan Review (SLP)  evaluation/treatment results reviewed;care plan/treatment goals reviewed;risks/benefits reviewed;participants voiced agreement with care plan;participants included;patient   SLP Discharge Planning    SLP Discharge Recommendation (DC Rec) home with assist;home with outpatient speech therapy   SLP Rationale for DC Rec Dysphagia, dysarthria   SLP Brief overview of current status  Mild oropharyngeal dysphagia secondary to right sided lingual and labial weakness and incoordination. Recommend soft and bite sized diet (6) and thin liquids with upright position, slow rate, alternate solids and liquids, and small bites/sips. Initiate SLP services for dysphagia.     Total Evaluation Time   Total Evaluation Time (Minutes) 20

## 2021-08-03 NOTE — PHARMACY-MEDICATION REGIMEN REVIEW
Pharmacy Medication Regimen Review  Paradise Yuan is a 71 year old female who is currently in the Acute Rehab Unit.    Assessment: Upon review of the medications and patient chart the following irregularities were found:     Medications without appropriate indications/durations:   -DAPT: Agree with 3 weeks of DAPT treatment for left MCA infarction. Consider confirming ongoing anti-platelet medication plan with Neurology after initial 21-day period. Per admission mediation history, appears patient was ASA naive.     Medications that need other dose adjustment  -Lisinopril: Agree with holding given WILMA  -All other medications appropriate given current renal function.     Significant Drug Interactions:   -ASA + Clopidogrel: Increased risk of bleeding     Plan:   1. Recommend confirmation of ongoing anti-platelet plan with Neurology after 3 weeks of DAPT. Once defined, consider adding stop-dates as appropriate to current DAPT therapy.   2. Agree with holding lisinopril given WILMA.   3. Continue other therapies as ordered.     Attending provider will be sent this note for review.  If there are any emergent issues noted above, pharmacist will contact provider directly by phone.      Pharmacy will periodically review the resident's medication regimen for any PRN medications not administered in > 72 hours and discontinue them. The pharmacist will discuss gradual dose reductions of psychopharmacologic medications with interdisciplinary team on a regular basis.    Please contact pharmacy if the above does not answer specific medication questions/concerns.    Background:  A pharmacist has reviewed all medications and pertinent medical history today.  Medications were reviewed for appropriate use and any irregularities found are listed with recommendations.      Current Facility-Administered Medications:      0.9% sodium chloride BOLUS, 1,000 mL, Intravenous, Once, Pari Lazo PA-C, Last Rate: 333.3 mL/hr at 08/03/21  0935, 1,000 mL at 08/03/21 0935     acetaminophen (TYLENOL) tablet 325-650 mg, 325-650 mg, Oral, Q6H PRN, Pari Lazo PA-C     aspirin EC tablet 81 mg, 81 mg, Oral, Daily, Pari Lazo PA-C, 81 mg at 08/03/21 0843     atenolol (TENORMIN) tablet 25 mg, 25 mg, Oral, At Bedtime, Pari Lazo PA-C     atorvastatin (LIPITOR) tablet 40 mg, 40 mg, Oral, At Bedtime, Pari Lazo PA-C, 40 mg at 08/02/21 2155     clopidogrel (PLAVIX) tablet 75 mg, 75 mg, Oral, Daily, Pari Lazo PA-C, 75 mg at 08/03/21 0843     glucose gel 15-30 g, 15-30 g, Oral, Q15 Min PRN **OR** dextrose 50 % injection 25-50 mL, 25-50 mL, Intravenous, Q15 Min PRN **OR** glucagon injection 1 mg, 1 mg, Subcutaneous, Q15 Min PRN, Pari Lazo PA-C     heparin ANTICOAGULANT injection 5,000 Units, 5,000 Units, Subcutaneous, Q12H, Pari Lazo PA-C     insulin aspart (NovoLOG) injection (RAPID ACTING), 3 Units, Subcutaneous, TID w/meals, Pari Lazo PA-C, 3 Units at 08/03/21 0843     insulin aspart (NovoLOG) injection (RAPID ACTING), 1-7 Units, Subcutaneous, TID AC, Pari Lazo PA-C     insulin aspart (NovoLOG) injection (RAPID ACTING), 1-5 Units, Subcutaneous, At Bedtime, Pari Lazo PA-C     insulin glargine (LANTUS PEN) injection 16 Units, 16 Units, Subcutaneous, At Bedtime, Pari Lazo PA-C, 16 Units at 08/02/21 2155     [Held by provider] lisinopril (ZESTRIL) tablet 10 mg, 10 mg, Oral, BID, Pari Lazo PA-C     melatonin tablet 1 mg, 1 mg, Oral, At Bedtime PRN, Pari Lazo PA-C     pantoprazole (PROTONIX) EC tablet 40 mg, 40 mg, Oral, QAM AC, Pari Lazo PA-C, 40 mg at 08/03/21 0631     polyethylene glycol (MIRALAX) Packet 17 g, 17 g, Oral, Daily PRN, Pari Lazo PA-C     senna-docusate (SENOKOT-S/PERICOLACE) 8.6-50 MG per tablet 1-4 tablet, 1-4 tablet, Oral, BID PRN, Pari Lazo PA-C  No current outpatient prescriptions  on file.   PMH: diabetes, hypertension, hyperlipidemia     Omkar Castro, TiffanieD

## 2021-08-03 NOTE — PROGRESS NOTES
Infectious Diseases Note.   Please note that this is NOT an official consult.     ID is evaluating the patient for potential use of monoclonal AB therapy given the recent positive test for COVID-19 on 7/30/2021 and the patient being asymptomatic.   The patient has been hospitalized since 7/22/2021 with initial negative COVID-19 test, the differential diagnosis is either a false positive test or a true exposure while inpatient.   We should repeat the test to confirm whether it's false positive or whether it's truly a positive test.     Further recommendations to follow depending on the test results.     Stacey Fong MD   8/3/2021.

## 2021-08-03 NOTE — PLAN OF CARE
Discharge Planner Post-Acute Rehab SLP:     Discharge Plan: Home with sister and adult nephew. Home care vs OP PT pending progress. Will need to confirm home set-up with sister.    Precautions: Fall, cognition, language deficits, swallowing    Current Status:  Communication:Mild dysarthria. Mild-mod receptive and expressive language tasks  Cognition: Memory deficits noted at baseline per family report. Not formally assessed.  Swallow: Recommend soft and bite sized diet (6) and thin liquids with upright position, slow rate, alternate solids and liquids, and small bites/sips.     Assessment: Speech/language and swallowing evaluations completed. Initiate SLP services for mild oropharyngeal dysphagia, mid dysarthria and mild-mod language deficits.     Other Barriers to Discharge (Family Training, etc): None anticipated from SLP

## 2021-08-03 NOTE — PROGRESS NOTES
Infectious Diseases Note.   Please note that this is NOT an official consult.     ID is evaluating the patient for potential use of monoclonal AB therapy given the recent positive test for COVID-19 on 7/30/2021 and the patient being asymptomatic.   The patient has been hospitalized since 7/22/2021 with initial negative COVID-19 test.     The positive test on 7/30/2021 is probably a false positive test given the lack of symptoms, patient has been hospitalized since 7/22/2021 with negative COVID-19 test on admission, on room air and unremarkable CXR for acute process in addition to repeat test which was negative for COVID-19 with undetected SARS-CoV-2 by PCR.     No indications for monoclonal AB therapy.   I would discontinue COVID-19 special precautions.       Stacey Fong MD   8/3/2021.

## 2021-08-03 NOTE — PROGRESS NOTES
08/03/21 1600   General Information   Onset of Illness/Injury or Date of Surgery 08/02/21   Referring Physician Pari Lazo PA-C   Patient/Family Therapy Goal Statement (SLP) None stated   Pertinent History of Current Problem Paradise Yuan is a 71 year old right hand dominant female with a past medical history of diabetes mellitus type II, hypertension, hyperlipidemia, CKD stage III, let upper lobectomy, and memory problems who was admitted on 7/22 with subacute left MCA infarction with ongoing right hemiparesis, impaired balance, impaired coordination, right-sided facial weakness, dysarthria, and dysphagia, with hospital course complicated by hypertension, hyperglycemia, anemia, atrial tachycardia, asymptomatic COVID-19 infection, orthostatic hypotension, and incidental finding of left cerebellar meningioma.  She is admitted to acute rehab on 8/2 for multidisciplinary rehabilitation and ongoing medical management.   General Observations Pt seen for speech/language evaluation. Of note, family identified memory deficits at baseline    Type of Evaluation   Type of Evaluation Speech, Language, Cognition   Speech   Comment, Motor Speech Assessment Mild dysarthria    Western Aphasia Battery- Revised Bedside Record From   Spontaneous Speech Content Score (out of 10) 4  (Pt appeared to provide a [facility] address)   Spontaneous Speech Fluency Score (out of 10) 9   Auditory Verbal Comprehension Score (out of 10) 10   Sequential Commands Score (out of 10) 4   Repetition Score (out of 10) 10   Object Naming Score (out of 10) 10   Bedside Aphasia Sum 47   WAB-R Bedside Aphasia Score 78.33   Bedside Aphasia Classification Anomic Aphasia   Aphasia Severity Level Mild Aphasia   Comments Mild dysarthria, mild comprehensive, and mild expressive language deficits    Reading Comprehension   Comment, Assessment (Reading Comprehension) Attempted informal packet for letter/word matching. Unclear if pt had difficulty  understanding the task or if her right neglect was playing a large role but this task was very difficult fo rher.    Written Language   Comment, Assessment (Written Language) Right handed, attempts to use but has difficulty    General Therapy Interventions   Planned Therapy Interventions Language   SLP Therapy Assessment/Plan   Criteria for Skilled Therapeutic Interventions Met (SLP Eval) yes;treatment indicated   SLP Diagnosis Mild dysarthria, mild deficits in language comprehension and expression.    Rehab Potential (SLP Eval) good, to achieve stated therapy goals   Therapy Frequency (SLP Eval) daily   Predicted Duration of Therapy Intervention (SLP Eval) 4 weeks   Comment, Therapy Assessment/Plan (SLP) Language evaluation completed per MD order. Administered the Bedside Western Aphasia Battery - Revised (WAB-R); pt scored 78.33/100, indicating mild anonmic aphasia. Expressive language characterized by occasional word finding and difficultes with comprehension. Suspect her right neglect may have also played a role in the sequential command portion of the assessment. Mild dysarthria. Recommend ongoing ST targeting dysarthria strategies and moderate level comprehension and verbal expression tasks. SLP will follow.   Therapy Plan Review/Discharge Plan (SLP)   Therapy Plan Review (SLP) evaluation/treatment results reviewed;care plan/treatment goals reviewed;participants voiced agreement with care plan;participants included   SLP Discharge Planning    SLP Discharge Recommendation (DC Rec) home with assist;home with outpatient speech therapy   SLP Rationale for DC Rec Dysarthria, language comprehension and expression deficits   SLP Brief overview of current status  Mild anonmic aphasia. Has some baseline memory deficits. Emelia is second language but she has been speaking English for many many years     Total Evaluation Time   Total Evaluation Time (Minutes) 40

## 2021-08-03 NOTE — PROGRESS NOTES
CLINICAL NUTRITION SERVICES - ASSESSMENT NOTE     Nutrition Prescription    RECOMMENDATIONS FOR MDs/PROVIDERS TO ORDER:  None today    Malnutrition Status:    Unable to determine    Recommendations already ordered by Registered Dietitian (RD):  Supplements PRN    Future/Additional Recommendations:  Monitor intakes/wt trends  Consider scheduled supplements should intakes decline  Complete NFPA     REASON FOR ASSESSMENT  Paradise Yuan is a 71 year old female assessed by the dietitian for MST score of 3: positive  for weight loss of unsure and positive  for poor oral intake related to decreased appetite    PMH significant for diabetes mellitus type II, hypertension, hyperlipidemia, CKD stage III, let upper lobectomy, and memory problems who was admitted on 7/22 with subacute left MCA infarction with ongoing right hemiparesis, impaired balance, impaired coordination, right-sided facial weakness, dysarthria, and dysphagia  NUTRITION HISTORY  Information obtained from chart review (initially on COVID precautions, did not answer the phone x2, with SLP after precautions removed). Per 7/29 RD note Spoke with patient at bedside this morning who notes that her appetite is good at baseline. Eats 3 meals/day. Lives in a home with her sister - they share cooking/grocery shopping responsibilities.    Intakes of meals were varied from 0-100% while at [facility]. She was typically orderign 3 meals/day. Reported a good appetite when assessed by RD. Declined supplements while at [facility].   CURRENT NUTRITION ORDERS  Diet: soft and bite sized (level 6)  Intake/Tolerance: Pt has consumed % of meals and ordered 1566kcal/52g protein per day since admit to [facility] which is likely meeting minimum estimated needs. Assessed by SLP today who recommended continued soft and bite sized diet with thin liquids     LABS  Labs reviewed:   Ref. Range 8/3/2021 07:13   Sodium Latest Ref Range: 133 - 144 mmol/L 139   Potassium Latest Ref Range: 3.4 -  "5.3 mmol/L 4.6   Chloride Latest Ref Range: 94 - 109 mmol/L 108   Carbon Dioxide Latest Ref Range: 20 - 32 mmol/L 29   Urea Nitrogen Latest Ref Range: 7 - 30 mg/dL 23   Creatinine Latest Ref Range: 0.52 - 1.04 mg/dL 1.26 (H)   GFR Estimate Latest Ref Range: >60 mL/min/1.73m2 43 (L)   Calcium Latest Ref Range: 8.5 - 10.1 mg/dL 8.7   Anion Gap Latest Ref Range: 3 - 14 mmol/L 2 (L)       MEDICATIONS  Medications reviewed:  novolog  lantus   PRN: miralax, senokot    ANTHROPOMETRICS  Height: 157.5 cm (5' 2\")  Most Recent Weight: 48 kg (105 lb 12.8 oz)    IBW: 50 kg  BMI: 19.35 kg/m^2, Normal BMI  Weight History: 5# (4.5%) wt loss since admit to [facility] (11 days). Stable X6 months   Wt Readings from Last 10 Encounters:   08/02/21 48 kg (105 lb 12.8 oz)   07/22/21 49.9 kg (110 lb)   07/22/21 48.7 kg (107 lb 7 oz)   05/25/21 52.2 kg (115 lb)   04/20/21 50.1 kg (110 lb 8 oz)   02/23/21 47.8 kg (105 lb 5 oz)   01/05/21 45.1 kg (99 lb 8 oz)   12/03/20 50.1 kg (110 lb 6 oz)   10/27/20 50.5 kg (111 lb 5 oz)   05/16/19 50.5 kg (111 lb 6 oz)     Dosing Weight: 48 kg (current)    ASSESSED NUTRITION NEEDS  Estimated Energy Needs: 0418-9100 kcals/day (25 - 30 kcals/kg)  Justification: Maintenance  Estimated Protein Needs: 48-58 grams protein/day (1 - 1.2 grams of pro/kg)  Justification: Maintenance  Estimated Fluid Needs:1 mL/kcal  Justification: Maintenance or Per provider pending fluid status    PHYSICAL FINDINGS  See malnutrition section below.     MALNUTRITION  % Intake: No decreased intake noted  % Weight Loss: Weight loss does not meet criteria  Subcutaneous Fat Loss: Unable to assess  Muscle Loss: Unable to assess  Fluid Accumulation/Edema: None noted  Malnutrition Diagnosis: Unable to determine due to no NFPA completed     NUTRITION DIAGNOSIS  Predicted inadequate protein-energy intake related to variable appetite as evidenced by pt reliant on PO intakes to meet 100% of nutritional needs with potential for variation    "     INTERVENTIONS  Implementation  Nutrition Education: Unable to complete, pt unavailable   Any supplements PRN     Goals  Patient to consume % of nutritionally adequate meal trays TID, or the equivalent with supplements/snacks.     Monitoring/Evaluation  Progress toward goals will be monitored and evaluated per protocol.    Keisha Faustin MS, RD, LDN  Unit Pager [phone]

## 2021-08-03 NOTE — PROGRESS NOTES
"  [facility]   Acute Rehabilitation Unit  Daily progress note    INTERVAL HISTORY  Paradise Yuan was seen and examined at bedside.  No acute events reported overnight.  Nursing did note that she did not urinate for the first ~12 hours at admission and bladder scans not meeting criteria for cathing.  Patient reports to be feeling well this morning.  States she slept fine.  Encourage oral fluid intake, IV fluids running now.  She denies pain, shortness of breath, nausea.        Functionally, therapy evals underway today.    MEDICATIONS    sodium chloride 0.9%  1,000 mL Intravenous Once     aspirin  81 mg Oral Daily     atenolol  25 mg Oral At Bedtime     atorvastatin  40 mg Oral At Bedtime     clopidogrel  75 mg Oral Daily     heparin ANTICOAGULANT  5,000 Units Subcutaneous Q12H     insulin aspart  3 Units Subcutaneous TID w/meals     insulin aspart  1-7 Units Subcutaneous TID AC     insulin aspart  1-5 Units Subcutaneous At Bedtime     insulin glargine  16 Units Subcutaneous At Bedtime     [Held by provider] lisinopril  10 mg Oral BID     pantoprazole  40 mg Oral QAM AC        acetaminophen, glucose **OR** dextrose **OR** glucagon, melatonin, polyethylene glycol, senna-docusate     PHYSICAL EXAM  /62 (BP Location: Left arm)   Pulse 67   Temp 97.7  F (36.5  C) (Oral)   Resp 16   Ht 1.575 m (5' 2\")   Wt 48 kg (105 lb 12.8 oz)   SpO2 94%   BMI 19.35 kg/m    Gen: NAD, flat affect, lying in bed  HEENT: NC/AT  Cardio: RRR, appears well-perfused  Pulm: non-labored on room air  Abd: soft, non-tender, non-distended  Ext: no edema or calf tenderness bilaterally  Neuro/MSK: disoriented, slowed responses, some difficulty following commands, strength at least 3/5 throughout with weakness and incoordination in right upper and low extremities.  No increased tone noted.    LABS  CBC RESULTS: Recent Labs   Lab Test 08/03/21  0713 08/02/21  0735 07/28/21  0842 07/23/21  0440   WBC " 8.9 8.0  --  10.8   RBC 3.52* 3.53*  --  4.59   HGB 10.1* 10.2*  --  13.0   HCT 32.7* 33.1*  --  41.4   MCV 93 94  --  90   MCH 28.7 28.9  --  28.3   MCHC 30.9* 30.8*  --  31.4*   RDW 13.4 13.5  --  13.1    407 347 332     Last Basic Metabolic Panel:  Recent Labs   Lab Test 08/03/21  0833 08/03/21  0713 08/03/21  0213 08/02/21  0735 07/29/21  0942   NA  --  139  --  142 144   POTASSIUM  --  4.6  --  4.3 4.6   CHLORIDE  --  108  --  106 110*   CO2  --  29  --  29 27   ANIONGAP  --  2*  --  7 7   * 116* 160* 111 140*   BUN  --  23  --  17 32*   CR  --  1.26*  --  0.90 0.97   GFRESTIMATED  --  43*  --  64 59*   CHELA  --  8.7  --  9.3 9.0       Rehabilitation - continue comprehensive acute inpatient rehabilitation program with multidisciplinary approach including therapies, rehab nursing, and physiatry following. See interval history for updates.      ASSESSMENT AND PLAN  Paradise Yuan is a 71 year old right hand dominant female with a past medical history of diabetes mellitus type II, hypertension, hyperlipidemia, CKD stage III, let upper lobectomy, and memory problems who was admitted on 7/22 with subacute left MCA infarction with ongoing right hemiparesis, impaired balance, impaired coordination, right-sided facial weakness, dysarthria, and dysphagia, with hospital course complicated by hypertension, hyperglycemia, anemia, atrial tachycardia, asymptomatic COVID-19 infection, orthostatic hypotension, and incidental finding of left cerebellar meningioma.  She is admitted to acute rehab on 8/2 for multidisciplinary rehabilitation and ongoing medical management.    Subacute left MCA infarction  Presented with 2-week history of right sided facial droop, partial right hemiparesis, partial expressive aphasia.  MRI of head shows recent infarcts of varying age within the left MCA territory affecting the deep frontal white matter and cortex at the frontoparietal junction.   MRA head shows diminished flow within  the left MCA beyond the distal M1 segment, normal MRA of the neck.  - LDL 84, goal <70.  Continue Lipitor 40 mg (increased from PTA 20 mg this admission).  - SBP goal <160, management as below  - Continue dual antiplatelet therapy with ASA 81 mg and Plavix 75 mg daily.  Duration not specified, based on CHANCE and POINT trials, will plan on x3 weeks  - Given multifocal infarcts, neurology recommends outpatient 30-day event monitor.  If unrevealing, consider implantable loop recorder.  - Continue PT/OT/SLP  - Follow up with stroke neurology in 1 month     Asymptomatic COVID-19 infection  Negative on admission 7/22.  Repeated on 7/30 prior to planned ARU discharge and resulted positive.  Chest x-ray-no acute findings number different to differentiate given background of s/p left upper lobectomy, bronchiectases.  Precautions implemented.  No respiratory symptoms, stable on room air.  - Monitor respiratory symptoms closely, supplemental oxygen by NC PRN  - Special precautions  - Per ID, recommend repeat test to rule out false positive     Meningioma, left posterior fossa  Revealed on imaging this admission.  No intervention needed this admission.  - Follow up with neurosurgery as outpatient     Diabetes mellitus, type II, uncontrolled  Had previously been on metformin, discontinued due to abnormal kidney function.  Had most recently been on Lantus 16 units daily.  Given A1c 10.0 on 7/22, question whether taking insulin as prescribed at home.  Blood glucose well-managed this admisison with Lantus 16 units daily, mealtime Novolog (added 7/30), and sliding scale insulin.  - Continue Lantus 16 units at bedtime  - Continue Aspart 3 units TID with meals  - Continue medium intensity sliding scale insulin  - Hypoglycemia protocol  - Monitor blood glucose QID AC & HS, adjust insulin regimen as indicated     Essential hypertension  Initially held home lisinopril and atenolol for permissive hypertension.  Per neurology, given  subacute stroke and symptoms for 2 weeks, goal to keep SBP <160. Resumed home antihypertensive regimen and BP better controlled.  - Continue atenolol 25 mg daily  - Continue lisinopril 10 mg BID  - Monitor BP and adjust meds as indicated    WILMA   CKD stage III  Cr increased from 0.9 yesterday to 1.26 today (8/3).  Suspect prerenal in setting of poor oral intake given low urine output on admission and poor initiation.  - Hold lisinopril  - Administer 1L IV fluid bolus today, encourage oral fluids  - Lovenox switched to subcutaneous Heparin  - Repeat BMP tomorrow     Hyperlipidemia  PTA on Lipitor 20 mg.  LDL 84 this admission, increased statin dose.  - Continue Lipitor 40 mg daily     Normocytic anemia  Stable in 13s at admission, most recent Hgb 10.2 on 8/2.  - Hgb stable at 10.1 today (8/3)  - Trend CBC     Oral dysphagia  - Continue SLP  - Continue soft and bite-sized diet (IDDSI 6) and thin liquids      H/o left upper lobectomy     Memory problems  Per family, has had progressive memory issues for quite some time.  PCP had referred to neurology/memory clinic for further evaluation and management.      1. Adjustment to disability:  Clinical psychology to eval and treat if indicated  2. FEN: soft and bite-sized diet (IDDSI 6) and thin liquids (IDDSI 0)  3. Bowel: intermittent incontinence, monitor, PRN bowel meds available  4. Bladder: continent, monitor PVRs at admission and ISC as indicated  5. DVT Prophylaxis: Lovenox switched to subcutaneous Heparin due to WILMA  6. GI Prophylaxis: Protonix  7. Code: full code  8. Disposition: goal for home  9. ELOS: 14 days  10. Follow up Appointments on Discharge: PCP, stroke neurology (1 month), neurosurgery, PM&R        Patient seen and discussed with Dr. Williams Benitez, PM&R Staff Physician    Pari Lazo PA-C  Physical Medicine & Rehabilitation

## 2021-08-03 NOTE — PLAN OF CARE
"Discharge Planner Post-Acute Rehab PT:     Discharge Plan: Home with sister and adult nephew. Home care vs OP PT pending progress. Will need to confirm home set-up with sister.    Precautions: Falls, impaired cognition    Current Status:  Bed Mobility: SBA- cues for attn to R UE  Transfer: CGA with FWW- attn to R hand  Gait: up to 65 ft with FWW, primarily CGA, attn to R hand and intermittent assist to manage FWW  Stairs: 4x6\" step ups with bilat HR, min A through R UE  Balance: LUNDBERG 27/56    Assessment:  Pt presents to PT with impaired R UE>LE strength, balance, and what appears R-sided inattn s/p L MCA CVA. Pt should benefit well from skilled PT during ARU stay; however, may require supervision level of A for mobility at time of discharge, pending cognition. ELOS 14 days.    Other Barriers to Discharge (DME, Family Training, etc):   Family training  Equipment TBD  "

## 2021-08-03 NOTE — PLAN OF CARE
FOCUS/GOAL  Medical management    ASSESSMENT, INTERVENTIONS AND CONTINUING PLAN FOR GOAL:  Pt is alert,disoriented to time and place. Mild dysarthric noted. Speech went well after talking to her w/ tagalog. Rt arm weakness. Denies pain,tingling sensation or sob. No cough noted. Pt does not have the urge to void since yesterday. Encourage to drink water. Bladder scan gradually increase during the shift. Assisted to the commode,voided 22occ,cloudy and odorous urine. Educate pt on importance of drinking fluids and draining the bladder. Denies pain on urination. Will continue to monitor bladder activity.  Offer toileting later on the shift but decline. Latest bladder scan of 84cc.Cont to offer fluids. Cont w/ POC.

## 2021-08-03 NOTE — PLAN OF CARE
"RN [id number]  FOCUS/GOAL  Bowel management, Bladder management, Nutrition/Feeding/Swallowing precautions, Medication management, Medical management, Mobility, Cognition/Memory/Judgment/Problem solving, and Safety management    ASSESSMENT, INTERVENTIONS AND CONTINUING PLAN FOR GOAL:  A/Ox2, disoriented to place/situation, mild dysarthric, slow speech. On RA. Denies pain, CP, SOB, N/V, N/T, dizziness. R sided weakness. Ax1 gb/walker. LBM today in commode. Voiding without difficulty -- admission PVRs completed. Low UO, decreased GFR noted, providers aware. Soft/bite sized diet level 6 thin liquids. Good appetite, staff to encourage increased PO fluids/food. , 103, 150, 181. Sliding scale and carb coverage per orders. NS bolus given this AM. L PIV SL. No skin issues. Neg COVID swab collected, special precautions discontinued at 1300. Pt not always able to make needs known, RN to anticipate needs. Call light within reach, reorientation provided, bed alarm on for safety. Continue POC.    /61   Pulse 76   Temp 98  F (36.7  C) (Oral)   Resp 20   Ht 1.575 m (5' 2\")   Wt 48 kg (105 lb 12.8 oz)   SpO2 100%   BMI 19.35 kg/m    "

## 2021-08-03 NOTE — PROGRESS NOTES
"   08/03/21 1400   Quick Adds   Type of Visit Initial PT Evaluation       Present no   Language English   Living Environment   People in home sibling(s);other relative(s)   Current Living Arrangements house   Transportation Anticipated family or friend will provide   Living Environment Comments Per chart review, pt lives with sister and adult nephew. Attempted to reach sister regarding house set-up, but no answer via phone.   Self-Care   Usual Activity Tolerance good   Current Activity Tolerance moderate   Equipment Currently Used at Home none   Disability/Function   Hearing Difficulty or Deaf no   Wear Glasses or Blind yes   Vision Management glasses   Concentrating, Remembering or Making Decisions Difficulty yes   Difficulty Communicating no   Difficulty Eating/Swallowing no   Walking or Climbing Stairs Difficulty no   Dressing/Bathing Difficulty no   Toileting issues no   Doing Errands Independently Difficulty (such as shopping) yes   Fall history within last six months yes   Number of times patient has fallen within last six months 1   Change in Functional Status Since Onset of Current Illness/Injury yes   General Information   Onset of Illness/Injury or Date of Surgery 07/22/21   Referring Physician Williams Benitez, DO   Patient/Family Therapy Goals Statement (PT) Unable to verbalize   Pertinent History of Current Problem (include personal factors and/or comorbidities that impact the POC) \"Paradise Yuan is a 71 year old right hand dominant female with a past medical history of diabetes mellitus type II, hypertension, hyperlipidemia, CKD stage III, let upper lobectomy, and memory problems who was admitted on 7/22 with subacute left MCA infarction with ongoing right hemiparesis, impaired balance, impaired coordination, right-sided facial weakness, dysarthria, and dysphagia, with hospital course complicated by hypertension, hyperglycemia, anemia, atrial tachycardia, asymptomatic COVID-19 " "infection, orthostatic hypotension, and incidental finding of left cerebellar meningioma.  She is admitted to acute rehab on  for multidisciplinary rehabilitation and ongoing medical management.\"   Existing Precautions/Restrictions fall   Weight-Bearing Status - LUE full weight-bearing   Weight-Bearing Status - RUE full weight-bearing   Weight-Bearing Status - LLE full weight-bearing   Weight-Bearing Status - RLE full weight-bearing   Cognition   Orientation Status (Cognition) oriented to;person;verbal cues/prompts needed for orientation   Affect/Mental Status (Cognition) flat/blunted affect   Follows Commands (Cognition) 50-74% accuracy   Behavioral Issues   (None noted)   Safety Deficit (Cognition)   (Difficult to assess)   Cognitive Status Comments Pleasant and cooperative, although confused and requires cues for initiation. Congitive impairments clearly evident- appreciate OT, SLP input.   Pain Assessment   Patient Currently in Pain No   Integumentary/Edema   Integumentary/Edema no deficits were identifed   Range of Motion (ROM)   ROM Comment Grossly WFL based on functional observation   Strength   Strength Comments Difficult to formally assess with MMT d/t cognitive impairments. Appears >= 3+/5 t/o bilat LE, except R ankle DF: 2/5   ARC Assessment Only   Acute Rehab Functional Assessment See IP Rehab Daily Documentation Flowsheet for Functional Mobility/ADL Assessment   Balance   Balance Comments LUNDBERG/56   Sensory Examination   Sensory Perception Comments Difficult to assess d/t cognitive impairments. Appears intact to light touch t/o bilat LE. Proprioception tests 5/5 at bilat great toe.   Coordination   Coordination Comments Difficult to formally assess d/t difficulty with following multi-step directions.   Oculomotor Exam   Smooth Pursuit Normal   Saccades Normal   VOR Normal   Convergence Testing Normal   Infrared Goggle Exam or Frenzel Lense Exam   Exam completed with Room Light   Spontaneous " Nystagmus Negative   Gaze Evoked Nystagmus Negative   Clinical Impression   Criteria for Skilled Therapeutic Intervention yes, treatment indicated   PT Diagnosis (PT) impaired strength, balance, questionable R-sided inattn s/p L MCA CVA   Influenced by the following impairments strength, balance, sensory organization   Functional limitations due to impairments impaired bed mobility, transfers, ambulation, stairs   Clinical Presentation Evolving/Changing   Clinical Presentation Rationale impaired cognition. hospital course complicated by hypertension, hyperglycemia, anemia, atrial tachycardia, asymptomatic COVID-19 infection, orthostatic hypotension, and incidental finding of left cerebellar meningioma   Clinical Decision Making (Complexity) moderate complexity   Therapy Frequency (PT) Daily   Predicted Duration of Therapy Intervention (days/wks) 14 days   Planned Therapy Interventions (PT) balance training;bed mobility training;gait training;home exercise program;motor coordination training;neuromuscular re-education;patient/family education;stair training;strengthening;stretching;transfer training;home program guidelines;progressive activity/exercise   Anticipated Equipment Needs at Discharge (PT) walker, rolling;cane, quad;cane, straight   Risk & Benefits of therapy have been explained evaluation/treatment results reviewed;care plan/treatment goals reviewed;risks/benefits reviewed;current/potential barriers reviewed;participants voiced agreement with care plan;participants included;patient   Clinical Impression Comments Pt presents to PT with impaired R UE>LE strength, balance, and what appears R-sided inattn s/p L MCA CVA. Pt should benefit well from skilled PT during ARU stay; however, may require supervision level of A for mobility at time of discharge, pending cognition. ELOS 14 days.   Total Evaluation Time   Total Evaluation Time (Minutes) 30

## 2021-08-04 ENCOUNTER — APPOINTMENT (OUTPATIENT)
Dept: SPEECH THERAPY | Facility: CLINIC | Age: 71
DRG: 057 | End: 2021-08-04
Attending: PHYSICAL MEDICINE & REHABILITATION
Payer: COMMERCIAL

## 2021-08-04 ENCOUNTER — APPOINTMENT (OUTPATIENT)
Dept: OCCUPATIONAL THERAPY | Facility: CLINIC | Age: 71
DRG: 057 | End: 2021-08-04
Attending: PHYSICAL MEDICINE & REHABILITATION
Payer: COMMERCIAL

## 2021-08-04 ENCOUNTER — APPOINTMENT (OUTPATIENT)
Dept: PHYSICAL THERAPY | Facility: CLINIC | Age: 71
DRG: 057 | End: 2021-08-04
Attending: PHYSICAL MEDICINE & REHABILITATION
Payer: COMMERCIAL

## 2021-08-04 LAB
ANION GAP SERPL CALCULATED.3IONS-SCNC: 1 MMOL/L (ref 3–14)
BUN SERPL-MCNC: 16 MG/DL (ref 7–30)
CALCIUM SERPL-MCNC: 8.2 MG/DL (ref 8.5–10.1)
CHLORIDE BLD-SCNC: 113 MMOL/L (ref 94–109)
CO2 SERPL-SCNC: 28 MMOL/L (ref 20–32)
CREAT SERPL-MCNC: 0.99 MG/DL (ref 0.52–1.04)
GFR SERPL CREATININE-BSD FRML MDRD: 57 ML/MIN/1.73M2
GLUCOSE BLD-MCNC: 125 MG/DL (ref 70–99)
GLUCOSE BLDC GLUCOMTR-MCNC: 110 MG/DL (ref 70–99)
GLUCOSE BLDC GLUCOMTR-MCNC: 113 MG/DL (ref 70–99)
GLUCOSE BLDC GLUCOMTR-MCNC: 123 MG/DL (ref 70–99)
GLUCOSE BLDC GLUCOMTR-MCNC: 126 MG/DL (ref 70–99)
GLUCOSE BLDC GLUCOMTR-MCNC: 136 MG/DL (ref 70–99)
GLUCOSE BLDC GLUCOMTR-MCNC: 179 MG/DL (ref 70–99)
POTASSIUM BLD-SCNC: 4.6 MMOL/L (ref 3.4–5.3)
SODIUM SERPL-SCNC: 142 MMOL/L (ref 133–144)

## 2021-08-04 PROCEDURE — 99232 SBSQ HOSP IP/OBS MODERATE 35: CPT | Performed by: PHYSICAL MEDICINE & REHABILITATION

## 2021-08-04 PROCEDURE — 97112 NEUROMUSCULAR REEDUCATION: CPT | Mod: GO | Performed by: OCCUPATIONAL THERAPIST

## 2021-08-04 PROCEDURE — 97110 THERAPEUTIC EXERCISES: CPT | Mod: GP

## 2021-08-04 PROCEDURE — 80048 BASIC METABOLIC PNL TOTAL CA: CPT | Performed by: PHYSICIAN ASSISTANT

## 2021-08-04 PROCEDURE — 250N000011 HC RX IP 250 OP 636: Performed by: PHYSICIAN ASSISTANT

## 2021-08-04 PROCEDURE — 36415 COLL VENOUS BLD VENIPUNCTURE: CPT | Performed by: PHYSICIAN ASSISTANT

## 2021-08-04 PROCEDURE — 92507 TX SP LANG VOICE COMM INDIV: CPT | Mod: GN | Performed by: SPEECH-LANGUAGE PATHOLOGIST

## 2021-08-04 PROCEDURE — 97116 GAIT TRAINING THERAPY: CPT | Mod: GP

## 2021-08-04 PROCEDURE — 250N000013 HC RX MED GY IP 250 OP 250 PS 637: Performed by: PHYSICIAN ASSISTANT

## 2021-08-04 PROCEDURE — 97535 SELF CARE MNGMENT TRAINING: CPT | Mod: GO | Performed by: OCCUPATIONAL THERAPIST

## 2021-08-04 PROCEDURE — 128N000003 HC R&B REHAB

## 2021-08-04 PROCEDURE — 97530 THERAPEUTIC ACTIVITIES: CPT | Mod: GP

## 2021-08-04 RX ADMIN — ATORVASTATIN CALCIUM 40 MG: 40 TABLET, FILM COATED ORAL at 21:26

## 2021-08-04 RX ADMIN — HEPARIN SODIUM 5000 UNITS: 5000 INJECTION, SOLUTION INTRAVENOUS; SUBCUTANEOUS at 21:26

## 2021-08-04 RX ADMIN — HEPARIN SODIUM 5000 UNITS: 5000 INJECTION, SOLUTION INTRAVENOUS; SUBCUTANEOUS at 09:13

## 2021-08-04 RX ADMIN — CLOPIDOGREL BISULFATE 75 MG: 75 TABLET, FILM COATED ORAL at 09:15

## 2021-08-04 RX ADMIN — ASPIRIN 81 MG: 81 TABLET, COATED ORAL at 09:15

## 2021-08-04 RX ADMIN — INSULIN ASPART 3 UNITS: 100 INJECTION, SOLUTION INTRAVENOUS; SUBCUTANEOUS at 12:41

## 2021-08-04 RX ADMIN — INSULIN ASPART 3 UNITS: 100 INJECTION, SOLUTION INTRAVENOUS; SUBCUTANEOUS at 18:07

## 2021-08-04 RX ADMIN — ATENOLOL 25 MG: 25 TABLET ORAL at 21:26

## 2021-08-04 RX ADMIN — INSULIN GLARGINE 16 UNITS: 100 INJECTION, SOLUTION SUBCUTANEOUS at 21:50

## 2021-08-04 RX ADMIN — INSULIN ASPART 3 UNITS: 100 INJECTION, SOLUTION INTRAVENOUS; SUBCUTANEOUS at 09:12

## 2021-08-04 RX ADMIN — PANTOPRAZOLE SODIUM 40 MG: 40 TABLET, DELAYED RELEASE ORAL at 06:04

## 2021-08-04 NOTE — PLAN OF CARE
FUGL-KENNEDY ASSESSMENT   UPPER EXTREMITY  Assessment of sensorimotor function   Stroke specific, performance-based impairment index. It is designed to assess motor functioning, sensation, and joint functioning in patients with post-stroke hemiplegia.    Scoring  The FMA is an impairment measure, consisting of 33 motor tasks and 30 sensory/pain observations.   Motor and sensory items are scored on a 0-2 scale: 0 none, 1 partial, and 2 full.  Motor Function   Upper Extremity 25/36   Wrist 5/10   Hand 4/14   Coordination/Speed 3/6   Total 38/66     Sensory Function   Sensation 12/12   Joint Pain 24/24   Passive Joint Motion 24/24     Motor Classification  Very Severe (0-12)  Severe-Moderate (13-30)  Moderate-Mild (31-47)  Mild (48-60)    Sensory/joint observations (light touch, proprioception, PROM, PMHx impacting UE function):  R side inattention  Intact light touch/prioprioception  Distal > proximal weakness    References  FMA-UE PROTOCOL [facility], [facility]  Approved by Rodril-Kennedy AR 2010  Rodril-Kennedy AR, Maurisio L, Kaylee I, Catherine S, Kerwin S: The post-stroke hemiplegic patient. A method for evaluation of physical performance. Scand J Rehabil Med 1975, 7:13-31.  Sneha suazo al.: Determining Levels of Upper Extremity Movement Impairment by Applying a Cluster Analysis to the Fugl-Kennedy Assessment of the Upper Extremity in Chronic Stroke. Archives of Physical Medicine and Rehabilitation 2016, 98: 456-462.

## 2021-08-04 NOTE — PLAN OF CARE
"  VS: /59 (BP Location: Right arm)   Pulse 97   Temp 97.8  F (36.6  C) (Oral)   Resp 16   Ht 1.575 m (5' 2\")   Wt 48 kg (105 lb 12.8 oz)   SpO2 98%   BMI 19.35 kg/m      O2: 98% on RA, denies SOB or respiratory distress    Output: Incontinent of bladder    Last BM: 8/3, passing flatus, abdomen soft and non-distended   Activity: Needs assist of one with transfers with use of walker    Up for meals? Up in chair for meals   Skin: Scattered ecchymotic areas to abdomen from injection sites    Pain: Denies pain or discomfort   CMS: Alert, disoriented to time, place and situation, denies numbness or tingling    Dressing: None    Diet: Soft and bite size with thin liquids, appetite 50% for meals, blood glucose 126 & 179, insulin given per orders    LDA: PIV to left arm, saline locked    Equipment: W/c, walker and personal belongings    Plan: Will continue plan of care    Additional Info:       "

## 2021-08-04 NOTE — PLAN OF CARE
FOCUS/GOAL  Medical management    ASSESSMENT, INTERVENTIONS AND CONTINUING PLAN FOR GOAL:  Pt is alert but dis-oriented to place and situation.Denies pain. Encourage her and assisted to use the commode even she said that she does  not need to go. She sat at the commode and voided 160cc, w/ strong odor. Fluids offered. PIV to left lower arm is patent. BG is 113mg/dl. Cont w/ POC.

## 2021-08-04 NOTE — PROGRESS NOTES
"   08/03/21 1030   Quick Adds   Type of Visit Initial Occupational Therapy Evaluation       Present no   Language English 2nd language, decline  for Emiliano.    Living Environment   People in home sibling(s)   Current Living Arrangements other (see comments);house   Home Accessibility stairs within home   Transportation Anticipated family or friend will provide   Living Environment Comments OT: Per chart review, pt lives with sister and adult nephew in house with >10 stairs (bed/bath on 3rd floor). Pt unable to provide further information and unable to get hold of pt family.   Self-Care   Usual Activity Tolerance good   Current Activity Tolerance moderate   Regular Exercise No   Equipment Currently Used at Home none   Activity/Exercise/Self-Care Comment Per chart review. Pt IND ADLs/IADLs. Pt unable to state previous activity level or equipment use. Will need to confirm with family.   Instrumental Activities of Daily Living (IADL)   IADL Comments Per chart review, family provided assistance for medication management and community transportation.   Disability/Function   Hearing Difficulty or Deaf no   Wear Glasses or Blind yes   Vision Management glasses   Concentrating, Remembering or Making Decisions Difficulty yes   Concentration Management Memory deficits   Difficulty Communicating no   Difficulty Eating/Swallowing no   Walking or Climbing Stairs Difficulty no   Dressing/Bathing Difficulty no   Toileting issues no   Doing Errands Independently Difficulty (such as shopping) yes   Errands Management Family providing assist for med management and community transportation.   Fall history within last six months no   Change in Functional Status Since Onset of Current Illness/Injury yes   General Information   Onset of Illness/Injury or Date of Surgery 07/22/21   Referring Physician Williams Benitez, DO   Patient/Family Therapy Goal Statement (OT) Pt states goal is \"to get stronger\" "   Additional Occupational Profile Info/Pertinent History of Current Problem Paradise Yuan is a 71 year old right hand dominant female with a past medical history of diabetes mellitus type II, hypertension, hyperlipidemia, CKD stage III, let upper lobectomy, and memory problems who was admitted on 7/22 with subacute left MCA infarction with ongoing right hemiparesis, impaired balance, impaired coordination, right-sided facial weakness, dysarthria, and dysphagia, with hospital course complicated by hypertension, hyperglycemia, anemia, atrial tachycardia, asymptomatic COVID-19 infection, orthostatic hypotension, and incidental finding of left cerebellar meningioma.  She is admitted to acute rehab on 8/2 for multidisciplinary rehabilitation and ongoing medical management.   Performance Patterns (Routines, Roles, Habits) Per chart review, pt IND ADLs/IADLs and functional mobility. Did receive family assist for med admin/community transportation due to memory deficits.    Existing Precautions/Restrictions fall   Limitations/Impairments safety/cognitive;swallowing   Left Upper Extremity (Weight-bearing Status) full weight-bearing (FWB)   Right Upper Extremity (Weight-bearing Status) full weight-bearing (FWB)   Left Lower Extremity (Weight-bearing Status) full weight-bearing (FWB)   Right Lower Extremity (Weight-bearing Status) full weight-bearing (FWB)   Cognitive Status Examination   Orientation Status person;other (see comments)  (situation)   Affect/Mental Status (Cognitive) confused   Follows Commands follows one-step commands;delayed response/completion;increased processing time needed;initiation impaired;physical/tactile prompts required   Safety Deficit minimal deficit;ability to follow commands   Memory Deficit other (see comments)   Cognitive Status Comments Per chart review, family reports progressive memory deficits in recent years. Family provided assist for med admin. Pt is A & O to self and situation, not  time or place. Very slow processing time, unable to participate in complex conversation, follows simple 1 step commands but harder responding to 2-3 step commands, flat affect.    Visual Perception   Visual Acuity Wears glasses   Visual Attention R side inattention    Impact of Vision Impairment on Function (Vision) Unable to accurately follow directions for occulomotor tracking.    Sensory   Sensory Comments Unclear, R inattention evident from functional standpoint. May have R side sensory deficits. Unable to follow directions for formal testing.    Integumentary/Edema   Integumentary/Edema Comments LUE IV site   Posture   Posture not impaired   Range of Motion Comprehensive   Comment, General Range of Motion RUE PROM WNL. RUE AROM limited by hemiparesis. LUE PROM/AROM WFL.   Strength Comprehensive (MMT)   General Manual Muscle Testing (MMT) Assessment upper extremity strength deficits identified;hand strength deficits identified   Upper Extremity (Manual Muscle Testing)   Comment, MMT: Upper Extremity LUE 5/5. RUE 3/5 proximally, demo 2/5 in hand/wrist.    Upper Extremity: Manual Muscle Testing (MMT) left UE strength is WNL;right elbow/forearm strength deficit;right shoulder strength deficit;right wrist strength deficit   Hand Strength   Hand Strength Comments Pt briefly under COVID isolation precautions, did not formally test . Pt able to demo partial , unable to hold against resistance, difficulties extending digits.   Muscle Tone Assessment   Muscle Tone Quick Adds RUE;RLE   Muscle Tone Assessment - RUE mildly decreased tone   Muscle Tone Assessment - RLE mildly decreased tone   Coordination   Upper Extremity Coordination Right UE impaired   Coordination Comments Pt briefly under COVID isolation precautions, did not bring materials in to formally test, pt with difficulties following directions. Does demonstrate RUE coordination deficits, mostly due to inattention and hemiparesis.   ARC Assessment Only    Acute Rehab Functional Assessment See IP Rehab Daily Documentation Flowsheet for Functional Mobility/ADL Assessment   Bed Mobility   Comment (Bed Mobility) SBA bed mobility   Transfers   Transfer Comments CGA transfers with fww, assist to initially manage RUE.   Balance   Balance Assessment standing balance: static;standing balance: dynamic;sit to stand balance;sitting balance: dynamic   Sitting Balance: Dynamic mild impairment   Sit-to-Stand Balance fair balance   Standing Balance: Static fair balance   Standing Balance: Dynamic poor balance   Clinical Impression   Criteria for Skilled Therapeutic Interventions Met (OT) yes   OT Diagnosis Decreased IND ADLs/IADLs   OT Problem List-Impairments impacting ADL problems related to;activity tolerance impaired;balance;cognition;communication;mobility;motor control;range of motion (ROM);muscle tone;sensation;strength;sensory feedback;vision   ADL comments/analysis Performance with ADLs/IADL below baseline.   Assessment of Occupational Performance 5 or more Performance Deficits   Identified Performance Deficits Performance deficits include mobility, transfers, dressing, grooming, bathing, toileting, med admin, finances, community transportation, meal prep, household tasks.   Planned Therapy Interventions (OT) ADL retraining;IADL retraining;balance training;cognition;E-stim;ROM;strengthening;stretching;transfer training;visual perception;neuromuscular re-education;motor coordination training;fine motor coordination training;progressive activity/exercise;home program guidelines;risk factor education   Clinical Decision Making Complexity (OT) high complexity   Therapy Frequency (OT) Daily   Predicted Duration of Therapy 2 weeks   Anticipated Equipment Needs Upon Discharge (OT) walker, rolling;shower chair;reacher   Risk & Benefits of therapy have been explained evaluation/treatment results reviewed;care plan/treatment goals reviewed;risks/benefits reviewed;current/potential  barriers reviewed;participants included;patient  (Will need to reinforce or discuss with family member)   Comment-Clinical Impression The is a 71 yr old female with moderately complex medical hx admitted to IP ARU following hospital stay for L MCA CVA. Performance with ADLs/IADL is below baseline, impacted by R inattention, R hemiparesis, impaired cognition, and impaired balance. Pt will benefit from skilled OT intervention to address goals in POC and maximize functional IND and safety in discharge environment. ELOS 2 weeks.   Total Evaluation Time (Minutes)   Total Evaluation Time (Minutes) 40

## 2021-08-04 NOTE — PROGRESS NOTES
Individualized Overall Plan Of Care (IOPOC)      Rehab diagnosis/Impairment Group Code: Stroke ischemic 01.2 (r) body involvement (l) brain: l mca infarct  Acute ischemic left mca stroke (h)       Expected functional outcome:  Reach a level of mod I    Clinical Impression Comments: R hemiparesis 2/2 CVA    Mobility: Pt presents to PT with impaired R UE>LE strength, balance, and what appears R-sided inattn s/p L MCA CVA. Pt should benefit well from skilled PT during ARU stay; however, may require supervision level of A for mobility at time of discharge, pending cognition. ELOS 14 days.    ADL: The is a 71 yr old female with moderately complex medical hx admitted to IP ARU following hospital stay for L MCA CVA. Performance with ADLs/IADL is below baseline, impacted by R inattention, R hemiparesis, impaired cognition, and impaired balance. Pt will benefit from skilled OT intervention to address goals in POC and maximize functional IND and safety in discharge environment. ELOS 2 weeks.    Communication/Cognition/Swallow: Language evaluation completed per MD order. Administered the Bedside Western Aphasia Battery - Revised (WAB-R); pt scored 78.33/100, indicating mild anonmic aphasia. Expressive language characterized by occasional word finding and difficultes with comprehension. Suspect her right neglect may have also played a role in the sequential command portion of the assessment. Mild dysarthria. Recommend ongoing ST targeting dysarthria strategies and moderate level comprehension and verbal expression tasks. SLP will follow.     Intensity of therapy:   PT 60 minutes, Daily, for 14 days  OT 60 minutes, Daily, for 2 weeks  SLP 60 minutes, daily, for 2 weeks      Education stroke  Neuropsychology Testing: No        Medical Prognosis: good       Physician summary statement: R hemiparesis 2/2 CVA goal is to reach a level of mod I     Discharge destination: prior home  Discharge rehabilitation needs: home care, PT, OT  and SLP      Estimated length of stay: 14 days       Rehabilitation Physician Williams Benitez DO

## 2021-08-04 NOTE — PLAN OF CARE
Discharge Planner Post-Acute Rehab SLP:      Discharge Plan: Home with sister and adult nephew. Home care vs OP PT pending progress. Will need to confirm home set-up with sister.     Precautions: Fall, cognition, language deficits, swallowing     Current Status:  Communication:Mild dysarthria. Mild-mod receptive and expressive language tasks  Cognition: Memory deficits noted at baseline per family report. Not formally assessed.  Swallow: Recommend soft and bite sized diet (6) and thin liquids with upright position, slow rate, alternate solids and liquids, and small bites/sips.      Assessment:  Patient seen for communication treatment in AM. Patient responded to spoken yes/no questions (moderate level) with 90% accuracy. Reading comprehension (abstract sentence level): 40%. Patient named objects described with 40% accuracy independently (increased accuracy to 70% given visual cues). Speech intelligibility in oral reading task (sentence length) 40% initial attempt (improved to 80% given cue to repeat). Patient also needed cues to increase loudness and slow rate.    PM session: Auditory comprehension (sentence length abstract) 80% accuracy. Patient needed occasional repetitions. Patient needed mod assist to select menu items on menu for breakfast and lunch meals. Patient benefitted from limiting field of choices. Verbal phrase completion (word level): 80% accuracy. Sentence completion (single word) 90% accuracy. Generative naming 4-8 items per category given additional time and cues to expand responses.     Other Barriers to Discharge (Family Training, etc): None anticipated from SLP

## 2021-08-04 NOTE — PLAN OF CARE
Discharge Planner Post-Acute Rehab OT:     Discharge Plan: Home with sister and adult nephew. Home care vs OP OT pending progress. Will need to confirm home set-up with sister.    Precautions: Falls, impaired cognition    Current Status:  ADLs: CGA transfers and room mobility fww. Min A UB dressing, SBA standing grooming, Mod A LB dressing, and Min A toileting.  IADLs: TBA.   Vision/Cognition: R side inattention. Disoriented, slowed responses, mild difficulty following commands.    Assessment: Initial evaluation completed and POC established. Performance with ADLs/IADLs below baseline, impacted by R side weakness, R side inattention, impaired balance, and impaired cognition. Goals to advance to Mod IND ADLs and functional mobility, however may need supervision pending cognitive status. ELOS 14 days.    Other Barriers to Discharge (DME, Family Training, etc):   Caregiver support   Equipment TBD pending progress

## 2021-08-04 NOTE — PROGRESS NOTES
"  [facility]   Acute Rehabilitation Unit  Daily progress note    INTERVAL HISTORY  Paradise Yuan was seen and examined this morning while having her breakfast.  No acute events reported overnight.  She reports that she slept well last night and is feeling ok this morning.  She denies any pain.  States therapies are going \"ok\".  She denies shortness of breath, dizziness, nausea, bowel or bladder concerns.  Noting ongoing low urine output.  Encourage adequate oral fluid intake.  AM labs revealed improvement in Cr s/p 1L IV fluids.    Functionally, she needs contact guard assist for transfers with FWW, ambulates up to 65' with FWW and primarily contact guard assist.  She was able to navigate 4x6\" step ups with min assist and scored 27/56 on LUNDBERG indicating high falls risk.  She requires min assist for upper body dressing, standby assist for standing grooming, mod assist for lower body dressing, min assist for toileting.  SLP noting mild dysarthria, mild-mod receptive and expressive language deficits, and mild oropharyngeal dysphagia.    MEDICATIONS    aspirin  81 mg Oral Daily     atenolol  25 mg Oral At Bedtime     atorvastatin  40 mg Oral At Bedtime     clopidogrel  75 mg Oral Daily     heparin ANTICOAGULANT  5,000 Units Subcutaneous Q12H     insulin aspart  3 Units Subcutaneous TID w/meals     insulin aspart  1-7 Units Subcutaneous TID AC     insulin aspart  1-5 Units Subcutaneous At Bedtime     insulin glargine  16 Units Subcutaneous At Bedtime     [Held by provider] lisinopril  10 mg Oral BID     pantoprazole  40 mg Oral QAM AC     sodium chloride (PF)  3 mL Intracatheter Q8H        acetaminophen, glucose **OR** dextrose **OR** glucagon, melatonin, polyethylene glycol, senna-docusate     PHYSICAL EXAM  /59 (BP Location: Right arm)   Pulse 97   Temp 98  F (36.7  C) (Oral)   Resp 16   Ht 1.575 m (5' 2\")   Wt 48 kg (105 lb 12.8 oz)   SpO2 98%   BMI 19.35 kg/m  "   Gen: NAD, flat affect, seated comfortably in wheelchair  HEENT: NC/AT  Cardio: RRR, appears well-perfused  Pulm: non-labored on room air  Abd: soft, non-tender, non-distended  Ext: no edema or calf tenderness bilaterally  Neuro/MSK: disoriented to year, able to name state/not city, slowed responses, some difficulty following commands.  +right facial droop.  Strength at least 3/5 throughout with weakness and incoordination in right upper and low extremities.  No increased tone noted.    LABS  CBC RESULTS:   Recent Labs   Lab Test 08/03/21  0713 08/02/21  0735 07/28/21  0842 07/23/21  0440   WBC 8.9 8.0  --  10.8   RBC 3.52* 3.53*  --  4.59   HGB 10.1* 10.2*  --  13.0   HCT 32.7* 33.1*  --  41.4   MCV 93 94  --  90   MCH 28.7 28.9  --  28.3   MCHC 30.9* 30.8*  --  31.4*   RDW 13.4 13.5  --  13.1    407 347 332     Last Basic Metabolic Panel:  Recent Labs   Lab Test 08/04/21  0908 08/04/21  0803 08/04/21  0700 08/03/21  0713 08/02/21  0735   NA  --   --  142 139 142   POTASSIUM  --   --  4.6 4.6 4.3   CHLORIDE  --   --  113* 108 106   CO2  --   --  28 29 29   ANIONGAP  --   --  1* 2* 7   * 123* 125* 116* 111   BUN  --   --  16 23 17   CR  --   --  0.99 1.26* 0.90   GFRESTIMATED  --   --  57* 43* 64   CHELA  --   --  8.2* 8.7 9.3       Rehabilitation - continue comprehensive acute inpatient rehabilitation program with multidisciplinary approach including therapies, rehab nursing, and physiatry following. See interval history for updates.      ASSESSMENT AND PLAN  Paradise Yuan is a 71 year old right hand dominant female with a past medical history of diabetes mellitus type II, hypertension, hyperlipidemia, CKD stage III, let upper lobectomy, and memory problems who was admitted on 7/22 with subacute left MCA infarction with ongoing right hemiparesis, impaired balance, impaired coordination, right-sided facial weakness, dysarthria, and dysphagia, with hospital course complicated by hypertension,  hyperglycemia, anemia, atrial tachycardia, asymptomatic COVID-19 infection, orthostatic hypotension, and incidental finding of left cerebellar meningioma.  She is admitted to acute rehab on 8/2 for multidisciplinary rehabilitation and ongoing medical management.    Subacute left MCA infarction  Presented with 2-week history of right sided facial droop, partial right hemiparesis, partial expressive aphasia.  MRI of head shows recent infarcts of varying age within the left MCA territory affecting the deep frontal white matter and cortex at the frontoparietal junction.   MRA head shows diminished flow within the left MCA beyond the distal M1 segment, normal MRA of the neck.  - LDL 84, goal <70.  Continue Lipitor 40 mg (increased from PTA 20 mg this admission).  - SBP goal <160, management as below  - Continue dual antiplatelet therapy with ASA 81 mg and Plavix 75 mg daily.  Duration not specified, based on CHANCE and POINT trials, will plan on x3 weeks  - Given multifocal infarcts, neurology recommends outpatient 30-day event monitor.  If unrevealing, consider implantable loop recorder.  - Continue PT/OT/SLP  - Follow up with stroke neurology in 1 month     COVID-19 positive 7/30, suspected false positive  Negative on admission 7/22.  Repeated on 7/30 prior to planned ARU discharge and resulted positive.  Chest x-ray-no acute findings number different to differentiate given background of s/p left upper lobectomy, bronchiectases.  Precautions implemented.  No respiratory symptoms, stable on room air.  Per ID, recommended repeat test 8/3 to rule-out false positive and test returned negative.  Per discussion with them, feel likely 7/30 test represents false positive and recommended remove special precautions.     Meningioma, left posterior fossa  Revealed on imaging this admission.  No intervention needed this admission.  - Follow up with neurosurgery as outpatient     Diabetes mellitus, type II, uncontrolled  Had  previously been on metformin, discontinued due to abnormal kidney function.  Had most recently been on Lantus 16 units daily.  Given A1c 10.0 on 7/22, question whether taking insulin as prescribed at home.  Blood glucose well-managed this admisison with Lantus 16 units daily, mealtime Novolog (added 7/30), and sliding scale insulin.  - Continue Lantus 16 units at bedtime  - Continue Aspart 3 units TID with meals  - Continue medium intensity sliding scale insulin  - Hypoglycemia protocol  - Monitor blood glucose QID AC & HS, adjust insulin regimen as indicated     Essential hypertension  Initially held home lisinopril and atenolol for permissive hypertension.  Per neurology, given subacute stroke and symptoms for 2 weeks, goal to keep SBP <160. Resumed home antihypertensive regimen and BP better controlled.  - Continue atenolol 25 mg daily  - Continue lisinopril 10 mg BID  - Monitor BP and adjust meds as indicated    WILMA   CKD stage III  On admission, Cr increased from 0.9 8/2 to 1.26 8/3.  Suspect prerenal in setting of poor oral intake given low urine output on admission and poor initiation.  Lisinopril held, Lovenox switched to subcutaneous Heparin, received 1L bolus NS.  Cr improved today to 0.99.  - Continue to hold lisinopril, BP well-controlled without at this time  - Encourage oral fluids  - Repeat BMP tomorrow     Hyperlipidemia  PTA on Lipitor 20 mg.  LDL 84 this admission, increased statin dose.  - Continue Lipitor 40 mg daily     Normocytic anemia  Stable in 13s at admission, most recent Hgb 10.2 on 8/2.  - Hgb stable at 10.1 on 8/3  - Trend CBC     Oral dysphagia  - Continue SLP  - Continue soft and bite-sized diet (IDDSI 6) and thin liquids      H/o left upper lobectomy     Memory problems  Per family, has had progressive memory issues for quite some time.  PCP had referred to neurology/memory clinic for further evaluation and management.      1. Adjustment to disability:  Clinical psychology to rao  and treat if indicated  2. FEN: soft and bite-sized diet (IDDSI 6) and thin liquids (IDDSI 0)  3. Bowel: intermittent incontinence, monitor, PRN bowel meds available  4. Bladder: continent, monitor PVRs at admission and ISC as indicated  5. DVT Prophylaxis: Lovenox switched to subcutaneous Heparin due to WILMA  6. GI Prophylaxis: Protonix  7. Code: full code  8. Disposition: goal for home  9. ELOS: 14 days  10. Follow up Appointments on Discharge: PCP, stroke neurology (1 month), neurosurgery, PM&R        Patient seen and discussed with Dr. Williams Benitez, PM&R Staff Physician    Pari Lazo PA-C  Physical Medicine & Rehabilitation

## 2021-08-04 NOTE — PLAN OF CARE
Discharge Planner Post-Acute Rehab OT:     Discharge Plan: Home with sister and adult nephew. Home care vs OP OT pending progress. Will need to confirm home set-up with sister.    Precautions: Falls, impaired cognition    Current Status:  ADLs: CGA transfers and room mobility fww. Min A UB dressing, SBA standing grooming, Mod A LB dressing, and Min A toileting.  IADLs: TBA.   Vision/Cognition: R side inattention. Disoriented, slowed responses, mild difficulty following commands.    Assessment: Performance with ADLs/IADLs below baseline, impacted by R side weakness, R side inattention, impaired balance, and impaired cognition. Goals to advance to Mod IND ADLs and functional mobility, however may need supervision pending cognitive status.    Other Barriers to Discharge (DME, Family Training, etc):   Caregiver support   Equipment TBD pending progress

## 2021-08-04 NOTE — PLAN OF CARE
"Discharge Planner Post-Acute Rehab PT:      Discharge Plan: Home with sister and adult nephew. Home care vs OP PT pending progress. Will need to confirm home set-up with sister.     Precautions: Falls, impaired cognition     Current Status:  Bed Mobility: SBA- cues for attn to R UE  Transfer: CGA with FWW- attn to R hand  Gait: 100-150' with use of walker and CGA  Stairs: 4x6\" step ups with bilat HR, min A through R UE  Balance: LUNDBERG 27/56     Assessment:  Pt continues to demonstrate R inattention and weakness.  Improving with transfers and ambulation.  Continues to require cues and assist for safety.     Other Barriers to Discharge (DME, Family Training, etc):   Family training  Equipment TBD  "

## 2021-08-05 ENCOUNTER — APPOINTMENT (OUTPATIENT)
Dept: SPEECH THERAPY | Facility: CLINIC | Age: 71
DRG: 057 | End: 2021-08-05
Attending: PHYSICAL MEDICINE & REHABILITATION
Payer: COMMERCIAL

## 2021-08-05 ENCOUNTER — APPOINTMENT (OUTPATIENT)
Dept: OCCUPATIONAL THERAPY | Facility: CLINIC | Age: 71
DRG: 057 | End: 2021-08-05
Attending: PHYSICAL MEDICINE & REHABILITATION
Payer: COMMERCIAL

## 2021-08-05 ENCOUNTER — APPOINTMENT (OUTPATIENT)
Dept: PHYSICAL THERAPY | Facility: CLINIC | Age: 71
DRG: 057 | End: 2021-08-05
Attending: PHYSICAL MEDICINE & REHABILITATION
Payer: COMMERCIAL

## 2021-08-05 LAB
ANION GAP SERPL CALCULATED.3IONS-SCNC: 3 MMOL/L (ref 3–14)
BUN SERPL-MCNC: 16 MG/DL (ref 7–30)
CALCIUM SERPL-MCNC: 8.6 MG/DL (ref 8.5–10.1)
CHLORIDE BLD-SCNC: 110 MMOL/L (ref 94–109)
CO2 SERPL-SCNC: 28 MMOL/L (ref 20–32)
CREAT SERPL-MCNC: 0.92 MG/DL (ref 0.52–1.04)
ERYTHROCYTE [DISTWIDTH] IN BLOOD BY AUTOMATED COUNT: 13.8 % (ref 10–15)
GFR SERPL CREATININE-BSD FRML MDRD: 62 ML/MIN/1.73M2
GLUCOSE BLD-MCNC: 102 MG/DL (ref 70–99)
GLUCOSE BLDC GLUCOMTR-MCNC: 125 MG/DL (ref 70–99)
GLUCOSE BLDC GLUCOMTR-MCNC: 137 MG/DL (ref 70–99)
GLUCOSE BLDC GLUCOMTR-MCNC: 211 MG/DL (ref 70–99)
GLUCOSE BLDC GLUCOMTR-MCNC: 86 MG/DL (ref 70–99)
GLUCOSE BLDC GLUCOMTR-MCNC: 88 MG/DL (ref 70–99)
GLUCOSE BLDC GLUCOMTR-MCNC: 90 MG/DL (ref 70–99)
GLUCOSE BLDC GLUCOMTR-MCNC: 97 MG/DL (ref 70–99)
HCT VFR BLD AUTO: 33 % (ref 35–47)
HGB BLD-MCNC: 10 G/DL (ref 11.7–15.7)
HOLD SPECIMEN: NORMAL
MCH RBC QN AUTO: 28.7 PG (ref 26.5–33)
MCHC RBC AUTO-ENTMCNC: 30.3 G/DL (ref 31.5–36.5)
MCV RBC AUTO: 95 FL (ref 78–100)
PLATELET # BLD AUTO: 414 10E3/UL (ref 150–450)
POTASSIUM BLD-SCNC: 4.4 MMOL/L (ref 3.4–5.3)
RBC # BLD AUTO: 3.48 10E6/UL (ref 3.8–5.2)
SODIUM SERPL-SCNC: 141 MMOL/L (ref 133–144)
WBC # BLD AUTO: 9 10E3/UL (ref 4–11)

## 2021-08-05 PROCEDURE — 250N000011 HC RX IP 250 OP 636: Performed by: PHYSICIAN ASSISTANT

## 2021-08-05 PROCEDURE — 80048 BASIC METABOLIC PNL TOTAL CA: CPT | Performed by: PHYSICIAN ASSISTANT

## 2021-08-05 PROCEDURE — 85027 COMPLETE CBC AUTOMATED: CPT | Performed by: PHYSICIAN ASSISTANT

## 2021-08-05 PROCEDURE — 128N000003 HC R&B REHAB

## 2021-08-05 PROCEDURE — 99232 SBSQ HOSP IP/OBS MODERATE 35: CPT | Performed by: PHYSICAL MEDICINE & REHABILITATION

## 2021-08-05 PROCEDURE — 36415 COLL VENOUS BLD VENIPUNCTURE: CPT | Performed by: PHYSICAL MEDICINE & REHABILITATION

## 2021-08-05 PROCEDURE — 999N000150 HC STATISTIC PT MED CONFERENCE < 30 MIN: Performed by: PHYSICAL THERAPIST

## 2021-08-05 PROCEDURE — 250N000013 HC RX MED GY IP 250 OP 250 PS 637: Performed by: PHYSICIAN ASSISTANT

## 2021-08-05 PROCEDURE — 92526 ORAL FUNCTION THERAPY: CPT | Mod: GN

## 2021-08-05 PROCEDURE — 92507 TX SP LANG VOICE COMM INDIV: CPT | Mod: GN | Performed by: SPEECH-LANGUAGE PATHOLOGIST

## 2021-08-05 PROCEDURE — 97110 THERAPEUTIC EXERCISES: CPT | Mod: GO

## 2021-08-05 PROCEDURE — 36415 COLL VENOUS BLD VENIPUNCTURE: CPT | Performed by: PHYSICIAN ASSISTANT

## 2021-08-05 PROCEDURE — 97535 SELF CARE MNGMENT TRAINING: CPT | Mod: GO

## 2021-08-05 PROCEDURE — 999N000125 HC STATISTIC PATIENT MED CONFERENCE < 30 MIN: Performed by: SPEECH-LANGUAGE PATHOLOGIST

## 2021-08-05 PROCEDURE — 97112 NEUROMUSCULAR REEDUCATION: CPT | Mod: GP | Performed by: PHYSICAL THERAPIST

## 2021-08-05 PROCEDURE — 999N000125 HC STATISTIC PATIENT MED CONFERENCE < 30 MIN: Performed by: STUDENT IN AN ORGANIZED HEALTH CARE EDUCATION/TRAINING PROGRAM

## 2021-08-05 RX ADMIN — INSULIN GLARGINE 16 UNITS: 100 INJECTION, SOLUTION SUBCUTANEOUS at 21:12

## 2021-08-05 RX ADMIN — INSULIN ASPART 3 UNITS: 100 INJECTION, SOLUTION INTRAVENOUS; SUBCUTANEOUS at 18:05

## 2021-08-05 RX ADMIN — HEPARIN SODIUM 5000 UNITS: 5000 INJECTION, SOLUTION INTRAVENOUS; SUBCUTANEOUS at 08:21

## 2021-08-05 RX ADMIN — ASPIRIN 81 MG: 81 TABLET, COATED ORAL at 08:21

## 2021-08-05 RX ADMIN — PANTOPRAZOLE SODIUM 40 MG: 40 TABLET, DELAYED RELEASE ORAL at 08:23

## 2021-08-05 RX ADMIN — ATORVASTATIN CALCIUM 40 MG: 40 TABLET, FILM COATED ORAL at 21:05

## 2021-08-05 RX ADMIN — HEPARIN SODIUM 5000 UNITS: 5000 INJECTION, SOLUTION INTRAVENOUS; SUBCUTANEOUS at 21:09

## 2021-08-05 RX ADMIN — INSULIN ASPART 3 UNITS: 100 INJECTION, SOLUTION INTRAVENOUS; SUBCUTANEOUS at 08:23

## 2021-08-05 RX ADMIN — CLOPIDOGREL BISULFATE 75 MG: 75 TABLET, FILM COATED ORAL at 08:21

## 2021-08-05 RX ADMIN — ATENOLOL 25 MG: 25 TABLET ORAL at 21:05

## 2021-08-05 NOTE — PROGRESS NOTES
CLINICAL NUTRITION SERVICES - BRIEF NOTE     Nutrition Prescription      Recommendations already ordered by Registered Dietitian (RD):  Change pt to room service assist - NSA to encourage extra fluids at meal times     Patient care order - nursing to encourage between meal fluids     RD will adjust supplement order to Glucerna once daily at breakfast (NSA as pt for flavor preference or offer other supplements to try when obtaining meal orders)    Future/Additional Recommendations:  RD services will continue to monitor per protocol - monitor meal intakes, supplement acceptance, lab/wt trends     NEW FINDINGS   RD received Provider order - nursing reported poor oral intake, assess/optimize nutrition, RD services did assess pt on 8/3 for risk screen - see that note for details, supplement PRN ordered at that time as noted pt declined supplements during hospitalization. RD participated in team rounds today, Provider discussed concern that pt having poor fluid intakes, RD then added additional nutrition interventions as above, in light of additional Provider referral RD will also adjust supplement order as above.     INTERVENTIONS  Implementation  Collaboration with other staff - order as above   Medical food supplement therapy - order adjusted as above   Modify composition of meals/snacks - ordered as above     Monitoring/Evaluation  Will continue to monitor and evaluate per protocol.

## 2021-08-05 NOTE — PLAN OF CARE
"Discharge Planner Post-Acute Rehab PT:      Discharge Plan: Home with sister and adult nephew. Home care vs OP PT pending progress. Split level house with single rail, plus several SHIRAZ.     Precautions: Falls, impaired cognition, R-sided inattn     Current Status:  Bed Mobility: SBA- cues for attn to R UE  Transfer: CGA with FWW- attn to R hand  Gait: up to 200 ft, min A through FWW  Stairs: 4x6\" step ups with bilat HR, min A through R UE  Balance: LUNDBERG on 8/3: 27/56     Assessment: Primary focus today on activities to help incorporate use of and attn to R side, as R inattention remains biggest limiting factor.     Other Barriers to Discharge (DME, Family Training, etc):   Family training  Equipment TBD  "

## 2021-08-05 NOTE — CARE CONFERENCE
Acute Rehab Care Conference/Team Rounds      Type: Team Rounds    Present: Dr. Delia Maher, Pari Lazo PA, Florecita Sarabia RN, Jeremiah Timmons PT, Mariana Taylor OT, Ayanna Rouse SLP, Jadyn Martinez [facility], Maria Isabel Thompson , Whitney Gage Dietician, Patient Paradise Yuan      Discharge Barriers/Treatment/Education    Rehab Diagnosis: Stroke Ischemic 01.2 (R) Body Involvement (L) Brain: L MCA infarct     Active Medical Co-morbidities/Prognosis: L MCA stroke, left posterior fossa meningioma, DM II, HTN, CKD III, HLD, anemia, dysphagia, h/o upper lobectomy, memory problems    Safety:Alert. Disoriented to time, place and situation.Bed alarm in chair and bed for safety.    Pain:Denies pain this shift.    Medications, Skin, Tubes/Lines: Takes pill whole. Skin okay, ex ecchymotic areas on abdomen.    Swallowing/Nutrition:Blood sugar monitor, carb count.Soft and bite sized/ thin liquids. Recommend soft and bite sized diet (IDDSI 6) and thin liquids (IDSSI 0) with upright position, slow rate, alternate solids and liquids, and small bites/sips.     Bowel/Bladder:Incontinent of bladder.    Psychosocial: Single, lives with sister and nephew. Not working, pt denied having an income and family supports her. Indep PTA but memory/cognitive concerns noted in pt chart from end of last year. Sister helps with finances, medications and higher level tasks. No MH or SA reported by pt. No children.     ADLs/IADLs: Pt is currently CGA transfers and room mobility fww. Min A UB dressing, SBA standing grooming, Mod A LB dressing, and Min A toileting. Performance with ADLs/IADLs below baseline, impacted by R side weakness, R side inattention, impaired balance, and impaired cognition. Goals to advance to Mod IND ADLs and functional mobility, however may need supervision pending cognitive status. ELOS was 14 days from admission.     Mobility:   Bed Mobility: SBA- cues for attn to R UE  Transfer: CGA with FWW- attn to R  "hand  Gait: 100-150' with use of walker and CGA  Stairs: 4x6\" step ups with bilat HR, min A through R UE  Balance: LUNDBERG on 8/3 27/56  Goals for supervision level of mobility with walker at time of discharge, considering impaired cognition. ELOS 14 days.    Cognition/Language:  Mild dysarthria. Mild-mod receptive and expressive language impairments. Auditory comprehension breaks down at abstract sentence level. Speech intelligibility in oral reading task (sentence length) 40% initial attempt (improved to 80% given cue to repeat). Patient also needed cues to increase loudness and slow rate. Generative naming 4-8 items per category given additional time and cues to expand responses.     Community Re-Entry: Not an immediate barrier.    Transportation: Family to provide. Will continue to practice sequencing/safety with car transfers.    Decision maker: self with input from family    Plan of Care and goals reviewed and updated.    Discharge Plan/Recommendations    Fall Precautions: continue    Patient/Family input to goals: Patient is making progress toward goals    Anticipated rehab needs following discharge: Home RN, PT, OT, SLP    Anticipated care giver support after discharge: Will need support from family upon discharge.     Estimated length of stay: 12 days    Overall plan for the patient: Patient will complete rehab over the course of 12 days and go home with family support. Was living with sister and nephew.       Utilization Review and Continued Stay Justification    Medical Necessity Criteria:    For any criteria that is not met, please document reason and plan for discharge, transfer, or modification of plan of care to address.    Requires intensive rehabilitation program to treat functional deficits?: Yes    Requires 3x per week or greater involvement of rehabilitation physician to oversee rehabilitation program?: Yes    Requires rehabilitation nursing interventions?: Yes    Patient is making functional " progress?: Yes    There is a potential for additional functional progress? Yes    Patient is participating in therapy 3 hours per day a minimum of 5 days per week or 15 hours per week in 7 day period?:Yes    Has discharge needs that require coordinated discharge planning approach?:Yes      Barriers/Concerns related to meeting medical necessity criteria:  None    Team Plan to Address Concern:  N/A      Final Physician Sign off    Statement of Approval: I agree with all the recommendations detailed in this document. Delia Maher MD     Patient Goals  Social Work Goals: Confirm discharge recommendations with therapy, coordinate safe discharge plan and remain available to support and assist as needed.    OT Frequency: 60-90 min daily  OT goal: hygiene/grooming: independent  OT goal: upper body dressing: Independent  OT goal: lower body dressing: Modified independent  OT goal: upper body bathing: Supervision/stand-by assist  OT goal: lower body bathing: Supervision/stand-by assist        OT goal: toilet transfer/toileting: Modified independent  OT goal: meal preparation: Supervision/stand-by assist, with simple meal preparation, ambulatory level  OT goal: home management: Supervision/stand-by assist, with light demand household tasks, ambulatory level           OT goal 1: Pt will demo SBA shower transfer using DME/AE prn.  OT goal 2: Pt will demo IND initiation of compensatory strategies for R side inattention in order to improve safety with ADLs and mobility.     PT Frequency: Daily x60-90 min  PT goal: bed mobility: Independent, Supine to/from sit, Rolling, Bridging  PT goal: transfers: Modified independent, Sit to/from stand, Bed to/from chair, Assistive device  PT goal: gait: Modified independent, Greater than 200 feet, Assistive device  PT goal: stairs: Supervision/stand-by assist (flight of stairs with single rail)  PT goal: perform aerobic activity with stable cardiovascular response: continuous activity,  NuStep, 15 minutes  PT goal 1: Pt will be IND with LE strength, balance HEP to improve IND with mobility and reduce risk of future falls.  PT Goal 2: Pt will be able to perform car transfer with SBA and LRAD in order to safely discharge home and access medical appts.  PT Goal 3: Pt will be able to perform floor>furniture transfer with SBA as part of fall recovery training.     SLP Frequency: daily  SLP Swallow Goal:  Safely tolerate diet without signs/symptoms of aspiration: Regular diet, Thin liquids     SLP goal 1: Pt will complete moderate level auditory and written comprehension task siwth  80 % accuracy and min cues  SLP goal 2: Pt will complete moderate level expressive language tasks with 80% accuracy and min cues  SLP goal 3: Pt will implement speaking strategies to improve intelligibility in 80% of opportunities with min cues

## 2021-08-05 NOTE — PROGRESS NOTES
"VS: /72   Pulse 96   Temp 97.8  F (36.6  C) (Oral)   Resp 16   Ht 1.575 m (5' 2\")   Wt 48 kg (105 lb 12.8 oz)   SpO2 96%   BMI 19.35 kg/m  Denies SOB, chest pain, lightheadedness, dizziness and nausea and vomiting. Slow/logical speech.   O2: Room air sat. > 90%.    Output: Incontinent of bladder.   Last BM: 8/4/21   Activity: AX 1 with walker and galt belt.   Skin: Intact.Ex ecchymotic areas in abdomen.   Pain: Denies pain   CMS: CMS and neuro intact.Alert. Disoriented to time, place and situation. Denies numbness and tingling.   Dressing: None in place.   Diet: Soft and bite sized/ thin liquids.BG monitor.   LDA: None   Equipment: Wheel chair, galt belt, and personal belongings.   Plan: Continue with plan of care. Call light within reach.   Additional Info:   at 0214     "

## 2021-08-05 NOTE — PROGRESS NOTES
PT and SW called pt sister and left vm. SW left direct number and asked that sister return the call to get collateral and discussed progress, support and discharge plans. Awaiting return call. Will call nephew in next few days if sister does not return the call.     ABDIAZIZ Rivera   [facility]   Direct Phone: [phone]  I   Pager: [phone]  I  Fax: [phone]

## 2021-08-05 NOTE — PLAN OF CARE
Discharge Planner Post-Acute Rehab OT:      Discharge Plan: Home with sister and adult nephew. Home care vs OP OT pending progress. Will need to confirm home set-up with sister.     Precautions: Falls, impaired cognition     Current Status:  ADLs: CGA transfers and room mobility fww. Min A UB dressing, SBA standing grooming, Mod A LB dressing, and Min A toileting. Dep Toileting hygiene.   IADLs: TBA.   Vision/Cognition: R side inattention. Disoriented, slowed responses, mild difficulty following commands.     Assessment: Performance with ADLs/IADLs below baseline, impacted by R side weakness, R side inattention, impaired balance, and impaired cognition. Pt had good participation in morning ADLs and continue to progress activity muscle strength and ROM in distal RUE. Cont. OT POC.      Other Barriers to Discharge (DME, Family Training, etc):   Caregiver support   Equipment TBD pending progress     -15 mins for pt eating breakfast and nursing medications.

## 2021-08-05 NOTE — PLAN OF CARE
Discharge Planner Post-Acute Rehab SLP:      Discharge Plan: Home with sister and adult nephew. Home care vs OP PT pending progress. Will need to confirm home set-up with sister.     Precautions: Fall, cognition, language deficits, swallowing     Current Status:  Communication:Mild dysarthria. Mild-mod receptive and expressive language tasks  Cognition: Memory deficits noted at baseline per family report. Not formally assessed.  Swallow: Recommend easy to chew (7) diet and thin liquids with upright position, slow rate, alternate solids and liquids, and small bites/sips.      Assessment:  Patient able to tolerate easy to chew diet with thin liquids without any overt s/sx of aspiration. Some mild difficulties noted with feeding 2/2 hemipegia and using left hand. Recommend diet advancement to easy to chew/thin.     Other Barriers to Discharge (Family Training, etc): None anticipated from SLP

## 2021-08-05 NOTE — PROGRESS NOTES
"  [facility]   Acute Rehabilitation Unit  Daily progress note    INTERVAL HISTORY  Paradise Yuan was seen and examined this morning during team rounds.  No acute events reported overnight.  Patient denies any concerns this morning, no pain, shortness of breath, dizziness, nausea, bowel or bladder concerns, sleep or appetite changes.  AM labs revealed stable anemia and Cr.  Still with low urine output but scans without retention.  Encouraged adequate fluid intake.    Functionally, she need contact guard assist primarily with transfers and ambulation with walker, though min assist at times due to right inattention.  She needs min to mod assist for ADLs.  SLP noting dysphagia, mild dysarthria, and cognitive deficits.  Likely to need 24/7 supervision at discharge.  For full functional updates, see team rounds note from today.    MEDICATIONS    aspirin  81 mg Oral Daily     atenolol  25 mg Oral At Bedtime     atorvastatin  40 mg Oral At Bedtime     clopidogrel  75 mg Oral Daily     heparin ANTICOAGULANT  5,000 Units Subcutaneous Q12H     insulin aspart  3 Units Subcutaneous TID w/meals     insulin aspart  1-7 Units Subcutaneous TID AC     insulin aspart  1-5 Units Subcutaneous At Bedtime     insulin glargine  16 Units Subcutaneous At Bedtime     [Held by provider] lisinopril  10 mg Oral BID     pantoprazole  40 mg Oral QAM AC        acetaminophen, glucose **OR** dextrose **OR** glucagon, melatonin, polyethylene glycol, senna-docusate     PHYSICAL EXAM  /65 (BP Location: Left arm)   Pulse 77   Temp 97.8  F (36.6  C) (Oral)   Resp 16   Ht 1.575 m (5' 2\")   Wt 48 kg (105 lb 12.8 oz)   SpO2 98%   BMI 19.35 kg/m    Gen: NAD, flat affect, seated comfortably in wheelchair  HEENT: NC/AT  Cardio: RRR, appears well-perfused  Pulm: non-labored on room air  Abd: soft, non-tender, non-distended  Ext: no edema or calf tenderness bilaterally  Neuro/MSK: disoriented to year, able to " name state/not city, slowed responses, some difficulty following commands.  +right facial droop.  Strength at least 3/5 throughout with weakness and incoordination in right upper and low extremities.  No increased tone noted.    LABS  CBC RESULTS:   Recent Labs   Lab Test 08/05/21  0701 08/03/21  0713 08/02/21  0735   WBC 9.0 8.9 8.0   RBC 3.48* 3.52* 3.53*   HGB 10.0* 10.1* 10.2*   HCT 33.0* 32.7* 33.1*   MCV 95 93 94   MCH 28.7 28.7 28.9   MCHC 30.3* 30.9* 30.8*   RDW 13.8 13.4 13.5    419 407     Last Basic Metabolic Panel:  Recent Labs   Lab Test 08/05/21  1153 08/05/21  0757 08/05/21  0701 08/04/21  0700 08/03/21  0713   NA  --   --  141 142 139   POTASSIUM  --   --  4.4 4.6 4.6   CHLORIDE  --   --  110* 113* 108   CO2  --   --  28 28 29   ANIONGAP  --   --  3 1* 2*   GLC 86 97 102* 125* 116*   BUN  --   --  16 16 23   CR  --   --  0.92 0.99 1.26*   GFRESTIMATED  --   --  62 57* 43*   CHELA  --   --  8.6 8.2* 8.7       Rehabilitation - continue comprehensive acute inpatient rehabilitation program with multidisciplinary approach including therapies, rehab nursing, and physiatry following. See interval history for updates.      ASSESSMENT AND PLAN  Paradise Yuan is a 71 year old right hand dominant female with a past medical history of diabetes mellitus type II, hypertension, hyperlipidemia, CKD stage III, let upper lobectomy, and memory problems who was admitted on 7/22 with subacute left MCA infarction with ongoing right hemiparesis, impaired balance, impaired coordination, right-sided facial weakness, dysarthria, and dysphagia, with hospital course complicated by hypertension, hyperglycemia, anemia, atrial tachycardia, asymptomatic COVID-19 infection, orthostatic hypotension, and incidental finding of left cerebellar meningioma.  She is admitted to acute rehab on 8/2 for multidisciplinary rehabilitation and ongoing medical management.    Subacute left MCA infarction  Presented with 2-week history of  right sided facial droop, partial right hemiparesis, partial expressive aphasia.  MRI of head shows recent infarcts of varying age within the left MCA territory affecting the deep frontal white matter and cortex at the frontoparietal junction.   MRA head shows diminished flow within the left MCA beyond the distal M1 segment, normal MRA of the neck.  - LDL 84, goal <70.  Continue Lipitor 40 mg (increased from PTA 20 mg this admission).  - SBP goal <160, management as below  - Continue dual antiplatelet therapy with ASA 81 mg and Plavix 75 mg daily.  Duration not specified, based on CHANCE and POINT trials, will plan on x3 weeks  - Given multifocal infarcts, neurology recommends outpatient 30-day event monitor.  If unrevealing, consider implantable loop recorder.  - Continue PT/OT/SLP  - Follow up with stroke neurology in 1 month     COVID-19 positive 7/30, suspected false positive  Negative on admission 7/22.  Repeated on 7/30 prior to planned ARU discharge and resulted positive.  Chest x-ray-no acute findings number different to differentiate given background of s/p left upper lobectomy, bronchiectases.  Precautions implemented.  No respiratory symptoms, stable on room air.  Per ID, recommended repeat test 8/3 to rule-out false positive and test returned negative.  Per discussion with them, feel likely 7/30 test represents false positive and recommended remove special precautions.     Meningioma, left posterior fossa  Revealed on imaging this admission.  No intervention needed this admission.  - Follow up with neurosurgery as outpatient     Diabetes mellitus, type II, uncontrolled  Had previously been on metformin, discontinued due to abnormal kidney function.  Had most recently been on Lantus 16 units daily.  Given A1c 10.0 on 7/22, question whether taking insulin as prescribed at home.  Blood glucose well-managed this admisison with Lantus 16 units daily, mealtime Novolog (added 7/30), and sliding scale  insulin.  - Continue Lantus 16 units at bedtime  - Continue Aspart 3 units TID with meals  - Continue medium intensity sliding scale insulin  - Hypoglycemia protocol  - Monitor blood glucose QID AC & HS, adjust insulin regimen as indicated     Essential hypertension  Initially held home lisinopril and atenolol for permissive hypertension.  Per neurology, given subacute stroke and symptoms for 2 weeks, goal to keep SBP <160. Resumed home antihypertensive regimen and BP better controlled.  - Continue atenolol 25 mg daily  - Continue lisinopril 10 mg BID  - Monitor BP and adjust meds as indicated    WILMA   CKD stage III  On admission, Cr increased from 0.9 8/2 to 1.26 8/3.  Suspect prerenal in setting of poor oral intake given low urine output on admission and poor initiation.  Lisinopril held, Lovenox switched to subcutaneous Heparin, received 1L bolus NS.  Cr improved to 0.99.  - Continue to hold lisinopril, BP well-controlled without at this time  - Encourage oral fluids  - Repeat BMP today with Cr stable at 0.92.     Hyperlipidemia  PTA on Lipitor 20 mg.  LDL 84 this admission, increased statin dose.  - Continue Lipitor 40 mg daily     Normocytic anemia  Stable in 13s at admission, most recent Hgb 10.2 on 8/2.  - Hgb stable at 10.0 on 8/5  - Trend CBC     Oral dysphagia  - Continue SLP  - Continue soft and bite-sized diet (IDDSI 6) and thin liquids      H/o left upper lobectomy     Memory problems  Per family, has had progressive memory issues for quite some time.  PCP had referred to neurology/memory clinic for further evaluation and management.  - Consider trial of Aricept while at ARU  - Follow up with neurology as planned    1. Adjustment to disability:  Clinical psychology to eval and treat if indicated  2. FEN: soft and bite-sized diet (IDDSI 6) and thin liquids (IDDSI 0)  3. Bowel: intermittent incontinence, monitor, PRN bowel meds available  4. Bladder: intermittently incontinent, low urine output, PVRs  without evidence of retention  5. DVT Prophylaxis: Lovenox switched to subcutaneous Heparin due to WILMA  6. GI Prophylaxis: Protonix  7. Code: full code  8. Disposition: goal for home  9. ELOS: 14 days  10. Follow up Appointments on Discharge: PCP, stroke neurology (1 month), neurosurgery, PM&R        Patient seen and discussed with Dr. Delia Maher, PM&R Staff Physician    Pari Lazo PA-C  Physical Medicine & Rehabilitation

## 2021-08-06 ENCOUNTER — APPOINTMENT (OUTPATIENT)
Dept: SPEECH THERAPY | Facility: CLINIC | Age: 71
DRG: 057 | End: 2021-08-06
Attending: PHYSICAL MEDICINE & REHABILITATION
Payer: COMMERCIAL

## 2021-08-06 ENCOUNTER — APPOINTMENT (OUTPATIENT)
Dept: OCCUPATIONAL THERAPY | Facility: CLINIC | Age: 71
DRG: 057 | End: 2021-08-06
Attending: PHYSICAL MEDICINE & REHABILITATION
Payer: COMMERCIAL

## 2021-08-06 ENCOUNTER — APPOINTMENT (OUTPATIENT)
Dept: PHYSICAL THERAPY | Facility: CLINIC | Age: 71
DRG: 057 | End: 2021-08-06
Attending: PHYSICAL MEDICINE & REHABILITATION
Payer: COMMERCIAL

## 2021-08-06 LAB
GLUCOSE BLDC GLUCOMTR-MCNC: 100 MG/DL (ref 70–99)
GLUCOSE BLDC GLUCOMTR-MCNC: 109 MG/DL (ref 70–99)
GLUCOSE BLDC GLUCOMTR-MCNC: 113 MG/DL (ref 70–99)
GLUCOSE BLDC GLUCOMTR-MCNC: 175 MG/DL (ref 70–99)
GLUCOSE BLDC GLUCOMTR-MCNC: 97 MG/DL (ref 70–99)
HOLD SPECIMEN: NORMAL
PLATELET # BLD AUTO: 400 10E3/UL (ref 150–450)

## 2021-08-06 PROCEDURE — 250N000013 HC RX MED GY IP 250 OP 250 PS 637: Performed by: PHYSICIAN ASSISTANT

## 2021-08-06 PROCEDURE — 97112 NEUROMUSCULAR REEDUCATION: CPT | Mod: GO

## 2021-08-06 PROCEDURE — 36415 COLL VENOUS BLD VENIPUNCTURE: CPT | Performed by: PHYSICIAN ASSISTANT

## 2021-08-06 PROCEDURE — 99232 SBSQ HOSP IP/OBS MODERATE 35: CPT | Performed by: PHYSICAL MEDICINE & REHABILITATION

## 2021-08-06 PROCEDURE — 92507 TX SP LANG VOICE COMM INDIV: CPT | Mod: GN | Performed by: SPEECH-LANGUAGE PATHOLOGIST

## 2021-08-06 PROCEDURE — 250N000011 HC RX IP 250 OP 636: Performed by: PHYSICIAN ASSISTANT

## 2021-08-06 PROCEDURE — 85049 AUTOMATED PLATELET COUNT: CPT | Performed by: PHYSICIAN ASSISTANT

## 2021-08-06 PROCEDURE — 97535 SELF CARE MNGMENT TRAINING: CPT | Mod: GO

## 2021-08-06 PROCEDURE — 97112 NEUROMUSCULAR REEDUCATION: CPT | Mod: GP | Performed by: PHYSICAL THERAPIST

## 2021-08-06 PROCEDURE — 128N000003 HC R&B REHAB

## 2021-08-06 RX ADMIN — CLOPIDOGREL BISULFATE 75 MG: 75 TABLET, FILM COATED ORAL at 08:45

## 2021-08-06 RX ADMIN — ATENOLOL 25 MG: 25 TABLET ORAL at 21:30

## 2021-08-06 RX ADMIN — INSULIN ASPART 3 UNITS: 100 INJECTION, SOLUTION INTRAVENOUS; SUBCUTANEOUS at 08:45

## 2021-08-06 RX ADMIN — ATORVASTATIN CALCIUM 40 MG: 40 TABLET, FILM COATED ORAL at 21:30

## 2021-08-06 RX ADMIN — PANTOPRAZOLE SODIUM 40 MG: 40 TABLET, DELAYED RELEASE ORAL at 06:53

## 2021-08-06 RX ADMIN — INSULIN GLARGINE 16 UNITS: 100 INJECTION, SOLUTION SUBCUTANEOUS at 21:30

## 2021-08-06 RX ADMIN — ASPIRIN 81 MG: 81 TABLET, COATED ORAL at 08:45

## 2021-08-06 RX ADMIN — HEPARIN SODIUM 5000 UNITS: 5000 INJECTION, SOLUTION INTRAVENOUS; SUBCUTANEOUS at 21:30

## 2021-08-06 RX ADMIN — INSULIN ASPART 3 UNITS: 100 INJECTION, SOLUTION INTRAVENOUS; SUBCUTANEOUS at 12:41

## 2021-08-06 RX ADMIN — HEPARIN SODIUM 5000 UNITS: 5000 INJECTION, SOLUTION INTRAVENOUS; SUBCUTANEOUS at 08:46

## 2021-08-06 RX ADMIN — INSULIN ASPART 3 UNITS: 100 INJECTION, SOLUTION INTRAVENOUS; SUBCUTANEOUS at 17:37

## 2021-08-06 NOTE — PLAN OF CARE
FOCUS/GOAL  Safety management     ASSESSMENT, INTERVENTIONS AND CONTINUING PLAN FOR GOAL:  Pt alert and oriented to self, and situation, confused to time, and place.  Needs anticipated by nursing staff.  Denies pain, cough, sob, chest pain, N/V, N/T.  Pt is diabetic, controlled with scheduled lantus at night, aspart scheduled dose and s/s, no concerns noted with blood sugar.  Pt on heparin no concerns with bleeding noted.  Reg/thin, taking pills whole with water.  Working with therapy.  Nursing will continue to monitor.

## 2021-08-06 NOTE — PLAN OF CARE
Discharge Planner Post-Acute Rehab SLP:      Discharge Plan: Home with sister and adult nephew. Home care vs OP PT pending progress. Will need to confirm home set-up with sister.     Precautions: Fall, cognition, language deficits, swallowing     Current Status:  Communication:Mild dysarthria. Mild-mod receptive and expressive language tasks  Cognition: Memory deficits noted at baseline per family report. Not formally assessed.  Swallow: Recommend easy to chew (7) diet and thin liquids with upright position, slow rate, alternate solids and liquids, and small bites/sips.      Assessment:    worked on word finding, stating item described 70%, verbal phrase completion 80%, responsive naming 10/20 needing repetition at times.?if ESL affects accuracy. Auditory comprehension  2 part commands 3/5, moderate level auditory comprehension yes/no short paragraphs 3/6. Noting difficulty writing with dominant hand, tried left hand able to print but with legibility moderately decreased  Other Barriers to Discharge (Family Training, etc): None anticipated from SLP

## 2021-08-06 NOTE — PLAN OF CARE
"Discharge Planner Post-Acute Rehab PT:      Discharge Plan: Home with sister and adult nephew. Home care vs OP PT pending progress. Split level house with single rail, plus several SHIRAZ.     Precautions: Falls, impaired cognition, R-sided inattn/neglect     Current Status:  Bed Mobility: SBA- cues for attn to R UE  Transfer: CGA with FWW- attn to R hand  Gait: up to 200 ft, primarily CGA/SBA, intermittent min A to manage FWW, turning R  Stairs: 4x6\" step ups with bilat HR, min A through R UE  Balance: LUNDBERG on 8/3: 27/56     Assessment: Impaired cognition and R-sided visual, body neglect remain challenging barriers to PT; however, pt is demonstrating decreased level of A for gait with FWW from previous day. Plan to continue with activities to increase R-sided attn (Dynavision, cross-body reaching, etc.), along with R UE/LE strengthening, balance.     Other Barriers to Discharge (DME, Family Training, etc):   Family training to be scheduled  Equipment TBD  "

## 2021-08-06 NOTE — PLAN OF CARE
FOCUS/GOAL  Medical management    ASSESSMENT, INTERVENTIONS AND CONTINUING PLAN FOR GOAL:    Pt has been sleeping well this shift. Staff to anticipate needs. Alert, disoriented to time and situation. Offered toileting while awake. Continent of bowel and bladder tonight. Had a bm. A1 stand pivot to wheelchair in transfers. Will continue POC.

## 2021-08-06 NOTE — PLAN OF CARE
Discharge Planner Post-Acute Rehab OT:      Discharge Plan: Home with sister and adult nephew. Home care vs OP OT pending progress. Will need to confirm home set-up with sister.     Precautions: Falls, impaired cognition, R inattention, R hemiparesis     Current Status:  ADLs: CGA transfers and room mobility FWW. SBA UB dressing, SBA standing grooming, Mod A LB dressing, and Min A toileting. Dep Toileting hygiene.   IADLs: TBA.   Vision/Cognition: R side inattention. Disoriented, slowed responses, mild difficulty following commands.     Assessment: Continues to participate in E neuro re-ed and training with hand over hand facilitation to incorporate R hand into ADLs. Performance with ADLs/IADLs limited by R side weakness distal>proximal, R side inattention, impaired balance, impaired cognition, and need for significant cues for R side awareness/scanning     Other Barriers to Discharge (DME, Family Training, etc):   Caregiver support   Equipment TBD pending progress      Francisco Campbell  1958  Preferred Contact Number: [phone] ok to leave a detailed message on voicemail.    Patient has decided he would like to proceed forward with an MRI and is requesting referral/order to MRI for left knee.     Please call.

## 2021-08-06 NOTE — PLAN OF CARE
FOCUS/GOAL  Bowel management, Bladder management, and Mobility    ASSESSMENT, INTERVENTIONS AND CONTINUING PLAN FOR GOAL:    Patient is A&Ox3 this shift, disoriented to time. Denies pain, numbness, tingling or discomfort. Patient ambulates with assist of 1 w/ walker and g/b to the bathroom. Continent of bladder, no BM this shift, last BM: 8/3/2021. Needs help ordering meals and setting up,  appetite- 75% for dinner. Fluids and juices encouraged throughout shift. BG: Dinner- 90, HS- 211. VS stable, will continue with POC.

## 2021-08-06 NOTE — PROGRESS NOTES
"  [facility]   Acute Rehabilitation Unit  Daily progress note    INTERVAL HISTORY  Paradise Yuan was seen and examined this morning during rounds.  Per nursing report, no acute events reported overnight.  Patient denies any concerns this morning. Specifically, no pain, shortness of breath, dizziness, nausea, bowel or bladder concerns, or appetite changes. Last BM 8/6.     Functionally, she scored a 27/56 on LUNDBERG balance assessment on 8/3 indicating high falls risk. She is walking up to 200 feet with min assist with FWW. She continues to struggle with right hemineglect, requiring cues for attention to right upper extremity on walker during gait. She exhibits mild dysarthria, with mild-mod receptive and expressive language tasks. She does have memory deficits at baseline. She is tolerating regular diet with thin liquids.     MEDICATIONS    aspirin  81 mg Oral Daily     atenolol  25 mg Oral At Bedtime     atorvastatin  40 mg Oral At Bedtime     clopidogrel  75 mg Oral Daily     heparin ANTICOAGULANT  5,000 Units Subcutaneous Q12H     insulin aspart  3 Units Subcutaneous TID w/meals     insulin aspart  1-7 Units Subcutaneous TID AC     insulin aspart  1-5 Units Subcutaneous At Bedtime     insulin glargine  16 Units Subcutaneous At Bedtime     [Held by provider] lisinopril  10 mg Oral BID     pantoprazole  40 mg Oral QAM AC        acetaminophen, glucose **OR** dextrose **OR** glucagon, melatonin, polyethylene glycol, senna-docusate     PHYSICAL EXAM  /50 (BP Location: Left arm)   Pulse 72   Temp 97.8  F (36.6  C) (Oral)   Resp 16   Ht 1.575 m (5' 2\")   Wt 48 kg (105 lb 12.8 oz)   SpO2 98%   BMI 19.35 kg/m    Gen: NAD, flat affect, seated comfortably in wheelchair  HEENT: NC/AT  Cardio: RRR, appears well-perfused  Pulm: non-labored on room air  Abd: soft, non-tender, non-distended  Ext: no edema or calf tenderness bilaterally  Neuro/MSK: +right facial droop.  Right " hemiparesis with R shoulder abduction 4/5, elbow flexion/ext 4/5, wrist ext 3/5, hand  1/5. She has at least antigravity in all muscle groups of right lower extremity. No increased tone noted in right upper or lower ext.     LABS  CBC RESULTS:   Recent Labs   Lab Test 08/06/21  0549 08/05/21  0701 08/03/21  0713 08/02/21  0735   WBC  --  9.0 8.9 8.0   RBC  --  3.48* 3.52* 3.53*   HGB  --  10.0* 10.1* 10.2*   HCT  --  33.0* 32.7* 33.1*   MCV  --  95 93 94   MCH  --  28.7 28.7 28.9   MCHC  --  30.3* 30.9* 30.8*   RDW  --  13.8 13.4 13.5    414 419 407     Last Basic Metabolic Panel:  Recent Labs   Lab Test 08/06/21  0734 08/06/21  0200 08/05/21  2108 08/05/21  0701 08/04/21  0700 08/03/21  0713   NA  --   --   --  141 142 139   POTASSIUM  --   --   --  4.4 4.6 4.6   CHLORIDE  --   --   --  110* 113* 108   CO2  --   --   --  28 28 29   ANIONGAP  --   --   --  3 1* 2*   * 113* 211* 102* 125* 116*   BUN  --   --   --  16 16 23   CR  --   --   --  0.92 0.99 1.26*   GFRESTIMATED  --   --   --  62 57* 43*   CHELA  --   --   --  8.6 8.2* 8.7       Rehabilitation - continue comprehensive acute inpatient rehabilitation program with multidisciplinary approach including therapies, rehab nursing, and physiatry following. See interval history for updates.      ASSESSMENT AND PLAN  Paradise Yuan is a 71 year old right hand dominant female with a past medical history of diabetes mellitus type II, hypertension, hyperlipidemia, CKD stage III, let upper lobectomy, and memory problems who was admitted on 7/22 with subacute left MCA infarction with ongoing right hemiparesis, impaired balance, impaired coordination, right-sided facial weakness, dysarthria, and dysphagia, with hospital course complicated by hypertension, hyperglycemia, anemia, atrial tachycardia, asymptomatic COVID-19 infection, orthostatic hypotension, and incidental finding of left cerebellar meningioma.  She is admitted to acute rehab on 8/2 for  multidisciplinary rehabilitation and ongoing medical management.    Subacute left MCA infarction  Presented with 2-week history of right sided facial droop, partial right hemiparesis, partial expressive aphasia.  MRI of head shows recent infarcts of varying age within the left MCA territory affecting the deep frontal white matter and cortex at the frontoparietal junction.   MRA head shows diminished flow within the left MCA beyond the distal M1 segment, normal MRA of the neck.  - LDL 84, goal <70.  Continue Lipitor 40 mg (increased from PTA 20 mg this admission).  - SBP goal <160, management as below  - Continue dual antiplatelet therapy with ASA 81 mg and Plavix 75 mg daily.  Duration not specified, based on CHANCE and POINT trials, will plan on x3 weeks  - Given multifocal infarcts, neurology recommends outpatient 30-day event monitor.  If unrevealing, consider implantable loop recorder.  - Continue PT/OT/SLP  - Follow up with stroke neurology in 1 month     COVID-19 positive 7/30, suspected false positive  Negative on admission 7/22.  Repeated on 7/30 prior to planned ARU discharge and resulted positive. Chest x-ray-no acute findings number different to differentiate given background of s/p left upper lobectomy, bronchiectases.  Precautions implemented.  No respiratory symptoms, stable on room air.  Per ID, recommended repeat test 8/3 to rule-out false positive and test returned negative.  Per discussion with them, feel likely 7/30 test represents false positive and recommended remove special precautions.     Meningioma, left posterior fossa  Revealed on imaging this admission.  No intervention needed this admission.  - Follow up with neurosurgery as outpatient     Diabetes mellitus, type II, uncontrolled  Had previously been on metformin, discontinued due to abnormal kidney function.  Had most recently been on Lantus 16 units daily.  Given A1c 10.0 on 7/22, question whether taking insulin as prescribed at home.   Blood glucose well-managed this admisison with Lantus 16 units daily, mealtime Novolog (added 7/30), and sliding scale insulin.  - Continue Lantus 16 units at bedtime  - Continue Aspart 3 units TID with meals  - Continue medium intensity sliding scale insulin  - Hypoglycemia protocol  - Monitor blood glucose QID AC & HS, adjust insulin regimen as indicated     Essential hypertension  Initially held home lisinopril and atenolol for permissive hypertension.  Per neurology, given subacute stroke and symptoms for 2 weeks, goal to keep SBP <160. Resumed home antihypertensive regimen and BP better controlled.  - Continue atenolol 25 mg daily  - Continue lisinopril 10 mg BID  - Monitor BP and adjust meds as indicated    WILMA   CKD stage III  On admission, Cr increased from 0.9 8/2 to 1.26 8/3.  Suspect prerenal in setting of poor oral intake given low urine output on admission and poor initiation.  Lisinopril held, Lovenox switched to subcutaneous Heparin, received 1L bolus NS.  Cr improved to 0.99.  - Continue to hold lisinopril, BP well-controlled without at this time  - Encourage oral fluids  - Repeat BMP on 8/5 with Cr stable at 0.92.     Hyperlipidemia  PTA on Lipitor 20 mg.  LDL 84 this admission, increased statin dose.  - Continue Lipitor 40 mg daily     Normocytic anemia  Stable in 13s at admission, most recent Hgb 10.2 on 8/2.  - Hgb stable at 10.0 on 8/5  - Trend CBC     Oral dysphagia  - Continue SLP  - Continue soft and bite-sized diet (IDDSI 6) and thin liquids      H/o left upper lobectomy     Memory problems  Per family, has had progressive memory issues for quite some time.  PCP had referred to neurology/memory clinic for further evaluation and management.  - Consider trial of Aricept while at ARU  - Follow up with neurology as planned    1. Adjustment to disability:  Clinical psychology to eval and treat if indicated  2. FEN: soft and bite-sized diet (IDDSI 6) and thin liquids (IDDSI 0)  3. Bowel:  intermittent incontinence, monitor, PRN bowel meds available  4. Bladder: intermittently incontinent, low urine output, PVRs without evidence of retention  5. DVT Prophylaxis: Lovenox switched to subcutaneous heparin due to WILMA - although Cr is now improved, will continue subcutaneous heparin.   6. GI Prophylaxis: Protonix  7. Code: full code  8. Disposition: goal for home  9. ELOS: tentative discharge date of 8/17.   10. Follow up Appointments on Discharge: PCP, stroke neurology (1 month), neurosurgery, PM&R      Delia Maher MD   Physical Medicine & Rehabilitation       I spent a total of 25 minutes face-to-face and managing the care of Paradise Yuan. Over 50% of my time was spent counseling the patient and coordinating care. Please see note for details.

## 2021-08-06 NOTE — PLAN OF CARE
Alert & oriented to self and situation, disoriented to time and place. Needs assist of one with walker. Continent of bladder with use of toilet. No BM this shift, LBM 8/3. Appetite <50% for breakfast and lunch with a lot of encouragement. Blood glucose 97, 86 & 88, noon scheduled insulin held, provider updated. Drinking small amounts of fluids with a lot of encouragement. Denies pain or discomfort.

## 2021-08-07 ENCOUNTER — APPOINTMENT (OUTPATIENT)
Dept: PHYSICAL THERAPY | Facility: CLINIC | Age: 71
DRG: 057 | End: 2021-08-07
Attending: PHYSICAL MEDICINE & REHABILITATION
Payer: COMMERCIAL

## 2021-08-07 ENCOUNTER — APPOINTMENT (OUTPATIENT)
Dept: SPEECH THERAPY | Facility: CLINIC | Age: 71
DRG: 057 | End: 2021-08-07
Attending: PHYSICAL MEDICINE & REHABILITATION
Payer: COMMERCIAL

## 2021-08-07 ENCOUNTER — APPOINTMENT (OUTPATIENT)
Dept: OCCUPATIONAL THERAPY | Facility: CLINIC | Age: 71
DRG: 057 | End: 2021-08-07
Attending: PHYSICAL MEDICINE & REHABILITATION
Payer: COMMERCIAL

## 2021-08-07 LAB
GLUCOSE BLDC GLUCOMTR-MCNC: 106 MG/DL (ref 70–99)
GLUCOSE BLDC GLUCOMTR-MCNC: 111 MG/DL (ref 70–99)
GLUCOSE BLDC GLUCOMTR-MCNC: 124 MG/DL (ref 70–99)
GLUCOSE BLDC GLUCOMTR-MCNC: 126 MG/DL (ref 70–99)
GLUCOSE BLDC GLUCOMTR-MCNC: 126 MG/DL (ref 70–99)
GLUCOSE BLDC GLUCOMTR-MCNC: 146 MG/DL (ref 70–99)

## 2021-08-07 PROCEDURE — 128N000003 HC R&B REHAB

## 2021-08-07 PROCEDURE — 97112 NEUROMUSCULAR REEDUCATION: CPT | Mod: GO

## 2021-08-07 PROCEDURE — 250N000013 HC RX MED GY IP 250 OP 250 PS 637: Performed by: PHYSICIAN ASSISTANT

## 2021-08-07 PROCEDURE — 97530 THERAPEUTIC ACTIVITIES: CPT | Mod: GP | Performed by: PHYSICAL THERAPIST

## 2021-08-07 PROCEDURE — 97535 SELF CARE MNGMENT TRAINING: CPT | Mod: GO

## 2021-08-07 PROCEDURE — 92507 TX SP LANG VOICE COMM INDIV: CPT | Mod: GN | Performed by: REHABILITATION PRACTITIONER

## 2021-08-07 PROCEDURE — 97116 GAIT TRAINING THERAPY: CPT | Mod: GP | Performed by: PHYSICAL THERAPIST

## 2021-08-07 PROCEDURE — 97112 NEUROMUSCULAR REEDUCATION: CPT | Mod: GP | Performed by: PHYSICAL THERAPIST

## 2021-08-07 PROCEDURE — 92526 ORAL FUNCTION THERAPY: CPT | Mod: GN | Performed by: REHABILITATION PRACTITIONER

## 2021-08-07 PROCEDURE — 250N000011 HC RX IP 250 OP 636: Performed by: PHYSICIAN ASSISTANT

## 2021-08-07 RX ADMIN — INSULIN ASPART 3 UNITS: 100 INJECTION, SOLUTION INTRAVENOUS; SUBCUTANEOUS at 18:49

## 2021-08-07 RX ADMIN — HEPARIN SODIUM 5000 UNITS: 5000 INJECTION, SOLUTION INTRAVENOUS; SUBCUTANEOUS at 21:20

## 2021-08-07 RX ADMIN — CLOPIDOGREL BISULFATE 75 MG: 75 TABLET, FILM COATED ORAL at 07:43

## 2021-08-07 RX ADMIN — INSULIN ASPART 3 UNITS: 100 INJECTION, SOLUTION INTRAVENOUS; SUBCUTANEOUS at 12:01

## 2021-08-07 RX ADMIN — HEPARIN SODIUM 5000 UNITS: 5000 INJECTION, SOLUTION INTRAVENOUS; SUBCUTANEOUS at 07:44

## 2021-08-07 RX ADMIN — PANTOPRAZOLE SODIUM 40 MG: 40 TABLET, DELAYED RELEASE ORAL at 06:32

## 2021-08-07 RX ADMIN — ATORVASTATIN CALCIUM 40 MG: 40 TABLET, FILM COATED ORAL at 22:17

## 2021-08-07 RX ADMIN — INSULIN GLARGINE 16 UNITS: 100 INJECTION, SOLUTION SUBCUTANEOUS at 22:26

## 2021-08-07 RX ADMIN — INSULIN ASPART 3 UNITS: 100 INJECTION, SOLUTION INTRAVENOUS; SUBCUTANEOUS at 07:55

## 2021-08-07 RX ADMIN — ASPIRIN 81 MG: 81 TABLET, COATED ORAL at 07:43

## 2021-08-07 NOTE — PLAN OF CARE
"Discharge Planner Post-Acute Rehab PT:      Discharge Plan: Home with sister and adult nephew. Home care vs OP PT pending progress. Split level house with single rail, plus several SHIR[place].     Precautions: Falls, impaired cognition, R-sided inattn/neglect     Current Status:  Bed Mobility: SBA- cues for attn to R UE  Transfer: CGA with FWW- attn to R hand  Gait: up to 200 ft, primarily CGA/SBA, intermittent min A to manage FWW, turning R  Stairs: 4x6\" step ups with bilat HR, min A through R UE  Balance: LUNDBERG on 8/3: 27/56     Assessment:  Pt was very tired today and needed motivation to complete tasks.  Transfers, stairs and gait looked better and more stability.  Pt demonstrated intermittent improvement with R sided attention and use.  Pt will continue to progress.     Other Barriers to Discharge (DME, Family Training, etc):   Family training to be scheduled  Equipment TBD  "

## 2021-08-07 NOTE — PLAN OF CARE
XCite stim unit for Activity Based Therapy.   Skilled set up; determined appropriate FES parameters for each muscle group based off of strong tetanic response of muscle test.  Used the following activities from the software library: grasp/release, thumb opposition 3 sets x25 reps each  Intervention and patient response: pt tolerated well and good motor response. Pt attending to R side for most of tasks, required max verbal cues for motor planning, maximizing movements, and attempting to initiate movements. Required facilitation of R wrist/elbow to keep wrist in neutral for tasks  Demo's AROM finger flexion/extension except no AROM in thumb. After Xcite pt demo'd trace mm in thumb adduction  Please see [email or web address] for further details on patient's stimulation parameters.

## 2021-08-07 NOTE — PLAN OF CARE
FOCUS/GOAL  Bladder management, Pain management, Mobility, Cognition/Memory/Judgment/Problem solving, and Safety management    ASSESSMENT, INTERVENTIONS AND CONTINUING PLAN FOR GOAL:  Pt is alert, disoriented to time this shift. Incontinent of large amount of urine x 1. No complaint of pain or discomfort this shift. Up with assist of 1 with walker to the bathroom. Appeared to be sleeping well during rounds. Call light within reach. Bed alarm on for safety. Will continue with POC.

## 2021-08-07 NOTE — PLAN OF CARE
FOCUS/GOAL  Bowel management, Bladder management, and Mobility    ASSESSMENT, INTERVENTIONS AND CONTINUING PLAN FOR GOAL:    Patient is alert and oriented x2, disoriented to place and time. Patient denies pain. Patient ambulates with assist of 1, walker and gate belt to the toilet. Incontinent/ continent of bowel and bladder. Last BM: 8/6/2021. Soft bite sized diet, thin liquids, takes pills whole. VSS, will continue with POC.

## 2021-08-07 NOTE — PLAN OF CARE
FOCUS/GOAL  Safety management     ASSESSMENT, INTERVENTIONS AND CONTINUING PLAN FOR GOAL:  Pt alert and oriented to self, situation, time, confused to place.  Needs anticipated by nursing staff.  A1 pivot for transfers. Denies pain, cough, sob, chest pain, N/V, N/T.  Pt is diabetic, controlled with scheduled lantus at night, aspart scheduled dose and s/s, no concerns noted with blood sugar.  Pt on heparin no concerns with bleeding noted.  LBM 8/6. Reg/thin, taking pills whole with water.  Working with therapy.  Nursing will continue to monitor.

## 2021-08-08 ENCOUNTER — APPOINTMENT (OUTPATIENT)
Dept: OCCUPATIONAL THERAPY | Facility: CLINIC | Age: 71
DRG: 057 | End: 2021-08-08
Attending: PHYSICAL MEDICINE & REHABILITATION
Payer: COMMERCIAL

## 2021-08-08 ENCOUNTER — APPOINTMENT (OUTPATIENT)
Dept: SPEECH THERAPY | Facility: CLINIC | Age: 71
DRG: 057 | End: 2021-08-08
Attending: PHYSICAL MEDICINE & REHABILITATION
Payer: COMMERCIAL

## 2021-08-08 LAB
CREAT SERPL-MCNC: 1.07 MG/DL (ref 0.52–1.04)
GFR SERPL CREATININE-BSD FRML MDRD: 52 ML/MIN/1.73M2
GLUCOSE BLDC GLUCOMTR-MCNC: 125 MG/DL (ref 70–99)
GLUCOSE BLDC GLUCOMTR-MCNC: 140 MG/DL (ref 70–99)
GLUCOSE BLDC GLUCOMTR-MCNC: 176 MG/DL (ref 70–99)
GLUCOSE BLDC GLUCOMTR-MCNC: 183 MG/DL (ref 70–99)
GLUCOSE BLDC GLUCOMTR-MCNC: 87 MG/DL (ref 70–99)
PLATELET # BLD AUTO: 454 10E3/UL (ref 150–450)

## 2021-08-08 PROCEDURE — 99231 SBSQ HOSP IP/OBS SF/LOW 25: CPT | Performed by: PHYSICAL MEDICINE & REHABILITATION

## 2021-08-08 PROCEDURE — 92507 TX SP LANG VOICE COMM INDIV: CPT | Mod: GN | Performed by: SPEECH-LANGUAGE PATHOLOGIST

## 2021-08-08 PROCEDURE — 36415 COLL VENOUS BLD VENIPUNCTURE: CPT | Performed by: PHYSICIAN ASSISTANT

## 2021-08-08 PROCEDURE — 250N000013 HC RX MED GY IP 250 OP 250 PS 637: Performed by: PHYSICIAN ASSISTANT

## 2021-08-08 PROCEDURE — 92507 TX SP LANG VOICE COMM INDIV: CPT | Mod: GN | Performed by: REHABILITATION PRACTITIONER

## 2021-08-08 PROCEDURE — 128N000003 HC R&B REHAB

## 2021-08-08 PROCEDURE — 97535 SELF CARE MNGMENT TRAINING: CPT | Mod: GO

## 2021-08-08 PROCEDURE — 85049 AUTOMATED PLATELET COUNT: CPT | Performed by: PHYSICIAN ASSISTANT

## 2021-08-08 PROCEDURE — 82565 ASSAY OF CREATININE: CPT | Performed by: PHYSICIAN ASSISTANT

## 2021-08-08 PROCEDURE — 250N000011 HC RX IP 250 OP 636: Performed by: PHYSICIAN ASSISTANT

## 2021-08-08 RX ADMIN — ATORVASTATIN CALCIUM 40 MG: 40 TABLET, FILM COATED ORAL at 22:05

## 2021-08-08 RX ADMIN — INSULIN ASPART 3 UNITS: 100 INJECTION, SOLUTION INTRAVENOUS; SUBCUTANEOUS at 17:50

## 2021-08-08 RX ADMIN — INSULIN ASPART 3 UNITS: 100 INJECTION, SOLUTION INTRAVENOUS; SUBCUTANEOUS at 10:04

## 2021-08-08 RX ADMIN — HEPARIN SODIUM 5000 UNITS: 5000 INJECTION, SOLUTION INTRAVENOUS; SUBCUTANEOUS at 22:05

## 2021-08-08 RX ADMIN — CLOPIDOGREL BISULFATE 75 MG: 75 TABLET, FILM COATED ORAL at 08:49

## 2021-08-08 RX ADMIN — HEPARIN SODIUM 5000 UNITS: 5000 INJECTION, SOLUTION INTRAVENOUS; SUBCUTANEOUS at 08:50

## 2021-08-08 RX ADMIN — PANTOPRAZOLE SODIUM 40 MG: 40 TABLET, DELAYED RELEASE ORAL at 06:02

## 2021-08-08 RX ADMIN — INSULIN ASPART 3 UNITS: 100 INJECTION, SOLUTION INTRAVENOUS; SUBCUTANEOUS at 13:18

## 2021-08-08 RX ADMIN — INSULIN GLARGINE 16 UNITS: 100 INJECTION, SOLUTION SUBCUTANEOUS at 22:06

## 2021-08-08 RX ADMIN — ASPIRIN 81 MG: 81 TABLET, COATED ORAL at 08:49

## 2021-08-08 NOTE — PROGRESS NOTES
[facility], [place]   Physical Medicine and Rehabilitation Daily Note           Assessment and Plan of Care:   Paradise Yuan is a 71 year old right hand dominant female with a past medical history of diabetes mellitus type II, hypertension, hyperlipidemia, CKD stage III, let upper lobectomy, and memory problems who was admitted on 7/22 with subacute left MCA infarction with ongoing right hemiparesis, impaired balance, impaired coordination, right-sided facial weakness, dysarthria, and dysphagia, with hospital course complicated by hypertension, hyperglycemia, anemia, atrial tachycardia, orthostatic hypotension, and incidental finding of left cerebellar meningioma.  She is admitted to acute rehab on 8/2 for multidisciplinary rehabilitation and ongoing medical management.    --Vitals stable. Labs: Cr 1.07, , Platelets 454  --Given that creatinine is mildly trending up, plan will be to continue on heparin for DVT ppx rather than switch back to Lovenox.   --Continue ongoing medical management.  --Continue therapies and plan of care.           Interval history:   Patient seen and examined at bedside. Patient does not have any concerns today. Denies fever, chills, CP, SOB, N/V, abdominal pain, new pain or weakness/numbness/tingling.             Physical Exam:   VS:   Vitals:    08/07/21 1721 08/07/21 2217 08/08/21 0741 08/08/21 1634   BP: (!) 142/64 106/60 122/65 119/61   BP Location: Left arm  Left arm Left arm   Pulse: 77 72 81 93   Resp: 18  16 16   Temp: (!) 96  F (35.6  C)  (!) 96.5  F (35.8  C) 96.8  F (36  C)   TempSrc: Oral  Oral Oral   SpO2: 99%  96% 96%   Weight:       Height:         Gen: NAD, resting comfortably in bed  Heart: RRR  Lungs: breathing unlabored on room air  Abd: soft and non-tender  Ext: no edema in BLE, no calf tenderness  MSK/neuro: Alert and oriented, R hemiparesis with at least antigravity in RUE         Data:   Scheduled meds    aspirin  81 mg Oral Daily      atenolol  25 mg Oral At Bedtime     atorvastatin  40 mg Oral At Bedtime     clopidogrel  75 mg Oral Daily     heparin ANTICOAGULANT  5,000 Units Subcutaneous Q12H     insulin aspart  3 Units Subcutaneous TID w/meals     insulin aspart  1-7 Units Subcutaneous TID AC     insulin aspart  1-5 Units Subcutaneous At Bedtime     insulin glargine  16 Units Subcutaneous At Bedtime     [Held by provider] lisinopril  10 mg Oral BID     pantoprazole  40 mg Oral QAM AC       PRN meds:  acetaminophen, glucose **OR** dextrose **OR** glucagon, melatonin, polyethylene glycol, senna-docusate      Delia Maher MD  Physical Medicine and Rehabilitation     I spent a total of 15 minutes face-to-face and managing the care of Paradise Yuan. Over 50% of my time on the unit was spent counseling the patient and coordinating care. Please see note for details.

## 2021-08-08 NOTE — PLAN OF CARE
Writer took over care from [id number].    Pt alert and oriented to self and situation. A of 1 w/ walker. No complaints of pain. Cont/incont, LBM 8/7. BG was 140. Continue w/ plan of care.

## 2021-08-08 NOTE — PLAN OF CARE
Discharge Planner Post-Acute Rehab SLP:      Discharge Plan: Home with sister and adult nephew. Home care vs OP PT pending progress. Will need to confirm home set-up with sister.     Precautions: Fall, cognition, language deficits, swallowing     Current Status:  Communication:Mild dysarthria. Mild-mod receptive and expressive language tasks  Cognition: Memory deficits noted at baseline per family report. Not formally assessed.  Swallow: Recommend easy to chew (7) diet and thin liquids with upright position, slow rate, alternate solids and liquids, and small bites/sips.      Assessment:  Patient demonstrates improved verbal expression in a structured task, however does require cues and prompting. Mild dysarthria, benefits from max auditory cues to implement compensatory speech strategies (loud speech). Reading comprehension at the single word level also continues to improve. Continue focus on moderate level expression and comprehension goals.    Other Barriers to Discharge (Family Training, etc): None anticipated from SLP

## 2021-08-08 NOTE — PLAN OF CARE
"/60   Pulse 72   Temp (!) 96  F (35.6  C) (Oral)   Resp 18   Ht 1.575 m (5' 2\")   Wt 48 kg (105 lb 12.8 oz)   SpO2 99%   BMI 19.35 kg/m    Patient is A&O x2, able to make needs known. LS CTA, BS active. Skin is intact. Denies pain. Transfers with assist of one using a  walker and GB. Incont of urine but cont with bowels. LBM 8/7. Bed alarm. BG's 124, 124. Continue to monitor.  "

## 2021-08-08 NOTE — PLAN OF CARE
FOCUS/GOAL  Bladder management, Pain management, Mobility, Cognition/Memory/Judgment/Problem solving, and Safety management    ASSESSMENT, INTERVENTIONS AND CONTINUING PLAN FOR GOAL:  Pt is alert, disoriented to time and place. Continent of bladder this shift. Up with assist of 1 with walker to the bathroom. Denied pain or discomfort overnight. Not consistent with call light use, anticipate pt needs. Bed alarm on for safety. Will continue with POC.

## 2021-08-08 NOTE — PLAN OF CARE
Patient is Alert, and oriented to self. A1 walker and gait belt. LBM yesterday. Ate well for breakfast and lunch. BG were 125 and 183. VSS. Continue with POC.

## 2021-08-08 NOTE — PLAN OF CARE
Discharge Planner Post-Acute Rehab OT:      Discharge Plan: Home with sister and adult nephew. Home care vs OP OT pending progress. Will need to confirm home set-up with sister.     Precautions: Falls, impaired cognition, R inattention, R hemiparesis     Current Status:  ADLs: CGA transfers and room mobility FWW. SBA UB dressing, SBA standing grooming, min  A LB dressing, and cga toileting. sba Toileting hygiene sitting.   IADLs: TBA.   Vision/Cognition: R side inattention. Disoriented, slowed responses, mild difficulty following commands.     Assessment:pt increase use of r u/e as active assist with g/h standing and dressing. Pt increased g/h, U/B and L/B dressing and toilet hygiene    Other Barriers to Discharge (DME, Family Training, etc):   Caregiver support   Equipment TBD pending progress

## 2021-08-09 ENCOUNTER — APPOINTMENT (OUTPATIENT)
Dept: OCCUPATIONAL THERAPY | Facility: CLINIC | Age: 71
DRG: 057 | End: 2021-08-09
Attending: PHYSICAL MEDICINE & REHABILITATION
Payer: COMMERCIAL

## 2021-08-09 ENCOUNTER — APPOINTMENT (OUTPATIENT)
Dept: PHYSICAL THERAPY | Facility: CLINIC | Age: 71
DRG: 057 | End: 2021-08-09
Attending: PHYSICAL MEDICINE & REHABILITATION
Payer: COMMERCIAL

## 2021-08-09 ENCOUNTER — APPOINTMENT (OUTPATIENT)
Dept: SPEECH THERAPY | Facility: CLINIC | Age: 71
DRG: 057 | End: 2021-08-09
Attending: PHYSICAL MEDICINE & REHABILITATION
Payer: COMMERCIAL

## 2021-08-09 LAB
ANION GAP SERPL CALCULATED.3IONS-SCNC: 2 MMOL/L (ref 3–14)
BUN SERPL-MCNC: 17 MG/DL (ref 7–30)
CALCIUM SERPL-MCNC: 8.9 MG/DL (ref 8.5–10.1)
CHLORIDE BLD-SCNC: 106 MMOL/L (ref 94–109)
CO2 SERPL-SCNC: 30 MMOL/L (ref 20–32)
CREAT SERPL-MCNC: 1.11 MG/DL (ref 0.52–1.04)
ERYTHROCYTE [DISTWIDTH] IN BLOOD BY AUTOMATED COUNT: 14.3 % (ref 10–15)
GFR SERPL CREATININE-BSD FRML MDRD: 50 ML/MIN/1.73M2
GLUCOSE BLD-MCNC: 129 MG/DL (ref 70–99)
GLUCOSE BLDC GLUCOMTR-MCNC: 134 MG/DL (ref 70–99)
GLUCOSE BLDC GLUCOMTR-MCNC: 141 MG/DL (ref 70–99)
GLUCOSE BLDC GLUCOMTR-MCNC: 175 MG/DL (ref 70–99)
GLUCOSE BLDC GLUCOMTR-MCNC: 96 MG/DL (ref 70–99)
HCT VFR BLD AUTO: 32.2 % (ref 35–47)
HGB BLD-MCNC: 10.2 G/DL (ref 11.7–15.7)
MAGNESIUM SERPL-MCNC: 2.6 MG/DL (ref 1.6–2.3)
MCH RBC QN AUTO: 29.5 PG (ref 26.5–33)
MCHC RBC AUTO-ENTMCNC: 31.7 G/DL (ref 31.5–36.5)
MCV RBC AUTO: 93 FL (ref 78–100)
PLATELET # BLD AUTO: 422 10E3/UL (ref 150–450)
POTASSIUM BLD-SCNC: 4.8 MMOL/L (ref 3.4–5.3)
RBC # BLD AUTO: 3.46 10E6/UL (ref 3.8–5.2)
SODIUM SERPL-SCNC: 138 MMOL/L (ref 133–144)
WBC # BLD AUTO: 8.7 10E3/UL (ref 4–11)

## 2021-08-09 PROCEDURE — 92507 TX SP LANG VOICE COMM INDIV: CPT | Mod: GN

## 2021-08-09 PROCEDURE — 250N000011 HC RX IP 250 OP 636: Performed by: PHYSICIAN ASSISTANT

## 2021-08-09 PROCEDURE — 36415 COLL VENOUS BLD VENIPUNCTURE: CPT | Performed by: PHYSICIAN ASSISTANT

## 2021-08-09 PROCEDURE — 128N000003 HC R&B REHAB

## 2021-08-09 PROCEDURE — 250N000013 HC RX MED GY IP 250 OP 250 PS 637: Performed by: PHYSICIAN ASSISTANT

## 2021-08-09 PROCEDURE — 85027 COMPLETE CBC AUTOMATED: CPT | Performed by: PHYSICIAN ASSISTANT

## 2021-08-09 PROCEDURE — 97112 NEUROMUSCULAR REEDUCATION: CPT | Mod: GP

## 2021-08-09 PROCEDURE — 97112 NEUROMUSCULAR REEDUCATION: CPT | Mod: GO | Performed by: OCCUPATIONAL THERAPIST

## 2021-08-09 PROCEDURE — 80048 BASIC METABOLIC PNL TOTAL CA: CPT | Performed by: PHYSICIAN ASSISTANT

## 2021-08-09 PROCEDURE — 97116 GAIT TRAINING THERAPY: CPT | Mod: GP

## 2021-08-09 PROCEDURE — 83735 ASSAY OF MAGNESIUM: CPT | Performed by: PHYSICIAN ASSISTANT

## 2021-08-09 PROCEDURE — 97530 THERAPEUTIC ACTIVITIES: CPT | Mod: GP

## 2021-08-09 RX ADMIN — PANTOPRAZOLE SODIUM 40 MG: 40 TABLET, DELAYED RELEASE ORAL at 06:08

## 2021-08-09 RX ADMIN — ATENOLOL 25 MG: 25 TABLET ORAL at 21:12

## 2021-08-09 RX ADMIN — HEPARIN SODIUM 5000 UNITS: 5000 INJECTION, SOLUTION INTRAVENOUS; SUBCUTANEOUS at 07:57

## 2021-08-09 RX ADMIN — INSULIN ASPART 3 UNITS: 100 INJECTION, SOLUTION INTRAVENOUS; SUBCUTANEOUS at 12:57

## 2021-08-09 RX ADMIN — INSULIN GLARGINE 16 UNITS: 100 INJECTION, SOLUTION SUBCUTANEOUS at 22:04

## 2021-08-09 RX ADMIN — CLOPIDOGREL BISULFATE 75 MG: 75 TABLET, FILM COATED ORAL at 07:57

## 2021-08-09 RX ADMIN — ASPIRIN 81 MG: 81 TABLET, COATED ORAL at 07:57

## 2021-08-09 RX ADMIN — INSULIN ASPART 3 UNITS: 100 INJECTION, SOLUTION INTRAVENOUS; SUBCUTANEOUS at 17:31

## 2021-08-09 RX ADMIN — HEPARIN SODIUM 5000 UNITS: 5000 INJECTION, SOLUTION INTRAVENOUS; SUBCUTANEOUS at 21:13

## 2021-08-09 RX ADMIN — INSULIN ASPART 3 UNITS: 100 INJECTION, SOLUTION INTRAVENOUS; SUBCUTANEOUS at 07:59

## 2021-08-09 RX ADMIN — ATORVASTATIN CALCIUM 40 MG: 40 TABLET, FILM COATED ORAL at 21:12

## 2021-08-09 ASSESSMENT — MIFFLIN-ST. JEOR: SCORE: 945.42

## 2021-08-09 NOTE — PLAN OF CARE
FOCUS/GOAL  Bladder management, Pain management, Mobility, Cognition/Memory/Judgment/Problem solving, and Safety management    ASSESSMENT, INTERVENTIONS AND CONTINUING PLAN FOR GOAL:  Pt is alert, disoriented to time and place. Continent and incontinent of bladder this shift. Up with assist of 1 with walker to the bathroom. Denied pain or discomfort this shift. Appeared to be sleeping well during rounds. Call light within reach. Bed alarm on for safety. Will continue with POC.

## 2021-08-09 NOTE — PROGRESS NOTES
"  [facility]   Acute Rehabilitation Unit  Daily progress note    INTERVAL HISTORY  Paradise Yuan was seen and examined this morning during rounds.  Per nursing report, no acute events reported overnight.  Patient denies any concerns this morning. Specifically, no pain, shortness of breath, dizziness, nausea, bowel or bladder concerns, or appetite changes. Will need to continue PO fluid intake.  Will obtain repeat BMP in AM.     Functional  SLP:  Patient demonstrates improved verbal expression in a structured task, however does require cues and prompting. Mild dysarthria, benefits from max auditory cues to implement compensatory speech strategies (loud speech). Reading comprehension at the single word level also continues to improve. Continue focus on moderate level expression and comprehension goals.     ROS: 10 point ROS neg other than the symptoms noted above in the HPI.        MEDICATIONS    aspirin  81 mg Oral Daily     atenolol  25 mg Oral At Bedtime     atorvastatin  40 mg Oral At Bedtime     clopidogrel  75 mg Oral Daily     heparin ANTICOAGULANT  5,000 Units Subcutaneous Q12H     insulin aspart  3 Units Subcutaneous TID w/meals     insulin aspart  1-7 Units Subcutaneous TID AC     insulin aspart  1-5 Units Subcutaneous At Bedtime     insulin glargine  16 Units Subcutaneous At Bedtime     [Held by provider] lisinopril  10 mg Oral BID     pantoprazole  40 mg Oral QAM AC        acetaminophen, glucose **OR** dextrose **OR** glucagon, melatonin, polyethylene glycol, senna-docusate     PHYSICAL EXAM  BP (!) 144/64 (BP Location: Left arm)   Pulse 80   Temp 97.9  F (36.6  C) (Oral)   Resp 20   Ht 1.575 m (5' 2\")   Wt 48.2 kg (106 lb 4.8 oz)   SpO2 95%   BMI 19.44 kg/m    Gen: NAD, flat affect, seated comfortably in wheelchair  HEENT: NC/AT  Cardio: RRR, appears well-perfused  Pulm: non-labored on room air  Abd: soft, non-tender, non-distended  Ext: no edema or calf " tenderness bilaterally  Neuro/MSK: +right facial droop.  Right hemiparesis with R shoulder abduction 4/5, elbow flexion/ext 4/5, wrist ext 3/5, hand  1/5. She has at least antigravity in all muscle groups of right lower extremity. No increased tone noted in right upper or lower ext.     LABS  CBC RESULTS:   Recent Labs   Lab Test 08/09/21  0706 08/08/21  0634 08/06/21  0549 08/05/21  0701 08/03/21  0713   WBC 8.7  --   --  9.0 8.9   RBC 3.46*  --   --  3.48* 3.52*   HGB 10.2*  --   --  10.0* 10.1*   HCT 32.2*  --   --  33.0* 32.7*   MCV 93  --   --  95 93   MCH 29.5  --   --  28.7 28.7   MCHC 31.7  --   --  30.3* 30.9*   RDW 14.3  --   --  13.8 13.4    454* 400 414 419     Last Basic Metabolic Panel:  Recent Labs   Lab Test 08/09/21  0706 08/09/21  0209 08/08/21  2131 08/08/21  0634 08/05/21  0701 08/04/21  0700     --   --   --  141 142   POTASSIUM 4.8  --   --   --  4.4 4.6   CHLORIDE 106  --   --   --  110* 113*   CO2 30  --   --   --  28 28   ANIONGAP 2*  --   --   --  3 1*   * 96 176*  --  102* 125*   BUN 17  --   --   --  16 16   CR 1.11*  --   --  1.07* 0.92 0.99   GFRESTIMATED 50*  --   --  52* 62 57*   CHELA 8.9  --   --   --  8.6 8.2*       Rehabilitation - continue comprehensive acute inpatient rehabilitation program with multidisciplinary approach including therapies, rehab nursing, and physiatry following. See interval history for updates.      ASSESSMENT AND PLAN  Paradise Yuan is a 71 year old right hand dominant female with a past medical history of diabetes mellitus type II, hypertension, hyperlipidemia, CKD stage III, let upper lobectomy, and memory problems who was admitted on 7/22 with subacute left MCA infarction with ongoing right hemiparesis, impaired balance, impaired coordination, right-sided facial weakness, dysarthria, and dysphagia, with hospital course complicated by hypertension, hyperglycemia, anemia, atrial tachycardia, asymptomatic COVID-19 infection,  orthostatic hypotension, and incidental finding of left cerebellar meningioma.  She is admitted to acute rehab on 8/2 for multidisciplinary rehabilitation and ongoing medical management.    Subacute left MCA infarction  Presented with 2-week history of right sided facial droop, partial right hemiparesis, partial expressive aphasia.  MRI of head shows recent infarcts of varying age within the left MCA territory affecting the deep frontal white matter and cortex at the frontoparietal junction.   MRA head shows diminished flow within the left MCA beyond the distal M1 segment, normal MRA of the neck.  - LDL 84, goal <70.  Continue Lipitor 40 mg (increased from PTA 20 mg this admission).  - SBP goal <160, management as below  - Continue dual antiplatelet therapy with ASA 81 mg and Plavix 75 mg daily.  Duration not specified, based on CHANCE and POINT trials, will plan on x3 weeks  - Given multifocal infarcts, neurology recommends outpatient 30-day event monitor.  If unrevealing, consider implantable loop recorder.  - Continue PT/OT/SLP  - Follow up with stroke neurology in 1 month     COVID-19 positive 7/30, suspected false positive  Negative on admission 7/22.  Repeated on 7/30 prior to planned ARU discharge and resulted positive. Chest x-ray-no acute findings number different to differentiate given background of s/p left upper lobectomy, bronchiectases.  Precautions implemented.  No respiratory symptoms, stable on room air.  Per ID, recommended repeat test 8/3 to rule-out false positive and test returned negative.  Per discussion with them, feel likely 7/30 test represents false positive and recommended remove special precautions.     Meningioma, left posterior fossa  Revealed on imaging this admission.  No intervention needed this admission.  - Follow up with neurosurgery as outpatient     Diabetes mellitus, type II, uncontrolled  Had previously been on metformin, discontinued due to abnormal kidney function.  Had  most recently been on Lantus 16 units daily.  Given A1c 10.0 on 7/22, question whether taking insulin as prescribed at home.  Blood glucose well-managed this admisison with Lantus 16 units daily, mealtime Novolog (added 7/30), and sliding scale insulin.  - Continue Lantus 16 units at bedtime  - Continue Aspart 3 units TID with meals  - Continue medium intensity sliding scale insulin  - Hypoglycemia protocol  - Monitor blood glucose QID AC & HS, adjust insulin regimen as indicated     Essential hypertension  Initially held home lisinopril and atenolol for permissive hypertension.  Per neurology, given subacute stroke and symptoms for 2 weeks, goal to keep SBP <160. Resumed home antihypertensive regimen and BP better controlled.  - Continue atenolol 25 mg daily  - Continue lisinopril 10 mg BID  - Monitor BP and adjust meds as indicated    WILMA   CKD stage III  On admission, Cr increased from 0.9 8/2 to 1.26 8/3.  Suspect prerenal in setting of poor oral intake given low urine output on admission and poor initiation.  Lisinopril held, Lovenox switched to subcutaneous Heparin, received 1L bolus NS.  Cr improved to 0.99.  - Continue to hold lisinopril, BP well-controlled without at this time  - Encourage oral fluids  - Repeat BMP on 8/5 with Cr stable at 0.92 this am 1.11, will continue to encourage fluids and monitor.      Hyperlipidemia  PTA on Lipitor 20 mg.  LDL 84 this admission, increased statin dose.  - Continue Lipitor 40 mg daily     Normocytic anemia  Stable in 13s at admission, most recent Hgb 10.2 on 8/2.  - Hgb stable at 10.0 on 8/5 and 10.2 on 8/9  - Trend CBC     Oral dysphagia  - Continue SLP  - Continue soft and bite-sized diet (IDDSI 6) and thin liquids      H/o left upper lobectomy     Memory problems  Per family, has had progressive memory issues for quite some time.  PCP had referred to neurology/memory clinic for further evaluation and management.  - Consider trial of Aricept while at ARU  - Follow  up with neurology as planned    1. Adjustment to disability:  Clinical psychology to eval and treat if indicated  2. FEN: soft and bite-sized diet (IDDSI 6) and thin liquids (IDDSI 0)  3. Bowel: intermittent incontinence, monitor, PRN bowel meds available  4. Bladder: intermittently incontinent, low urine output, PVRs without evidence of retention  5. DVT Prophylaxis: Lovenox switched to subcutaneous heparin due to WILMA - although Cr is now improved, will continue subcutaneous heparin.   6. GI Prophylaxis: Protonix  7. Code: full code  8. Disposition: goal for home  9. ELOS: tentative discharge date of 8/17.   10. Follow up Appointments on Discharge: PCP, stroke neurology (1 month), neurosurgery, PM&R      Williams Benitez, DO  Physical Medicine & Rehabilitation       I spent a total of 25 minutes face-to-face and managing the care of Paradise Yuan. Over 50% of my time was spent counseling the patient and coordinating care. Please see note for details.

## 2021-08-09 NOTE — PLAN OF CARE
"Discharge Planner Post-Acute Rehab PT:      Discharge Plan: Home with sister and adult nephew. Home care vs OP PT pending progress. Split level house with single rail, plus several SHIRAZ.     Precautions: Falls, impaired cognition, R-sided inattn/neglect     Current Status:  Bed Mobility: SBA- cues for attn to R UE  Transfer: CGA with WBQC  Gait: up to 200' with WBQC and foot-up. Heavy cueing to turn R side, often will do a full [place] to L. Poor FWW management requiring up to mod-A to manage.  Stairs: 4x6\" step ups with bilat HR, min A through R UE  Balance: LUNDBERG on 8/3: 27/56     Assessment:  Changed AD to WBQC today due to very poor walker management. Benefiting from foot-up for R foot clearance. Continue to progress R-sided attention, strength, stairs.     Other Barriers to Discharge (DME, Family Training, etc):   Family training to be scheduled  Equipment TBD - likely WBQC, foot up  "

## 2021-08-09 NOTE — PLAN OF CARE
Discharge Planner Post-Acute Rehab SLP:      Discharge Plan: Home with sister and adult nephew. Home care vs OP PT pending progress. Will need to confirm home set-up with sister.     Precautions: Fall, cognition, language deficits, swallowing     Current Status:  Communication:Mild dysarthria. Mild-mod receptive and expressive language tasks  Cognition: Memory deficits noted at baseline per family report. Not formally assessed.  Swallow: Recommend easy to chew (7) diet and thin liquids with upright position, slow rate, alternate solids and liquids, and small bites/sips.      Assessment:  Pt completed fill in the blank language based task. She completed correctly in 14/20 opportunities and the errors made were felt to be secondary to English as a second language vs language expression errors. Persist mild dysarthria with some lack of awareness and SLP needing to ask for repetitions, minimal self corrections     Other Barriers to Discharge (Family Training, etc): None anticipated from SLP

## 2021-08-09 NOTE — PLAN OF CARE
Pt alert and oriented to self and situation. VSS. No complaints of shortness of breath or chest pain. Denied pain. Up with one assist. Easy to chew diet, thin liquids.Takes pills whole. Continent during this shift. BS: 176 at bedtime. Novolog not given. Skin intact. Bed alarm on for pt safety. Able to make her needs known. Will continue with plan of care.

## 2021-08-10 ENCOUNTER — APPOINTMENT (OUTPATIENT)
Dept: SPEECH THERAPY | Facility: CLINIC | Age: 71
DRG: 057 | End: 2021-08-10
Attending: PHYSICAL MEDICINE & REHABILITATION
Payer: COMMERCIAL

## 2021-08-10 ENCOUNTER — APPOINTMENT (OUTPATIENT)
Dept: OCCUPATIONAL THERAPY | Facility: CLINIC | Age: 71
DRG: 057 | End: 2021-08-10
Attending: PHYSICAL MEDICINE & REHABILITATION
Payer: COMMERCIAL

## 2021-08-10 ENCOUNTER — APPOINTMENT (OUTPATIENT)
Dept: PHYSICAL THERAPY | Facility: CLINIC | Age: 71
DRG: 057 | End: 2021-08-10
Attending: PHYSICAL MEDICINE & REHABILITATION
Payer: COMMERCIAL

## 2021-08-10 LAB
ANION GAP SERPL CALCULATED.3IONS-SCNC: 6 MMOL/L (ref 3–14)
BUN SERPL-MCNC: 19 MG/DL (ref 7–30)
CALCIUM SERPL-MCNC: 9.1 MG/DL (ref 8.5–10.1)
CHLORIDE BLD-SCNC: 105 MMOL/L (ref 94–109)
CO2 SERPL-SCNC: 28 MMOL/L (ref 20–32)
CREAT SERPL-MCNC: 1.19 MG/DL (ref 0.52–1.04)
GFR SERPL CREATININE-BSD FRML MDRD: 46 ML/MIN/1.73M2
GLUCOSE BLD-MCNC: 138 MG/DL (ref 70–99)
GLUCOSE BLDC GLUCOMTR-MCNC: 106 MG/DL (ref 70–99)
GLUCOSE BLDC GLUCOMTR-MCNC: 112 MG/DL (ref 70–99)
GLUCOSE BLDC GLUCOMTR-MCNC: 115 MG/DL (ref 70–99)
GLUCOSE BLDC GLUCOMTR-MCNC: 117 MG/DL (ref 70–99)
GLUCOSE BLDC GLUCOMTR-MCNC: 142 MG/DL (ref 70–99)
POTASSIUM BLD-SCNC: 4.9 MMOL/L (ref 3.4–5.3)
SARS-COV-2 RNA RESP QL NAA+PROBE: NEGATIVE
SODIUM SERPL-SCNC: 139 MMOL/L (ref 133–144)

## 2021-08-10 PROCEDURE — U0003 INFECTIOUS AGENT DETECTION BY NUCLEIC ACID (DNA OR RNA); SEVERE ACUTE RESPIRATORY SYNDROME CORONAVIRUS 2 (SARS-COV-2) (CORONAVIRUS DISEASE [COVID-19]), AMPLIFIED PROBE TECHNIQUE, MAKING USE OF HIGH THROUGHPUT TECHNOLOGIES AS DESCRIBED BY CMS-2020-01-R: HCPCS | Performed by: PHYSICAL MEDICINE & REHABILITATION

## 2021-08-10 PROCEDURE — 250N000013 HC RX MED GY IP 250 OP 250 PS 637: Performed by: PHYSICIAN ASSISTANT

## 2021-08-10 PROCEDURE — 97535 SELF CARE MNGMENT TRAINING: CPT | Mod: GO

## 2021-08-10 PROCEDURE — 97110 THERAPEUTIC EXERCISES: CPT | Mod: GP

## 2021-08-10 PROCEDURE — 99232 SBSQ HOSP IP/OBS MODERATE 35: CPT | Performed by: PHYSICAL MEDICINE & REHABILITATION

## 2021-08-10 PROCEDURE — 36415 COLL VENOUS BLD VENIPUNCTURE: CPT | Performed by: PHYSICAL MEDICINE & REHABILITATION

## 2021-08-10 PROCEDURE — 128N000003 HC R&B REHAB

## 2021-08-10 PROCEDURE — 80048 BASIC METABOLIC PNL TOTAL CA: CPT | Performed by: PHYSICAL MEDICINE & REHABILITATION

## 2021-08-10 PROCEDURE — 97116 GAIT TRAINING THERAPY: CPT | Mod: GP

## 2021-08-10 PROCEDURE — 250N000011 HC RX IP 250 OP 636: Performed by: PHYSICIAN ASSISTANT

## 2021-08-10 PROCEDURE — 92507 TX SP LANG VOICE COMM INDIV: CPT | Mod: GN

## 2021-08-10 RX ADMIN — HEPARIN SODIUM 5000 UNITS: 5000 INJECTION, SOLUTION INTRAVENOUS; SUBCUTANEOUS at 21:41

## 2021-08-10 RX ADMIN — INSULIN ASPART 3 UNITS: 100 INJECTION, SOLUTION INTRAVENOUS; SUBCUTANEOUS at 08:15

## 2021-08-10 RX ADMIN — HEPARIN SODIUM 5000 UNITS: 5000 INJECTION, SOLUTION INTRAVENOUS; SUBCUTANEOUS at 08:15

## 2021-08-10 RX ADMIN — PANTOPRAZOLE SODIUM 40 MG: 40 TABLET, DELAYED RELEASE ORAL at 06:33

## 2021-08-10 RX ADMIN — INSULIN ASPART 3 UNITS: 100 INJECTION, SOLUTION INTRAVENOUS; SUBCUTANEOUS at 17:56

## 2021-08-10 RX ADMIN — INSULIN ASPART 3 UNITS: 100 INJECTION, SOLUTION INTRAVENOUS; SUBCUTANEOUS at 12:37

## 2021-08-10 RX ADMIN — ASPIRIN 81 MG: 81 TABLET, COATED ORAL at 08:15

## 2021-08-10 RX ADMIN — CLOPIDOGREL BISULFATE 75 MG: 75 TABLET, FILM COATED ORAL at 08:15

## 2021-08-10 RX ADMIN — ATORVASTATIN CALCIUM 40 MG: 40 TABLET, FILM COATED ORAL at 21:41

## 2021-08-10 RX ADMIN — INSULIN GLARGINE 16 UNITS: 100 INJECTION, SOLUTION SUBCUTANEOUS at 21:41

## 2021-08-10 NOTE — PLAN OF CARE
FOCUS/GOAL  Bowel management, Bladder management, Nutrition/Feeding/Swallowing precautions, Mobility, and Cognition/Memory/Judgment/Problem solving    ASSESSMENT, INTERVENTIONS AND CONTINUING PLAN FOR GOAL:    Patient alert but not oriented to time, was able to express place and situation by was unsure of day of the week and date of the month. Speech was slow. Patient denies pain, nausea, numbness, tingling, SOB, and chest pain. Patient cont of urine. No BM during shift, LBM 08/09/21. Bgs stable, both sliding scale Novolog insulins were not given due to not meeting order parameters. Patient Ax1 with gait belt and walker. Easy to chest diet and thin liquids. Asymptomatic COVID-19 swab collected and sent to lab. VSS. Continue POC.

## 2021-08-10 NOTE — PROGRESS NOTES
"  [facility]   Acute Rehabilitation Unit  Daily progress note    INTERVAL HISTORY  Paradise Yuan was seen and examined this morning during rounds.  Per nursing report, no acute events reported overnight.  Patient denies any concerns this morning, no pain, shortness of breath, dizziness, nausea, bowel or bladder concerns, or appetite changes. Will need to continue PO fluid intake as Cr is 1.19 from 1.11.  She is participating well in therapy program.  Her Right hand coordination and strength is improving, but still goals to make.     Functional  OT:  ADLs: CGA transfers and room mobility FWW. SBA UB dressing, SBA standing grooming, min  A LB dressing, and cga toileting. sba Toileting hygiene sitting.   IADLs: TBA.   Vision/Cognition: R side inattention. Disoriented, slowed responses, mild difficulty following commands.        ROS: 10 point ROS neg other than the symptoms noted above in the HPI.        MEDICATIONS    aspirin  81 mg Oral Daily     atenolol  25 mg Oral At Bedtime     atorvastatin  40 mg Oral At Bedtime     clopidogrel  75 mg Oral Daily     heparin ANTICOAGULANT  5,000 Units Subcutaneous Q12H     insulin aspart  3 Units Subcutaneous TID w/meals     insulin aspart  1-7 Units Subcutaneous TID AC     insulin aspart  1-5 Units Subcutaneous At Bedtime     insulin glargine  16 Units Subcutaneous At Bedtime     [Held by provider] lisinopril  10 mg Oral BID     pantoprazole  40 mg Oral QAM AC        acetaminophen, glucose **OR** dextrose **OR** glucagon, melatonin, polyethylene glycol, senna-docusate     PHYSICAL EXAM  /60 (BP Location: Left arm)   Pulse 70   Temp (!) 95.7  F (35.4  C) (Oral)   Resp 18   Ht 1.575 m (5' 2\")   Wt 48.2 kg (106 lb 4.8 oz)   SpO2 98%   BMI 19.44 kg/m    Gen: NAD, seated comfortably in wheelchair, pleasant   HEENT: NC/AT  Cardio: RRR, appears well-perfused  Pulm: non-labored on room air  Abd: soft, non-tender, non-distended  Ext: no " edema or calf tenderness bilaterally  Neuro/MSK: +right facial droop.  Right hemiparesis with R shoulder abduction 4/5, elbow flexion/ext 4/5, wrist ext 3/5, hand  1/5. She has at least antigravity in all muscle groups of right lower extremity. No increased tone noted in right upper or lower ext.     LABS  CBC RESULTS:   Recent Labs   Lab Test 08/09/21  0706 08/08/21  0634 08/06/21  0549 08/05/21  0701 08/03/21  0713   WBC 8.7  --   --  9.0 8.9   RBC 3.46*  --   --  3.48* 3.52*   HGB 10.2*  --   --  10.0* 10.1*   HCT 32.2*  --   --  33.0* 32.7*   MCV 93  --   --  95 93   MCH 29.5  --   --  28.7 28.7   MCHC 31.7  --   --  30.3* 30.9*   RDW 14.3  --   --  13.8 13.4    454* 400 414 419     Last Basic Metabolic Panel:  Recent Labs   Lab Test 08/10/21  0726 08/10/21  0706 08/10/21  0207 08/09/21  0706 08/08/21  0634 08/05/21  0701   NA  --  139  --  138  --  141   POTASSIUM  --  4.9  --  4.8  --  4.4   CHLORIDE  --  105  --  106  --  110*   CO2  --  28  --  30  --  28   ANIONGAP  --  6  --  2*  --  3   * 138* 112* 129*  --  102*   BUN  --  19  --  17  --  16   CR  --  1.19*  --  1.11* 1.07* 0.92   GFRESTIMATED  --  46*  --  50* 52* 62   CHELA  --  9.1  --  8.9  --  8.6       Rehabilitation - continue comprehensive acute inpatient rehabilitation program with multidisciplinary approach including therapies, rehab nursing, and physiatry following. See interval history for updates.      ASSESSMENT AND PLAN  Paradise Yuan is a 71 year old right hand dominant female with a past medical history of diabetes mellitus type II, hypertension, hyperlipidemia, CKD stage III, let upper lobectomy, and memory problems who was admitted on 7/22 with subacute left MCA infarction with ongoing right hemiparesis, impaired balance, impaired coordination, right-sided facial weakness, dysarthria, and dysphagia, with hospital course complicated by hypertension, hyperglycemia, anemia, atrial tachycardia, asymptomatic COVID-19  infection, orthostatic hypotension, and incidental finding of left cerebellar meningioma.  She is admitted to acute rehab on 8/2 for multidisciplinary rehabilitation and ongoing medical management.    Subacute left MCA infarction  Presented with 2-week history of right sided facial droop, partial right hemiparesis, partial expressive aphasia.  MRI of head shows recent infarcts of varying age within the left MCA territory affecting the deep frontal white matter and cortex at the frontoparietal junction.   MRA head shows diminished flow within the left MCA beyond the distal M1 segment, normal MRA of the neck.  - LDL 84, goal <70.  Continue Lipitor 40 mg (increased from PTA 20 mg this admission).  - SBP goal <160, management as below  - Continue dual antiplatelet therapy with ASA 81 mg and Plavix 75 mg daily.  Duration not specified, based on CHANCE and POINT trials, will plan on x3 weeks - 8/13   - Given multifocal infarcts, neurology recommends outpatient 30-day event monitor.  If unrevealing, consider implantable loop recorder.  - Continue PT/OT/SLP  - Follow up with stroke neurology in 1 month     COVID-19 positive 7/30, suspected false positive  Negative on admission 7/22.  Repeated on 7/30 prior to planned ARU discharge and resulted positive. Chest x-ray-no acute findings number different to differentiate given background of s/p left upper lobectomy, bronchiectases.  Precautions implemented.  No respiratory symptoms, stable on room air.  Per ID, recommended repeat test 8/3 to rule-out false positive and test returned negative.  Per discussion with them, feel likely 7/30 test represents false positive and recommended remove special precautions.     Meningioma, left posterior fossa  Revealed on imaging this admission.  No intervention needed this admission.  - Follow up with neurosurgery as outpatient     Diabetes mellitus, type II, uncontrolled  Had previously been on metformin, discontinued due to abnormal kidney  function.  Had most recently been on Lantus 16 units daily.  Given A1c 10.0 on 7/22, question whether taking insulin as prescribed at home.  Blood glucose well-managed this admisison with Lantus 16 units daily, mealtime Novolog (added 7/30), and sliding scale insulin.  - Continue Lantus 16 units at bedtime  - Continue Aspart 3 units TID with meals  - Continue medium intensity sliding scale insulin  - Hypoglycemia protocol  - Monitor blood glucose QID AC & HS, adjust insulin regimen as indicated     Essential hypertension  Initially held home lisinopril and atenolol for permissive hypertension.  Per neurology, given subacute stroke and symptoms for 2 weeks, goal to keep SBP <160. Resumed home antihypertensive regimen and BP better controlled.  - Continue atenolol 25 mg daily  - Continue lisinopril 10 mg BID  - Monitor BP and adjust meds as indicated    WILMA   CKD stage III  On admission, Cr increased from 0.9 8/2 to 1.26 8/3.  Suspect prerenal in setting of poor oral intake given low urine output on admission and poor initiation.  Lisinopril held, Lovenox switched to subcutaneous Heparin, received 1L bolus NS.  Cr improved to 0.99.  - Continue to hold lisinopril, BP well-controlled without at this time  - Encourage oral fluids  - Repeat BMP on 8/10 this am 1.19 from 1.11, will continue to encourage fluids and monitor.      Hyperlipidemia  PTA on Lipitor 20 mg.  LDL 84 this admission, increased statin dose.  - Continue Lipitor 40 mg daily     Normocytic anemia  Stable in 13s at admission, most recent Hgb 10.2 on 8/2.  - Hgb stable at 10.0 on 8/5 and 10.2 on 8/9  - Trend CBC     Oral dysphagia  - Continue SLP  - Continue soft and bite-sized diet (IDDSI 6) and thin liquids      H/o left upper lobectomy     Memory problems  Per family, has had progressive memory issues for quite some time.  PCP had referred to neurology/memory clinic for further evaluation and management.  - Consider trial of Aricept while at ARU  -  Follow up with neurology as planned    1. Adjustment to disability:  Clinical psychology to eval and treat if indicated  2. FEN: soft and bite-sized diet (IDDSI 6) and thin liquids (IDDSI 0)  3. Bowel: intermittent incontinence, monitor, PRN bowel meds available  4. Bladder: intermittently incontinent, low urine output, PVRs without evidence of retention  5. DVT Prophylaxis: Lovenox switched to subcutaneous heparin due to WILMA - although Cr had improved, will continue subcutaneous heparin and today there 1.19 Cr.    6. GI Prophylaxis: Protonix  7. Code: full code  8. Disposition: goal for home  9. ELOS: tentative discharge date of 8/17.   10. Follow up Appointments on Discharge: PCP, stroke neurology (1 month), neurosurgery, PM&R      Williams Benitez, DO  Physical Medicine & Rehabilitation       I spent a total of 25 minutes face-to-face and managing the care of Paradise Yuan. Over 50% of my time was spent counseling the patient and coordinating care. Please see note for details.

## 2021-08-10 NOTE — PLAN OF CARE
"Discharge Planner Post-Acute Rehab PT:      Discharge Plan: Home with sister and adult nephew. Home care vs OP PT pending progress. Split level house with single rail, plus several SHIRAZ.     Precautions: Falls, impaired cognition, R-sided inattn/neglect     Current Status:  Bed Mobility: SBA- cues for attn to R UE  Transfer: CGA with WBQC  Gait: up to 200' with WBQC and foot-up. Heavy cueing to turn R side, often will do a full [place] to L. Poor FWW management requiring up to mod-A to manage.  Stairs: 4x6\" step ups with bilat HR x 4 sets, min A through R UE  Balance: LUNDBERG on 8/3: 27/56     Assessment:  Progressed stair climbing to 16 steps today, CGA with cues for R hand going up/down rail. Pt ambulating with quad cane and R foot up, CGA but continues to require cues for R sided attention and surroundings. Cues for approach to chair and to ensure backed up fully before sitting.     Other Barriers to Discharge (DME, Family Training, etc):   Family training to be scheduled  Equipment TBD - likely WBQC, foot up  "

## 2021-08-10 NOTE — PLAN OF CARE
Discharge Planner Post-Acute Rehab OT:      Discharge Plan: Home with sister and adult nephew. Home care vs OP OT pending progress. Will need to confirm home set-up with sister.     Precautions: Falls, impaired cognition, R inattention, R hemiparesis     Current Status:  ADLs: CGA transfers and room mobility FWW. SBA UB dressing, SBA standing grooming, min  A LB dressing, and cga toileting. sba Toileting hygiene sitting.   IADLs: TBA.   Vision/Cognition: R side inattention. Disoriented, slowed responses, mild difficulty following commands.     Assessment: pt continues to improve use  Of RUE as supportive assist during adl tasks. Pt continues to benefit from OT services, cont per POC.    Other Barriers to Discharge (DME, Family Training, etc):   Caregiver support   Equipment TBD pending progress

## 2021-08-10 NOTE — PLAN OF CARE
"FOCUS/GOAL  Pain management, Skin integrity, and Safety management    ASSESSMENT, INTERVENTIONS AND CONTINUING PLAN FOR GOAL:  /56   Pulse 84   Temp 98.9  F (37.2  C) (Oral)   Resp 18   Ht 1.575 m (5' 2\")   Wt 48.2 kg (106 lb 4.8 oz)   SpO2 98%   BMI 19.44 kg/m    Pt VSS. Denies SOB, dizziness, headache, pain and nausea. Good appetite, on easy to chew diet/thin liquids.Takes pills whole without difficulties.  and 134. Continent of bladder this evening with timed toileting, LBM 8/9. Transfers with 1 assist with a walker. Pt is alert and oriented x3, disoriented to time. Pt did not use call light this evening, staff anticipates needs. Alarms on for safety.    "

## 2021-08-10 NOTE — PLAN OF CARE
FOCUS/GOAL  Bowel management, Bladder management, Skin integrity and Cognition/Memory/Judgment/Problem solving    ASSESSMENT, INTERVENTIONS AND CONTINUING PLAN FOR GOAL:  Pt is alert and oriented x 3 intermittently  disoriented to time, sleeping well during the night, bg of 112 at 2am  no indication or reports of sob, fever, chills, n/v, or chest pain, or new numbness and tingling, continent of bowel and bladder, assist of 1 ww, no further care concerns at this time continue with POC.

## 2021-08-11 ENCOUNTER — APPOINTMENT (OUTPATIENT)
Dept: OCCUPATIONAL THERAPY | Facility: CLINIC | Age: 71
DRG: 057 | End: 2021-08-11
Attending: PHYSICAL MEDICINE & REHABILITATION
Payer: COMMERCIAL

## 2021-08-11 ENCOUNTER — APPOINTMENT (OUTPATIENT)
Dept: PHYSICAL THERAPY | Facility: CLINIC | Age: 71
DRG: 057 | End: 2021-08-11
Attending: PHYSICAL MEDICINE & REHABILITATION
Payer: COMMERCIAL

## 2021-08-11 LAB
CREAT SERPL-MCNC: 1.22 MG/DL (ref 0.52–1.04)
GFR SERPL CREATININE-BSD FRML MDRD: 44 ML/MIN/1.73M2
GLUCOSE BLDC GLUCOMTR-MCNC: 101 MG/DL (ref 70–99)
GLUCOSE BLDC GLUCOMTR-MCNC: 113 MG/DL (ref 70–99)
GLUCOSE BLDC GLUCOMTR-MCNC: 133 MG/DL (ref 70–99)
GLUCOSE BLDC GLUCOMTR-MCNC: 133 MG/DL (ref 70–99)
GLUCOSE BLDC GLUCOMTR-MCNC: 141 MG/DL (ref 70–99)
PLATELET # BLD AUTO: 400 10E3/UL (ref 150–450)

## 2021-08-11 PROCEDURE — 97116 GAIT TRAINING THERAPY: CPT | Mod: GP

## 2021-08-11 PROCEDURE — 250N000011 HC RX IP 250 OP 636: Performed by: PHYSICIAN ASSISTANT

## 2021-08-11 PROCEDURE — 97535 SELF CARE MNGMENT TRAINING: CPT | Mod: GO | Performed by: OCCUPATIONAL THERAPIST

## 2021-08-11 PROCEDURE — 97112 NEUROMUSCULAR REEDUCATION: CPT | Mod: GP

## 2021-08-11 PROCEDURE — 250N000013 HC RX MED GY IP 250 OP 250 PS 637: Performed by: PHYSICIAN ASSISTANT

## 2021-08-11 PROCEDURE — 85049 AUTOMATED PLATELET COUNT: CPT | Performed by: PHYSICIAN ASSISTANT

## 2021-08-11 PROCEDURE — 36415 COLL VENOUS BLD VENIPUNCTURE: CPT | Performed by: PHYSICIAN ASSISTANT

## 2021-08-11 PROCEDURE — 97112 NEUROMUSCULAR REEDUCATION: CPT | Mod: GO | Performed by: OCCUPATIONAL THERAPIST

## 2021-08-11 PROCEDURE — 82565 ASSAY OF CREATININE: CPT | Performed by: PHYSICIAN ASSISTANT

## 2021-08-11 PROCEDURE — 128N000003 HC R&B REHAB

## 2021-08-11 PROCEDURE — 99232 SBSQ HOSP IP/OBS MODERATE 35: CPT | Performed by: PHYSICAL MEDICINE & REHABILITATION

## 2021-08-11 RX ADMIN — ATORVASTATIN CALCIUM 40 MG: 40 TABLET, FILM COATED ORAL at 21:44

## 2021-08-11 RX ADMIN — HEPARIN SODIUM 5000 UNITS: 5000 INJECTION, SOLUTION INTRAVENOUS; SUBCUTANEOUS at 08:11

## 2021-08-11 RX ADMIN — INSULIN ASPART 3 UNITS: 100 INJECTION, SOLUTION INTRAVENOUS; SUBCUTANEOUS at 18:23

## 2021-08-11 RX ADMIN — INSULIN ASPART 3 UNITS: 100 INJECTION, SOLUTION INTRAVENOUS; SUBCUTANEOUS at 12:52

## 2021-08-11 RX ADMIN — PANTOPRAZOLE SODIUM 40 MG: 40 TABLET, DELAYED RELEASE ORAL at 06:38

## 2021-08-11 RX ADMIN — INSULIN GLARGINE 16 UNITS: 100 INJECTION, SOLUTION SUBCUTANEOUS at 21:44

## 2021-08-11 RX ADMIN — CLOPIDOGREL BISULFATE 75 MG: 75 TABLET, FILM COATED ORAL at 08:11

## 2021-08-11 RX ADMIN — ATENOLOL 25 MG: 25 TABLET ORAL at 21:45

## 2021-08-11 RX ADMIN — INSULIN ASPART 3 UNITS: 100 INJECTION, SOLUTION INTRAVENOUS; SUBCUTANEOUS at 08:11

## 2021-08-11 RX ADMIN — HEPARIN SODIUM 5000 UNITS: 5000 INJECTION, SOLUTION INTRAVENOUS; SUBCUTANEOUS at 21:44

## 2021-08-11 RX ADMIN — ASPIRIN 81 MG: 81 TABLET, COATED ORAL at 08:11

## 2021-08-11 NOTE — PLAN OF CARE
Discharge Planner Post-Acute Rehab OT:      Discharge Plan: Home with sister and adult nephew. Home with HC.    Precautions: Falls, impaired cognition, R inattention, R hemiparesis     Current Status:  ADLs: CGA transfers and room mobility fww and R walker splint. SBA UB dressing, SBA standing grooming, min  A LB dressing, and CGA toileting.  IADLs: TBA.   Vision/Cognition: R side inattention. Disoriented, slowed responses, mild difficulty following commands.     Assessment: Progressing with ADLs and functional mobility. Performance limited by R side weakness (distal>proximal), R side inattention, impaired balance, and cognitive impairment. Goals to advance to Mod IND-SBA mobility and ADLs. Anticipate pt will require assistance for IADLs due to cognitive and physical deficits. Caregiver support TBD which may be a barrier to discharge.     Other Barriers to Discharge (DME, Family Training, etc):   Caregiver support   Equipment TBD pending progress

## 2021-08-11 NOTE — PLAN OF CARE
"FOCUS/GOAL  Pain management, Skin integrity, and Safety management    ASSESSMENT, INTERVENTIONS AND CONTINUING PLAN FOR GOAL:  /59   Pulse 80   Temp 97.1  F (36.2  C) (Oral)   Resp 16   Ht 1.575 m (5' 2\")   Wt 48.2 kg (106 lb 4.8 oz)   SpO2 98%   BMI 19.44 kg/m    Pt VSS. Atenolol held at HS, /59. Denies SOB, dizziness, headache, pain and nausea. Good appetite, on easy to chew diet/thin liquids.Takes pills whole without difficulties.  and 142. Continent of bladder with timed toileting, incontinent x1, LBM 8/9. Transfers with 1 assist with a cane. Pt is alert and oriented x3, disoriented to time. Pt did not use call light this evening, staff anticipates needs. Alarms on for safety.       "

## 2021-08-11 NOTE — PLAN OF CARE
FOCUS/GOAL  Bladder management, Pain management, Mobility, Cognition/Memory/Judgment/Problem solving, and Safety management    ASSESSMENT, INTERVENTIONS AND CONTINUING PLAN FOR GOAL:  Pt is alert, disoriented to time. Continent of bladder with timed toileting. Up with assist of 1 with cane to the bathroom. Denied pain or discomfort this shift. Appeared to be sleeping well during rounds. Pt does not use call light, anticipate needs. Bed alarm on for safety. Will continue with POC.

## 2021-08-11 NOTE — PROGRESS NOTES
"  [facility]   Acute Rehabilitation Unit  Daily progress note    INTERVAL HISTORY  Paradise Yuan was seen and examined this morning.  No acute events overnight.  Patient denies any concerns this morning, no pain, shortness of breath, dizziness, nausea, bowel or bladder concerns, or appetite changes. She had good AM session of therapy, but again slight bump in Cr to 1.22.  Encouraged patient to drink fluids.  Will get repeat BMP in AM.  Mild improvement in R hand strength which is encouraging.     Functional  PT:  Progressed stair climbing to 16 steps, CGA with cues for R hand going up/down rail. Pt ambulating with quad cane and R foot up, CGA but continues to require cues for R sided attention and surroundings. Cues for approach to chair and to ensure backed up fully before         ROS: 10 point ROS neg other than the symptoms noted above in the HPI.        MEDICATIONS    aspirin  81 mg Oral Daily     atenolol  25 mg Oral At Bedtime     atorvastatin  40 mg Oral At Bedtime     clopidogrel  75 mg Oral Daily     heparin ANTICOAGULANT  5,000 Units Subcutaneous Q12H     insulin aspart  3 Units Subcutaneous TID w/meals     insulin aspart  1-7 Units Subcutaneous TID AC     insulin aspart  1-5 Units Subcutaneous At Bedtime     insulin glargine  16 Units Subcutaneous At Bedtime     [Held by provider] lisinopril  10 mg Oral BID     pantoprazole  40 mg Oral QAM AC        acetaminophen, glucose **OR** dextrose **OR** glucagon, melatonin, polyethylene glycol, senna-docusate     PHYSICAL EXAM  BP 94/57 (BP Location: Left arm)   Pulse 77   Temp 98.3  F (36.8  C) (Oral)   Resp 16   Ht 1.575 m (5' 2\")   Wt 48.2 kg (106 lb 4.8 oz)   SpO2 98%   BMI 19.44 kg/m    Gen: NAD, seated comfortably in wheelchair, pleasant   HEENT: NC/AT  Cardio: RRR, appears well-perfused  Pulm: non-labored on room air  Abd: soft, non-tender, non-distended  Ext: no edema or calf tenderness bilaterally  Neuro/MSK: " +right facial droop.  Right hemiparesis with R shoulder abduction 4/5, elbow flexion/ext 4/5, wrist ext 3/5, hand  1/5. She has at least antigravity in all muscle groups of right lower extremity.     LABS  CBC RESULTS:   Recent Labs   Lab Test 08/11/21  0545 08/09/21  0706 08/08/21  0634 08/05/21  0701 08/03/21  0713   WBC  --  8.7  --  9.0 8.9   RBC  --  3.46*  --  3.48* 3.52*   HGB  --  10.2*  --  10.0* 10.1*   HCT  --  32.2*  --  33.0* 32.7*   MCV  --  93  --  95 93   MCH  --  29.5  --  28.7 28.7   MCHC  --  31.7  --  30.3* 30.9*   RDW  --  14.3  --  13.8 13.4    422 454* 414 419     Last Basic Metabolic Panel:  Recent Labs   Lab Test 08/11/21  0731 08/11/21  0545 08/11/21  0223 08/10/21  2140 08/10/21  0706 08/09/21  0706 08/05/21  0757 08/05/21  0701   NA  --   --   --   --  139 138  --  141   POTASSIUM  --   --   --   --  4.9 4.8  --  4.4   CHLORIDE  --   --   --   --  105 106  --  110*   CO2  --   --   --   --  28 30  --  28   ANIONGAP  --   --   --   --  6 2*  --  3   *  --  113* 142* 138* 129*  --  102*   BUN  --   --   --   --  19 17  --  16   CR  --  1.22*  --   --  1.19* 1.11*   < > 0.92   GFRESTIMATED  --  44*  --   --  46* 50*   < > 62   CHELA  --   --   --   --  9.1 8.9  --  8.6    < > = values in this interval not displayed.       Rehabilitation - continue comprehensive acute inpatient rehabilitation program with multidisciplinary approach including therapies, rehab nursing, and physiatry following. See interval history for updates.      ASSESSMENT AND PLAN  Paradise Yuan is a 71 year old right hand dominant female with a past medical history of diabetes mellitus type II, hypertension, hyperlipidemia, CKD stage III, let upper lobectomy, and memory problems who was admitted on 7/22 with subacute left MCA infarction with ongoing right hemiparesis, impaired balance, impaired coordination, right-sided facial weakness, dysarthria, and dysphagia, with hospital course complicated by  hypertension, hyperglycemia, anemia, atrial tachycardia, asymptomatic COVID-19 infection, orthostatic hypotension, and incidental finding of left cerebellar meningioma.  She is admitted to acute rehab on 8/2 for multidisciplinary rehabilitation and ongoing medical management.    Subacute left MCA infarction  Presented with 2-week history of right sided facial droop, partial right hemiparesis, partial expressive aphasia.  MRI of head shows recent infarcts of varying age within the left MCA territory affecting the deep frontal white matter and cortex at the frontoparietal junction.   MRA head shows diminished flow within the left MCA beyond the distal M1 segment, normal MRA of the neck.  - LDL 84, goal <70.  Continue Lipitor 40 mg (increased from PTA 20 mg this admission).  - SBP goal <160, management as below  - Continue dual antiplatelet therapy with ASA 81 mg and Plavix 75 mg daily.  Duration not specified, based on CHANCE and POINT trials, will plan on x3 weeks - 8/13   - Given multifocal infarcts, neurology recommends outpatient 30-day event monitor.  If unrevealing, consider implantable loop recorder.  - Continue PT/OT/SLP  - Follow up with stroke neurology in 1 month     COVID-19 positive 7/30, suspected false positive  Negative on admission 7/22.  Repeated on 7/30 prior to planned ARU discharge and resulted positive. Chest x-ray-no acute findings number different to differentiate given background of s/p left upper lobectomy, bronchiectases.  Precautions implemented.  No respiratory symptoms, stable on room air.  Per ID, recommended repeat test 8/3 to rule-out false positive and test returned negative.  Per discussion with them, feel likely 7/30 test represents false positive and recommended remove special precautions.     Meningioma, left posterior fossa  Revealed on imaging this admission.  No intervention needed this admission.  - Follow up with neurosurgery as outpatient     Diabetes mellitus, type II,  uncontrolled  Had previously been on metformin, discontinued due to abnormal kidney function.  Had most recently been on Lantus 16 units daily.  Given A1c 10.0 on 7/22, question whether taking insulin as prescribed at home.  Blood glucose well-managed this admisison with Lantus 16 units daily, mealtime Novolog (added 7/30), and sliding scale insulin.  - Continue Lantus 16 units at bedtime  - Continue Aspart 3 units TID with meals  - Continue medium intensity sliding scale insulin  - Hypoglycemia protocol  - Monitor blood glucose QID AC & HS, adjust insulin regimen as indicated     Essential hypertension  Initially held home lisinopril and atenolol for permissive hypertension.  Per neurology, given subacute stroke and symptoms for 2 weeks, goal to keep SBP <160. Resumed home antihypertensive regimen and BP better controlled.  - Continue atenolol 25 mg daily  - Continue lisinopril 10 mg BID--on hold  - Monitor BP and adjust meds as indicated    WILMA   CKD stage III  On admission, Cr increased from 0.9 8/2 to 1.26 8/3.  Suspect prerenal in setting of poor oral intake given low urine output on admission and poor initiation.  Lisinopril held, Lovenox switched to subcutaneous Heparin, received 1L bolus NS.  Cr improved to 0.99.  - Continue to hold lisinopril, BP well-controlled without at this time  - Encourage oral fluids  - Repeat BMP on 8/10 this am 1.22 from 1.19, will continue to encourage fluids and monitor, repeat labs in AM.  Did well in therapy this AM.      Hyperlipidemia  PTA on Lipitor 20 mg.  LDL 84 this admission, increased statin dose.  - Continue Lipitor 40 mg daily     Normocytic anemia  Stable in 13s at admission, most recent Hgb 10.2 on 8/2.  - Hgb stable at 10.0 on 8/5 and 10.2 on 8/9  - Trend CBC     Oral dysphagia  - Continue SLP  - Continue soft and bite-sized diet (IDDSI 6) and thin liquids      H/o left upper lobectomy     Memory problems  Per family, has had progressive memory issues for quite  some time.  PCP had referred to neurology/memory clinic for further evaluation and management.  - Consider trial of Aricept while at ARU  - Follow up with neurology as planned    1. Adjustment to disability:  Clinical psychology to eval and treat if indicated  2. FEN: soft and bite-sized diet (IDDSI 6) and thin liquids (IDDSI 0)  3. Bowel: intermittent incontinence, monitor, PRN bowel meds available  4. Bladder: intermittently incontinent, low urine output, PVRs without evidence of retention  5. DVT Prophylaxis: Lovenox switched to subcutaneous heparin due to WILMA - although Cr had improved, will continue subcutaneous heparin and today there 1.22 Cr.    6. GI Prophylaxis: Protonix  7. Code: full code  8. Disposition: goal for home  9. ELOS: tentative discharge date of 8/17.   10. Follow up Appointments on Discharge: PCP, stroke neurology (1 month), neurosurgery, PM&R      Williams Benitez, DO  Physical Medicine & Rehabilitation       I spent a total of 25 minutes face-to-face and managing the care of Paradise Yuan. Over 50% of my time was spent counseling the patient and coordinating care. Please see note for details.

## 2021-08-11 NOTE — PLAN OF CARE
"Discharge Planner Post-Acute Rehab PT:      Discharge Plan: Home with sister and adult nephew. Home care vs OP PT pending progress. Split level house with single rail, plus several SHIRAZ.     Precautions: Falls, impaired cognition, R-sided inattn/neglect     Current Status:  Bed Mobility: SBA- cues for attn to R UE  Transfer: CGA with WBQC  Gait: up to 200' with WBQC and foot-up. Heavy cueing to turn R side, often will do a full [place] to L. Poor FWW management requiring up to mod-A to manage.  Stairs: 4x6\" step ups with bilat HR x 4 sets, min A through R UE  Balance: LUNDBERG on 8/3: 27/56     Assessment: Session focused on improving attention to R side with obstacle course and attending to objects on the R. Several ambulation trials with WBQC vs walker with R hand  attachment. Pt may benefit from continuing to use walker d/t increased stability and opportunity to bear weight through RUE.     Other Barriers to Discharge (DME, Family Training, etc):   Family training to be scheduled  Equipment TBD - likely WBQC, foot up  "

## 2021-08-11 NOTE — PLAN OF CARE
Disoriented to time. Incontinent and continent of bladder, continent of bowel. LBM 8/9. Denies pain. Assist of 1 with walker. QID blood sugars with insulin coverage. Level 7 diet with thin liquids. Skin is bruised. Bed alarm on for safety, call light within reach, Ok to continue with care plan.

## 2021-08-12 ENCOUNTER — APPOINTMENT (OUTPATIENT)
Dept: PHYSICAL THERAPY | Facility: CLINIC | Age: 71
DRG: 057 | End: 2021-08-12
Attending: PHYSICAL MEDICINE & REHABILITATION
Payer: COMMERCIAL

## 2021-08-12 ENCOUNTER — APPOINTMENT (OUTPATIENT)
Dept: SPEECH THERAPY | Facility: CLINIC | Age: 71
DRG: 057 | End: 2021-08-12
Attending: PHYSICAL MEDICINE & REHABILITATION
Payer: COMMERCIAL

## 2021-08-12 ENCOUNTER — APPOINTMENT (OUTPATIENT)
Dept: OCCUPATIONAL THERAPY | Facility: CLINIC | Age: 71
DRG: 057 | End: 2021-08-12
Attending: PHYSICAL MEDICINE & REHABILITATION
Payer: COMMERCIAL

## 2021-08-12 LAB
ANION GAP SERPL CALCULATED.3IONS-SCNC: 5 MMOL/L (ref 3–14)
BUN SERPL-MCNC: 24 MG/DL (ref 7–30)
CALCIUM SERPL-MCNC: 8.9 MG/DL (ref 8.5–10.1)
CHLORIDE BLD-SCNC: 104 MMOL/L (ref 94–109)
CO2 SERPL-SCNC: 28 MMOL/L (ref 20–32)
CREAT SERPL-MCNC: 1.26 MG/DL (ref 0.52–1.04)
ERYTHROCYTE [DISTWIDTH] IN BLOOD BY AUTOMATED COUNT: 14.5 % (ref 10–15)
GFR SERPL CREATININE-BSD FRML MDRD: 43 ML/MIN/1.73M2
GLUCOSE BLD-MCNC: 138 MG/DL (ref 70–99)
GLUCOSE BLDC GLUCOMTR-MCNC: 108 MG/DL (ref 70–99)
GLUCOSE BLDC GLUCOMTR-MCNC: 111 MG/DL (ref 70–99)
GLUCOSE BLDC GLUCOMTR-MCNC: 127 MG/DL (ref 70–99)
GLUCOSE BLDC GLUCOMTR-MCNC: 154 MG/DL (ref 70–99)
GLUCOSE BLDC GLUCOMTR-MCNC: 98 MG/DL (ref 70–99)
HCT VFR BLD AUTO: 32.1 % (ref 35–47)
HGB BLD-MCNC: 10.2 G/DL (ref 11.7–15.7)
MCH RBC QN AUTO: 29.1 PG (ref 26.5–33)
MCHC RBC AUTO-ENTMCNC: 31.8 G/DL (ref 31.5–36.5)
MCV RBC AUTO: 92 FL (ref 78–100)
PLATELET # BLD AUTO: 390 10E3/UL (ref 150–450)
POTASSIUM BLD-SCNC: 5.3 MMOL/L (ref 3.4–5.3)
RBC # BLD AUTO: 3.5 10E6/UL (ref 3.8–5.2)
SODIUM SERPL-SCNC: 137 MMOL/L (ref 133–144)
WBC # BLD AUTO: 8.4 10E3/UL (ref 4–11)

## 2021-08-12 PROCEDURE — 97535 SELF CARE MNGMENT TRAINING: CPT | Mod: GO

## 2021-08-12 PROCEDURE — 99233 SBSQ HOSP IP/OBS HIGH 50: CPT | Performed by: PHYSICAL MEDICINE & REHABILITATION

## 2021-08-12 PROCEDURE — 97129 THER IVNTJ 1ST 15 MIN: CPT | Mod: GN | Performed by: SPEECH-LANGUAGE PATHOLOGIST

## 2021-08-12 PROCEDURE — 128N000003 HC R&B REHAB

## 2021-08-12 PROCEDURE — 97530 THERAPEUTIC ACTIVITIES: CPT | Mod: GO

## 2021-08-12 PROCEDURE — 250N000011 HC RX IP 250 OP 636: Performed by: PHYSICIAN ASSISTANT

## 2021-08-12 PROCEDURE — 92507 TX SP LANG VOICE COMM INDIV: CPT | Mod: GN | Performed by: SPEECH-LANGUAGE PATHOLOGIST

## 2021-08-12 PROCEDURE — 999N000150 HC STATISTIC PT MED CONFERENCE < 30 MIN: Performed by: PHYSICAL THERAPIST

## 2021-08-12 PROCEDURE — 250N000013 HC RX MED GY IP 250 OP 250 PS 637: Performed by: PHYSICIAN ASSISTANT

## 2021-08-12 PROCEDURE — 97530 THERAPEUTIC ACTIVITIES: CPT | Mod: GP | Performed by: PHYSICAL THERAPIST

## 2021-08-12 PROCEDURE — 999N000125 HC STATISTIC PATIENT MED CONFERENCE < 30 MIN: Performed by: SPEECH-LANGUAGE PATHOLOGIST

## 2021-08-12 PROCEDURE — 97750 PHYSICAL PERFORMANCE TEST: CPT | Mod: GP | Performed by: PHYSICAL THERAPIST

## 2021-08-12 PROCEDURE — 85027 COMPLETE CBC AUTOMATED: CPT | Performed by: PHYSICIAN ASSISTANT

## 2021-08-12 PROCEDURE — 999N000125 HC STATISTIC PATIENT MED CONFERENCE < 30 MIN: Performed by: OCCUPATIONAL THERAPIST

## 2021-08-12 PROCEDURE — 97130 THER IVNTJ EA ADDL 15 MIN: CPT | Mod: GN | Performed by: SPEECH-LANGUAGE PATHOLOGIST

## 2021-08-12 PROCEDURE — 36415 COLL VENOUS BLD VENIPUNCTURE: CPT | Performed by: PHYSICIAN ASSISTANT

## 2021-08-12 PROCEDURE — 80048 BASIC METABOLIC PNL TOTAL CA: CPT | Performed by: PHYSICIAN ASSISTANT

## 2021-08-12 RX ADMIN — DOCUSATE SODIUM AND SENNOSIDES 4 TABLET: 8.6; 5 TABLET ORAL at 21:19

## 2021-08-12 RX ADMIN — HEPARIN SODIUM 5000 UNITS: 5000 INJECTION, SOLUTION INTRAVENOUS; SUBCUTANEOUS at 21:20

## 2021-08-12 RX ADMIN — INSULIN ASPART 3 UNITS: 100 INJECTION, SOLUTION INTRAVENOUS; SUBCUTANEOUS at 12:44

## 2021-08-12 RX ADMIN — PANTOPRAZOLE SODIUM 40 MG: 40 TABLET, DELAYED RELEASE ORAL at 07:19

## 2021-08-12 RX ADMIN — ASPIRIN 81 MG: 81 TABLET, COATED ORAL at 07:54

## 2021-08-12 RX ADMIN — INSULIN ASPART 3 UNITS: 100 INJECTION, SOLUTION INTRAVENOUS; SUBCUTANEOUS at 09:29

## 2021-08-12 RX ADMIN — INSULIN GLARGINE 16 UNITS: 100 INJECTION, SOLUTION SUBCUTANEOUS at 21:20

## 2021-08-12 RX ADMIN — CLOPIDOGREL BISULFATE 75 MG: 75 TABLET, FILM COATED ORAL at 07:54

## 2021-08-12 RX ADMIN — INSULIN ASPART 3 UNITS: 100 INJECTION, SOLUTION INTRAVENOUS; SUBCUTANEOUS at 17:19

## 2021-08-12 RX ADMIN — ATORVASTATIN CALCIUM 40 MG: 40 TABLET, FILM COATED ORAL at 21:19

## 2021-08-12 RX ADMIN — ATENOLOL 25 MG: 25 TABLET ORAL at 21:21

## 2021-08-12 RX ADMIN — HEPARIN SODIUM 5000 UNITS: 5000 INJECTION, SOLUTION INTRAVENOUS; SUBCUTANEOUS at 07:54

## 2021-08-12 NOTE — CARE CONFERENCE
Acute Rehab Care Conference/Team Rounds      Type: Team Rounds    Present: Dr. Williams Roque, Pari Lazo PA, Belen Hamilton RN, Jeremiah Timmons PT, Slime Bray OT, Armand Epstein SLP, Jadyn Martinez [facility], Maria Isabel Thompson , Whitney Gage Dietician, Patient Paradise Yuan      Discharge Barriers/Treatment/Education    Rehab Diagnosis: recent CVA with R hemiparesis     Active Medical Co-morbidities/Prognosis:     Patient Active Problem List   Diagnosis Code     Stomatitis Herpetiformis K12.0     Benign Essential Hypertension I10     Type 2 diabetes mellitus treated without insulin (H) E11.9     Hyperlipidemia E78.5     Memory problem R41.3     Chronic kidney disease, stage 3 N18.30     Facial droop R29.810     Right leg weakness R29.898     Right arm weakness R29.898     Cerebral infarction due to occlusion of left middle cerebral artery (H) I63.512     Left cerebellar meningioma D32.0     Fecal incontinence R15.9     Infection due to 2019 novel coronavirus U07.1     Acute ischemic left MCA stroke (H) I63.512       Safety:Alert, disoriented to time. No call light use, anticipate needs. Bed alarm on for safety.     Pain: Denies pain or discomfort.     Medications, Skin, Tubes/Lines: Medications taken whole with water. Skin intact with bruising. No tubes or lines.     Swallowing/Nutrition: IDDSI Easy to Chew (Level 7) textures and thin liquids.    Bowel/Bladder: Continent of bladder with timed toileting. Continent of bowel, last documented BM 8/9.     Psychosocial: Single, lives with sister and nephew. Not working, pt denied having an income and family supports her. Indep PTA but memory/cognitive concerns noted in pt chart from end of last year. Sister helps with finances, medications and higher level tasks. No MH or SA reported by pt. No children.     ADLs/IADLs: Pt making gradual progress with ADLs and functional mobility. CGA transfers and room mobility fww and R walker splint. SBA UB dressing, SBA  "standing grooming, min  A LB dressing, and CGA toileting. Performance limited by R side weakness (distal>proximal), R side inattention, impaired balance, and cognitive impairment. RUE proximal strength is improving, still limited distally. Goals to advance to Mod IND-SBA mobility and ADLs. Anticipate pt will require supervision and IADL support at home given communication and cognitive deficits. Caregiver support TBD which may be a barrier to discharge. Recommend HC OT follow up and caregiver training. ELOS TBD pending caregiver support and progress. Equipment: shower bench, walker vs cane, commode, toilet/shower grab bar, R hand splint.    Mobility:   Bed Mobility: SBA- cues for attn to R UE  Transfer: CGA with FWW  Gait: up to 200 ft with FWW, R toe up, attn to R hand  Stairs: 16x6\" step ups with bilat HR, min A through R UE  Balance: LUNDBERG on 8/12: 33/56  R-sided inattn and impaired cognition remain challenging barriers, although pt is demonstrating improving R-sided activation and standing balance, through reassessment of LUNDBERG from 27 >> 33/56. Plan to continue to focus on R-sided strengthening, standing balance, and activities to increase R-sided attn to improve IND with mobility and decrease risk of future falls.    Cognition/Language: Mild language impairment, though improving during unstructured tasks. Persistent and significant right neglect with pen/paper work. Patient baseline impaired cognition, specifically memory. Limited carryover of strategies from session to session. Will likely require increased assistance with completion of home management tasks.    Community Re-Entry: Not an immediate barrier. Plan to transition to home care PT post ARU discharge.    Transportation: Family to provide. Will need to continue to practice safety with car transfers.    Decision maker: self    Plan of Care and goals reviewed and updated.    Discharge Plan/Recommendations    Fall Precautions: continue    Patient/Family " input to goals: yes     Estimated length of stay: 14 - 16 days    Overall plan for the patient: reach a level of mod I       Utilization Review and Continued Stay Justification    Medical Necessity Criteria:    For any criteria that is not met, please document reason and plan for discharge, transfer, or modification of plan of care to address.    Requires intensive rehabilitation program to treat functional deficits?: Yes    Requires 3x per week or greater involvement of rehabilitation physician to oversee rehabilitation program?: Yes    Requires rehabilitation nursing interventions?: Yes    Patient is making functional progress?: Yes    There is a potential for additional functional progress? Yes    Patient is participating in therapy 3 hours per day a minimum of 5 days per week or 15 hours per week in 7 day period?:Yes    Has discharge needs that require coordinated discharge planning approach?:Yes      Barriers/Concerns related to meeting medical necessity criteria:  none    Team Plan to Address Concern:  As needed       Final Physician Sign off    Statement of Approval:  Williams Benitez, DO      Patient Goals  Social Work Goals: Confirm discharge recommendations with therapy, coordinate safe discharge plan and remain available to support and assist as needed.    OT Frequency: 60-90 min daily  OT goal: hygiene/grooming: independent  OT goal: upper body dressing: Independent  OT goal: lower body dressing: Modified independent  OT goal: upper body bathing: Supervision/stand-by assist  OT goal: lower body bathing: Supervision/stand-by assist  OT goal: toilet transfer/toileting: Modified independent  OT goal: meal preparation: Supervision/stand-by assist, with simple meal preparation, ambulatory level  OT goal: home management: Supervision/stand-by assist, with light demand household tasks, ambulatory level  OT goal 1: Pt will demo SBA shower transfer using DME/AE prn.  OT goal 2: Pt will demo IND initiation of  compensatory strategies for R side inattention in order to improve safety with ADLs and mobility.     PT Frequency: Daily x60-90 min  PT goal: bed mobility: Independent, Supine to/from sit, Rolling, Bridging  PT goal: transfers: Modified independent, Sit to/from stand, Bed to/from chair, Assistive device  PT goal: gait: Modified independent, Greater than 200 feet, Assistive device  PT goal: stairs: Supervision/stand-by assist (flight of stairs with single rail)  PT goal: perform aerobic activity with stable cardiovascular response: continuous activity, NuStep, 15 minutes  PT goal 1: Pt will be IND with LE strength, balance HEP to improve IND with mobility and reduce risk of future falls.  PT Goal 2: Pt will be able to perform car transfer with SBA and LRAD in order to safely discharge home and access medical appts.  PT Goal 3: Pt will be able to perform floor>furniture transfer with SBA as part of fall recovery training.    SLP Frequency: daily  SLP Swallow Goal:  Safely tolerate diet without signs/symptoms of aspiration: Regular diet, Thin liquids  SLP goal 1: Pt will complete moderate level auditory and reading comprehension task with 80% accuracy and minimal cues.  SLP goal 2: Pt will complete moderate level expressive language tasks with 80% accuracy and min cues  SLP goal 3: Pt will implement speaking strategies to improve intelligibility in 80% of opportunities with min cues        RN Goal:  Pain Management: Patient will advocate for adequate pain management and participate in all therapies.  RN Goal: Skin Integrity: Patient will have intact skin and will be free of any pressure injuries during her stay at Acute Rehab.  RN Goal: Safety Management: Patient will demonstrate understanding of safety and avoid falls by calling for help before getting up.

## 2021-08-12 NOTE — PLAN OF CARE
Discharge Planner Post-Acute Rehab SLP:      Discharge Plan: Home with sister and adult nephew. Home care vs OP PT pending progress. Will need to confirm home set-up with sister.     Precautions: Fall, cognition, language deficits, swallowing     Current Status:  Communication:Mild dysarthria. Mild-mod receptive and expressive language tasks  Cognition: Memory deficits noted at baseline per family report. Not formally assessed.  Swallow: Recommend easy to chew (7) diet and thin liquids with upright position, slow rate, alternate solids and liquids, and small bites/sips.      Assessment: Patient benefitted from visual pictures to improve initiation of words and phrases during structured categorization task. Patient verbalized short-sentences when presented with visual pictures with 80% accy IND, increasing to 100% accy with max auditory cues and ongoing repetition and rephrasing. Patient with improved verbal expression when presented with stimulus picture vs attempting to generate words independently.    Other Barriers to Discharge (Family Training, etc): None anticipated from SLP

## 2021-08-12 NOTE — PLAN OF CARE
OT: Contacted pt sister and nephew and left vm. Requested that family return the call to discuss progress, support and discharge plans. Awaiting return phone call.     Also contacted pt brother which is listed in emergency contact list. Brother requesting that we relay all information to pt sister and nephew.

## 2021-08-12 NOTE — PLAN OF CARE
FOCUS/GOAL  Bowel management, Bladder management, Pain management, Mobility, Skin integrity, and Safety management    ASSESSMENT, INTERVENTIONS AND CONTINUING PLAN FOR GOAL:  A/O, VS stable. Regular/thin, pills whole. Ate most of meal. Continent of bowel, continent and incontinent of bladder. No complaints of pain this shift. Assist of 1 with walker to transfer. No new skin concerns, abdomen bruised from heparin. Does not call with light, trying to anticipate cares. Continue POC.

## 2021-08-12 NOTE — PLAN OF CARE
FOCUS/GOAL  Bladder management, Pain management, Mobility, and Safety management    ASSESSMENT, INTERVENTIONS AND CONTINUING PLAN FOR GOAL:  Patient is alert but disoriented to time. Denied pain. Does not use her call light so needs were anticipated. Assist of 1 using walker for transfers. Incontinent of bladder.  and 133.

## 2021-08-12 NOTE — PLAN OF CARE
Post Rounds Family Phone Call  Length of call: 10 min   Family involved: Pt nephew and pt sister.   Projected discharge date: 8/20/21  Projected discharge location: Home with HC, support from nephew and sister  Equipment needs: fww, shower chair, commode overlay.  Other questions and misc:   Provided update regarding functional progress, goals, and POC. Pt nephew reports that they provided IADL support prior to hospital admission, so they are comfortable providing this level of assistance time of discharge. Discussed need for 24/7 supervision given pt cognitive/communication deficits and R side inattention. Pt sister is available to provide supervision but is unable to provide physical assistance due to her own orthopedic concerns. Pt nephew is working during the day but able to provide assistance at other times. Goals will be to advance pt to supervision level with mobility and ADLs. Scheduled caregiver training 8/18 2-4pm.

## 2021-08-12 NOTE — PROGRESS NOTES
"  [facility]   Acute Rehabilitation Unit  Daily progress note    INTERVAL HISTORY  Paradise Yuan was seen and examined this morning.  No acute events overnight.  Patient denies any concerns this morning, no pain, shortness of breath, dizziness, nausea, bowel or bladder concerns, or appetite changes. She had good AM session of therapy, but again slight bump in Cr to 1.26; however, still making excellent gains.  Encouraged patient again to drink fluids.  Discussed with Neurology, will complete 3 week DAPT and continue with 81 mg ASA going forward.     Functional  PT:  Bed Mobility: SBA- cues for attn to R UE  Transfer: CGA with FWW  Gait: up to 200 ft with FWW, R toe up, attn to R hand  Stairs: 16x6\" step ups with bilat HR, min A through R UE  Balance: LUNDBERG on 8/12: 33/56        ROS: 10 point ROS neg other than the symptoms noted above in the HPI.        MEDICATIONS    aspirin  81 mg Oral Daily     atenolol  25 mg Oral At Bedtime     atorvastatin  40 mg Oral At Bedtime     clopidogrel  75 mg Oral Daily     heparin ANTICOAGULANT  5,000 Units Subcutaneous Q12H     insulin aspart  3 Units Subcutaneous TID w/meals     insulin aspart  1-7 Units Subcutaneous TID AC     insulin aspart  1-5 Units Subcutaneous At Bedtime     insulin glargine  16 Units Subcutaneous At Bedtime     [Held by provider] lisinopril  10 mg Oral BID     pantoprazole  40 mg Oral QAM AC        acetaminophen, glucose **OR** dextrose **OR** glucagon, melatonin, polyethylene glycol, senna-docusate     PHYSICAL EXAM  /63 (BP Location: Left arm)   Pulse 77   Temp 97.1  F (36.2  C) (Oral)   Resp 18   Ht 1.575 m (5' 2\")   Wt 48.2 kg (106 lb 4.8 oz)   SpO2 99%   BMI 19.44 kg/m    Gen: NAD, seated comfortably in wheelchair, pleasant   HEENT: NC/AT  Cardio: RRR, appears well-perfused  Pulm: non-labored on room air  Abd: soft, non-tender, non-distended  Ext: no edema or calf tenderness bilaterally  Neuro/MSK: " +right facial droop.  Right hemiparesis with R shoulder abduction 4/5, elbow flexion/ext 4/5, wrist ext 3/5, hand  1/5. She has at least antigravity in all muscle groups of right lower extremity.     LABS  CBC RESULTS:   Recent Labs   Lab Test 08/12/21  0733 08/11/21  0545 08/09/21  0706 08/05/21  0701   WBC 8.4  --  8.7 9.0   RBC 3.50*  --  3.46* 3.48*   HGB 10.2*  --  10.2* 10.0*   HCT 32.1*  --  32.2* 33.0*   MCV 92  --  93 95   MCH 29.1  --  29.5 28.7   MCHC 31.8  --  31.7 30.3*   RDW 14.5  --  14.3 13.8    400 422 414     Last Basic Metabolic Panel:  Recent Labs   Lab Test 08/12/21  1656 08/12/21  1159 08/12/21  0825 08/12/21  0733 08/11/21  0545 08/10/21  0706 08/09/21  0706   NA  --   --   --  137  --  139 138   POTASSIUM  --   --   --  5.3  --  4.9 4.8   CHLORIDE  --   --   --  104  --  105 106   CO2  --   --   --  28  --  28 30   ANIONGAP  --   --   --  5  --  6 2*   GLC 98 108* 127* 138*  --  138* 129*   BUN  --   --   --  24  --  19 17   CR  --   --   --  1.26* 1.22* 1.19* 1.11*   GFRESTIMATED  --   --   --  43* 44* 46* 50*   CHELA  --   --   --  8.9  --  9.1 8.9       Rehabilitation - continue comprehensive acute inpatient rehabilitation program with multidisciplinary approach including therapies, rehab nursing, and physiatry following. See interval history for updates.      ASSESSMENT AND PLAN  Paradise Yuan is a 71 year old right hand dominant female with a past medical history of diabetes mellitus type II, hypertension, hyperlipidemia, CKD stage III, let upper lobectomy, and memory problems who was admitted on 7/22 with subacute left MCA infarction with ongoing right hemiparesis, impaired balance, impaired coordination, right-sided facial weakness, dysarthria, and dysphagia, with hospital course complicated by hypertension, hyperglycemia, anemia, atrial tachycardia, asymptomatic COVID-19 infection, orthostatic hypotension, and incidental finding of left cerebellar meningioma.  She is  admitted to acute rehab on 8/2 for multidisciplinary rehabilitation and ongoing medical management.    Subacute left MCA infarction  Presented with 2-week history of right sided facial droop, partial right hemiparesis, partial expressive aphasia.  MRI of head shows recent infarcts of varying age within the left MCA territory affecting the deep frontal white matter and cortex at the frontoparietal junction.   MRA head shows diminished flow within the left MCA beyond the distal M1 segment, normal MRA of the neck.  - LDL 84, goal <70.  Continue Lipitor 40 mg (increased from PTA 20 mg this admission).  - SBP goal <160, management as below  - Continue dual antiplatelet therapy with ASA 81 mg and Plavix 75 mg daily.  Duration not specified, based on CHANCE and POINT trials, will plan on x3 weeks - 8/13, will continue with 81 mg ASA going forward.   - Given multifocal infarcts, neurology recommends outpatient 30-day event monitor.  If unrevealing, consider implantable loop recorder.  - Continue PT/OT/SLP  - Follow up with stroke neurology in 1 month     COVID-19 positive 7/30, suspected false positive  Negative on admission 7/22.  Repeated on 7/30 prior to planned ARU discharge and resulted positive. Chest x-ray-no acute findings number different to differentiate given background of s/p left upper lobectomy, bronchiectases.  Precautions implemented.  No respiratory symptoms, stable on room air.  Per ID, recommended repeat test 8/3 to rule-out false positive and test returned negative.  Per discussion with them, feel likely 7/30 test represents false positive and recommended remove special precautions.     Meningioma, left posterior fossa  Revealed on imaging this admission.  No intervention needed this admission.  - Follow up with neurosurgery as outpatient     Diabetes mellitus, type II, uncontrolled  Had previously been on metformin, discontinued due to abnormal kidney function.  Had most recently been on Lantus 16 units  daily.  Given A1c 10.0 on 7/22, question whether taking insulin as prescribed at home.  Blood glucose well-managed this admisison with Lantus 16 units daily, mealtime Novolog (added 7/30), and sliding scale insulin.  - Continue Lantus 16 units at bedtime  - Continue Aspart 3 units TID with meals  - Continue medium intensity sliding scale insulin  - Hypoglycemia protocol  - Monitor blood glucose QID AC & HS, adjust insulin regimen as indicated     Essential hypertension  Initially held home lisinopril and atenolol for permissive hypertension.  Per neurology, given subacute stroke and symptoms for 2 weeks, goal to keep SBP <160. Resumed home antihypertensive regimen and BP better controlled.  - Continue atenolol 25 mg daily  - Continue lisinopril 10 mg BID--on hold  - Monitor BP and adjust meds as indicated    WILMA   CKD stage III  On admission, Cr increased from 0.9 8/2 to 1.26 8/3.  Suspect prerenal in setting of poor oral intake given low urine output on admission and poor initiation.  Lisinopril held, Lovenox switched to subcutaneous Heparin, received 1L bolus NS.  Cr improved to 0.99.  - Continue to hold lisinopril, BP well-controlled without at this time  - Encourage oral fluids  - Repeat BMP on 8/12 this am 1.26 from 1.22, will continue to encourage fluids and monitor, repeat labs in AM.  Did well in therapy this AM.      Hyperlipidemia  PTA on Lipitor 20 mg.  LDL 84 this admission, increased statin dose.  - Continue Lipitor 40 mg daily     Normocytic anemia  Stable in 13s at admission, most recent Hgb 10.2 on 8/2.  - Hgb stable at 10.0 on 8/5 and 10.2 on 8/12  - Trend CBC     Oral dysphagia  - Continue SLP  - Continue soft and bite-sized diet (IDDSI 6) and thin liquids      H/o left upper lobectomy     Memory problems  Per family, has had progressive memory issues for quite some time.  PCP had referred to neurology/memory clinic for further evaluation and management.  - Consider trial of Aricept while at [facility]  -  Follow up with neurology as planned    1. Adjustment to disability:  Clinical psychology to eval and treat if indicated  2. FEN: soft and bite-sized diet (IDDSI 6) and thin liquids (IDDSI 0)  3. Bowel: intermittent incontinence, monitor, PRN bowel meds available  4. Bladder: intermittently incontinent, low urine output, PVRs without evidence of retention  5. DVT Prophylaxis: Lovenox switched to subcutaneous heparin due to WILMA - although Cr had improved, will continue subcutaneous heparin and today there 1.26 Cr.    6. GI Prophylaxis: Protonix  7. Code: full code  8. Disposition: goal for home  9. ELOS: tentative discharge date of 8/17.   10. Follow up Appointments on Discharge: PCP, stroke neurology (1 month), neurosurgery, PM&R      Williams Benitez, DO  Physical Medicine & Rehabilitation       I spent a total of 35 minutes face-to-face and managing the care of Paradise Yuan. Over 50% of my time was spent counseling the patient and coordinating care. Please see note for details.

## 2021-08-12 NOTE — PLAN OF CARE
FUGL-KENNEDY ASSESSMENT   UPPER EXTREMITY  Assessment of sensorimotor function   Stroke specific, performance-based impairment index. It is designed to assess motor functioning, sensation, and joint functioning in patients with post-stroke hemiplegia.    Scoring  The FMA is an impairment measure, consisting of 33 motor tasks and 30 sensory/pain observations.   Motor and sensory items are scored on a 0-2 scale: 0 none, 1 partial, and 2 full.  Motor Function   Upper Extremity 30/36   Wrist 5/10   Hand 5/14   Coordination/Speed 3/6   Total 43/66     Sensory Function   Sensation 12/12   Joint Pain 24/24   Passive Joint Motion 24/24     Motor Classification  Very Severe (0-12)  Severe-Moderate (13-30)  Moderate-Mild (31-47)  Mild (48-60)    Sensory/joint observations (light touch, proprioception, PROM, PMHx impacting UE function):  R side inattention  Intact light touch/prioprioception    8/4 scoring 38/66 for motor function. Making some improvements with proximal motor function, continues to be limited with distal motor function. Demonstrating gradual functional improvements with RUE.    References  FMA-UE PROTOCOL [facility]  Approved by Fugl-Kennedy AR 2010  Fugl-Kennedy AR, Maurisio L, Kaylee I, Catherine S, Kerwin S: The post-stroke hemiplegic patient. A method for evaluation of physical performance. [place]and J Rehabil Med 1975, 7:13-31.  Sneha suazo al.: Determining Levels of Upper Extremity Movement Impairment by Applying a Cluster Analysis to the Fugl-Kennedy Assessment of the Upper Extremity in Chronic Stroke. Archives of Physical Medicine and Rehabilitation 2016, 98: 456-462.

## 2021-08-12 NOTE — PROGRESS NOTES
CLINICAL NUTRITION SERVICES - REASSESSMENT NOTE     Nutrition Prescription    RECOMMENDATIONS FOR MDs/PROVIDERS TO ORDER:  None today, pt reviewed face to face during team rounds    Malnutrition Status:    Patient does not meet two of the established criteria necessary for diagnosing malnutrition but is at risk for malnutrition    Recommendations already ordered by Registered Dietitian (RD):  None today - continue current nutrition interventions as reviewed below     Future/Additional Recommendations:  Continue to monitor meal intakes/supplement acceptance, weight and lab trends      EVALUATION OF THE PROGRESS TOWARD GOALS   Diet: Easy to Chew (level 7)  Supplement: Glucerna once daily at breakfast meal (NSA to discuss flavor options)  Intake: % per flow sheets        NEW FINDINGS   MAR reviewed, noted pt on correction scale insulin and Lantus. Labs reviewed, creat trends noted, A1c noted. Pt reviewed during team rounds and visited at bedside, both MD and RD encouraged fluids, RD encouraged meal intakes and pt agreeing to taking supplement as ordered above. DM labs/meds reviewed with MD.     08/09/21 0602 48.2 kg (106 lb 4.8 oz)   08/02/21 1400 48 kg (105 lb 12.8 oz)     07/22/21 49.9 kg (110 lb)   07/22/21 48.7 kg (107 lb 7 oz)   05/25/21 52.2 kg (115 lb)   04/20/21 50.1 kg (110 lb 8 oz)   02/23/21 47.8 kg (105 lb 5 oz)   01/05/21 45.1 kg (99 lb 8 oz)   12/03/20 50.1 kg (110 lb 6 oz)     Weight assessment: Pt's weight appears stable from ARU admit, possibly significant 7.8% in almost 3 months, 1 lb wt gain in almost 6 months.     MALNUTRITION  % Intake: No decreased intake noted  % Weight Loss: 7.5% in 3 months (non-severe/moderate)  Subcutaneous Fat Loss: Facial region: mild vs age related   Muscle Loss: Facial region: mild vs age related   Fluid Accumulation/Edema: Trace per flow sheets   Malnutrition Diagnosis: Patient does not meet two of the established criteria necessary for diagnosing malnutrition but  is at risk for malnutrition    Previous Goals   Patient to consume % of nutritionally adequate meal trays TID, or the equivalent with supplements/snacks  Evaluation: Improving/stable     Previous Nutrition Diagnosis  Predicted inadequate protein-energy intake related to variable appetite as evidenced by pt reliant on PO intakes to meet 100% of nutritional needs with potential for variation  Evaluation: No change    CURRENT NUTRITION DIAGNOSIS  Predicted inadequate protein-energy intake related to variable appetite as evidenced by pt reliant on PO intakes to meet 100% of nutritional needs with potential for variation    INTERVENTIONS  Implementation  Collaboration with other staff - continue nursing patient care order to encourage fluids between meals   Medical food supplement therapy - continue as ordered   Modify composition of meals/snacks -  assistance to encourage pt to order extra fluids at meals     Goals  Patient to consume % of nutritionally adequate meal trays TID, or the equivalent with supplements/snacks.    Monitoring/Evaluation  Progress toward goals will be monitored and evaluated per protocol.    Whitney Gage RD, CNSC, LD  [place] pager: [phone]

## 2021-08-12 NOTE — PLAN OF CARE
FOCUS/GOAL  Bladder management, Pain management, Mobility, Cognition/Memory/Judgment/Problem solving, and Safety management    ASSESSMENT, INTERVENTIONS AND CONTINUING PLAN FOR GOAL:  Pt is alert, disoriented to time. Continent of bladder this shift with timed toileting. Up with assist of 1 with WBQC. Denied pain or discomfort overnight. Appeared to be sleeping well during rounds. No call light use, anticipate needs. Bed alarm on for safety. Will continue with POC.

## 2021-08-12 NOTE — PLAN OF CARE
"Discharge Planner Post-Acute Rehab PT:      Discharge Plan: Home with sister and adult nephew. Home care PT. Split level house with single rail, plus several [place]IR[place].     Precautions: Falls, impaired cognition, R-sided inattn and hemiparesis     Current Status:  Bed Mobility: SBA- cues for attn to R UE  Transfer: CGA with FWW  Gait: up to 200 ft with FWW, R toe up, attn to R hand  Stairs: 16x6\" step ups with bilat HR, min A through R UE  Balance: LUNDBERG on 8/12: 33/56     Assessment: R-sided inattn and impaired cognition remain challenging barriers, although pt is demonstrating improving R-sided activation and standing balance, through reassessment of LUNDBERG from 27 >> 33/56. Plan to continue to focus on R-sided strengthening, standing balance, and activities to increase R-sided attn to improve IND with mobility and decrease risk of future falls.     Other Barriers to Discharge (DME, Family Training, etc):   Family training 8/18 2-4 PM.  Equipment TBD - likely FWW, foot up  "

## 2021-08-13 ENCOUNTER — APPOINTMENT (OUTPATIENT)
Dept: OCCUPATIONAL THERAPY | Facility: CLINIC | Age: 71
DRG: 057 | End: 2021-08-13
Attending: PHYSICAL MEDICINE & REHABILITATION
Payer: COMMERCIAL

## 2021-08-13 ENCOUNTER — APPOINTMENT (OUTPATIENT)
Dept: SPEECH THERAPY | Facility: CLINIC | Age: 71
DRG: 057 | End: 2021-08-13
Attending: PHYSICAL MEDICINE & REHABILITATION
Payer: COMMERCIAL

## 2021-08-13 ENCOUNTER — APPOINTMENT (OUTPATIENT)
Dept: PHYSICAL THERAPY | Facility: CLINIC | Age: 71
DRG: 057 | End: 2021-08-13
Attending: PHYSICAL MEDICINE & REHABILITATION
Payer: COMMERCIAL

## 2021-08-13 LAB
GLUCOSE BLDC GLUCOMTR-MCNC: 105 MG/DL (ref 70–99)
GLUCOSE BLDC GLUCOMTR-MCNC: 107 MG/DL (ref 70–99)
GLUCOSE BLDC GLUCOMTR-MCNC: 110 MG/DL (ref 70–99)
GLUCOSE BLDC GLUCOMTR-MCNC: 123 MG/DL (ref 70–99)
GLUCOSE BLDC GLUCOMTR-MCNC: 149 MG/DL (ref 70–99)

## 2021-08-13 PROCEDURE — 97112 NEUROMUSCULAR REEDUCATION: CPT | Mod: GP | Performed by: PHYSICAL THERAPIST

## 2021-08-13 PROCEDURE — 92507 TX SP LANG VOICE COMM INDIV: CPT | Mod: GN | Performed by: SPEECH-LANGUAGE PATHOLOGIST

## 2021-08-13 PROCEDURE — 128N000003 HC R&B REHAB

## 2021-08-13 PROCEDURE — 250N000011 HC RX IP 250 OP 636: Performed by: PHYSICIAN ASSISTANT

## 2021-08-13 PROCEDURE — 97535 SELF CARE MNGMENT TRAINING: CPT | Mod: GO

## 2021-08-13 PROCEDURE — 97760 ORTHOTIC MGMT&TRAING 1ST ENC: CPT | Mod: GO

## 2021-08-13 PROCEDURE — 250N000013 HC RX MED GY IP 250 OP 250 PS 637: Performed by: PHYSICIAN ASSISTANT

## 2021-08-13 PROCEDURE — 99232 SBSQ HOSP IP/OBS MODERATE 35: CPT | Performed by: PHYSICAL MEDICINE & REHABILITATION

## 2021-08-13 RX ADMIN — INSULIN ASPART 3 UNITS: 100 INJECTION, SOLUTION INTRAVENOUS; SUBCUTANEOUS at 08:35

## 2021-08-13 RX ADMIN — HEPARIN SODIUM 5000 UNITS: 5000 INJECTION, SOLUTION INTRAVENOUS; SUBCUTANEOUS at 08:35

## 2021-08-13 RX ADMIN — ATENOLOL 25 MG: 25 TABLET ORAL at 21:22

## 2021-08-13 RX ADMIN — HEPARIN SODIUM 5000 UNITS: 5000 INJECTION, SOLUTION INTRAVENOUS; SUBCUTANEOUS at 21:23

## 2021-08-13 RX ADMIN — ATORVASTATIN CALCIUM 40 MG: 40 TABLET, FILM COATED ORAL at 21:22

## 2021-08-13 RX ADMIN — ASPIRIN 81 MG: 81 TABLET, COATED ORAL at 08:35

## 2021-08-13 RX ADMIN — INSULIN GLARGINE 16 UNITS: 100 INJECTION, SOLUTION SUBCUTANEOUS at 21:23

## 2021-08-13 RX ADMIN — INSULIN ASPART 3 UNITS: 100 INJECTION, SOLUTION INTRAVENOUS; SUBCUTANEOUS at 13:00

## 2021-08-13 RX ADMIN — PANTOPRAZOLE SODIUM 40 MG: 40 TABLET, DELAYED RELEASE ORAL at 06:09

## 2021-08-13 NOTE — PLAN OF CARE
"Discharge Planner Post-Acute Rehab PT:      Discharge Plan: Home with sister and adult nephew. Home care PT. Split level house with single rail, plus several [place]IR[place].     Precautions: Falls, impaired cognition, R-sided inattn and hemiparesis     Current Status: Requires cues t/o for attn to R UE  Bed Mobility: SBA  Transfer: CGA/SBA with FWW  Gait: up to 200 ft with FWW, R toe up. Intermittent min A through R hand if fatigued or if turning R  Stairs: 16x6\" step ups with bilat HR, CGA  Balance: LUNDBERG on 8/12: 33/56     Assessment: R-sided inattn and impaired cognition remain challenging barriers, although pt is demonstrating improving R-sided activation and standing balance, through reassessment of LUNDBERG from 27 >> 33/56. Plan to continue to focus on R-sided strengthening, standing balance, and activities to increase R-sided attn to improve IND with mobility and decrease risk of future falls.     Other Barriers to Discharge (DME, Family Training, etc):   Family training 8/18 2-4 PM.  Equipment TBD - likely FWW, foot up  "

## 2021-08-13 NOTE — PLAN OF CARE
Discharge Planner Post-Acute Rehab OT:      Discharge Plan: Home with sister and adult nephew. Home with HC.    Precautions: Falls, impaired cognition, R inattention, R hemiparesis     Current Status:  ADLs: CGA transfers and room mobility fww and R wrist drop splint. SBA UB dressing, SBA standing grooming, min  A LB dressing, and CGA toileting.        IADLs: TBA.   Vision/Cognition: R side inattention. Disoriented, slowed responses, mild difficulty following commands.     Assessment:pt seen for fabrication of R wrist drop splnt and R thumb opponens splint to increase 2 point pinch for adls.ot pt seen for use of fww with wristdrop splint and toileting Good control of hand on walker with occasional cues for awareness of where hand is in spaceGoals to advance to Mod IND-SBA mobility and ADLs. Anticipate pt will require assistance for IADLs due to cognitive and physical deficits. Caregiver support TBD which may be a barrier to discharge.     Other Barriers to Discharge (DME, Family Training, etc):   Caregiver support   Equipment TBD pending progress   r wrist drop splint and r thumb opponens splint fabricated 8-13

## 2021-08-13 NOTE — PROGRESS NOTES
[facility]   Acute Rehabilitation Unit  Daily progress note    INTERVAL HISTORY  Paradise Yuan was seen and examined this morning.  No acute events overnight.  Patient denies any concerns this morning, no pain, shortness of breath, dizziness, nausea, bowel or bladder concerns, or appetite changes. Continue to encourage PO fluid intake. Despite this she is participating well in therapy program.  Will obtain AM labs and if Cr continues to increase will need bolus.      Functional  OT:  The FMA is an impairment measure, consisting of 33 motor tasks and 30 sensory/pain observations.   Motor and sensory items are scored on a 0-2 scale: 0 none, 1 partial, and 2 full.      Motor Function   Upper Extremity 30/36   Wrist 5/10   Hand 5/14   Coordination/Speed 3/6   Total 43/66          Sensory Function   Sensation 12/12   Joint Pain 24/24   Passive Joint Motion 24/24      Motor Classification  Very Severe (0-12)  Severe-Moderate (13-30)  Moderate-Mild (31-47)  Mild (48-60)     Sensory/joint observations (light touch, proprioception, PROM, PMHx impacting UE function):  R side inattention  Intact light touch/prioprioception     8/4 scoring 38/66 for motor function. Making some improvements with proximal motor function, continues to be limited with distal motor function. Demonstrating gradual functional improvements with RUE.          ROS: 10 point ROS neg other than the symptoms noted above in the HPI.        MEDICATIONS    aspirin  81 mg Oral Daily     atenolol  25 mg Oral At Bedtime     atorvastatin  40 mg Oral At Bedtime     heparin ANTICOAGULANT  5,000 Units Subcutaneous Q12H     insulin aspart  3 Units Subcutaneous TID w/meals     insulin aspart  1-7 Units Subcutaneous TID AC     insulin aspart  1-5 Units Subcutaneous At Bedtime     insulin glargine  16 Units Subcutaneous At Bedtime     [Held by provider] lisinopril  10 mg Oral BID     pantoprazole  40 mg Oral QAM AC     "    acetaminophen, glucose **OR** dextrose **OR** glucagon, melatonin, polyethylene glycol, senna-docusate     PHYSICAL EXAM  /65 (BP Location: Left arm)   Pulse 73   Temp 98.1  F (36.7  C) (Oral)   Resp 18   Ht 1.575 m (5' 2\")   Wt 48.2 kg (106 lb 4.8 oz)   SpO2 98%   BMI 19.44 kg/m    Gen: NAD, seated comfortably in wheelchair, pleasant   HEENT: NC/AT  Cardio: RRR, appears well-perfused  Pulm: non-labored on room air  Abd: soft, non-tender, non-distended  Ext: no edema or calf tenderness bilaterally  Neuro/MSK: +right facial droop.  Right hemiparesis with R shoulder abduction 4/5, elbow flexion/ext 4/5, wrist ext 3/5, hand  1/5. She has at least antigravity in all muscle groups of right lower extremity.     LABS  CBC RESULTS:   Recent Labs   Lab Test 08/12/21  0733 08/11/21  0545 08/09/21  0706 08/05/21  0701   WBC 8.4  --  8.7 9.0   RBC 3.50*  --  3.46* 3.48*   HGB 10.2*  --  10.2* 10.0*   HCT 32.1*  --  32.2* 33.0*   MCV 92  --  93 95   MCH 29.1  --  29.5 28.7   MCHC 31.8  --  31.7 30.3*   RDW 14.5  --  14.3 13.8    400 422 414     Last Basic Metabolic Panel:  Recent Labs   Lab Test 08/13/21  1215 08/13/21  0739 08/13/21  0158 08/12/21  0733 08/11/21  0545 08/10/21  0706 08/09/21  0706   NA  --   --   --  137  --  139 138   POTASSIUM  --   --   --  5.3  --  4.9 4.8   CHLORIDE  --   --   --  104  --  105 106   CO2  --   --   --  28  --  28 30   ANIONGAP  --   --   --  5  --  6 2*   * 149* 107* 138*  --  138* 129*   BUN  --   --   --  24  --  19 17   CR  --   --   --  1.26* 1.22* 1.19* 1.11*   GFRESTIMATED  --   --   --  43* 44* 46* 50*   CHELA  --   --   --  8.9  --  9.1 8.9       Rehabilitation - continue comprehensive acute inpatient rehabilitation program with multidisciplinary approach including therapies, rehab nursing, and physiatry following. See interval history for updates.      ASSESSMENT AND PLAN  Paradise Yuan is a 71 year old right hand dominant female with a past " medical history of diabetes mellitus type II, hypertension, hyperlipidemia, CKD stage III, let upper lobectomy, and memory problems who was admitted on 7/22 with subacute left MCA infarction with ongoing right hemiparesis, impaired balance, impaired coordination, right-sided facial weakness, dysarthria, and dysphagia, with hospital course complicated by hypertension, hyperglycemia, anemia, atrial tachycardia, asymptomatic COVID-19 infection, orthostatic hypotension, and incidental finding of left cerebellar meningioma.  She is admitted to acute rehab on 8/2 for multidisciplinary rehabilitation and ongoing medical management.    Subacute left MCA infarction  Presented with 2-week history of right sided facial droop, partial right hemiparesis, partial expressive aphasia.  MRI of head shows recent infarcts of varying age within the left MCA territory affecting the deep frontal white matter and cortex at the frontoparietal junction.   MRA head shows diminished flow within the left MCA beyond the distal M1 segment, normal MRA of the neck.  - LDL 84, goal <70.  Continue Lipitor 40 mg (increased from PTA 20 mg this admission).  - SBP goal <160, management as below  - Continue dual antiplatelet therapy with ASA 81 mg and Plavix 75 mg daily.  Duration not specified, based on CHANCE and POINT trials, will plan on x3 weeks - 8/13, will continue with 81 mg ASA going forward.   - Given multifocal infarcts, neurology recommends outpatient 30-day event monitor.  If unrevealing, consider implantable loop recorder.  - Continue PT/OT/SLP  - Follow up with stroke neurology in 1 month     COVID-19 positive 7/30, suspected false positive  Negative on admission 7/22.  Repeated on 7/30 prior to planned ARU discharge and resulted positive. Chest x-ray-no acute findings number different to differentiate given background of s/p left upper lobectomy, bronchiectases.  Precautions implemented.  No respiratory symptoms, stable on room air.  Per  ID, recommended repeat test 8/3 to rule-out false positive and test returned negative.  Per discussion with them, feel likely 7/30 test represents false positive and recommended remove special precautions.     Meningioma, left posterior fossa  Revealed on imaging this admission.  No intervention needed this admission.  - Follow up with neurosurgery as outpatient     Diabetes mellitus, type II, uncontrolled  Had previously been on metformin, discontinued due to abnormal kidney function.  Had most recently been on Lantus 16 units daily.  Given A1c 10.0 on 7/22, question whether taking insulin as prescribed at home.  Blood glucose well-managed this admisison with Lantus 16 units daily, mealtime Novolog (added 7/30), and sliding scale insulin.  - Continue Lantus 16 units at bedtime  - Continue Aspart 3 units TID with meals  - Continue medium intensity sliding scale insulin  - Hypoglycemia protocol  - Monitor blood glucose QID AC & HS, adjust insulin regimen as indicated     Essential hypertension  Initially held home lisinopril and atenolol for permissive hypertension.  Per neurology, given subacute stroke and symptoms for 2 weeks, goal to keep SBP <160. Resumed home antihypertensive regimen and BP better controlled.  - Continue atenolol 25 mg daily  - Continue lisinopril 10 mg BID--on hold  - Monitor BP and adjust meds as indicated    WILMA   CKD stage III  On admission, Cr increased from 0.9 8/2 to 1.26 8/3.  Suspect prerenal in setting of poor oral intake given low urine output on admission and poor initiation.  Lisinopril held, Lovenox switched to subcutaneous Heparin, received 1L bolus NS.  Cr improved to 0.99.  - Continue to hold lisinopril, BP well-controlled without at this time  - Encourage oral fluids  - Repeat BMP on 8/12 this am 1.26 from 1.22, will continue to encourage fluids and monitor, repeat labs in AM.  Did well in therapy this AM.      Hyperlipidemia  PTA on Lipitor 20 mg.  LDL 84 this admission,  increased statin dose.  - Continue Lipitor 40 mg daily     Normocytic anemia  Stable in 13s at admission, most recent Hgb 10.2 on 8/2.  - Hgb stable at 10.0 on 8/5 and 10.2 on 8/12  - Trend CBC     Oral dysphagia  - Continue SLP  - Continue soft and bite-sized diet (IDDSI 6) and thin liquids      H/o left upper lobectomy     Memory problems  Per family, has had progressive memory issues for quite some time.  PCP had referred to neurology/memory clinic for further evaluation and management.  - Consider trial of Aricept while at ARU  - Follow up with neurology as planned    1. Adjustment to disability:  Clinical psychology to eval and treat if indicated  2. FEN: soft and bite-sized diet (IDDSI 6) and thin liquids (IDDSI 0)  3. Bowel: intermittent incontinence, monitor, PRN bowel meds available  4. Bladder: intermittently incontinent, low urine output, PVRs without evidence of retention  5. DVT Prophylaxis: Lovenox switched to subcutaneous heparin due to WILMA - although Cr had improved, will continue subcutaneous heparin and today there 1.26 Cr.    6. GI Prophylaxis: Protonix  7. Code: full code  8. Disposition: goal for home  9. ELOS: tentative discharge date of 8/17.   10. Follow up Appointments on Discharge: PCP, stroke neurology (1 month), neurosurgery, PM&R      Williams Benitez, DO  Physical Medicine & Rehabilitation       I spent a total of 25 minutes face-to-face and managing the care of Paradise Yuan. Over 50% of my time was spent counseling the patient and coordinating care. Please see note for details.

## 2021-08-13 NOTE — PLAN OF CARE
FOCUS/GOAL  Bowel management, Bladder management, Nutrition/Feeding/Swallowing precautions, Pain management, Mobility, Skin integrity, and Cognition/Memory/Judgment/Problem solving    ASSESSMENT, INTERVENTIONS AND CONTINUING PLAN FOR GOAL:    Patient alert, oriented to situation, person, place, but disoriented to time and date. Denies pain, headache, numbness, tingling, SOB, CP, and nausea. CGA with walker. Regular diet and thin liquids. Fluids encouraged. Frequent toileting encouraged but patient only voided once. Patient was continent of bladder, no BM during shift. Last BM per report was 8/9. PRN Senna was given to patient. Brusing noted to abd from medication injection. Bgs stable. VSS. Continue POC.

## 2021-08-13 NOTE — PLAN OF CARE
FOCUS/GOAL  Bladder management, Pain management, Mobility, Cognition/Memory/Judgment/Problem solving, and Safety management    ASSESSMENT, INTERVENTIONS AND CONTINUING PLAN FOR GOAL:  Pt is alert, disoriented to time. Continent of bladder with timed toileting. Incontinent of medium soft BM this morning, and completed BM in the toilet. Up with assist of 1 with walker to the bathroom. Denied pain or discomfort this shift. Appeared to be sleeping well during rounds. Does not use call light, anticipate pt needs. Bed alarm on for safety. Will continue with POC.

## 2021-08-13 NOTE — PLAN OF CARE
FOCUS/GOAL  Bowel management, Bladder management, Pain management, Mobility, Skin integrity, and Safety management    ASSESSMENT, INTERVENTIONS AND CONTINUING PLAN FOR GOAL:  A/O - time, situation, VS stable. Regular/thin, pills whole. Ate most of meal. Continent and incontinent of bowel and bladder. No complaints of pain this shift. Assist of 1 with walker. No new skin concerns. Calls appropriately with light. Continue POC.

## 2021-08-13 NOTE — PLAN OF CARE
Discharge Planner Post-Acute Rehab SLP:      Discharge Plan: Home with sister and adult nephew. Home care vs OP PT pending progress. Will need to confirm home set-up with sister.     Precautions: Fall, cognition, language deficits, swallowing     Current Status:  Communication:Mild dysarthria. Mild-mod receptive and expressive language tasks  Cognition: Memory deficits noted at baseline per family report. Not formally assessed.  Swallow: Recommend easy to chew (7) diet and thin liquids with upright position, slow rate, alternate solids and liquids, and small bites/sips.      Assessment: Patient demonstrates significantly improved reading comprehension, able to follow simple written directions and answer simple yes/no questions from written question. Does require usual (50-74%) prompting to continue to participate in tasks, but improved task accuracy. Naming remains WFL, but improving sentence completion for generating object definitions. Also, improved picture description following structured language format. Patient with improved verbal expression when presented with stimulus picture vs attempting to generate words independently.    Other Barriers to Discharge (Family Training, etc): None anticipated from SLP

## 2021-08-14 ENCOUNTER — APPOINTMENT (OUTPATIENT)
Dept: OCCUPATIONAL THERAPY | Facility: CLINIC | Age: 71
DRG: 057 | End: 2021-08-14
Attending: PHYSICAL MEDICINE & REHABILITATION
Payer: COMMERCIAL

## 2021-08-14 ENCOUNTER — APPOINTMENT (OUTPATIENT)
Dept: PHYSICAL THERAPY | Facility: CLINIC | Age: 71
DRG: 057 | End: 2021-08-14
Attending: PHYSICAL MEDICINE & REHABILITATION
Payer: COMMERCIAL

## 2021-08-14 ENCOUNTER — APPOINTMENT (OUTPATIENT)
Dept: SPEECH THERAPY | Facility: CLINIC | Age: 71
DRG: 057 | End: 2021-08-14
Attending: PHYSICAL MEDICINE & REHABILITATION
Payer: COMMERCIAL

## 2021-08-14 LAB
ANION GAP SERPL CALCULATED.3IONS-SCNC: 3 MMOL/L (ref 3–14)
BUN SERPL-MCNC: 27 MG/DL (ref 7–30)
CALCIUM SERPL-MCNC: 9 MG/DL (ref 8.5–10.1)
CHLORIDE BLD-SCNC: 106 MMOL/L (ref 94–109)
CO2 SERPL-SCNC: 28 MMOL/L (ref 20–32)
CREAT SERPL-MCNC: 1.26 MG/DL (ref 0.52–1.04)
GFR SERPL CREATININE-BSD FRML MDRD: 43 ML/MIN/1.73M2
GLUCOSE BLD-MCNC: 132 MG/DL (ref 70–99)
GLUCOSE BLDC GLUCOMTR-MCNC: 115 MG/DL (ref 70–99)
GLUCOSE BLDC GLUCOMTR-MCNC: 118 MG/DL (ref 70–99)
GLUCOSE BLDC GLUCOMTR-MCNC: 133 MG/DL (ref 70–99)
GLUCOSE BLDC GLUCOMTR-MCNC: 140 MG/DL (ref 70–99)
GLUCOSE BLDC GLUCOMTR-MCNC: 192 MG/DL (ref 70–99)
HOLD SPECIMEN: NORMAL
POTASSIUM BLD-SCNC: 4.4 MMOL/L (ref 3.4–5.3)
SODIUM SERPL-SCNC: 137 MMOL/L (ref 133–144)

## 2021-08-14 PROCEDURE — 128N000003 HC R&B REHAB

## 2021-08-14 PROCEDURE — 96125 COGNITIVE TEST BY HC PRO: CPT | Mod: GN | Performed by: SPEECH-LANGUAGE PATHOLOGIST

## 2021-08-14 PROCEDURE — 97116 GAIT TRAINING THERAPY: CPT | Mod: GP | Performed by: PHYSICAL THERAPIST

## 2021-08-14 PROCEDURE — 97112 NEUROMUSCULAR REEDUCATION: CPT | Mod: GP | Performed by: PHYSICAL THERAPIST

## 2021-08-14 PROCEDURE — 250N000011 HC RX IP 250 OP 636: Performed by: PHYSICIAN ASSISTANT

## 2021-08-14 PROCEDURE — 97112 NEUROMUSCULAR REEDUCATION: CPT | Mod: GO | Performed by: OCCUPATIONAL THERAPIST

## 2021-08-14 PROCEDURE — 250N000013 HC RX MED GY IP 250 OP 250 PS 637: Performed by: PHYSICIAN ASSISTANT

## 2021-08-14 PROCEDURE — 92507 TX SP LANG VOICE COMM INDIV: CPT | Mod: GN | Performed by: SPEECH-LANGUAGE PATHOLOGIST

## 2021-08-14 PROCEDURE — 80048 BASIC METABOLIC PNL TOTAL CA: CPT | Performed by: PHYSICAL MEDICINE & REHABILITATION

## 2021-08-14 PROCEDURE — 36415 COLL VENOUS BLD VENIPUNCTURE: CPT | Performed by: PHYSICAL MEDICINE & REHABILITATION

## 2021-08-14 RX ADMIN — ATORVASTATIN CALCIUM 40 MG: 40 TABLET, FILM COATED ORAL at 20:42

## 2021-08-14 RX ADMIN — HEPARIN SODIUM 5000 UNITS: 5000 INJECTION, SOLUTION INTRAVENOUS; SUBCUTANEOUS at 10:00

## 2021-08-14 RX ADMIN — INSULIN ASPART 3 UNITS: 100 INJECTION, SOLUTION INTRAVENOUS; SUBCUTANEOUS at 18:02

## 2021-08-14 RX ADMIN — ASPIRIN 81 MG: 81 TABLET, COATED ORAL at 07:52

## 2021-08-14 RX ADMIN — INSULIN ASPART 3 UNITS: 100 INJECTION, SOLUTION INTRAVENOUS; SUBCUTANEOUS at 12:51

## 2021-08-14 RX ADMIN — PANTOPRAZOLE SODIUM 40 MG: 40 TABLET, DELAYED RELEASE ORAL at 06:14

## 2021-08-14 RX ADMIN — INSULIN ASPART 3 UNITS: 100 INJECTION, SOLUTION INTRAVENOUS; SUBCUTANEOUS at 09:58

## 2021-08-14 RX ADMIN — HEPARIN SODIUM 5000 UNITS: 5000 INJECTION, SOLUTION INTRAVENOUS; SUBCUTANEOUS at 20:35

## 2021-08-14 RX ADMIN — INSULIN GLARGINE 16 UNITS: 100 INJECTION, SOLUTION SUBCUTANEOUS at 22:06

## 2021-08-14 NOTE — PLAN OF CARE
Disoriented to time. Continent of bowel and bladder this shift with staff anticipating needs. LBM 8/13. Denies pain. Assist of 1 with walker. QID blood sugars. Level 7 diet with level 0 liquids. Ate about 50% of breakfast. Skin bruised. Bed alarm on for safety, call light within reach, Ok to continue with care plan.

## 2021-08-14 NOTE — PLAN OF CARE
Discharge Planner Post-Acute Rehab SLP:      Discharge Plan: Home with sister and adult nephew. Home care vs OP PT pending progress. Will need to confirm home set-up with sister.     Precautions: Fall, cognition, language deficits, swallowing     Current Status:  Communication:Mild dysarthria. Mild-mod receptive and expressive language tasks  Cognition: Memory deficits noted at baseline per family report. Not formally assessed.  Swallow: Recommend easy to chew (7) diet and thin liquids with upright position, slow rate, alternate solids and liquids, and small bites/sips.      Assessment: Cognitive-linguistic evaluation completed. Patient demonstrates below baseline cognitive-linguistic status and would benefit from SLP skilled services to participate in daily tasks and therapeutic activity at the highest functional level. Of note, patient has memory impairment at baseline, however since most recent CVA, patient also demonstrates impaired attention. CLQT score revealed severe overall cognitive-linguistic impairment with moderate language and memory scores, and severe attention, executive function, and visuospatial scores. Patient demonstrates ongoing severe right-side neglect. Patient also with poor sustained attention and working memory for completion of therapeutic activities. Will plan to address to improve patient overall participation in therapy sessions.    Cognitive-Linguistic Quick Test results    Cognitive Domain  Severity Rating  Attention        Severe  Memory        Moderate  Executive Functions       Severe  Language        Moderate  Visuospatial Skills       Severe    Composite Severity Rating      Severe          Other Barriers to Discharge (Family Training, etc): None anticipated from SLP

## 2021-08-14 NOTE — PLAN OF CARE
Alert , disoriented to time , encouraged to use the callight for help pt does not use the callight her needs are anticipated transferred and ambulate with assist of 1 and walker continent of bladder in the toilet with  toileting prompting .

## 2021-08-14 NOTE — PLAN OF CARE
FOCUS/GOAL  Medical management and Safety management    ASSESSMENT, INTERVENTIONS AND CONTINUING PLAN FOR GOAL:    Pt is alert, disoriented to time. Does not use the call light, staff to anticipate needs. Offered toileting when pt is awake. Denies pain, sob, headache, fever or any new weakness. Able to reposition self in bed. A1 with the walker in transfers. Continent of bowel and bladder. No bm this shift. BG at 2am is 118. Will continue POC.

## 2021-08-14 NOTE — PLAN OF CARE
Discharge Planner Post-Acute Rehab OT:      Discharge Plan: Home with sister and adult nephew. Home with HC.    Precautions: Falls, impaired cognition, R inattention, R hemiparesis     Current Status:  ADLs: CGA transfers and room mobility fww and R wrist drop splint. SBA UB dressing, SBA standing grooming, min A LB dressing, and CGA toileting.  IADLs: TBA.   Vision/Cognition: R side inattention. Disoriented, slowed responses, mild difficulty following commands.     Assessment: Family able to provide assistance for IADLs and supervision for ADLs. Sister unable to provide physical assistance due to her own orthopedic concerns. Goals to advance to SBA mobility and ADLs.     Other Barriers to Discharge (DME, Family Training, etc):   Caregiver support: Training scheduled for 8/18 2-4pm.  Equipment: fww vs quad cane, shower bench, commode overlay.

## 2021-08-14 NOTE — PROGRESS NOTES
08/14/21 1600   General Information   Onset of Illness/Injury or Date of Surgery 08/02/21   Referring Physician Pari Lazo PA-C   Patient/Family Therapy Goal Statement (SLP) None stated   Pertinent History of Current Problem Paradise Yuna is a 71 year old right hand dominant female with a past medical history of diabetes mellitus type II, hypertension, hyperlipidemia, CKD stage III, let upper lobectomy, and memory problems who was admitted on 7/22 with subacute left MCA infarction with ongoing right hemiparesis, impaired balance, impaired coordination, right-sided facial weakness, dysarthria, and dysphagia, with hospital course complicated by hypertension, hyperglycemia, anemia, atrial tachycardia, asymptomatic COVID-19 infection, orthostatic hypotension, and incidental finding of left cerebellar meningioma.  She is admitted to acute rehab on 8/2 for multidisciplinary rehabilitation and ongoing medical management.   General Observations Pt seen for speech/language evaluation. Of note, family identified memory deficits at baseline    Type of Evaluation   Type of Evaluation Speech, Language, Cognition   Cognition   Cognitive Status Exam Comments CLQT Domains: Attention - severe, memory - moderate, executive functions - severe, language - moderate, visuospatial skills - severe. Composite severity score - Severe   Cognitive Status Alert and cooperative during session, though requires prompting to participate and maintain focus.   Cognitive Function attention deficit;executive function deficit;memory deficit   Additional cognitive-linguistic evaluation indicated  Completed   Attention Deficit (Cognition) moderate deficit;severe deficit;selective attention;focused/sustained attention;other (see comments)  (Right neglect)   Executive Function Deficit (Cognition) severe deficit;planning/decision-making;problem-solving/reasoning;self-monitoring/self-correction;insight/awareness of deficits   Memory Deficit  (Cognition) moderate deficit;short-term memory;immediate recall;procedural memory   General Therapy Interventions   Planned Therapy Interventions Cognitive Treatment   Cognitive treatment Progressive attention training   SLP Therapy Assessment/Plan   Criteria for Skilled Therapeutic Interventions Met (SLP Eval) yes;treatment indicated   SLP Diagnosis Moderate-severe cognitive-linguistic impairment   Rehab Potential (SLP Eval) good, to achieve stated therapy goals   Therapy Frequency (SLP Eval) daily   Predicted Duration of Therapy Intervention (SLP Eval) 4 weeks   Comment, Therapy Assessment/Plan (SLP) Patient demonstrates below baseline cognitive-linguistic status and would benefit from SLP skilled services to participate in daily tasks and therapeutic activity at the highest functional level. Of note, patient has memory impairment at baseline, however since most recent CVA, patient also demonstrates impaired attention. CLQT score revealed severe overall cognitive-linguistic impairment with moderate language and memory scores, and severe attention, executive function, and visuospatial scores. Patient demonstrates ongoing severe right-side neglect. Patient also with poor sustained attention and working memory for completion of therapeutic activities. Will plan to address to improve patient overall participation in therapy sessions.   Therapy Plan Review/Discharge Plan (SLP)   Therapy Plan Review (SLP) evaluation/treatment results reviewed;care plan/treatment goals reviewed;risks/benefits reviewed;current/potential barriers reviewed;participants voiced agreement with care plan;participants included;patient    Total Evaluation Time   Total Evaluation Time (Minutes) 45  (Evaluation)

## 2021-08-14 NOTE — PLAN OF CARE
"Discharge Planner Post-Acute Rehab PT:      Discharge Plan: Home with sister and adult nephew. Home care PT. Split level house with single rail, plus several [place].     Precautions: Falls, impaired cognition, R-sided inattn and hemiparesis     Current Status: Requires cues t/o for attn to R UE  Bed Mobility: SBA  Transfer: CGA/SBA with FWW  Gait: up to 230 ft with FWW, R toe up. Intermittent min A through R hand if fatigued or if turning R  Stairs: 16x6\" step ups with bilat HR, CGA  Balance: LUNDBERG on 8/12: 33/56     Assessment: R-sided inattn improving with repetition but cognition remain challenging barriers, improving R-sided activation and standing balance; continue to focus on R-sided strengthening, standing balance, and activities to increase R-sided attn to improve IND with mobility and decrease risk of future falls.     Other Barriers to Discharge (DME, Family Training, etc):   Family training 8/18 2-4 PM.  Equipment TBD - likely FWW, foot up  "

## 2021-08-15 ENCOUNTER — APPOINTMENT (OUTPATIENT)
Dept: OCCUPATIONAL THERAPY | Facility: CLINIC | Age: 71
DRG: 057 | End: 2021-08-15
Attending: PHYSICAL MEDICINE & REHABILITATION
Payer: COMMERCIAL

## 2021-08-15 ENCOUNTER — APPOINTMENT (OUTPATIENT)
Dept: SPEECH THERAPY | Facility: CLINIC | Age: 71
DRG: 057 | End: 2021-08-15
Attending: PHYSICAL MEDICINE & REHABILITATION
Payer: COMMERCIAL

## 2021-08-15 ENCOUNTER — APPOINTMENT (OUTPATIENT)
Dept: PHYSICAL THERAPY | Facility: CLINIC | Age: 71
DRG: 057 | End: 2021-08-15
Attending: PHYSICAL MEDICINE & REHABILITATION
Payer: COMMERCIAL

## 2021-08-15 LAB
GLUCOSE BLDC GLUCOMTR-MCNC: 101 MG/DL (ref 70–99)
GLUCOSE BLDC GLUCOMTR-MCNC: 129 MG/DL (ref 70–99)
GLUCOSE BLDC GLUCOMTR-MCNC: 145 MG/DL (ref 70–99)
GLUCOSE BLDC GLUCOMTR-MCNC: 158 MG/DL (ref 70–99)
GLUCOSE BLDC GLUCOMTR-MCNC: 198 MG/DL (ref 70–99)

## 2021-08-15 PROCEDURE — 250N000011 HC RX IP 250 OP 636: Performed by: PHYSICIAN ASSISTANT

## 2021-08-15 PROCEDURE — 258N000003 HC RX IP 258 OP 636: Performed by: STUDENT IN AN ORGANIZED HEALTH CARE EDUCATION/TRAINING PROGRAM

## 2021-08-15 PROCEDURE — 97535 SELF CARE MNGMENT TRAINING: CPT | Mod: GO

## 2021-08-15 PROCEDURE — 97129 THER IVNTJ 1ST 15 MIN: CPT | Mod: GN | Performed by: SPEECH-LANGUAGE PATHOLOGIST

## 2021-08-15 PROCEDURE — 97112 NEUROMUSCULAR REEDUCATION: CPT | Mod: GP | Performed by: PHYSICAL THERAPIST

## 2021-08-15 PROCEDURE — 128N000003 HC R&B REHAB

## 2021-08-15 PROCEDURE — 250N000013 HC RX MED GY IP 250 OP 250 PS 637: Performed by: PHYSICIAN ASSISTANT

## 2021-08-15 PROCEDURE — 97530 THERAPEUTIC ACTIVITIES: CPT | Mod: GO

## 2021-08-15 PROCEDURE — 92507 TX SP LANG VOICE COMM INDIV: CPT | Mod: GN | Performed by: SPEECH-LANGUAGE PATHOLOGIST

## 2021-08-15 PROCEDURE — 97116 GAIT TRAINING THERAPY: CPT | Mod: GP | Performed by: PHYSICAL THERAPIST

## 2021-08-15 PROCEDURE — 97530 THERAPEUTIC ACTIVITIES: CPT | Mod: GP | Performed by: PHYSICAL THERAPIST

## 2021-08-15 PROCEDURE — 97130 THER IVNTJ EA ADDL 15 MIN: CPT | Mod: GN | Performed by: SPEECH-LANGUAGE PATHOLOGIST

## 2021-08-15 PROCEDURE — 99231 SBSQ HOSP IP/OBS SF/LOW 25: CPT | Mod: GC | Performed by: PHYSICAL MEDICINE & REHABILITATION

## 2021-08-15 RX ORDER — LIDOCAINE 40 MG/G
CREAM TOPICAL
Status: DISCONTINUED | OUTPATIENT
Start: 2021-08-15 | End: 2021-08-20 | Stop reason: HOSPADM

## 2021-08-15 RX ADMIN — INSULIN ASPART 3 UNITS: 100 INJECTION, SOLUTION INTRAVENOUS; SUBCUTANEOUS at 13:00

## 2021-08-15 RX ADMIN — HEPARIN SODIUM 5000 UNITS: 5000 INJECTION, SOLUTION INTRAVENOUS; SUBCUTANEOUS at 08:16

## 2021-08-15 RX ADMIN — SODIUM CHLORIDE, POTASSIUM CHLORIDE, SODIUM LACTATE AND CALCIUM CHLORIDE 1000 ML: 600; 310; 30; 20 INJECTION, SOLUTION INTRAVENOUS at 18:32

## 2021-08-15 RX ADMIN — ATENOLOL 25 MG: 25 TABLET ORAL at 21:05

## 2021-08-15 RX ADMIN — INSULIN GLARGINE 16 UNITS: 100 INJECTION, SOLUTION SUBCUTANEOUS at 21:44

## 2021-08-15 RX ADMIN — INSULIN ASPART 3 UNITS: 100 INJECTION, SOLUTION INTRAVENOUS; SUBCUTANEOUS at 09:46

## 2021-08-15 RX ADMIN — ASPIRIN 81 MG: 81 TABLET, COATED ORAL at 08:16

## 2021-08-15 RX ADMIN — PANTOPRAZOLE SODIUM 40 MG: 40 TABLET, DELAYED RELEASE ORAL at 06:00

## 2021-08-15 RX ADMIN — INSULIN ASPART 3 UNITS: 100 INJECTION, SOLUTION INTRAVENOUS; SUBCUTANEOUS at 18:00

## 2021-08-15 RX ADMIN — ATORVASTATIN CALCIUM 40 MG: 40 TABLET, FILM COATED ORAL at 21:01

## 2021-08-15 NOTE — PLAN OF CARE
Discharge Planner Post-Acute Rehab SLP:      Discharge Plan: Home with sister and adult nephew. Home care vs OP PT pending progress. Will need to confirm home set-up with sister.     Precautions: Fall, cognition, language deficits, swallowing     Current Status:  Communication:Mild dysarthria. Mild-mod receptive and expressive language tasks  Cognition: Memory deficits noted at baseline per family report. Moderate-Severe attention, executive functions.  Swallow: Recommend easy to chew (7) diet and thin liquids with upright position, slow rate, alternate solids and liquids, and small bites/sips.      Assessment: Patient right-side and alternating attention continues to be significantly impaired for moderately complex tasks. Improving sustained attention, but continues to be moderately impaired. Reading comprehension at the phrase/sentence level appears WFL.        Other Barriers to Discharge (Family Training, etc): Assistance with home management tasks.

## 2021-08-15 NOTE — PLAN OF CARE
Discharge Planner Post-Acute Rehab OT:      Discharge Plan: Home with sister and adult nephew. Home with HC.    Precautions: Falls, impaired cognition, R inattention, R hemiparesis     Current Status:  ADLs: CGA transfers and room mobility fww and R wrist drop splint. SBA UB dressing, SBA standing grooming, min A LB dressing, and CGA toileting.  IADLs: TBA.   Vision/Cognition: R side inattention. Disoriented, slowed responses, mild difficulty following commands.     Assessment:Patient continues to be limited by cognition, right sided inattention, and right side weakness. Will continue to progress per POC.     Other Barriers to Discharge (DME, Family Training, etc):   Caregiver support: Training scheduled for 8/18 2-4pm.  Equipment: fww vs quad cane, shower bench, commode overlay.

## 2021-08-15 NOTE — PLAN OF CARE
FOCUS/GOAL  Medical management and Safety management    ASSESSMENT, INTERVENTIONS AND CONTINUING PLAN FOR GOAL:    Pt is alert, disoriented to time. When asked a few more questions, pt would just smile at writer. Staff to anticipate needs. No complaints of pain, sob, N/V, Dizziness or any new weakness. A1 with the walker in transfers. Continent of bowel and bladder. LBM 8/15 this shift. Ambulates to the bathroom. Refused toileting initially but approached after an hour. No concerns so far. Will continue poc.

## 2021-08-15 NOTE — PLAN OF CARE
"Discharge Planner Post-Acute Rehab PT:      Discharge Plan: Home with sister and adult nephew. Home care PT. Split level house with single rail, plus several SHIRAZ.     Precautions: Falls, impaired cognition, R-sided inattn and hemiparesis     Current Status: Requires cues t/o for attn to R UE  Bed Mobility: SBA  Transfer: min A/SBA with FWW  Gait: up to 230 ft with FWW, R toe up. Intermittent min A through R hand if fatigued or if turning R  Stairs: 16x6\" step ups with bilat HR, CGA  Balance: LUNDBERG on 8/12: 33/56     Assessment: R-sided inattn improving with repetition but cognition remain challenging barriers, improving R-sided activation and standing balance; continue to focus on R-sided strengthening, standing balance, and activities to increase R-sided attn to improve IND with mobility and decrease risk of future falls.     Other Barriers to Discharge (DME, Family Training, etc):   Family training 8/18 2-4 PM.  Equipment TBD - likely FWW, foot up  "

## 2021-08-15 NOTE — PROGRESS NOTES
FOCUS/GOAL  Bladder management, Mobility, Cognition/Memory/Judgment/Problem solving and Safety management    ASSESSMENT, INTERVENTIONS AND CONTINUING PLAN FOR GOAL:  Alert , knows her birthday but not oriented to place and time , forgetful and does not use the callight , staff has to anticipate her needs , incontinent of urine x1  And most of the time declined to toilet when prompted , she was continent of urine x1 in the toilet after staff prompted her , denies pain , received her scheduled insulin before supper and correction of Novolog was given see MAR  She does not need any novolog correction at  , fed self with set up , CGA with transfer continue plan of care

## 2021-08-15 NOTE — PLAN OF CARE
FOCUS/GOAL  Bowel management, Bladder management, and Medical management    ASSESSMENT, INTERVENTIONS AND CONTINUING PLAN FOR GOAL:  Pt has been continent and incontinent of bladder this shift with toileting offered q2-3h. LBM was this morning with NOC shift. MD ordered one time LR bolus. Writer requested PIV order. Writer attempted PIV placement x2 but was unsuccessful. Called RN flyer when she became available after shift change for help with placement. LR ordered from SPD. Arrived at end of shift. IV pump placed in room. Alerted on-coming RN. Pt denied pain throughout shift. Will continue with POC.

## 2021-08-15 NOTE — PROGRESS NOTES
"  [facility]   Acute Rehabilitation Unit  Daily progress note    INTERVAL HISTORY  No acute events overnight.  Patient continues to have an increased Cr of 1.26 over weekend, but not up-trending at this time. Provided a 1L fluid bolus on 8/15.     This am she is seen while sitting in chair in room eating breakfast. She has no complaints at this time. Fluid intake is encouraged and pt states understands why, as to help her kidney function to improve. She denies SOB, chest pain, nausea, vomiting, fevers, and chills.      ROS: 10 point ROS neg other than the symptoms noted above in the HPI.        MEDICATIONS    aspirin  81 mg Oral Daily     atenolol  25 mg Oral At Bedtime     atorvastatin  40 mg Oral At Bedtime     heparin ANTICOAGULANT  5,000 Units Subcutaneous Q12H     insulin aspart  3 Units Subcutaneous TID w/meals     insulin aspart  1-7 Units Subcutaneous TID AC     insulin aspart  1-5 Units Subcutaneous At Bedtime     insulin glargine  16 Units Subcutaneous At Bedtime     [Held by provider] lisinopril  10 mg Oral BID     pantoprazole  40 mg Oral QAM AC        acetaminophen, glucose **OR** dextrose **OR** glucagon, melatonin, polyethylene glycol, senna-docusate     PHYSICAL EXAM  /49 (BP Location: Left arm)   Pulse 68   Temp 97.4  F (36.3  C) (Oral)   Resp 16   Ht 1.575 m (5' 2\")   Wt 48.2 kg (106 lb 4.8 oz)   SpO2 98%   BMI 19.44 kg/m    Gen: NAD, seated comfortably in wheelchair, pleasant   HEENT: NC/AT  Cardio: RRR, significant systolic murmur present  Pulm: non-labored on room air  Abd: soft, non-tender, non-distended  Ext: no edema or calf tenderness bilaterally    LABS  CBC RESULTS:   Recent Labs   Lab Test 08/12/21  0733 08/11/21  0545 08/09/21  0706 08/05/21  0701   WBC 8.4  --  8.7 9.0   RBC 3.50*  --  3.46* 3.48*   HGB 10.2*  --  10.2* 10.0*   HCT 32.1*  --  32.2* 33.0*   MCV 92  --  93 95   MCH 29.1  --  29.5 28.7   MCHC 31.8  --  31.7 30.3*   RDW 14.5 "  --  14.3 13.8    400 422 414     Last Basic Metabolic Panel:  Recent Labs   Lab Test 08/15/21  0213 08/14/21  2112 08/14/21  1704 08/14/21  0559 08/12/21  0733 08/11/21  0545 08/10/21  0706   NA  --   --   --  137 137  --  139   POTASSIUM  --   --   --  4.4 5.3  --  4.9   CHLORIDE  --   --   --  106 104  --  105   CO2  --   --   --  28 28  --  28   ANIONGAP  --   --   --  3 5  --  6   * 192* 140* 132* 138*  --  138*   BUN  --   --   --  27 24  --  19   CR  --   --   --  1.26* 1.26* 1.22* 1.19*   GFRESTIMATED  --   --   --  43* 43* 44* 46*   CHELA  --   --   --  9.0 8.9  --  9.1       Rehabilitation - continue comprehensive acute inpatient rehabilitation program with multidisciplinary approach including therapies, rehab nursing, and physiatry following. See interval history for updates.      ASSESSMENT AND PLAN    --Vitals stable. Cr 1.26, remaining elevated, but not increased from 8/12. 1L Fluid bolus provided on 8/15.  --Continue ongoing medical management.  --Continue therapies and plan of care.    Jenny Mae MD

## 2021-08-16 ENCOUNTER — APPOINTMENT (OUTPATIENT)
Dept: PHYSICAL THERAPY | Facility: CLINIC | Age: 71
DRG: 057 | End: 2021-08-16
Attending: PHYSICAL MEDICINE & REHABILITATION
Payer: COMMERCIAL

## 2021-08-16 ENCOUNTER — APPOINTMENT (OUTPATIENT)
Dept: OCCUPATIONAL THERAPY | Facility: CLINIC | Age: 71
DRG: 057 | End: 2021-08-16
Attending: PHYSICAL MEDICINE & REHABILITATION
Payer: COMMERCIAL

## 2021-08-16 ENCOUNTER — APPOINTMENT (OUTPATIENT)
Dept: SPEECH THERAPY | Facility: CLINIC | Age: 71
DRG: 057 | End: 2021-08-16
Attending: PHYSICAL MEDICINE & REHABILITATION
Payer: COMMERCIAL

## 2021-08-16 LAB
ANION GAP SERPL CALCULATED.3IONS-SCNC: 3 MMOL/L (ref 3–14)
BUN SERPL-MCNC: 22 MG/DL (ref 7–30)
CALCIUM SERPL-MCNC: 8.8 MG/DL (ref 8.5–10.1)
CHLORIDE BLD-SCNC: 109 MMOL/L (ref 94–109)
CO2 SERPL-SCNC: 28 MMOL/L (ref 20–32)
CREAT SERPL-MCNC: 1.08 MG/DL (ref 0.52–1.04)
ERYTHROCYTE [DISTWIDTH] IN BLOOD BY AUTOMATED COUNT: 14.5 % (ref 10–15)
GFR SERPL CREATININE-BSD FRML MDRD: 51 ML/MIN/1.73M2
GLUCOSE BLD-MCNC: 146 MG/DL (ref 70–99)
GLUCOSE BLDC GLUCOMTR-MCNC: 108 MG/DL (ref 70–99)
GLUCOSE BLDC GLUCOMTR-MCNC: 112 MG/DL (ref 70–99)
GLUCOSE BLDC GLUCOMTR-MCNC: 135 MG/DL (ref 70–99)
GLUCOSE BLDC GLUCOMTR-MCNC: 135 MG/DL (ref 70–99)
GLUCOSE BLDC GLUCOMTR-MCNC: 184 MG/DL (ref 70–99)
HCT VFR BLD AUTO: 32 % (ref 35–47)
HGB BLD-MCNC: 9.9 G/DL (ref 11.7–15.7)
MAGNESIUM SERPL-MCNC: 2.3 MG/DL (ref 1.6–2.3)
MCH RBC QN AUTO: 29.1 PG (ref 26.5–33)
MCHC RBC AUTO-ENTMCNC: 30.9 G/DL (ref 31.5–36.5)
MCV RBC AUTO: 94 FL (ref 78–100)
PLATELET # BLD AUTO: 356 10E3/UL (ref 150–450)
POTASSIUM BLD-SCNC: 4.6 MMOL/L (ref 3.4–5.3)
RBC # BLD AUTO: 3.4 10E6/UL (ref 3.8–5.2)
SODIUM SERPL-SCNC: 140 MMOL/L (ref 133–144)
WBC # BLD AUTO: 6.2 10E3/UL (ref 4–11)

## 2021-08-16 PROCEDURE — 250N000013 HC RX MED GY IP 250 OP 250 PS 637: Performed by: PHYSICIAN ASSISTANT

## 2021-08-16 PROCEDURE — 97116 GAIT TRAINING THERAPY: CPT | Mod: GP

## 2021-08-16 PROCEDURE — 99232 SBSQ HOSP IP/OBS MODERATE 35: CPT | Performed by: PHYSICAL MEDICINE & REHABILITATION

## 2021-08-16 PROCEDURE — 36415 COLL VENOUS BLD VENIPUNCTURE: CPT | Performed by: PHYSICIAN ASSISTANT

## 2021-08-16 PROCEDURE — 80048 BASIC METABOLIC PNL TOTAL CA: CPT | Performed by: PHYSICIAN ASSISTANT

## 2021-08-16 PROCEDURE — 97530 THERAPEUTIC ACTIVITIES: CPT | Mod: GP

## 2021-08-16 PROCEDURE — 97530 THERAPEUTIC ACTIVITIES: CPT | Mod: GO

## 2021-08-16 PROCEDURE — 97112 NEUROMUSCULAR REEDUCATION: CPT | Mod: GP | Performed by: PHYSICAL THERAPIST

## 2021-08-16 PROCEDURE — 97530 THERAPEUTIC ACTIVITIES: CPT | Mod: GP | Performed by: PHYSICAL THERAPIST

## 2021-08-16 PROCEDURE — 97112 NEUROMUSCULAR REEDUCATION: CPT | Mod: GP

## 2021-08-16 PROCEDURE — 83735 ASSAY OF MAGNESIUM: CPT | Performed by: PHYSICIAN ASSISTANT

## 2021-08-16 PROCEDURE — 128N000003 HC R&B REHAB

## 2021-08-16 PROCEDURE — 97130 THER IVNTJ EA ADDL 15 MIN: CPT | Mod: GN

## 2021-08-16 PROCEDURE — 250N000011 HC RX IP 250 OP 636: Performed by: PHYSICIAN ASSISTANT

## 2021-08-16 PROCEDURE — 97129 THER IVNTJ 1ST 15 MIN: CPT | Mod: GN

## 2021-08-16 PROCEDURE — 85027 COMPLETE CBC AUTOMATED: CPT | Performed by: PHYSICIAN ASSISTANT

## 2021-08-16 RX ADMIN — INSULIN GLARGINE 16 UNITS: 100 INJECTION, SOLUTION SUBCUTANEOUS at 21:45

## 2021-08-16 RX ADMIN — PANTOPRAZOLE SODIUM 40 MG: 40 TABLET, DELAYED RELEASE ORAL at 06:35

## 2021-08-16 RX ADMIN — HEPARIN SODIUM 5000 UNITS: 5000 INJECTION, SOLUTION INTRAVENOUS; SUBCUTANEOUS at 20:43

## 2021-08-16 RX ADMIN — INSULIN ASPART 3 UNITS: 100 INJECTION, SOLUTION INTRAVENOUS; SUBCUTANEOUS at 18:05

## 2021-08-16 RX ADMIN — INSULIN ASPART 3 UNITS: 100 INJECTION, SOLUTION INTRAVENOUS; SUBCUTANEOUS at 12:58

## 2021-08-16 RX ADMIN — HEPARIN SODIUM 5000 UNITS: 5000 INJECTION, SOLUTION INTRAVENOUS; SUBCUTANEOUS at 10:13

## 2021-08-16 RX ADMIN — ATORVASTATIN CALCIUM 40 MG: 40 TABLET, FILM COATED ORAL at 20:41

## 2021-08-16 RX ADMIN — ASPIRIN 81 MG: 81 TABLET, COATED ORAL at 10:13

## 2021-08-16 ASSESSMENT — MIFFLIN-ST. JEOR: SCORE: 950.87

## 2021-08-16 NOTE — PLAN OF CARE
Discharge Planner Post-Acute Rehab SLP:      Discharge Plan: Home with sister and adult nephew. Home care vs OP PT pending progress. Will need to confirm home set-up with sister.     Precautions: Fall, cognition, language deficits, swallowing     Current Status:  Communication:Mild dysarthria. Mild-mod receptive and expressive language tasks  Cognition: Memory deficits noted at baseline per family report. Moderate-Severe attention, executive functions.  Swallow: Recommend easy to chew (7) diet and thin liquids with upright position, slow rate, alternate solids and liquids, and small bites/sips.      Assessment: Patient engaged in easy level practical probelm solving tasks. Patient was able to generate at least one appropriate solution on 11/17 problems. At times requiring high amount of verbal cues and assistance in order to expound about hre vague responses.    Other Barriers to Discharge (Family Training, etc): Assistance with home management tasks.

## 2021-08-16 NOTE — PLAN OF CARE
Discharge Planner Post-Acute Rehab OT:      Discharge Plan: Home with sister and adult nephew. Home with HC.    Precautions: Falls, impaired cognition, R inattention, R hemiparesis     Current Status:  ADLs: CGA transfers and room mobility fww and R wrist drop splint. SBA UB dressing, SBA standing grooming, min A LB dressing, and CGA toileting.  IADLs: TBA.   Vision/Cognition: R side inattention. Disoriented, slowed responses, mild difficulty following commands.     Assessment:Patient continues to be limited by cognition, right sided inattention, and right side weakness.Cont. to address deficits and RUE GMC/FMC and muscular re-education. Continue OT POC and goals.     Other Barriers to Discharge (DME, Family Training, etc):   Caregiver support: Training scheduled for 8/18 2-4pm.  Equipment: fww vs quad cane, shower bench, commode overlay.

## 2021-08-16 NOTE — PLAN OF CARE
"Discharge Planner Post-Acute Rehab PT:      Discharge Plan: Home with sister and adult nephew. Home care PT. Split level house with single rail, plus several SHIRAZ.     Precautions: Falls, impaired cognition, R-sided inattn and hemiparesis     Current Status: Requires cues t/o for attn to R UE  Bed Mobility: SBA  Transfer: SBA with FWW  Gait: up to 250 ft with FWW, R toe up. Intermittent min A through R hand if fatigued or if turning R  Stairs: 16x6\" step ups with bilat HR, SBA  Balance: LUNDBERG on 8/12: 33/56     Assessment: Pt appears to be demonstrating improving R-sided attn, but cognition remains a challenging barrier, with limited carryover, requiring consistent cues for safety and sequencing with functional mobility.     Other Barriers to Discharge (DME, Family Training, etc):   Family training 8/18 2-4 PM.  Equipment: Will need to issue FWW, foot up  "

## 2021-08-16 NOTE — PLAN OF CARE
Patient is alert but disoriented to place and time on this shift. Denies pain. Is CGA with walker for transfers and ambulation. Has been both incontinent and continent of bladder using toilet in bathroom. No BM on this shift. Is tolerating diet well with good appetite. BG noted in results flowsheet. Bed and chair alarms in use for safety. Call light within reach. Continue with POC.

## 2021-08-16 NOTE — PLAN OF CARE
Alert , forgetful and not using the callight even she was instructed several times on how to use it rt sided weakness, transfer with assist of 1 gait belt and walker to ambulate to the toilet continent of urine x 2 and incontinent of urine x2 needs assist of one with pericare  And changing incontinent pad bgs before supper and at hs were checked no coverage needed both times with scheduled 3 units of Novolog before  Meals and with scheduled Lantus 16 units at bedtime ,high creatinine and completed 1 LR via PIV and it was saline lock after IV transfusion , denies pain continue to assist with safe transfer and ambulation in room , continue to teach on using callight if she needs assistance .continue to anticipate needs and prompting with toileting to prevent incontinence , continue to assist  With safe transfer and ambulation in room .

## 2021-08-16 NOTE — PLAN OF CARE
FOCUS/GOAL  Medical management    ASSESSMENT, INTERVENTIONS AND CONTINUING PLAN FOR GOAL:    Pt is alert and oriented but very forgetful. Reminded of using the call light for needs but does not utilize it telling writer she forgot. Staff to anticipate pt's needs, offered toileting while awake. Can be both continent and incontinent of bowel and bladder. Refused toileting but was found incont of urine already. A1 with the walker in transfers. Bg at 2am is 135. PIV on the left hand is patent, dressing is cdi. Reinforced with coban tape to secure tubing. Will continue POC.

## 2021-08-16 NOTE — PROGRESS NOTES
"  [facility]   Acute Rehabilitation Unit  Daily progress note    INTERVAL HISTORY  Paradise Yuan was seen and examined this morning.  No acute events overnight.  Patient denies any concerns this morning, no pain, shortness of breath, dizziness, nausea, bowel or bladder concerns, or appetite changes. Renal function improved, had IV bolus over weekend. Patient making some steady gains will look to start discharge planning this week.        Functional  OT:  ADLs: CGA transfers and room mobility fww and R wrist drop splint. SBA UB dressing, SBA standing grooming, min A LB dressing, and CGA toileting.  IADLs: TBA.   Vision/Cognition: R side inattention. Disoriented, slowed responses, mild difficulty following         ROS: 10 point ROS neg other than the symptoms noted above in the HPI.        MEDICATIONS    aspirin  81 mg Oral Daily     atenolol  25 mg Oral At Bedtime     atorvastatin  40 mg Oral At Bedtime     heparin ANTICOAGULANT  5,000 Units Subcutaneous Q12H     insulin aspart  3 Units Subcutaneous TID w/meals     insulin aspart  1-7 Units Subcutaneous TID AC     insulin aspart  1-5 Units Subcutaneous At Bedtime     insulin glargine  16 Units Subcutaneous At Bedtime     [Held by provider] lisinopril  10 mg Oral BID     pantoprazole  40 mg Oral QAM AC     sodium chloride (PF)  3 mL Intracatheter Q8H        acetaminophen, glucose **OR** dextrose **OR** glucagon, lidocaine 4%, lidocaine (buffered or not buffered), melatonin, polyethylene glycol, senna-docusate, sodium chloride (PF)     PHYSICAL EXAM  /61 (BP Location: Left arm)   Pulse 86   Temp 98.5  F (36.9  C) (Oral)   Resp 16   Ht 1.575 m (5' 2\")   Wt 48.8 kg (107 lb 8 oz)   SpO2 99%   BMI 19.66 kg/m    Gen: NAD, seated comfortably in wheelchair, pleasant   HEENT: NC/AT  Cardio: RRR, appears well-perfused  Pulm: non-labored on room air  Abd: soft, non-tender, non-distended  Ext: no edema or calf tenderness " bilaterally  Neuro/MSK: +right facial droop.  Right hemiparesis with R shoulder abduction 4/5, elbow flexion/ext 4/5, wrist ext 3/5, hand  1/5. She has at least antigravity in all muscle groups of right lower extremity. Wearing R side wrist brace.     LABS  CBC RESULTS:   Recent Labs   Lab Test 08/16/21  0626 08/12/21  0733 08/11/21  0545 08/09/21  0706   WBC 6.2 8.4  --  8.7   RBC 3.40* 3.50*  --  3.46*   HGB 9.9* 10.2*  --  10.2*   HCT 32.0* 32.1*  --  32.2*   MCV 94 92  --  93   MCH 29.1 29.1  --  29.5   MCHC 30.9* 31.8  --  31.7   RDW 14.5 14.5  --  14.3    390 400 422     Last Basic Metabolic Panel:  Recent Labs   Lab Test 08/16/21  0757 08/16/21  0626 08/16/21  0219 08/14/21  0559 08/12/21  0733   NA  --  140  --  137 137   POTASSIUM  --  4.6  --  4.4 5.3   CHLORIDE  --  109  --  106 104   CO2  --  28  --  28 28   ANIONGAP  --  3  --  3 5   * 146* 135* 132* 138*   BUN  --  22  --  27 24   CR  --  1.08*  --  1.26* 1.26*   GFRESTIMATED  --  51*  --  43* 43*   CHELA  --  8.8  --  9.0 8.9       Rehabilitation - continue comprehensive acute inpatient rehabilitation program with multidisciplinary approach including therapies, rehab nursing, and physiatry following. See interval history for updates.      ASSESSMENT AND PLAN  Paradise Yuan is a 71 year old right hand dominant female with a past medical history of diabetes mellitus type II, hypertension, hyperlipidemia, CKD stage III, let upper lobectomy, and memory problems who was admitted on 7/22 with subacute left MCA infarction with ongoing right hemiparesis, impaired balance, impaired coordination, right-sided facial weakness, dysarthria, and dysphagia, with hospital course complicated by hypertension, hyperglycemia, anemia, atrial tachycardia, asymptomatic COVID-19 infection, orthostatic hypotension, and incidental finding of left cerebellar meningioma.  She is admitted to acute rehab on 8/2 for multidisciplinary rehabilitation and ongoing  medical management.    Subacute left MCA infarction  Presented with 2-week history of right sided facial droop, partial right hemiparesis, partial expressive aphasia.  MRI of head shows recent infarcts of varying age within the left MCA territory affecting the deep frontal white matter and cortex at the frontoparietal junction.   MRA head shows diminished flow within the left MCA beyond the distal M1 segment, normal MRA of the neck.  - LDL 84, goal <70.  Continue Lipitor 40 mg (increased from PTA 20 mg this admission).  - SBP goal <160, management as below  - Continue dual antiplatelet therapy with ASA 81 mg and Plavix 75 mg daily.  Duration not specified, based on CHANCE and POINT trials, will plan on x3 weeks - 8/13, will continue with 81 mg ASA going forward.   - Given multifocal infarcts, neurology recommends outpatient 30-day event monitor.  If unrevealing, consider implantable loop recorder.  - Continue PT/OT/SLP  - Follow up with stroke neurology in 1 month     COVID-19 positive 7/30, suspected false positive  Negative on admission 7/22.  Repeated on 7/30 prior to planned ARU discharge and resulted positive. Chest x-ray-no acute findings number different to differentiate given background of s/p left upper lobectomy, bronchiectases.  Precautions implemented.  No respiratory symptoms, stable on room air.  Per ID, recommended repeat test 8/3 to rule-out false positive and test returned negative.  Per discussion with them, feel likely 7/30 test represents false positive and recommended remove special precautions.     Meningioma, left posterior fossa  Revealed on imaging this admission.  No intervention needed this admission.  - Follow up with neurosurgery as outpatient     Diabetes mellitus, type II, uncontrolled  Had previously been on metformin, discontinued due to abnormal kidney function.  Had most recently been on Lantus 16 units daily.  Given A1c 10.0 on 7/22, question whether taking insulin as prescribed  at home.  Blood glucose well-managed this admisison with Lantus 16 units daily, mealtime Novolog (added 7/30), and sliding scale insulin.  - Continue Lantus 16 units at bedtime  - Continue Aspart 3 units TID with meals  - Continue medium intensity sliding scale insulin  - Hypoglycemia protocol  - Monitor blood glucose QID AC & HS, adjust insulin regimen as indicated     Essential hypertension  Initially held home lisinopril and atenolol for permissive hypertension.  Per neurology, given subacute stroke and symptoms for 2 weeks, goal to keep SBP <160. Resumed home antihypertensive regimen and BP better controlled.  - Continue atenolol 25 mg daily  - Continue lisinopril 10 mg BID--on hold  - Monitor BP and adjust meds as indicated    WILMA   CKD stage III  On admission, Cr increased from 0.9 8/2 to 1.26 8/3.  Suspect prerenal in setting of poor oral intake given low urine output on admission and poor initiation.  Lisinopril held, Lovenox switched to subcutaneous Heparin, received 1L bolus NS.  Cr improved to 0.99.  - Continue to hold lisinopril, BP well-controlled without at this time  - Encourage oral fluids  - Repeat BMP on 8/12 this am 1.26 from 1.22, will continue to encourage fluids and monitor, repeat labs today show 1.08 with improved after weekend bolus.      Hyperlipidemia  PTA on Lipitor 20 mg.  LDL 84 this admission, increased statin dose.  - Continue Lipitor 40 mg daily     Normocytic anemia  Stable in 13s at admission, most recent Hgb 10.2 on 8/2.  - Hgb stable at 10.0 on 8/5 and 10.2 on 8/12, today 9.9.   - Trend CBC     Oral dysphagia  - Continue SLP  - Continue soft and bite-sized diet (IDDSI 6) and thin liquids      H/o left upper lobectomy     Memory problems  Per family, has had progressive memory issues for quite some time.  PCP had referred to neurology/memory clinic for further evaluation and management.  - Consider trial of Aricept while at ARU  - Follow up with neurology as  planned    1. Adjustment to disability:  Clinical psychology to eval and treat if indicated  2. FEN: soft and bite-sized diet (IDDSI 6) and thin liquids (IDDSI 0)  3. Bowel: intermittent incontinence, monitor, PRN bowel meds available  4. Bladder: intermittently incontinent, low urine output, PVRs without evidence of retention  5. DVT Prophylaxis: Lovenox switched to subcutaneous heparin due to WILMA - although Cr had improved, will continue subcutaneous heparin and today 1.08 down from 1.26 Cr.    6. GI Prophylaxis: Protonix  7. Code: full code  8. Disposition: goal for home  9. ELOS: tentative discharge date of 8/17.   10. Follow up Appointments on Discharge: PCP, stroke neurology (1 month), neurosurgery, PM&R      Williams Benitez, DO  Physical Medicine & Rehabilitation       I spent a total of 25 minutes face-to-face and managing the care of Paradise Yuan. Over 50% of my time was spent counseling the patient and coordinating care. Please see note for details.

## 2021-08-17 ENCOUNTER — APPOINTMENT (OUTPATIENT)
Dept: OCCUPATIONAL THERAPY | Facility: CLINIC | Age: 71
DRG: 057 | End: 2021-08-17
Attending: PHYSICAL MEDICINE & REHABILITATION
Payer: COMMERCIAL

## 2021-08-17 ENCOUNTER — APPOINTMENT (OUTPATIENT)
Dept: PHYSICAL THERAPY | Facility: CLINIC | Age: 71
DRG: 057 | End: 2021-08-17
Attending: PHYSICAL MEDICINE & REHABILITATION
Payer: COMMERCIAL

## 2021-08-17 ENCOUNTER — APPOINTMENT (OUTPATIENT)
Dept: SPEECH THERAPY | Facility: CLINIC | Age: 71
DRG: 057 | End: 2021-08-17
Attending: PHYSICAL MEDICINE & REHABILITATION
Payer: COMMERCIAL

## 2021-08-17 LAB
GLUCOSE BLDC GLUCOMTR-MCNC: 102 MG/DL (ref 70–99)
GLUCOSE BLDC GLUCOMTR-MCNC: 115 MG/DL (ref 70–99)
GLUCOSE BLDC GLUCOMTR-MCNC: 128 MG/DL (ref 70–99)
GLUCOSE BLDC GLUCOMTR-MCNC: 139 MG/DL (ref 70–99)
GLUCOSE BLDC GLUCOMTR-MCNC: 241 MG/DL (ref 70–99)

## 2021-08-17 PROCEDURE — 97530 THERAPEUTIC ACTIVITIES: CPT | Mod: GP | Performed by: PHYSICAL THERAPIST

## 2021-08-17 PROCEDURE — 250N000011 HC RX IP 250 OP 636: Performed by: PHYSICIAN ASSISTANT

## 2021-08-17 PROCEDURE — 99232 SBSQ HOSP IP/OBS MODERATE 35: CPT | Mod: GC | Performed by: PHYSICAL MEDICINE & REHABILITATION

## 2021-08-17 PROCEDURE — 92507 TX SP LANG VOICE COMM INDIV: CPT | Mod: GN

## 2021-08-17 PROCEDURE — 97750 PHYSICAL PERFORMANCE TEST: CPT | Mod: GP | Performed by: PHYSICAL THERAPIST

## 2021-08-17 PROCEDURE — 97112 NEUROMUSCULAR REEDUCATION: CPT | Mod: GO | Performed by: OCCUPATIONAL THERAPIST

## 2021-08-17 PROCEDURE — 97535 SELF CARE MNGMENT TRAINING: CPT | Mod: GO

## 2021-08-17 PROCEDURE — 128N000003 HC R&B REHAB

## 2021-08-17 PROCEDURE — 250N000013 HC RX MED GY IP 250 OP 250 PS 637: Performed by: PHYSICIAN ASSISTANT

## 2021-08-17 PROCEDURE — 97110 THERAPEUTIC EXERCISES: CPT | Mod: GP | Performed by: PHYSICAL THERAPIST

## 2021-08-17 PROCEDURE — 250N000012 HC RX MED GY IP 250 OP 636 PS 637: Performed by: PHYSICIAN ASSISTANT

## 2021-08-17 PROCEDURE — U0005 INFEC AGEN DETEC AMPLI PROBE: HCPCS | Performed by: STUDENT IN AN ORGANIZED HEALTH CARE EDUCATION/TRAINING PROGRAM

## 2021-08-17 RX ORDER — DONEPEZIL HYDROCHLORIDE 5 MG/1
5 TABLET, FILM COATED ORAL AT BEDTIME
Status: DISCONTINUED | OUTPATIENT
Start: 2021-08-17 | End: 2021-08-20 | Stop reason: HOSPADM

## 2021-08-17 RX ADMIN — INSULIN ASPART 3 UNITS: 100 INJECTION, SOLUTION INTRAVENOUS; SUBCUTANEOUS at 17:48

## 2021-08-17 RX ADMIN — PANTOPRAZOLE SODIUM 40 MG: 40 TABLET, DELAYED RELEASE ORAL at 06:17

## 2021-08-17 RX ADMIN — ATENOLOL 25 MG: 25 TABLET ORAL at 20:42

## 2021-08-17 RX ADMIN — ATORVASTATIN CALCIUM 40 MG: 40 TABLET, FILM COATED ORAL at 20:36

## 2021-08-17 RX ADMIN — HEPARIN SODIUM 5000 UNITS: 5000 INJECTION, SOLUTION INTRAVENOUS; SUBCUTANEOUS at 20:36

## 2021-08-17 RX ADMIN — INSULIN ASPART 3 UNITS: 100 INJECTION, SOLUTION INTRAVENOUS; SUBCUTANEOUS at 12:51

## 2021-08-17 RX ADMIN — INSULIN GLARGINE 16 UNITS: 100 INJECTION, SOLUTION SUBCUTANEOUS at 21:51

## 2021-08-17 RX ADMIN — ASPIRIN 81 MG: 81 TABLET, COATED ORAL at 09:24

## 2021-08-17 RX ADMIN — HEPARIN SODIUM 5000 UNITS: 5000 INJECTION, SOLUTION INTRAVENOUS; SUBCUTANEOUS at 09:25

## 2021-08-17 NOTE — PROGRESS NOTES
"  [facility]   Acute Rehabilitation Unit  Daily progress note    INTERVAL HISTORY  No acute events overnight. She continues with therapies well and has no concerns at this time. She denies n/v, abdominal pain, fevers, chills, SOB, and chest pain.     Functionally, she is progressing with mobility and balance. Performance limited by R side weakness, R side inattention, and impaired cognition. Requires consistent verbal cueing to utilize RUE during functional tasks and to maintain  on R walker handle. Continuing to recommend 24/7 support. Requires cues t/o for attn to R UE  Bed Mobility: SBA  Transfer: SBA with FWW  Gait: up to 250 ft with FWW, R toe up. Intermittent min A through R hand if fatigued  Stairs: 16x6\" step ups with bilat HR, SBA  Balance: LUNDBERG on 8/17: 37/56      ROS: 10 point ROS neg other than the symptoms noted above in the HPI.        MEDICATIONS    aspirin  81 mg Oral Daily     atenolol  25 mg Oral At Bedtime     atorvastatin  40 mg Oral At Bedtime     heparin ANTICOAGULANT  5,000 Units Subcutaneous Q12H     insulin aspart  3 Units Subcutaneous TID w/meals     insulin aspart  1-7 Units Subcutaneous TID AC     insulin aspart  1-5 Units Subcutaneous At Bedtime     insulin glargine  16 Units Subcutaneous At Bedtime     [Held by provider] lisinopril  10 mg Oral BID     pantoprazole  40 mg Oral QAM AC        acetaminophen, glucose **OR** dextrose **OR** glucagon, lidocaine 4%, lidocaine (buffered or not buffered), melatonin, polyethylene glycol, senna-docusate     PHYSICAL EXAM  /59 (BP Location: Left arm)   Pulse 75   Temp 98.1  F (36.7  C) (Oral)   Resp 18   Ht 1.575 m (5' 2\")   Wt 48.8 kg (107 lb 8 oz)   SpO2 95%   BMI 19.66 kg/m    Gen: NAD, seated comfortably in wheelchair, pleasant   HEENT: NC/AT  Cardio: RRR, appears well-perfused  Pulm: non-labored on room air  Abd: soft, non-tender, non-distended  Ext: no edema or calf tenderness " bilaterally  Neuro/MSK: +right facial droop. Wearing R side wrist brace.     LABS  CBC RESULTS:   Recent Labs   Lab Test 08/16/21  0626 08/12/21  0733 08/11/21  0545 08/09/21  0706   WBC 6.2 8.4  --  8.7   RBC 3.40* 3.50*  --  3.46*   HGB 9.9* 10.2*  --  10.2*   HCT 32.0* 32.1*  --  32.2*   MCV 94 92  --  93   MCH 29.1 29.1  --  29.5   MCHC 30.9* 31.8  --  31.7   RDW 14.5 14.5  --  14.3    390 400 422     Last Basic Metabolic Panel:  Recent Labs   Lab Test 08/17/21  1153 08/17/21  0805 08/17/21  0201 08/16/21  0626 08/14/21  0559 08/12/21  0733   NA  --   --   --  140 137 137   POTASSIUM  --   --   --  4.6 4.4 5.3   CHLORIDE  --   --   --  109 106 104   CO2  --   --   --  28 28 28   ANIONGAP  --   --   --  3 3 5   * 128* 102* 146* 132* 138*   BUN  --   --   --  22 27 24   CR  --   --   --  1.08* 1.26* 1.26*   GFRESTIMATED  --   --   --  51* 43* 43*   CHELA  --   --   --  8.8 9.0 8.9       Rehabilitation - continue comprehensive acute inpatient rehabilitation program with multidisciplinary approach including therapies, rehab nursing, and physiatry following. See interval history for updates.      ASSESSMENT AND PLAN  Paradise Yuan is a 71 year old right hand dominant female with a past medical history of diabetes mellitus type II, hypertension, hyperlipidemia, CKD stage III, let upper lobectomy, and memory problems who was admitted on 7/22 with subacute left MCA infarction with ongoing right hemiparesis, impaired balance, impaired coordination, right-sided facial weakness, dysarthria, and dysphagia, with hospital course complicated by hypertension, hyperglycemia, anemia, atrial tachycardia, asymptomatic COVID-19 infection, orthostatic hypotension, and incidental finding of left cerebellar meningioma.  She is admitted to acute rehab on 8/2 for multidisciplinary rehabilitation and ongoing medical management.    Subacute left MCA infarction  Presented with 2-week history of right sided facial droop,  partial right hemiparesis, partial expressive aphasia.  MRI of head shows recent infarcts of varying age within the left MCA territory affecting the deep frontal white matter and cortex at the frontoparietal junction.   MRA head shows diminished flow within the left MCA beyond the distal M1 segment, normal MRA of the neck.  - LDL 84, goal <70.  Continue Lipitor 40 mg (increased from PTA 20 mg this admission).  - SBP goal <160, management as below  - Continue dual antiplatelet therapy with ASA 81 mg and Plavix 75 mg daily.  Duration not specified, based on CHANCE and POINT trials, will plan on x3 weeks - 8/13, will continue with 81 mg ASA going forward.   - Given multifocal infarcts, neurology recommends outpatient 30-day event monitor.  If unrevealing, consider implantable loop recorder.  - Continue PT/OT/SLP  - Follow up with stroke neurology in 1 month     COVID-19 positive 7/30, suspected false positive  Negative on admission 7/22.  Repeated on 7/30 prior to planned ARU discharge and resulted positive. Chest x-ray-no acute findings number different to differentiate given background of s/p left upper lobectomy, bronchiectases.  Precautions implemented.  No respiratory symptoms, stable on room air.  Per ID, recommended repeat test 8/3 to rule-out false positive and test returned negative.  Per discussion with them, feel likely 7/30 test represents false positive and recommended remove special precautions.     Meningioma, left posterior fossa  Revealed on imaging this admission.  No intervention needed this admission.  - Follow up with neurosurgery as outpatient     Diabetes mellitus, type II, uncontrolled  Had previously been on metformin, discontinued due to abnormal kidney function.  Had most recently been on Lantus 16 units daily.  Given A1c 10.0 on 7/22, question whether taking insulin as prescribed at home.  Blood glucose well-managed this admisison with Lantus 16 units daily, mealtime Novolog (added 7/30),  and sliding scale insulin.  - Continue Lantus 16 units at bedtime  - Continue Aspart 3 units TID with meals  - Continue medium intensity sliding scale insulin  - Hypoglycemia protocol  - Monitor blood glucose QID AC & HS, adjust insulin regimen as indicated     Essential hypertension  Initially held home lisinopril and atenolol for permissive hypertension.  Per neurology, given subacute stroke and symptoms for 2 weeks, goal to keep SBP <160. Resumed home antihypertensive regimen and BP better controlled.  - Continue atenolol 25 mg daily  - Continue lisinopril 10 mg BID--on hold  - Monitor BP and adjust meds as indicated    WILMA   CKD stage III  On admission, Cr increased from 0.9 8/2 to 1.26 8/3.  Suspect prerenal in setting of poor oral intake given low urine output on admission and poor initiation.  Lisinopril held, Lovenox switched to subcutaneous Heparin, received 1L bolus NS.  Cr improved to 0.99.  - Continue to hold lisinopril, BP well-controlled without at this time  - Encourage oral fluids  - Repeat BMP on 8/12 this am 1.26 from 1.22, will continue to encourage fluids and monitor, repeat labs 8/16 show 1.08, improved after weekend bolus.      Hyperlipidemia  PTA on Lipitor 20 mg.  LDL 84 this admission, increased statin dose.  - Continue Lipitor 40 mg daily     Normocytic anemia  Stable in 13s at admission, most recent Hgb 10.2 on 8/2.  - Hgb stable at 10.0 on 8/5 and 10.2 on 8/12, 8/16 9.9.   - Trend CBC     Oral dysphagia  - Continue SLP  - Continue soft and bite-sized diet (IDDSI 6) and thin liquids      H/o left upper lobectomy     Memory problems  Per family, has had progressive memory issues for quite some time.  PCP had referred to neurology/memory clinic for further evaluation and management.  - Trial of Aricept initiated 8/16  - Follow up with neurology as planned    1. Adjustment to disability:  Clinical psychology to eval and treat if indicated  2. FEN: soft and bite-sized diet (IDDSI 6) and thin  liquids (IDDSI 0)  3. Bowel: intermittent incontinence, monitor, PRN bowel meds available  4. Bladder: intermittently incontinent, low urine output, PVRs without evidence of retention  5. DVT Prophylaxis: Lovenox switched to subcutaneous heparin due to WILMA - although Cr had improved, will continue subcutaneous heparin   6. GI Prophylaxis: Protonix  7. Code: full code  8. Disposition: goal for home  9. ELOS: tentative discharge date of 8/17.   10. Follow up Appointments on Discharge: PCP, stroke neurology (1 month), neurosurgery, PM&R      Jenny Mae MD    Physical Medicine & Rehabilitation

## 2021-08-17 NOTE — PLAN OF CARE
Discharge Planner Post-Acute Rehab SLP:      Discharge Plan: Home with sister and adult nephew. Home care vs OP PT pending progress. Will need to confirm home set-up with sister.     Precautions: Fall, cognition, language deficits, swallowing     Current Status:  Communication:Mild dysarthria. Mild-mod receptive and expressive language tasks  Cognition: Memory deficits noted at baseline per family report. Moderate-Severe attention, executive functions.  Swallow: Recommend easy to chew (7) diet and thin liquids with upright position, slow rate, alternate solids and liquids, and small bites/sips.      Assessment: Pt independently answered yes/no questions (written format) with 66% accuracy. Mod-max cues needed for attention to the right side and general sustained and divided attention. Pt demonstrated deficits in reasoning and some errors suspect related to English as a second language       Other Barriers to Discharge (Family Training, etc): Assistance with home management tasks.

## 2021-08-17 NOTE — PLAN OF CARE
Patient is alert but disoriented to place and time on this shift. Denies pain. Patient does not use call light and requires needs to be anticipated by staff. Is CGA with walker for transfers and ambulation. Has been continent of bladder using toilet in bathroom. No BM on this shift. Is tolerating diet well with good appetite. BG noted in results flowsheet. Wears splint on R hand/wrist. Bed and chair alarms in use for safety. Call light within reach. Continue with POC.

## 2021-08-17 NOTE — PLAN OF CARE
Alert to her name , knows her birthday ,  Forgetful and disoriented as to time and place , not using call light , staff anticipate needs , prompted to toilet she was not incontinent of urine this evening , continent in the toilet ,needs assist of one with pericare transfer and ambulate with CGA using walker but need cues for directions when ambulating , continue to assist with safe performance of ADLS .

## 2021-08-17 NOTE — PLAN OF CARE
FOCUS/GOAL  Medical management    ASSESSMENT, INTERVENTIONS AND CONTINUING PLAN FOR GOAL:    Pt is alert, disoriented to time. No complaints of pain, sob, headache and any new weakness. Encouraged toileting but patient would refuse and brief is noted to be dry. Fluids encouraged, pt only takes small sips. A1 with the walker and gb in transfers. BG at 2am is 102. Vitals stable this morning. Did void x1 this shift. No concerns so far. Will continue POC.

## 2021-08-17 NOTE — PROGRESS NOTES
Discharge home Fri 08/20 with HC. SW attempted to call pt sister to discuss, not able to leave vm. SWer called and left  for pt nephew Elfego and added his information to face sheet. Discussed discharge date, recommendations and referral to [facility]. Family coming for FT tomorrow, SW unable to meet with family at that time. Requested that pt nephew call back when able to confirm details and plans. [facility] referral sent, pt accepted and information added to AVS. SW will remain available and continue to follow.     Home Health Care:   [facility] PH: [phone] (previously known as: [facility])  Nurse, physical therapy, occupational therapy, speech therapy, home health aide     ADDENDUM: Pt nephew and sister at bedside. Shannanr met with all three and discussed discharge plans, HC set up, SOC and insurance coverage. All three agreeable to discharge plans and still planning to come in tomorrow for therapy family training and Thursday for stroke [facility]. Pt nephew will transport home on Friday and  around 1pm. Pt family denied additional needs. Discussed [facility], pt agreeable to referral. Referral completed in pt behalf and information in pt AVS for reference. SW will present pt tomorrow with [facility] 48 hours prior to discharge.     ABDIAZIZ Rivera   [facility]   Direct Phone: [phone]  I   Pager: [phone]  I  Fax: [phone]

## 2021-08-17 NOTE — PLAN OF CARE
"/68 (BP Location: Left arm)   Pulse 89   Temp 98.1  F (36.7  C) (Oral)   Resp 16   Ht 1.575 m (5' 2\")   Wt 48.8 kg (107 lb 8 oz)   SpO2 98%   BMI 19.66 kg/m    Pt VSS. Denies SOB, dizziness, headache, pain and nausea. Good appetite, on easy to chew diet/thin liquids. Up in the chair for dinner.  before dinner. Continent of bladder with timed toileting,LBM 8/16. Transfers with 1 assist with a walker. Pt is alert and oriented x2, disoriented to time and place. Pt did not use call light this evening, staff anticipates needs. Alarms on for safety.     "

## 2021-08-17 NOTE — PLAN OF CARE
"Discharge Planner Post-Acute Rehab PT:      Discharge Plan: Home with sister and adult nephew. Home care PT. Split level house with single rail, plus several SHIRAZ.     Precautions: Falls, impaired cognition, R-sided inattn and hemiparesis     Current Status: Requires cues t/o for attn to R UE  Bed Mobility: SBA  Transfer: SBA with FWW  Gait: up to 250 ft with FWW, R toe up. Intermittent min A through R hand if fatigued  Stairs: 16x6\" step ups with bilat HR, SBA  Balance: LUNDBERG on 8/17: 37/56     Assessment: Pt appears to be demonstrating improving R-sided attn, but cognition remains a challenging barrier, with limited carryover, requiring consistent cues for safety and sequencing with functional mobility. LUNDBERG improving slowly from start of care: 27 >> 37/56. Family training planned for tomorrow PM, with plan to cover safety with mobility and issue equipment per below.     Other Barriers to Discharge (DME, Family Training, etc):   Family training 8/18 2-4 PM.  Equipment: Will need to issue FWW, foot up  HEP handout has been provided.  "

## 2021-08-17 NOTE — PLAN OF CARE
Discharge Planner Post-Acute Rehab OT:      Discharge Plan: Home with sister and adult nephew. Home with HC.    Precautions: Falls, impaired cognition, R inattention, R hemiparesis     Current Status:  ADLs: SBA transfers and room mobility fww and R wrist drop splint. SBA UB dressing, SBA standing grooming, min A LB dressing, and CGA toileting.  IADLs: TBA.   Vision/Cognition: R side inattention. Disoriented, slowed responses, mild difficulty following commands.     Assessment: Progressing with mobility and balance. Performance limited by R side weakness, R side inattention, and impaired cognition. Requires consistent verbal cueing to utilize RUE during functional tasks and to maintain  on R walker handle. Continuing to recommend 24/7 support. Caregiver training scheduled for tomorrow.     Other Barriers to Discharge (DME, Family Training, etc):   Caregiver support: Training scheduled for 8/18 2-4pm.  Equipment: fww vs quad cane, shower bench, commode overlay.

## 2021-08-18 ENCOUNTER — APPOINTMENT (OUTPATIENT)
Dept: PHYSICAL THERAPY | Facility: CLINIC | Age: 71
DRG: 057 | End: 2021-08-18
Attending: PHYSICAL MEDICINE & REHABILITATION
Payer: COMMERCIAL

## 2021-08-18 ENCOUNTER — APPOINTMENT (OUTPATIENT)
Dept: OCCUPATIONAL THERAPY | Facility: CLINIC | Age: 71
DRG: 057 | End: 2021-08-18
Attending: PHYSICAL MEDICINE & REHABILITATION
Payer: COMMERCIAL

## 2021-08-18 ENCOUNTER — APPOINTMENT (OUTPATIENT)
Dept: SPEECH THERAPY | Facility: CLINIC | Age: 71
DRG: 057 | End: 2021-08-18
Attending: PHYSICAL MEDICINE & REHABILITATION
Payer: COMMERCIAL

## 2021-08-18 LAB
GLUCOSE BLDC GLUCOMTR-MCNC: 106 MG/DL (ref 70–99)
GLUCOSE BLDC GLUCOMTR-MCNC: 121 MG/DL (ref 70–99)
GLUCOSE BLDC GLUCOMTR-MCNC: 122 MG/DL (ref 70–99)
GLUCOSE BLDC GLUCOMTR-MCNC: 136 MG/DL (ref 70–99)
GLUCOSE BLDC GLUCOMTR-MCNC: 139 MG/DL (ref 70–99)
SARS-COV-2 RNA RESP QL NAA+PROBE: NEGATIVE

## 2021-08-18 PROCEDURE — 97535 SELF CARE MNGMENT TRAINING: CPT | Mod: GO

## 2021-08-18 PROCEDURE — 97535 SELF CARE MNGMENT TRAINING: CPT | Mod: GO | Performed by: OCCUPATIONAL THERAPIST

## 2021-08-18 PROCEDURE — 97530 THERAPEUTIC ACTIVITIES: CPT | Mod: GP | Performed by: PHYSICAL THERAPIST

## 2021-08-18 PROCEDURE — 99232 SBSQ HOSP IP/OBS MODERATE 35: CPT | Performed by: PHYSICAL MEDICINE & REHABILITATION

## 2021-08-18 PROCEDURE — 250N000013 HC RX MED GY IP 250 OP 250 PS 637: Performed by: PHYSICIAN ASSISTANT

## 2021-08-18 PROCEDURE — 250N000013 HC RX MED GY IP 250 OP 250 PS 637: Performed by: STUDENT IN AN ORGANIZED HEALTH CARE EDUCATION/TRAINING PROGRAM

## 2021-08-18 PROCEDURE — 128N000003 HC R&B REHAB

## 2021-08-18 PROCEDURE — 250N000011 HC RX IP 250 OP 636: Performed by: PHYSICIAN ASSISTANT

## 2021-08-18 PROCEDURE — 97116 GAIT TRAINING THERAPY: CPT | Mod: GP | Performed by: PHYSICAL THERAPIST

## 2021-08-18 PROCEDURE — 92507 TX SP LANG VOICE COMM INDIV: CPT | Mod: GN | Performed by: SPEECH-LANGUAGE PATHOLOGIST

## 2021-08-18 RX ADMIN — PANTOPRAZOLE SODIUM 40 MG: 40 TABLET, DELAYED RELEASE ORAL at 06:33

## 2021-08-18 RX ADMIN — HEPARIN SODIUM 5000 UNITS: 5000 INJECTION, SOLUTION INTRAVENOUS; SUBCUTANEOUS at 22:00

## 2021-08-18 RX ADMIN — INSULIN ASPART 3 UNITS: 100 INJECTION, SOLUTION INTRAVENOUS; SUBCUTANEOUS at 08:12

## 2021-08-18 RX ADMIN — INSULIN ASPART 3 UNITS: 100 INJECTION, SOLUTION INTRAVENOUS; SUBCUTANEOUS at 17:41

## 2021-08-18 RX ADMIN — ATENOLOL 25 MG: 25 TABLET ORAL at 22:01

## 2021-08-18 RX ADMIN — DONEPEZIL HYDROCHLORIDE 5 MG: 5 TABLET ORAL at 00:29

## 2021-08-18 RX ADMIN — INSULIN ASPART 3 UNITS: 100 INJECTION, SOLUTION INTRAVENOUS; SUBCUTANEOUS at 12:47

## 2021-08-18 RX ADMIN — HEPARIN SODIUM 5000 UNITS: 5000 INJECTION, SOLUTION INTRAVENOUS; SUBCUTANEOUS at 08:11

## 2021-08-18 RX ADMIN — ASPIRIN 81 MG: 81 TABLET, COATED ORAL at 08:08

## 2021-08-18 RX ADMIN — INSULIN GLARGINE 16 UNITS: 100 INJECTION, SOLUTION SUBCUTANEOUS at 22:00

## 2021-08-18 RX ADMIN — DONEPEZIL HYDROCHLORIDE 5 MG: 5 TABLET ORAL at 22:04

## 2021-08-18 RX ADMIN — ATORVASTATIN CALCIUM 40 MG: 40 TABLET, FILM COATED ORAL at 22:01

## 2021-08-18 NOTE — PLAN OF CARE
"Discharge Planner Post-Acute Rehab PT:      Discharge Plan: Home with sister and adult nephew. Home care PT. Split level house with single rail, plus several SHIRAZ.     Precautions: Falls, impaired cognition, R-sided inattn and hemiparesis     Current Status: Requires cues t/o for attn to R UE  Bed Mobility: SBA  Transfer: SBA with FWW  Gait: up to 250 ft with FWW, R toe up. Intermittent min A through R hand if fatigued  Stairs: 16x6\" step ups with bilat HR, SBA  Balance: LUNDBERG on 8/17: 37/56     Assessment:  Pt is needing Everardo with gait, and her R hand is slipping off her fww regularly, and may benefit form the white strap on R side.  Family training with pt's nephew Priyank, and her sister in the pm.  Pt's sister needing cues from the nephew at times to follow directions, possibly due to [place].  Pt and her sister needing cues for safe technique on the stairs, so family training was added on Thurs 8/19 at 2: 30 pm after stroke class for stair training with rail just on R side (they have 7 stairs with rail just on R). , and to continue to reiterate the need to use the walker in the home, safe transfers.  Pt wants to get home on Friday, but pt's nephew and therapist stating we want to make sure she is safe at home,especially on the stairs.    Fall prevention, R side positioning and awareness, safe gait and transfers covered with family.         Other Barriers to Discharge (DME, Family Training, etc):   Family training 8/18 2-4 PM.  Equipment: Will need to issue FWW, foot up ( to be delivered  To tech office Thurs 8/19 hopefully)   HEP handout has been provided.  "

## 2021-08-18 NOTE — PLAN OF CARE
Discharge Planner Post-Acute Rehab OT:      Discharge Plan: Home with sister and adult nephew. Home with HC.    Precautions: Falls, impaired cognition, R inattention, R hemiparesis     Current Status:  ADLs: SBA-Min A transfers and room mobility fww and R wrist drop splint - verbal cueing for R hand placement and occasional physical assistance for walker navigation. SBA UB dressing, SBA standing grooming, min A LB dressing, and CGA toileting.  IADLs: Recommend assistance.  Vision/Cognition: R side inattention. Disoriented, slowed responses, mild difficulty following commands.     Assessment: Completed caregiver training. Pt sister to provide 24/7 physical assistance. Pt nephew also available to assist, plans to provide support for IADLs. Pt sister demonstrating ability to assist with ADLs and functional mobility. Provided resources for DME/AE. Discussed recommendations for HC and then transition to OP. Family in favor of a HHA for bathing assistance. On track to discharge home 8/20.     Other Barriers to Discharge (DME, Family Training, etc):   Caregiver support: Training completed 8/18 2-4pm.  Equipment: fww, shower bench, commode overlay, gait belt, AFO vs toe up.

## 2021-08-18 NOTE — PLAN OF CARE
Alert , oriented to herself and to family member only disoriented asto time and place , does not use the call light or verbalized needs , staff to anticipate needs and prompted with toileting to prevent incontinence and was prompted  Many times and per NA report she went to her room to prompt her to go to the toilet and finally at Hs she agreed to go to the toilet to void and she was also incontinent of urine in the pad , needed assist of 1 with changing pad and pericare .  BGs monitored AC and HS  She received 1 unit at hs as correction fo her bgs of 241 , received also a Lantus 16 units at hs .  Rt sided weakness CGA with walker continue to assist with safe transfer and ambulation in room

## 2021-08-18 NOTE — PROGRESS NOTES
"  [facility]   Acute Rehabilitation Unit  Daily progress note    INTERVAL HISTORY  No acute events overnight. Patient seen this AM resting in bed.  Denies chest pain, shortness of breath, no fever or chills.  Continues to make progress.  Participating well in therapy program.  Goal is for discharge home on 8/20.  Continue to encourage PO fluid intake.     Functional  OT:  ADLs: SBA transfers and room mobility fww and R wrist drop splint. SBA UB dressing, SBA standing grooming, min A LB dressing, and CGA toileting.  IADLs: TBA.   Vision/Cognition: R side inattention. Disoriented, slowed responses, mild difficulty       ROS: 10 point ROS neg other than the symptoms noted above in the HPI.        MEDICATIONS    aspirin  81 mg Oral Daily     atenolol  25 mg Oral At Bedtime     atorvastatin  40 mg Oral At Bedtime     donepezil  5 mg Oral At Bedtime     heparin ANTICOAGULANT  5,000 Units Subcutaneous Q12H     insulin aspart  3 Units Subcutaneous TID w/meals     insulin aspart  1-7 Units Subcutaneous TID AC     insulin aspart  1-5 Units Subcutaneous At Bedtime     insulin glargine  16 Units Subcutaneous At Bedtime     [Held by provider] lisinopril  10 mg Oral BID     pantoprazole  40 mg Oral QAM AC        acetaminophen, glucose **OR** dextrose **OR** glucagon, lidocaine 4%, lidocaine (buffered or not buffered), melatonin, polyethylene glycol, senna-docusate     PHYSICAL EXAM  /72 (BP Location: Left arm)   Pulse 77   Temp (!) 96.1  F (35.6  C) (Oral)   Resp 16   Ht 1.575 m (5' 2\")   Wt 48.8 kg (107 lb 8 oz)   SpO2 97%   BMI 19.66 kg/m    Gen: NAD, resting in bed, pleasant   HEENT: NC/AT, EOMI  Cardio: RRR, appears well-perfused  Pulm: non-labored on room air, symmetrical chest rise  Abd: soft, non-tender, non-distended  Ext: no edema, no calf tenderness bilaterally  Neuro/MSK: +right facial droop. Wearing R side wrist brace.     LABS  CBC RESULTS:   Recent Labs   Lab Test " 08/16/21  0626 08/12/21  0733 08/11/21  0545 08/09/21  0706   WBC 6.2 8.4  --  8.7   RBC 3.40* 3.50*  --  3.46*   HGB 9.9* 10.2*  --  10.2*   HCT 32.0* 32.1*  --  32.2*   MCV 94 92  --  93   MCH 29.1 29.1  --  29.5   MCHC 30.9* 31.8  --  31.7   RDW 14.5 14.5  --  14.3    390 400 422     Last Basic Metabolic Panel:  Recent Labs   Lab Test 08/18/21  0753 08/18/21  0155 08/17/21 2123 08/16/21  0626 08/14/21  0559 08/12/21  0733   NA  --   --   --  140 137 137   POTASSIUM  --   --   --  4.6 4.4 5.3   CHLORIDE  --   --   --  109 106 104   CO2  --   --   --  28 28 28   ANIONGAP  --   --   --  3 3 5   * 136* 241* 146* 132* 138*   BUN  --   --   --  22 27 24   CR  --   --   --  1.08* 1.26* 1.26*   GFRESTIMATED  --   --   --  51* 43* 43*   CHELA  --   --   --  8.8 9.0 8.9       Rehabilitation - continue comprehensive acute inpatient rehabilitation program with multidisciplinary approach including therapies, rehab nursing, and physiatry following. See interval history for updates.      ASSESSMENT AND PLAN  Paradise Yuan is a 71 year old right hand dominant female with a past medical history of diabetes mellitus type II, hypertension, hyperlipidemia, CKD stage III, let upper lobectomy, and memory problems who was admitted on 7/22 with subacute left MCA infarction with ongoing right hemiparesis, impaired balance, impaired coordination, right-sided facial weakness, dysarthria, and dysphagia, with hospital course complicated by hypertension, hyperglycemia, anemia, atrial tachycardia, asymptomatic COVID-19 infection, orthostatic hypotension, and incidental finding of left cerebellar meningioma.  She is admitted to acute rehab on 8/2 for multidisciplinary rehabilitation and ongoing medical management.    Subacute left MCA infarction  Presented with 2-week history of right sided facial droop, partial right hemiparesis, partial expressive aphasia.  MRI of head shows recent infarcts of varying age within the left MCA  territory affecting the deep frontal white matter and cortex at the frontoparietal junction.   MRA head shows diminished flow within the left MCA beyond the distal M1 segment, normal MRA of the neck.  - LDL 84, goal <70.  Continue Lipitor 40 mg (increased from PTA 20 mg this admission).  - SBP goal <160, management as below  - Continue dual antiplatelet therapy with ASA 81 mg and Plavix 75 mg daily.  Duration not specified, based on CHANCE and POINT trials, will plan on x3 weeks - 8/13, will continue with 81 mg ASA going forward.   - Given multifocal infarcts, neurology recommends outpatient 30-day event monitor.  If unrevealing, consider implantable loop recorder.  - Continue PT/OT/SLP  - Follow up with stroke neurology in 1 month     COVID-19 positive 7/30, suspected false positive  Negative on admission 7/22.  Repeated on 7/30 prior to planned ARU discharge and resulted positive. Chest x-ray-no acute findings number different to differentiate given background of s/p left upper lobectomy, bronchiectases.  Precautions implemented.  No respiratory symptoms, stable on room air.  Per ID, recommended repeat test 8/3 to rule-out false positive and test returned negative.  Per discussion with them, feel likely 7/30 test represents false positive and recommended remove special precautions.     Meningioma, left posterior fossa  Revealed on imaging this admission.  No intervention needed this admission.  - Follow up with neurosurgery as outpatient     Diabetes mellitus, type II, uncontrolled  Had previously been on metformin, discontinued due to abnormal kidney function.  Had most recently been on Lantus 16 units daily.  Given A1c 10.0 on 7/22, question whether taking insulin as prescribed at home.  Blood glucose well-managed this admisison with Lantus 16 units daily, mealtime Novolog (added 7/30), and sliding scale insulin.  - Continue Lantus 16 units at bedtime  - Continue Aspart 3 units TID with meals  - Continue medium  intensity sliding scale insulin  - Hypoglycemia protocol  - Monitor blood glucose QID AC & HS, adjust insulin regimen as indicated     Essential hypertension  Initially held home lisinopril and atenolol for permissive hypertension.  Per neurology, given subacute stroke and symptoms for 2 weeks, goal to keep SBP <160. Resumed home antihypertensive regimen and BP better controlled.  - Continue atenolol 25 mg daily  - Continue lisinopril 10 mg BID--on hold  - Monitor BP and adjust meds as indicated    WILMA   CKD stage III  On admission, Cr increased from 0.9 8/2 to 1.26 8/3.  Suspect prerenal in setting of poor oral intake given low urine output on admission and poor initiation.  Lisinopril held, Lovenox switched to subcutaneous Heparin, received 1L bolus NS.  Cr improved to 0.99.  - Continue to hold lisinopril, BP well-controlled without at this time  - Encourage oral fluids  - Repeat BMP on 8/12 this am 1.26 from 1.22, will continue to encourage fluids and monitor, repeat labs 8/16 show 1.08, improved after weekend bolus.      Hyperlipidemia  PTA on Lipitor 20 mg.  LDL 84 this admission, increased statin dose.  - Continue Lipitor 40 mg daily     Normocytic anemia  Stable in 13s at admission, most recent Hgb 10.2 on 8/2.  - Hgb stable at 10.0 on 8/5 and 10.2 on 8/12, 8/16 9.9.   - Trend CBC     Oral dysphagia  - Continue SLP  - Continue soft and bite-sized diet (IDDSI 6) and thin liquids      H/o left upper lobectomy     Memory problems  Per family, has had progressive memory issues for quite some time.  PCP had referred to neurology/memory clinic for further evaluation and management.  - Trial of Aricept initiated 8/16  - Follow up with neurology as planned    1. Adjustment to disability:  Clinical psychology to eval and treat if indicated  2. FEN: soft and bite-sized diet (IDDSI 6) and thin liquids (IDDSI 0)  3. Bowel: intermittent incontinence, monitor, PRN bowel meds available  4. Bladder: intermittently  incontinent, low urine output, PVRs without evidence of retention  5. DVT Prophylaxis: Lovenox switched to subcutaneous heparin due to WILMA - although Cr had improved, will continue subcutaneous heparin until Discharge.  6. GI Prophylaxis: Protonix  7. Code: full code  8. Disposition: goal for home  9. ELOS: tentative discharge date of 8/20.   10. Follow up Appointments on Discharge: PCP, stroke neurology (1 month), neurosurgery, PM&R          Williams Benitez, DO  Physical Medicine & Rehabilitation         I spent a total of 25 minutes face to face and coordinating care of Paradise Yuan. Over 50% of my time on the unit was spent counseling the patient and /or coordinating care regarding recent CVA.

## 2021-08-18 NOTE — PLAN OF CARE
FOCUS/GOAL  Medical management    ASSESSMENT, INTERVENTIONS AND CONTINUING PLAN FOR GOAL:  Patient slept well, no c/o pain. She does not call for help or initiate her cares, staff anticipates her needs. She was prompted to use the bathroom, she transferred and ambulated with a walker and assistance of 1. No incontinence.   Continue toileting program, bed alarm.

## 2021-08-18 NOTE — PROGRESS NOTES
Met with pt at bedside. Pt appeared alert and pleasant. Discussed upcoming discharge plans again with pt, pt expressed understanding and denied concerns about discharge home. SWer went over IMM with pt. Pt denied concerns or questions. Pt unable to sign due to brace on her hand. SW filed with verbal permission. No additional SW needs.     HC set up, in AVS, pt and pt family aware and updated and nephew will transport home around 1pm on Friday 08/20.     Home Health Care:   [facility] PH: [phone] (previously known as: [facility])  Nurse, physical therapy, occupational therapy, speech therapy, home health aide     Jadyn Martinez [facility]   Direct Phone: [phone]  I   Pager: [phone]  I  Fax: [phone]

## 2021-08-18 NOTE — PLAN OF CARE
Discharge Planner Post-Acute Rehab SLP:      Discharge Plan: Home with sister and adult nephew. Home care vs OP PT pending progress. Will need to confirm home set-up with sister.     Precautions: Fall, cognition, language deficits, swallowing     Current Status:  Communication:Mild dysarthria. Mild-mod receptive and expressive language tasks  Cognition: Memory deficits noted at baseline per family report. Moderate-Severe attention, executive functions.  Swallow: Recommend easy to chew (7) diet and thin liquids with upright position, slow rate, alternate solids and liquids, and small bites/sips.      Assessment: Family education session - family received auditory education regarding role of SLP in patient care, goals for patient, patient progress, impact of CVA on visual attention and neglect, beneficial cues and prompts to assist and carryover of visual awareness, impact of CVA on cognitive-linguistic function and cues/assistance necessary to complete daily home management and personal management tasks, and rationale for current IDDSI 7 easy to chew diet with thin liquids and appropriate foods/preparation to make in home setting with current diet level. Family asked appropriate questions, verbalized understanding of instructions, and agreement with POC.    Other Barriers to Discharge (Family Training, etc): Assistance with home management tasks.

## 2021-08-19 ENCOUNTER — APPOINTMENT (OUTPATIENT)
Dept: OCCUPATIONAL THERAPY | Facility: CLINIC | Age: 71
DRG: 057 | End: 2021-08-19
Attending: PHYSICAL MEDICINE & REHABILITATION
Payer: COMMERCIAL

## 2021-08-19 ENCOUNTER — APPOINTMENT (OUTPATIENT)
Dept: EDUCATION SERVICES | Facility: CLINIC | Age: 71
DRG: 057 | End: 2021-08-19
Attending: PHYSICAL MEDICINE & REHABILITATION
Payer: COMMERCIAL

## 2021-08-19 ENCOUNTER — APPOINTMENT (OUTPATIENT)
Dept: SPEECH THERAPY | Facility: CLINIC | Age: 71
DRG: 057 | End: 2021-08-19
Attending: PHYSICAL MEDICINE & REHABILITATION
Payer: COMMERCIAL

## 2021-08-19 ENCOUNTER — APPOINTMENT (OUTPATIENT)
Dept: PHYSICAL THERAPY | Facility: CLINIC | Age: 71
DRG: 057 | End: 2021-08-19
Attending: PHYSICAL MEDICINE & REHABILITATION
Payer: COMMERCIAL

## 2021-08-19 LAB
ANION GAP SERPL CALCULATED.3IONS-SCNC: 3 MMOL/L (ref 3–14)
BUN SERPL-MCNC: 23 MG/DL (ref 7–30)
CALCIUM SERPL-MCNC: 8.8 MG/DL (ref 8.5–10.1)
CHLORIDE BLD-SCNC: 106 MMOL/L (ref 94–109)
CO2 SERPL-SCNC: 30 MMOL/L (ref 20–32)
CREAT SERPL-MCNC: 1.19 MG/DL (ref 0.52–1.04)
ERYTHROCYTE [DISTWIDTH] IN BLOOD BY AUTOMATED COUNT: 14.6 % (ref 10–15)
GFR SERPL CREATININE-BSD FRML MDRD: 46 ML/MIN/1.73M2
GLUCOSE BLD-MCNC: 126 MG/DL (ref 70–99)
GLUCOSE BLDC GLUCOMTR-MCNC: 113 MG/DL (ref 70–99)
GLUCOSE BLDC GLUCOMTR-MCNC: 129 MG/DL (ref 70–99)
GLUCOSE BLDC GLUCOMTR-MCNC: 135 MG/DL (ref 70–99)
GLUCOSE BLDC GLUCOMTR-MCNC: 143 MG/DL (ref 70–99)
GLUCOSE BLDC GLUCOMTR-MCNC: 148 MG/DL (ref 70–99)
GLUCOSE BLDC GLUCOMTR-MCNC: 177 MG/DL (ref 70–99)
GLUCOSE BLDC GLUCOMTR-MCNC: 95 MG/DL (ref 70–99)
HCT VFR BLD AUTO: 33.3 % (ref 35–47)
HGB BLD-MCNC: 10 G/DL (ref 11.7–15.7)
MCH RBC QN AUTO: 29 PG (ref 26.5–33)
MCHC RBC AUTO-ENTMCNC: 30 G/DL (ref 31.5–36.5)
MCV RBC AUTO: 97 FL (ref 78–100)
PLATELET # BLD AUTO: 350 10E3/UL (ref 150–450)
POTASSIUM BLD-SCNC: 4.6 MMOL/L (ref 3.4–5.3)
RBC # BLD AUTO: 3.45 10E6/UL (ref 3.8–5.2)
SODIUM SERPL-SCNC: 139 MMOL/L (ref 133–144)
WBC # BLD AUTO: 7.6 10E3/UL (ref 4–11)

## 2021-08-19 PROCEDURE — 250N000013 HC RX MED GY IP 250 OP 250 PS 637: Performed by: PHYSICIAN ASSISTANT

## 2021-08-19 PROCEDURE — 999N000125 HC STATISTIC PATIENT MED CONFERENCE < 30 MIN: Performed by: OCCUPATIONAL THERAPIST

## 2021-08-19 PROCEDURE — 999N000125 HC STATISTIC PATIENT MED CONFERENCE < 30 MIN: Performed by: SPEECH-LANGUAGE PATHOLOGIST

## 2021-08-19 PROCEDURE — 85027 COMPLETE CBC AUTOMATED: CPT | Performed by: PHYSICIAN ASSISTANT

## 2021-08-19 PROCEDURE — 36415 COLL VENOUS BLD VENIPUNCTURE: CPT | Performed by: PHYSICIAN ASSISTANT

## 2021-08-19 PROCEDURE — 80048 BASIC METABOLIC PNL TOTAL CA: CPT | Performed by: PHYSICIAN ASSISTANT

## 2021-08-19 PROCEDURE — 99233 SBSQ HOSP IP/OBS HIGH 50: CPT | Performed by: PHYSICAL MEDICINE & REHABILITATION

## 2021-08-19 PROCEDURE — 250N000013 HC RX MED GY IP 250 OP 250 PS 637: Performed by: STUDENT IN AN ORGANIZED HEALTH CARE EDUCATION/TRAINING PROGRAM

## 2021-08-19 PROCEDURE — 999N000150 HC STATISTIC PT MED CONFERENCE < 30 MIN: Performed by: PHYSICAL THERAPIST

## 2021-08-19 PROCEDURE — 97130 THER IVNTJ EA ADDL 15 MIN: CPT | Mod: GN | Performed by: SPEECH-LANGUAGE PATHOLOGIST

## 2021-08-19 PROCEDURE — 97129 THER IVNTJ 1ST 15 MIN: CPT | Mod: GN | Performed by: SPEECH-LANGUAGE PATHOLOGIST

## 2021-08-19 PROCEDURE — 97110 THERAPEUTIC EXERCISES: CPT | Mod: GO | Performed by: OCCUPATIONAL THERAPIST

## 2021-08-19 PROCEDURE — 97530 THERAPEUTIC ACTIVITIES: CPT | Mod: GP | Performed by: PHYSICAL THERAPIST

## 2021-08-19 PROCEDURE — 97535 SELF CARE MNGMENT TRAINING: CPT | Mod: GO | Performed by: OCCUPATIONAL THERAPIST

## 2021-08-19 PROCEDURE — 128N000003 HC R&B REHAB

## 2021-08-19 PROCEDURE — L3808 WHFO, RIGID W/O JOINTS: HCPCS | Performed by: OCCUPATIONAL THERAPIST

## 2021-08-19 PROCEDURE — 250N000011 HC RX IP 250 OP 636: Performed by: PHYSICIAN ASSISTANT

## 2021-08-19 RX ORDER — DONEPEZIL HYDROCHLORIDE 5 MG/1
5 TABLET, FILM COATED ORAL AT BEDTIME
Qty: 30 TABLET | Refills: 3 | Status: SHIPPED | OUTPATIENT
Start: 2021-08-19 | End: 2021-08-20

## 2021-08-19 RX ORDER — PANTOPRAZOLE SODIUM 40 MG/1
40 TABLET, DELAYED RELEASE ORAL
Qty: 30 TABLET | Refills: 3 | Status: SHIPPED | OUTPATIENT
Start: 2021-08-20 | End: 2021-08-20

## 2021-08-19 RX ORDER — ATORVASTATIN CALCIUM 40 MG/1
40 TABLET, FILM COATED ORAL AT BEDTIME
Qty: 90 TABLET | Refills: 3 | Status: SHIPPED | OUTPATIENT
Start: 2021-08-19 | End: 2021-08-20

## 2021-08-19 RX ADMIN — INSULIN ASPART 3 UNITS: 100 INJECTION, SOLUTION INTRAVENOUS; SUBCUTANEOUS at 08:07

## 2021-08-19 RX ADMIN — PANTOPRAZOLE SODIUM 40 MG: 40 TABLET, DELAYED RELEASE ORAL at 06:11

## 2021-08-19 RX ADMIN — ATORVASTATIN CALCIUM 40 MG: 40 TABLET, FILM COATED ORAL at 21:13

## 2021-08-19 RX ADMIN — INSULIN ASPART 3 UNITS: 100 INJECTION, SOLUTION INTRAVENOUS; SUBCUTANEOUS at 18:09

## 2021-08-19 RX ADMIN — ASPIRIN 81 MG: 81 TABLET, COATED ORAL at 08:06

## 2021-08-19 RX ADMIN — INSULIN ASPART 3 UNITS: 100 INJECTION, SOLUTION INTRAVENOUS; SUBCUTANEOUS at 12:47

## 2021-08-19 RX ADMIN — INSULIN GLARGINE 16 UNITS: 100 INJECTION, SOLUTION SUBCUTANEOUS at 21:28

## 2021-08-19 RX ADMIN — DONEPEZIL HYDROCHLORIDE 5 MG: 5 TABLET ORAL at 21:11

## 2021-08-19 RX ADMIN — ATENOLOL 25 MG: 25 TABLET ORAL at 21:11

## 2021-08-19 RX ADMIN — HEPARIN SODIUM 5000 UNITS: 5000 INJECTION, SOLUTION INTRAVENOUS; SUBCUTANEOUS at 08:04

## 2021-08-19 RX ADMIN — HEPARIN SODIUM 5000 UNITS: 5000 INJECTION, SOLUTION INTRAVENOUS; SUBCUTANEOUS at 21:08

## 2021-08-19 NOTE — PROGRESS NOTES
"  [facility]   Acute Rehabilitation Unit  Daily progress note    INTERVAL HISTORY  No acute events overnight. She continues with therapies well and has no concerns at this time. She denies n/v, abdominal pain, fevers, chills, SOB, and chest pain.  Team meeting today, plan is for discharge home tomorrow.       Functional  PT:  Requires cues t/o for attn to R UE  Bed Mobility: SBA  Transfer: SBA with FWW  Gait: up to 250 ft with FWW, R toe up. Intermittent min A through R hand if fatigued  Stairs: 16x6\" step ups with bilat HR, SBA  Balance: LUNDBERG on 8/17: 37/56      ROS: 10 point ROS neg other than the symptoms noted above in the HPI.        MEDICATIONS    aspirin  81 mg Oral Daily     atenolol  25 mg Oral At Bedtime     atorvastatin  40 mg Oral At Bedtime     donepezil  5 mg Oral At Bedtime     heparin ANTICOAGULANT  5,000 Units Subcutaneous Q12H     insulin aspart  3 Units Subcutaneous TID w/meals     insulin aspart  1-7 Units Subcutaneous TID AC     insulin aspart  1-5 Units Subcutaneous At Bedtime     insulin glargine  16 Units Subcutaneous At Bedtime     [Held by provider] lisinopril  10 mg Oral BID     pantoprazole  40 mg Oral QAM AC        acetaminophen, glucose **OR** dextrose **OR** glucagon, lidocaine 4%, lidocaine (buffered or not buffered), melatonin, polyethylene glycol, senna-docusate     PHYSICAL EXAM  /72 (BP Location: Left arm)   Pulse 73   Temp 97.4  F (36.3  C) (Oral)   Resp 16   Ht 1.575 m (5' 2\")   Wt 48.8 kg (107 lb 8 oz)   SpO2 95%   BMI 19.66 kg/m    Gen: NAD, resting up in bed  HEENT: NC/AT  Cardio: RRR, appears well-perfused  Pulm: non-labored on room air  Abd: soft, non-tender, non-distended  Ext: no edema or calf tenderness bilaterally  Neuro/MSK: +right facial droop. Wearing R side wrist brace. Able to make active fist and extend R wrist.     LABS  CBC RESULTS:   Recent Labs   Lab Test 08/19/21  0710 08/16/21  0626 08/12/21  0733   WBC 7.6 " 6.2 8.4   RBC 3.45* 3.40* 3.50*   HGB 10.0* 9.9* 10.2*   HCT 33.3* 32.0* 32.1*   MCV 97 94 92   MCH 29.0 29.1 29.1   MCHC 30.0* 30.9* 31.8   RDW 14.6 14.5 14.5    356 390     Last Basic Metabolic Panel:  Recent Labs   Lab Test 08/19/21  0802 08/19/21  0710 08/19/21  0202 08/16/21  0626 08/14/21  0559   NA  --  139  --  140 137   POTASSIUM  --  4.6  --  4.6 4.4   CHLORIDE  --  106  --  109 106   CO2  --  30  --  28 28   ANIONGAP  --  3  --  3 3   * 126* 113* 146* 132*   BUN  --  23  --  22 27   CR  --  1.19*  --  1.08* 1.26*   GFRESTIMATED  --  46*  --  51* 43*   CHELA  --  8.8  --  8.8 9.0       Rehabilitation - continue comprehensive acute inpatient rehabilitation program with multidisciplinary approach including therapies, rehab nursing, and physiatry following. See interval history for updates.      ASSESSMENT AND PLAN  Paradise Yuan is a 71 year old right hand dominant female with a past medical history of diabetes mellitus type II, hypertension, hyperlipidemia, CKD stage III, let upper lobectomy, and memory problems who was admitted on 7/22 with subacute left MCA infarction with ongoing right hemiparesis, impaired balance, impaired coordination, right-sided facial weakness, dysarthria, and dysphagia, with hospital course complicated by hypertension, hyperglycemia, anemia, atrial tachycardia, asymptomatic COVID-19 infection, orthostatic hypotension, and incidental finding of left cerebellar meningioma.  She is admitted to acute rehab on 8/2 for multidisciplinary rehabilitation and ongoing medical management.    Subacute left MCA infarction  Presented with 2-week history of right sided facial droop, partial right hemiparesis, partial expressive aphasia.  MRI of head shows recent infarcts of varying age within the left MCA territory affecting the deep frontal white matter and cortex at the frontoparietal junction.   MRA head shows diminished flow within the left MCA beyond the distal M1 segment,  normal MRA of the neck.  - LDL 84, goal <70.  Continue Lipitor 40 mg (increased from PTA 20 mg this admission).  - SBP goal <160, management as below  - Continue dual antiplatelet therapy with ASA 81 mg and Plavix 75 mg daily.  Duration not specified, based on CHANCE and POINT trials, will plan on x3 weeks - 8/13, will continue with 81 mg ASA going forward.   - Given multifocal infarcts, neurology recommends outpatient 30-day event monitor.  If unrevealing, consider implantable loop recorder.  - Continue PT/OT/SLP  - Follow up with stroke neurology in 1 month     COVID-19 positive 7/30, suspected false positive  Negative on admission 7/22.  Repeated on 7/30 prior to planned ARU discharge and resulted positive. Chest x-ray-no acute findings number different to differentiate given background of s/p left upper lobectomy, bronchiectases.  Precautions implemented.  No respiratory symptoms, stable on room air.  Per ID, recommended repeat test 8/3 to rule-out false positive and test returned negative.  Per discussion with them, feel likely 7/30 test represents false positive and recommended remove special precautions.     Meningioma, left posterior fossa  Revealed on imaging this admission.  No intervention needed this admission.  - Follow up with neurosurgery as outpatient     Diabetes mellitus, type II, uncontrolled  Had previously been on metformin, discontinued due to abnormal kidney function.  Had most recently been on Lantus 16 units daily.  Given A1c 10.0 on 7/22, question whether taking insulin as prescribed at home.  Blood glucose well-managed this admisison with Lantus 16 units daily, mealtime Novolog (added 7/30), and sliding scale insulin.  - Continue Lantus 16 units at bedtime  - Continue Aspart 3 units TID with meals  - Continue medium intensity sliding scale insulin  - Hypoglycemia protocol  - Monitor blood glucose QID AC & HS, adjust insulin regimen as indicated     Essential hypertension  Initially held  home lisinopril and atenolol for permissive hypertension.  Per neurology, given subacute stroke and symptoms for 2 weeks, goal to keep SBP <160. Resumed home antihypertensive regimen and BP better controlled.  - Continue atenolol 25 mg daily  - Continue lisinopril 10 mg BID--on hold  - Monitor BP and adjust meds as indicated    WILMA   CKD stage III  On admission, Cr increased from 0.9 8/2 to 1.26 8/3.  Suspect prerenal in setting of poor oral intake given low urine output on admission and poor initiation.  Lisinopril held, Lovenox switched to subcutaneous Heparin, received 1L bolus NS.  Cr improved to 0.99.  - Continue to hold lisinopril, BP well-controlled without at this time  - Encourage oral fluids  - Repeat BMP on 8/12 this am 1.26 from 1.22, will continue to encourage fluids and monitor, repeat labs 8/16 show 1.08, improved after weekend bolus.      Hyperlipidemia  PTA on Lipitor 20 mg.  LDL 84 this admission, increased statin dose.  - Continue Lipitor 40 mg daily     Normocytic anemia  Stable in 13s at admission, most recent Hgb 10.2 on 8/2.  - Hgb stable at 10.0 on 8/5 and 10.2 on 8/12, 8/16 9.9.   - Trend CBC     Oral dysphagia  - Continue SLP  - Continue soft and bite-sized diet (IDDSI 6) and thin liquids      H/o left upper lobectomy     Memory problems  Per family, has had progressive memory issues for quite some time.  PCP had referred to neurology/memory clinic for further evaluation and management.  - Trial of Aricept initiated 8/16  - Follow up with neurology as planned    1. Adjustment to disability:  Clinical psychology to eval and treat if indicated  2. FEN: soft and bite-sized diet (IDDSI 6) and thin liquids (IDDSI 0)  3. Bowel: intermittent incontinence, monitor, PRN bowel meds available  4. Bladder: intermittently incontinent, low urine output, PVRs without evidence of retention  5. DVT Prophylaxis: Lovenox switched to subcutaneous heparin due to WILMA - although Cr had improved, will continue  subcutaneous heparin   6. GI Prophylaxis: Protonix  7. Code: full code  8. Disposition: goal for home  9. ELOS: tentative discharge date of 8/20.   10. Follow up Appointments on Discharge: PCP, stroke neurology (1 month), neurosurgery, PM&R        Williams Benitez, DO  Physical Medicine & Rehabilitation         I spent a total of 35 minutes face to face and coordinating care of Paradise Yuan. Over 50% of my time on the unit was spent counseling the patient and /or coordinating care regarding recent CVA.

## 2021-08-19 NOTE — DISCHARGE SUMMARY
Speech Language Therapy Discharge Summary    Reason for therapy discharge:    Discharged to home with home therapy.    Progress towards therapy goal(s). See goals on Care Plan in Epic electronic health record for goal details.  Goals partially met.  Barriers to achieving goals:   discharge from facility.    Therapy recommendation(s):    Continued therapy is recommended.  Rationale/Recommendations:  patient will benefit from ongoing skilled SLP services to improve sustained attention, right-side attention, and simple/moderately-complex problem solving skills in order to increase functional independence and safety in the home setting..

## 2021-08-19 NOTE — PLAN OF CARE
FOCUS/GOAL  Medical management and Reinforcement of self-care/ADL    ASSESSMENT, INTERVENTIONS AND CONTINUING PLAN FOR GOAL:  Patient sleeping through the night, denies any pain.  Needs encouragement to advocate for care needs.  Formal care rounds will be held for patient today, see alternate note. No additional care concerns at this time, continue with POC.

## 2021-08-19 NOTE — PLAN OF CARE
Patient is A&Ox3 this shift, disoriented to time. CGA with walker and gait belt. Needs anticipation with cares. BG checks QID + 02:00, 129 at breakfast, 177 at lunch. Family educated on insulin use and additional of Novolog to patient's medication regiment. Regular-easy to chew, thin liquids, takes pills whole. Continent of bowel and bladder, last BM 08/16. Denies pain during shift. Received shower today with OT. VS stable. Continue POC.

## 2021-08-19 NOTE — PLAN OF CARE
Physical Therapy Discharge Summary    Reason for therapy discharge:    Discharged to home with home therapy.    Progress towards therapy goal(s). See goals on Care Plan in [facility] electronic health record for goal details.  Goals partially met.  Barriers to achieving goals:   impaired cognition and attn to R side.  TAMIKA on 8/17: 37/56    Therapy recommendation(s):    Pt has been issued a HEP for R LE strengthening and standing balance. Plan to transition to home care PT to address above impairments to further improve safety and IND with mobility and reduce risk of future falls.

## 2021-08-19 NOTE — DISCHARGE SUMMARY
[facility]   Acute Rehabilitation Unit  Discharge summary     Date of Admission: 8/2/2021  Date of Discharge:  8/20/21  Disposition: Home  Primary Care Physician: Kodak Smith  Attending physician: Williams Benitez DO  Other significant physician provider(s): Jenny Mae MD (resident)      discharge diagnosis    - Stroke Ischemic 01.2 (R) Body Involvement (L) Brain: L MCA infarction    - subacute left MCA infarction with ongoing right hemiparesis, impaired balance, impaired coordination, right-sided facial weakness, dysarthria, and dysphagia, with prior hospital course complicated by hypertension, hyperglycemia, anemia, atrial tachycardia, asymptomatic COVID-19 infection, orthostatic hypotension, and incidental finding of left cerebellar meningioma.      - Other medical concerns: diabetes mellitus type II, hypertension, hyperlipidemia, CKD stage III, let upper lobectomy, and memory problems       brief summary per hpi  Paradise Yuan is a 71 year old Tagolog-speaking right hand dominant female with past medical history of diabetes mellitus type II, hypertension, hyperlipidemia, CKD stage III, left upper lobectomy, memory problems who presented to outside ED on 7/22/21 with a ~2-week history of right-sided weakness, right facial droop, dysarthria, as well as a fall ~10 days prior to admission.  CT head revealed no acute intracranial process, but incidentally showed presumed large partially calcified meningioma in the left side of the posterior fossa.  MRI brain demonstrated recent infarcts of varying age within left MCA territory, acute to early subacute infarcts involving left corona radiata/deep white matter, and 3cm calcified mass within left cerebellum.  MRA head/neck revealed diminished flow within left MCA beyond distal M1 segment favored to represent occlusion or high-grade stenosis.  Patient was not a candidate for thrombolytics due to timing of symptom onset and  was transferred to [facility] for ongoing evaluation and management.  Echo demonstrated EF 55-60% and no significant valvular disease.  LDL was 84, patient's PTA statin was increased.  Patient was started on dual antiplatelet therapy with aspirin and Plavix.  Given multifocal infarcts and telemetry without evidence of afib/flutter, neurology recommends 30-day cardiac event monitor as an outpatient, or consideration of implantable loop recorder if that is unrevealing.       Patient's hospital course was complicated by diabetes management, hypertension, atrial tachycardia/SVT on telemetry (PTA atenolol restarted), and symptomatic orthostatic hypotension (improved s/p 1 L IV fluids 7/28).  Additionally, patient was planning to discharge to ARU on 7/30 when pre-admission COVID test resulted as positive.  Patient remained at hospital for monitoring and to await isolation room, but remains asymptomatic and stable on room air at time of discharge.    Upon arrival to the rehab unit, she reports that she is feeling well.  She denies need for , and states that she has been speaking English since childhood and understands well.  She notes some difficulties with using her right hand since her stroke, and weakness on right side compared to left.  She denies changes with speech, cognition, or swallowing compared to baseline.  She denies dizziness, headaches, visual changes. She also denies shortness of breath or cough.  States she is sleeping well.    rehabilitaiton course  Patient was admitted to the acute inpatient rehabilitation unit on 08/02/21 where she participated in physical, occupational, and speech language therapies for 60 minutes of each on a daily basis.     At the time of admission, her functionality was:    with moderate dysarthria with right side weakness and oral dysphagia. She requires max assist to don incontinence brief and min assist to complete grooming/hygiene tasks. Patient needs min  "assist using grab bars and quad cane to complete toilet transfer. Patient was able to ambulate 1x up to 160ft with contact guard assist of 1 and cues for sequencing, quad cane placement, frequent cues to lead with right toes to encourage foot clearance and heel strike but has not been able to duplicate that and is not able to ambulate more than 15ft at this time.   Patient does desat to 88% with activities, recovers with rest and cues for pursed lip breathing as indicated.    By the time of discharge her functionality improved to:    PT: Goals partially met.  Barriers to achieving goals:   impaired cognition and attn to R side.  Requires cues t/o for attn to R UE  Bed Mobility: SBA  Transfer: SBA with FWW  Gait: up to 250 ft with FWW, R toe up. Intermittent min A through R hand if fatigued  Stairs: 16x6\" step ups with bilat HR, SBA  Balance: LUNDBERG on 8/17: 37/56  LUNDBERG on 8/17: 37/56  Pt has been issued a HEP for R LE strengthening and standing balance. Plan to transition to home care PT to address above impairments to further improve safety and IND with mobility and reduce risk of future falls.    OT: ADLs: SBA-Min A transfers and room mobility fww and R wrist drop splint - verbal cueing for R hand placement and occasional physical assistance for walker navigation. SBA UB dressing, SBA standing grooming, min A LB dressing, and CGA toileting.  IADLs: Recommend assistance.  Vision/Cognition: R side inattention. Disoriented, slowed responses, mild difficulty following commands.  Completed caregiver training. Pt sister to provide 24/7 physical assistance. Pt nephew also available to assist, plans to provide support for IADLs. Pt sister demonstrating ability to assist with ADLs and functional mobility. Provided resources for DME/AE. Discussed recommendations for HC and then transition to OP. Family in favor of a HHA for bathing assistance. On track to discharge home 8/20.     SLP: Goals partially met.    Continued therapy " is recommended.  Rationale/Recommendations:  patient will benefit from ongoing skilled SLP services to improve sustained attention, right-side attention, and simple/moderately-complex problem solving skills in order to increase functional independence and safety in the home setting..      mEDICAL COURSE    The patient remained stable through her stay and participated well with therapies. For further details, please see below. (of note, her lasix was on hold during her hospitalization and was stopped at discharge, please follow-up with PCP for further BP management).     Subacute left MCA infarction  Presented with 2-week history of right sided facial droop, partial right hemiparesis, partial expressive aphasia.  MRI of head shows recent infarcts of varying age within the left MCA territory affecting the deep frontal white matter and cortex at the frontoparietal junction.   MRA head shows diminished flow within the left MCA beyond the distal M1 segment, normal MRA of the neck.  - LDL 84, goal <70.  Continue Lipitor 40 mg (increased from PTA 20 mg this admission).  - SBP goal <160, management as below  - Based on CHANCE and POINT trials, discontinued ASA + Plavix after 3 weeks and on 8/13, continued solely with 81 mg ASA going forward.   - Given multifocal infarcts, neurology recommends outpatient 30-day event monitor.  If unrevealing, consider implantable loop recorder.  - Continue PT/OT/SLP at home  - Follow up with stroke neurology as scheduled     COVID-19 positive 7/30, suspected false positive  Negative on admission 7/22.  Repeated on 7/30 prior to planned ARU discharge and resulted positive. Chest x-ray-no acute findings number different to differentiate given background of s/p left upper lobectomy, bronchiectases.  Precautions implemented.  No respiratory symptoms, stable on room air.  Per ID, recommended repeat test 8/3 to rule-out false positive and test returned negative.  Per discussion with them, feel  likely 7/30 test represents false positive.     Meningioma, left posterior fossa  Revealed on imaging this admission.  No intervention needed this admission.  - Follow up with neurology as outpatient as scheduled     Diabetes mellitus, type II, uncontrolled  Had previously been on metformin, discontinued due to abnormal kidney function and stopped at discharge.   - Continue Lantus 16 units at bedtime  - Continue Aspart 3 units TID with meals  - Continue to monitor glucose     Essential hypertension  - Continue atenolol 25 mg daily  - Discontinued lisinopril at discharge due to good control of BP while at ARU; please manage further with PCP and adjust as needed  - Monitor BP at home     WILMA   CKD stage III  On admission, Cr increased from 0.9 8/2 to 1.26 8/3.  Suspect prerenal in setting of poor oral intake given low urine output on admission and poor initiation. Lisinopril stopped per WILMA and discontinued at discharge as above. Cr 1.19 on 8/19.  - Continue to dicontinue lisinopril  - BP well-controlled without at this time  - Encourage oral fluids  - Repeat BMP with PCP and reassess kidney function     Hyperlipidemia  PTA on Lipitor 20 mg.  LDL 84 this admission, increased statin dose.  - Continue Lipitor 40 mg daily     Normocytic anemia  Stable in 13s at admission, most recent Hgb 10.0 on 8/19.     Oral dysphagia  - Continue SLP at home  - Continue soft and bite-sized diet       H/o left upper lobectomy     Memory problems  Per family, has had progressive memory issues for quite some time.  PCP had referred to neurology/memory clinic for further evaluation and management.  - Trial of Aricept initiated 8/16  - Follow up with neurology as planned        dISCHARGE MEDICATIONS  Current Discharge Medication List      START taking these medications    Details   donepezil (ARICEPT) 5 MG tablet Take 1 tablet (5 mg) by mouth At Bedtime  Qty: 30 tablet, Refills: 3    Associated Diagnoses: Memory problem      pantoprazole  (PROTONIX) 40 MG EC tablet Take 1 tablet (40 mg) by mouth every morning (before breakfast)  Qty: 30 tablet, Refills: 3    Associated Diagnoses: Gastroesophageal reflux disease, unspecified whether esophagitis present         CONTINUE these medications which have CHANGED    Details   atorvastatin (LIPITOR) 40 MG tablet Take 1 tablet (40 mg) by mouth At Bedtime  Qty: 90 tablet, Refills: 3    Associated Diagnoses: Acute ischemic left MCA stroke (H)         CONTINUE these medications which have NOT CHANGED    Details   aspirin (ASA) 81 MG EC tablet Take 1 tablet (81 mg) by mouth daily  Qty: 30 tablet, Refills: 0    Associated Diagnoses: Cerebral infarction due to occlusion of left middle cerebral artery (H)      atenolol (TENORMIN) 25 MG tablet Take 1 tablet (25 mg) by mouth At Bedtime  Qty: 30 tablet, Refills: 0    Associated Diagnoses: Essential hypertension, benign      !! blood glucose test strips [BLOOD GLUCOSE TEST STRIPS] Use 1 each As Directed 2 (two) times a day. Test blood sugar twice a day  Qty: 200 strip, Refills: 11    Comments: Dispense brand per patient's insurance at pharmacy discretion.  Associated Diagnoses: Type 2 diabetes mellitus treated without insulin (H)      !! blood-glucose meter Misc [BLOOD-GLUCOSE METER MISC] Test blood sugar twice a day  Qty: 1 each, Refills: 0    Comments: Dispense brand per patient's insurance at pharmacy discretion.  Associated Diagnoses: Type 2 diabetes mellitus treated without insulin (H)      generic lancets [GENERIC LANCETS] Use 1 each As Directed 2 (two) times a day. Test blood sugar twice a day  Qty: 200 each, Refills: 11    Comments: Dispense brand per patient's insurance at pharmacy discretion.  Associated Diagnoses: Type 2 diabetes mellitus treated without insulin (H)      insulin aspart (NOVOLOG PEN) 100 UNIT/ML pen Inject 3 Units Subcutaneous 3 times daily (with meals)  Qty: 2.7 mL, Refills: 0    Associated Diagnoses: Type 2 diabetes mellitus treated without  "insulin (H)      insulin glargine (LANTUS SOLOSTAR PEN) 100 unit/mL (3 mL) pen [INSULIN GLARGINE (LANTUS SOLOSTAR PEN) 100 UNIT/ML (3 ML) PEN] Inject 16 Units under the skin at bedtime.  Qty: 15 mL, Refills: 11    Comments: If LANTUS is not covered by insurance, may substitute BASAGLAR at same dose and frequency.    Associated Diagnoses: Type 2 diabetes mellitus treated without insulin (H)      melatonin 1 MG TABS tablet Take 1 tablet (1 mg) by mouth nightly as needed for sleep  Qty: 30 tablet, Refills: 0    Associated Diagnoses: Primary insomnia      pen needle, diabetic 31 gauge x 1/4\" Ndle [PEN NEEDLE, DIABETIC 31 GAUGE X 1/4\" NDLE] Use one daily as directed  Qty: 100 each, Refills: 11    Associated Diagnoses: Type 2 diabetes mellitus treated without insulin (H)      senna-docusate (SENOKOT-S/PERICOLACE) 8.6-50 MG tablet Take 1 tablet by mouth 2 times daily as needed for constipation  Qty: 30 tablet, Refills: 0    Associated Diagnoses: Slow transit constipation       !! - Potential duplicate medications found. Please discuss with provider.      STOP taking these medications       clopidogrel (PLAVIX) 75 MG tablet Comments:   Reason for Stopping:         lisinopril (ZESTRIL) 10 MG tablet Comments:   Reason for Stopping:                 DISCHARGE INSTRUCTIONS AND FOLLOW UP  Discharge Procedure Orders   Home care nursing referral     Home Care PT Referral for Hospital Discharge   Referral Priority: Routine Referral Type: Home Health Therapies & Aides   Number of Visits Requested: 1     Home Care OT Referral for Hospital Discharge   Referral Priority: Routine Referral Type: Consultation   Number of Visits Requested: 1     Home Care SLP Referral for Hospital Discharge   Referral Priority: Routine Referral Type: Consultation   Number of Visits Requested: 1     Reason for your hospital stay   Order Comments: Rehab following stroke     Activity   Order Comments: Your activity upon discharge: activity as tolerated "     Order Specific Question Answer Comments   Is discharge order? Yes      MD face to face encounter   Order Comments: Documentation of Face to Face and Certification for Home Health Services    I certify that patient: Paradise Yuan is under my care and that I, or a nurse practitioner or physician's assistant working with me, had a face-to-face encounter that meets the physician face-to-face encounter requirements with this patient on: 8/19/2021.    This encounter with the patient was in whole, or in part, for the following medical condition, which is the primary reason for home health care: patient with deficits in mobility and cognition.    I certify that, based on my findings, the following services are medically necessary home health services: Nursing, Occupational Therapy, Physical Therapy, and Speech Language Therapy.    My clinical findings support the need for the above services because: Nurse is needed: To assess her medically and clinical status after changes in medications or other medical regimen. and To provide assessment and oversight required in the home to assure adherence to the medical plan due to: cognitive deficits with pt and to assure new meds are taken as prescribed and old meds avoided.., Occupational Therapy Services are needed to assess and treat cognitive ability and address ADL safety due to impairment in mobility, strength, iADLs, ADLs, balance and ROM., Physical Therapy Services are needed to assess and treat the following functional impairments: mobility, strength, balance., and Speech Therapy Services are needed to assess and treat impairments in language and/or swallow functions due to her stroke.    Further, I certify that my clinical findings support that this patient is homebound (i.e. absences from home require considerable and taxing effort and are for medical reasons or [employer] services or infrequently or of short duration when for other reasons) because: Requires  assistance of another person or specialized equipment to access medical services because patient: Is prone to wander/get lost without assistance., Is unable to exit home safely on own due to: cognitive concerns and unable to mobilize on her own., Is unable to operate assistive equipment on their own., Range of motion limitations prevents ability to exit home safely., and Requires supervision of another for safe transfer...    Based on the above findings. I certify that this patient is confined to the home and needs intermittent skilled nursing care, physical therapy and/or speech therapy.  The patient is under my care, and I have initiated the establishment of the plan of care.  This patient will be followed by a physician who will periodically review the plan of care.  Physician/Provider to provide follow up care: Kodak Smith    Attending hospital physician (the Medicare certified [place] provider): Williams Benitez DO  Physician Signature: See electronic signature associated with these discharge orders.  Date: 8/19/2021     Follow Up and recommended labs and tests   Order Comments: - Dr. Kodak Smith on Tuesday, August 24th at 1:00 PM. Please arrive at 12:40 PM for check in.  - Stroke Neurology   You are scheduled for a virtual visit to see Dr. Rosales September 20th, 2021 at 11:00am  - PM&R  You are scheduled to see Dr. Grey on Monday October 4th, 2021 at 3:15pm     Diet   Order Comments: Follow this diet upon discharge:       Combination Diet Easy to Chew     Order Specific Question Answer Comments   Is discharge order? Yes           physical examination    Most recent Vital Signs:   Vitals:    08/18/21 2201 08/18/21 2212 08/19/21 0810 08/19/21 1500   BP: 117/67  128/72 103/56   BP Location:   Left arm Left arm   Pulse: 73  73 86   Resp:   16 16   Temp:  (!) 95.6  F (35.3  C) 97.4  F (36.3  C) 97  F (36.1  C)   TempSrc:   Oral Oral   SpO2:   95% 98%   Weight:       Height:             Gen: NAD, resting up in  bed  HEENT: NC/AT  Cardio: RRR, appears well-perfused  Pulm: non-labored on room air  Abd: soft, non-tender, non-distended  Ext: no edema or calf tenderness bilaterally  Neuro/MSK: +right facial droop. Wearing R side wrist brace. Able to make active fist and extend R wrist.         Discharge summary was forwarded to Kodak Smith (PCP) at the time of discharge, so as to bridge from hospital to outpatient care.     It was our pleasure to care for Paradise Yuan during this hospitalization. Please do not hesitate to contact me should there be questions regarding the hospital course or discharge plan.        Williams Benitez, DO  Physical Medicine & Rehabilitation        I spent a total of 35 minutes face to face and coordinating care of Paradise Yuan. Over 50% of my time on the unit was spent counseling the patient and /or coordinating care regarding recent CVA.

## 2021-08-19 NOTE — CARE CONFERENCE
Acute Rehab Care Conference/Team Rounds      Type: Team Rounds    Present: Dr. Williams Roque, Jenny Mae Resident, Concepción Diaz RN, Jeremiah Timmons PT, Slime Bray OT, Armand Epstein SLP, Kyle GARCIA, Whitney Gage Dietician, Patient Paradise Yuan       Discharge Barriers/Treatment/Education    Rehab Diagnosis: R hemiparesis 2/2 CVA    Active Medical Co-morbidities/Prognosis:     Patient Active Problem List   Diagnosis Code     Stomatitis Herpetiformis K12.0     Benign Essential Hypertension I10     Type 2 diabetes mellitus treated without insulin (H) E11.9     Hyperlipidemia E78.5     Memory problem R41.3     Chronic kidney disease, stage 3 N18.30     Facial droop R29.810     Right leg weakness R29.898     Right arm weakness R29.898     Cerebral infarction due to occlusion of left middle cerebral artery (H) I63.512     Left cerebellar meningioma D32.0     Fecal incontinence R15.9     Infection due to 2019 novel coronavirus U07.1     Acute ischemic left MCA stroke (H) I63.512         Safety:Pt able to ambulate with CGao1, needs things anticipated at times.     Pain:    Medications, Skin, Tubes/Lines:No tubes or drains.     Swallowing/Nutrition: IDDSI 7 Easy to Chew and IDDSI 0 (thin).    Bowel/Bladder:Voiding, last bowel movement was the 16th and was continent.      Psychosocial: Single, lives with sister and adult nephew in house. No mental or chemical health concerns. Lives with sister, Nildaflor who is support (but hard of hearing, per OSH  note). Lives with nephew, Elfego as well. Elfego provides transportation for pt. Brother Art also listed as emergency contact. Parents are . Pt denied having any other siblings.     ADLs/IADLs: Progressing with ADLs and functional mobility. Pt requiring SBA transfers and room mobility fww and R wrist drop splint. Pt requiring SBA UB dressing, SBA standing grooming, min A LB dressing, and CGA toileting. Performance limited by R side weakness, R side  "inattention, and impaired cognition. Requires consistent verbal cueing to utilize RUE during functional tasks and to maintain  on R walker handle. May benefit from additional R wrist support. Goals to maximize IND and safety with ADLs. Continuing to recommend 24/7 support. Caregiver training completed 8/18 with pt sister and nephew. Pt sister would benefit from increased practice with transfers/mobility prior to discharge as she will be the primary caregiver during the day. Plan to have additional training with PT/OT today in pm. Recommend follow up HC OT/HHA. DME/AE: shower bench, commode, fww, right wrist support, RLE toe up. Need to issue RUE HEP.    Mobility:   Requires cues t/o for attn to R UE  Bed Mobility: SBA  Transfer: SBA with FWW  Gait: up to 250 ft with FWW, R toe up. Intermittent min A through R hand if fatigued  Stairs: 16x6\" step ups with bilat HR, SBA  Balance: LUNDBERG on 8/17: 37/56  Family training with pt's nephew Priyank, and her sister in the pm.  Pt's sister needing cues from the nephew at times to follow directions, possibly due to [place].  Pt and her sister needing cues for safe technique on the stairs, so family training was added on Thurs 8/19 at 2: 30 pm after stroke class for stair training with rail just on R side (they have 7 stairs with rail just on R). , and to continue to reiterate the need to use the walker in the home, safe transfers.  Pt wants to get home on Friday, but pt's nephew and therapist stating we want to make sure she is safe at home,especially on the stairs.    Fall prevention, R side positioning and awareness, safe gait and transfers covered with family.     Cognition/Language: Patient continues to demonstrate impaired right-side attention, sustained attention, alternating attention, working memory, immediate/short-term memory, and moderately complex problem solving skills. Patient will require 24/7 assistance in return to home setting. Will require consistent assistance " with medication management and any other home management tasks.    Community Re-Entry: Not an immediate barrier. Plan to transition to home care PT.    Transportation: Family to provide.    Decision maker: self    Plan of Care and goals reviewed and updated.    Discharge Plan/Recommendations    Fall Precautions: continue    Patient/Family input to goals: yes     Estimated length of stay: 14-16 days     Overall plan for the patient: reach a level of mod I       Utilization Review and Continued Stay Justification    Medical Necessity Criteria:    For any criteria that is not met, please document reason and plan for discharge, transfer, or modification of plan of care to address.    Requires intensive rehabilitation program to treat functional deficits?: Yes    Requires 3x per week or greater involvement of rehabilitation physician to oversee rehabilitation program?: Yes    Requires rehabilitation nursing interventions?: Yes    Patient is making functional progress?: Yes    There is a potential for additional functional progress? Yes    Patient is participating in therapy 3 hours per day a minimum of 5 days per week or 15 hours per week in 7 day period?:Yes    Has discharge needs that require coordinated discharge planning approach?:Yes      Barriers/Concerns related to meeting medical necessity criteria:  none    Team Plan to Address Concern:  As needed       Final Physician Sign off    Statement of Approval:  Williams Benitez DO      Patient Goals  Social Work Goals:  Patient set for discharge home tomorrow.     OT Frequency: 60-90 min daily  OT goal: hygiene/grooming: independent  OT goal: upper body dressing: Supervision/stand-by assist  OT goal: lower body dressing: Supervision/stand-by assist  OT goal: upper body bathing: Supervision/stand-by assist  OT goal: lower body bathing: Supervision/stand-by assist  OT goal: toilet transfer/toileting: Supervision/stand-by assist  OT goal: meal preparation:  Supervision/stand-by assist, with simple meal preparation, ambulatory level  OT goal: home management: Supervision/stand-by assist, with light demand household tasks, ambulatory level  OT goal 1: Pt will demo SBA shower transfer using DME/AE prn.  OT goal 2: Pt will demo IND initiation of compensatory strategies for R side inattention in order to improve safety with ADLs and mobility.    PT Frequency: Daily x60-90 min  PT goal: bed mobility: Independent, Supine to/from sit, Rolling, Bridging  PT goal: transfers: Modified independent, Sit to/from stand, Bed to/from chair, Assistive device  PT goal: gait: Modified independent, Greater than 200 feet, Assistive device  PT goal: stairs: Supervision/stand-by assist (flight of stairs with single rail)  PT goal: perform aerobic activity with stable cardiovascular response: continuous activity, NuStep, 15 minutes  PT goal 1: Pt will be IND with LE strength, balance HEP to improve IND with mobility and reduce risk of future falls.  PT Goal 2: Pt will be able to perform car transfer with SBA and LRAD in order to safely discharge home and access medical appts.  PT Goal 3: Pt will be able to perform floor>furniture transfer with SBA as part of fall recovery training.       SLP Frequency: daily  SLP Swallow Goal:  Safely tolerate diet without signs/symptoms of aspiration: Regular diet, Thin liquids  SLP goal 1: Pt will complete moderate level auditory and reading comprehension task with 80% accuracy and minimal cues.  SLP goal 2: Pt will complete moderate level expressive language tasks with 80% accuracy and min cues  SLP goal 3: Pt will implement speaking strategies to improve intelligibility in 80% of opportunities with min cues  SLP goal 4: Patient will attend to a task for 3 minutes with 2 or fewer max auditory cues to maintain focus during structured task without distractions.  SLP goal 5: Patient will use written handouts to recall personal and safety information with 80%  accuracy with rare (0-24%) max auditory cues.            Patient Goal:  Pain Management: Patient will advocate for adequate pain management and participate in all therapies.     Goal: Skin Integrity: Patient will have intact skin and will be free of any pressure injuries during her stay at Acute Rehab.     Goal: Safety Management: Patient will demonstrate understanding of safety and avoid falls by calling for help before getting up.

## 2021-08-19 NOTE — PLAN OF CARE
FOCUS/GOAL  Pain management, Skin integrity, and Safety management    ASSESSMENT, INTERVENTIONS AND CONTINUING PLAN FOR GOAL:  Pt calm and cooperative, shy to ask for help/assistance, needs initiative from staff to do cares. Continent of bowel and bladder, used the bathroom with assist of 1/walker/gait belt. On easy, chewable diet and ate 100%. Denies pain. Blood sugars checked before supper and bedtime = 122 and 139 respectively. LBM: 8/16/21.

## 2021-08-19 NOTE — CONSULTS
Stroke Education Note    The following information has been reviewed with the patient and family:    1. Warning signs of stroke    2. Calling 911 if having warning signs of stroke    3. All modifiable risk factors: hypertension, CAD, atrial fib, diabetes, hypercholesterolemia, smoking, substance abuse, diet, physical inactivity, obesity, sleep apnea.    4. Patient's risk factors for stroke which include: DM2, HTN, HLD, physical inactivity    5. Follow-up plan for after discharge    6. Discharge medications which include: ASA, atenolol, lipitor, heparin    In addition, the above information was given to the patient and family in writing as a part of the [facility] Stroke Class Handout.    Learner's response to risk factors / lifestyle modification education: NA - Precontemplative     Vero Fuentes RN

## 2021-08-20 ENCOUNTER — APPOINTMENT (OUTPATIENT)
Dept: OCCUPATIONAL THERAPY | Facility: CLINIC | Age: 71
DRG: 057 | End: 2021-08-20
Attending: PHYSICAL MEDICINE & REHABILITATION
Payer: COMMERCIAL

## 2021-08-20 VITALS
SYSTOLIC BLOOD PRESSURE: 108 MMHG | WEIGHT: 107.5 LBS | BODY MASS INDEX: 19.78 KG/M2 | HEART RATE: 70 BPM | RESPIRATION RATE: 16 BRPM | OXYGEN SATURATION: 96 % | TEMPERATURE: 97.6 F | HEIGHT: 62 IN | DIASTOLIC BLOOD PRESSURE: 61 MMHG

## 2021-08-20 LAB
GLUCOSE BLDC GLUCOMTR-MCNC: 112 MG/DL (ref 70–99)
GLUCOSE BLDC GLUCOMTR-MCNC: 120 MG/DL (ref 70–99)
GLUCOSE BLDC GLUCOMTR-MCNC: 92 MG/DL (ref 70–99)

## 2021-08-20 PROCEDURE — 250N000011 HC RX IP 250 OP 636: Performed by: PHYSICIAN ASSISTANT

## 2021-08-20 PROCEDURE — 250N000013 HC RX MED GY IP 250 OP 250 PS 637: Performed by: PHYSICIAN ASSISTANT

## 2021-08-20 PROCEDURE — 99239 HOSP IP/OBS DSCHRG MGMT >30: CPT | Performed by: PHYSICAL MEDICINE & REHABILITATION

## 2021-08-20 PROCEDURE — 97535 SELF CARE MNGMENT TRAINING: CPT | Mod: GO | Performed by: OCCUPATIONAL THERAPIST

## 2021-08-20 RX ORDER — ATORVASTATIN CALCIUM 40 MG/1
40 TABLET, FILM COATED ORAL AT BEDTIME
Qty: 90 TABLET | Refills: 3 | Status: SHIPPED | OUTPATIENT
Start: 2021-08-20 | End: 2021-11-04

## 2021-08-20 RX ORDER — DONEPEZIL HYDROCHLORIDE 5 MG/1
5 TABLET, FILM COATED ORAL AT BEDTIME
Qty: 30 TABLET | Refills: 3 | Status: SHIPPED | OUTPATIENT
Start: 2021-08-20 | End: 2021-11-04

## 2021-08-20 RX ORDER — PANTOPRAZOLE SODIUM 40 MG/1
40 TABLET, DELAYED RELEASE ORAL
Qty: 30 TABLET | Refills: 3 | Status: SHIPPED | OUTPATIENT
Start: 2021-08-20 | End: 2021-09-19

## 2021-08-20 RX ADMIN — PANTOPRAZOLE SODIUM 40 MG: 40 TABLET, DELAYED RELEASE ORAL at 07:13

## 2021-08-20 RX ADMIN — HEPARIN SODIUM 5000 UNITS: 5000 INJECTION, SOLUTION INTRAVENOUS; SUBCUTANEOUS at 09:43

## 2021-08-20 RX ADMIN — INSULIN ASPART 3 UNITS: 100 INJECTION, SOLUTION INTRAVENOUS; SUBCUTANEOUS at 09:42

## 2021-08-20 RX ADMIN — ASPIRIN 81 MG: 81 TABLET, COATED ORAL at 09:43

## 2021-08-20 NOTE — PLAN OF CARE
Occupational Therapy Discharge Summary    Reason for therapy discharge:    Discharged to home with home therapy.    Progress towards therapy goal(s). See goals on Care Plan in [facility] electronic [facility] record for goal details.  Goals partially met.  Barriers to achieving goals:   Recommend supervision with ADLs/IADLs due to R side inattention and cognitive deficits.    Therapy recommendation(s):    Continued therapy is recommended.  Rationale/Recommendations:  ADL/IADL retraining, DME/AE training, home safety evaluation, fall prevention education, functional cognition, functional tranfsers/mobility, community reintegration, R side perceptual retraining, and R side strengthening..     Paradise Yuan is a 71 year old right hand dominant female with a past medical history of DMII, HTN, hyperlipidemia, CKD stage III, upper lobectomy, and memory concerns who was admitted to  [facility] follow hospital stay for on 7/22 with subacute left MCA infarction.    ADLs/IADLs: Pt requiring SBA transfers and room mobility fww and R wrist drop splint/walker splint. Pt requiring SBA UB dressing, SBA standing grooming, min A LB dressing, and CGA toileting. Performance limited by R side weakness, R side inattention, and impaired cognition. Requires consistent verbal cueing to utilize RUE during functional tasks and to maintain  on R walker handle.    Cognition: Demonstrating deficits with initiation, communication, problem solving, awareness, and recall. Recommend 24/7 support.     HEP: Issued RUE strengthening/coordination HEP.     Caregiver support: Caregiver training completed 8/18 and 8/20 with pt sister and nephew.     DME/AE: shower bench, commode, fww, right wrist support, RLE toe up. Need to issue RUE HEP.

## 2021-08-20 NOTE — PLAN OF CARE
FOCUS/GOAL  Discharge planning, Reinforcement of self-care/ADL and Safety management    ASSESSMENT, INTERVENTIONS AND CONTINUING PLAN FOR GOAL:  Patient sleeping through the night, repositioning in bed independently. Patient will be discharging to home, unclear the plan for BG and insulin management at home as patient has not been educated/may not have cognitive and/or physical ability to manage per self, MD was notified this am and education services contacted, next shift to continue to f/u as indicated.  Patient denies pain, does not use call light during the night so toileting was offered and patient also had incontinence.  Patient is able to ambulate with CGAo1 and walker, responds to step by step cues.  No additional care concerns at this time, continue with POC.

## 2021-08-20 NOTE — CONSULTS
Consult for Diabetes Educator entered at 1117 today.  Seeing patients on [place] and unable to check on needs until 1628.  Patient has already discharged.  BRAEDEN Kendall  Diabetes Clinical Nurse Specialist/CDE  [phone]

## 2021-08-20 NOTE — PLAN OF CARE
FOCUS/GOAL  Pain management, Skin integrity, and Safety management    ASSESSMENT, INTERVENTIONS AND CONTINUING PLAN FOR GOAL:    Patient alert, calm and cooperative, denies pain. Blood sugars checked before supper and at bedtime = 148 and 95. Offered snack (1/2 container of pudding), blood sugar rechecked after = 136. Continent of bladder and bowel, LBM: 8/16/21. On regular food, easy to swallow and thin liquids, ate 75% (supper from family/home).

## 2021-08-20 NOTE — DISCHARGE INSTRUCTIONS
Follow-up Appointments  - PCP  You are scheduled to see Dr. Kodak Smith on Tuesday, August 24th at 1:00 PM. Please arrive at 12:40 PM for check in.    Address  [facility] - [address], [address]                          [place] [address]  Phone   [phone]    - Stroke Neurology (1 month),   You are scheduled for a virtual visit to see Dr. Rosales September 20th, 2021 at 11:00am    Phone  [phone]      - Neurosurgery  You are scheduled to see *** on *** at ***.    Address [facility] - [address] [address]  Phone  [phone]    - PM&R  You are scheduled to see Dr. Grey on Monday October 4th, 2021 at 3:15pm    Address [facility]    [address] [place] [address]    [address]    [place] [address]  Phone [place]: [phone]    ____________________________________________________________________________________________      To reduce the risk of subsequent stroke there are several important factors including optimal management of anticoagulants, blood pressure, cholesterol, diabetes and smoking abstinence.    Anticoagulation:  You are on Aspirin    Blood Pressure:  Keeping your blood pressures less than 130/80 has been shown to reduce risk of recurrent stroke. Recording your blood pressure and heart rate once daily in a log book can help you and your providers make decisions on optimal management. You are encouraged to bring your log book with you to your primary physician and/or cardiology doctor visits.    You are currently on atenolol to help control your blood pressure. Several lifestyle modifications have been associated with blood pressure reduction and are an important part of a comprehensive plan. These include: weight loss (if over-weight); a diet low in salt and cholesterol and rich in fruits and vegetables;  "regular aerobic physical activity and limited alcohol consumption.    Diabetes:  Optimal management of diabetes not only reduces risk of another stroke, it can help with healing process from your recent stroke. Blood glucose levels measure how well you're controlling your diabetes. An additional test \"hemoglobin A1C\" reflects how you have been overall with glucose control in the prior few months. This should be less than 7 though even lower below 6 is considered normal. Your recent Hgb A1C was [insert A1C]. This should be followed up with your primary provider and/or endocrinologist.    Your present plan is to check blood glucose consistently Before Meals and at Bedtime. These should be recorded along with date/time and any relevant notes such as food consumed, insulin/medication taken etc. This helps you and your providers make decisions on optimal management. You're encouraged to bring you log book with to your primary and/or endocrinology doctor visits.  Your current medications include Insulin Aspart (Novolog) and Glargine (Lantus). Specific doses and frequencies listed elsewhere.     Diet:  Diet and exercise are very important for diabetes regardless of being on medication or not. Be aware of and moderate your carbohydrate intake as instructed by doctor, dietitian or nurse.  Regular physical activity may be more difficult since your stroke though doing what you can on a daily basis is helpful.     Cholesterol:  Traditional target levels for LDL cholesterol or \"bad cholesterol\" is less than 130 however once you have had a stroke, your target LDL level is now less than 70. Additional recommendations such as increasing your HDL or \"good\" cholesterol and lowering your triglyceride level can also be important.    Your most recent lipid panel was   Lab Results   Component Value Date    CHOL 141 07/22/2021     Lab Results   Component Value Date    HDL 34 07/22/2021     Lab Results   Component Value Date    LDL 84 " 07/22/2021     Lab Results   Component Value Date    TRIG 117 07/22/2021     No results found for: CHOLHDLRATIO    This should be followed up in 2-3 months with your primary provider.    Smoking:  Finally one of the most important modifiable risk factors is to not smoke. This includes cigarettes, pipes, cigars, chewing tobacco and second hand smoke. Support through counseling, nicotine replacement, and oral smoking-cessation medications may all be helpful. Often people have been able to quit during their hospitalization but once returning to their familiar environment, the urges can be stronger. If this is the case, we encourage you to get support. There are numerous options, start by talking with your doctor.    [place] [facility] [place] [facility]  Additional resources and contact information online   Www.strokemn.org & www.braininjurymn.org    Types of services that Stroke/Brain Injury Resource Facilitation offers include:  Emotional support in coping with a  new normal   Finding mental health support and counselors who understand brain injury and stroke  Finding a support group  Finding or re-connecting to rehabilitation services  Finding where and how to get a brain injury assessed  Finding or re-connecting with a primary care physician  Supporting caregivers by connecting them with resources  Education about diagnosis and symptoms -  Is this normal   Helping educate family and loved ones of the survivor s  invisible  symptoms  Figuring out the logistics of returning to work, vocational rehab and understanding ADA rights  If not going back to work, support in finding meaningful ways to structure your day and exploring volunteer options  Coaching on navigation the federal, state and county health and disability service system with additional support and conference calls as needed  Help finding appropriate legal support for appealing and navigating Social Security Disability, providing  advocacy on the individual s behalf as needed and connecting with cost effective alternatives to  as needed  Supporting parents/students with how to discuss return to school and sports with educators and connecting to a TBI specialist or parent advocate group if needed  Connecting to addiction (alcohol/drug/gambling/smoking) support and treatment services  Support with creative problem solving to life barriers.  *These are just a few examples of common things that Resource Facilitation can help with. They are not limited to what is listed here so if you are curious if they can help, just ask. Call us at [phone] or [phone].

## 2021-08-20 NOTE — PLAN OF CARE
Disoriented to time, situation. Incontinent and continent of bladder, continent of bowel. LBM 8/16. Diet level 7 with thin liquids. Denies pain. Assist of 1 with walker. Pt family here to pick patient up, received additional instruction from therapy with new shoes. Discharge instructions reviewed with family and all questions answered. Medications to be picked up at patient's home pharmacy. Pt left unit via walker with assist of 1 and discharged to private vehicle without incident.

## 2021-09-21 ENCOUNTER — TRANSITIONAL CARE UNIT VISIT (OUTPATIENT)
Dept: GERIATRICS | Facility: CLINIC | Age: 71
End: 2021-09-21
Payer: COMMERCIAL

## 2021-09-21 VITALS
OXYGEN SATURATION: 95 % | DIASTOLIC BLOOD PRESSURE: 82 MMHG | WEIGHT: 99.8 LBS | RESPIRATION RATE: 18 BRPM | SYSTOLIC BLOOD PRESSURE: 122 MMHG | TEMPERATURE: 97.2 F | HEART RATE: 92 BPM | BODY MASS INDEX: 18.25 KG/M2

## 2021-09-21 DIAGNOSIS — I10 ESSENTIAL HYPERTENSION, BENIGN: ICD-10-CM

## 2021-09-21 DIAGNOSIS — I63.512 CEREBRAL INFARCTION DUE TO OCCLUSION OF LEFT MIDDLE CEREBRAL ARTERY (H): ICD-10-CM

## 2021-09-21 DIAGNOSIS — I63.512 ACUTE ISCHEMIC LEFT MCA STROKE (H): Primary | ICD-10-CM

## 2021-09-21 DIAGNOSIS — R41.3 MEMORY PROBLEM: ICD-10-CM

## 2021-09-21 DIAGNOSIS — R29.898 RIGHT ARM WEAKNESS: ICD-10-CM

## 2021-09-21 DIAGNOSIS — R29.898 RIGHT LEG WEAKNESS: ICD-10-CM

## 2021-09-21 DIAGNOSIS — R29.810 FACIAL DROOP: ICD-10-CM

## 2021-09-21 PROCEDURE — 99207 PR CDG-CODE INCORRECT PER BILLING BASED ON TIME: CPT | Performed by: NURSE PRACTITIONER

## 2021-09-21 PROCEDURE — 99305 1ST NF CARE MODERATE MDM 35: CPT | Performed by: NURSE PRACTITIONER

## 2021-09-21 NOTE — LETTER
9/21/2021        RE: Paradise Yuan  [address] [place] [address]        [facility] GERIATRIC SERVICES  PRIMARY CARE PROVIDER AND CLINIC:  Kodak Smith MD, 1984 [facility] [address] / [place] [address]  Chief Complaint   Patient presents with     RECHECK      [place] Medical Record Number:  [id number]  Place of Service where encounter took place:  [facility] ([facility]) [[id number]]    Paradise Yuan  is a 71 year old  (1950), admitted to the above facility from  [facility]. Hospital stay 8/2 through 8/21..   HPI: Janette Goldberg has a primary history of type 2 diabetes, hypertension, hyperlipidemia and CKD with a left upper lobectomy and memory problems.  She presented to an outside ED on July 2 with 2 weeks of right-sided weakness and facial droop.  Her CT revealed no acute intracranial process but incidentally showed presumed large partially calcified meningioma on the left side of the posterior fossa.  MRI revealed subacute infarcts and a 3 cm calcified mass in left cerebellum.  She is not a candidate for thrombolytics and was then transferred to [facility].  Course complicated by hypertension and diabetes as well as SVT.  She also had a positive Covid test although had no symptoms. She   She was readmitted to [facility] on 8/23 for increased aphasia and right sided weakness.  This time  with occlusion of the left ICA hand TPA was administered before being taken for thrombectomy.  At the conclusion of the procedure, she was noted to be pulseless on the right upper extremity, she was seen by vascular who recommended monitoring.  She was then taken to inpatient rehabilitation.  She was cleared by speech for thin liquids and regular diet.  She is seen by PT and recommended moderate assist for standing.    TCU course: She admitted on the 17th, and remains in bed most of the time.  To the very flat affect, minimal eye contact.  Is not responding to any questions but will nod  yes or no.  She is nodding no to any pain.  Per staff she is having minimal p.o. intake.  Staff is trying to get her sister in whom she lives with prior to this in order to assist with her mood and spirits.  Prior to her recent test lesion, she was ambulating.  Unknown level participation with therapy at this time.  I did not get a chance to speak with him.  Nursing room states that she has been in bed majority of her time.    CODE STATUS/ADVANCE DIRECTIVES DISCUSSION:  Prior  CPR/Full code   ALLERGIES: No Known Allergies   PAST MEDICAL HISTORY:   Past Medical History:   Diagnosis Date     Hypertension       PAST SURGICAL HISTORY:   has a past surgical history that includes IR Miscellaneous Procedure (3/8/2003); IR Miscellaneous Procedure (3/8/2003); IR Visceral Angiogram (3/8/2003); IR Miscellaneous Procedure (3/8/2003); IR Miscellaneous Procedure (3/8/2003); IR Miscellaneous Procedure (3/8/2003); IR Visceral Angiogram (3/8/2003); IR Miscellaneous Procedure (3/10/2003); IR Visceral Angiogram (3/10/2003); IR Miscellaneous Procedure (3/11/2003); IR Visceral Angiogram (3/11/2003); IR Miscellaneous Procedure (3/11/2003); IR Miscellaneous Procedure (3/11/2003); IR Miscellaneous Procedure (3/11/2003); IR Miscellaneous Procedure (3/12/2003); IR Miscellaneous Procedure (3/12/2003); IR Miscellaneous Procedure (3/12/2003); IR Miscellaneous Procedure (3/12/2003); IR Spinal Angiogram (3/16/2003); IR Thoracic Aortogram (3/16/2003); IR Spinal Angiogram (3/16/2003); IR Spinal Angiogram (3/16/2003); IR Spinal Angiogram (3/16/2003); IR Miscellaneous Procedure (3/16/2003); IR Miscellaneous Procedure (3/16/2003); IR Miscellaneous Procedure (3/16/2003); IR Miscellaneous Procedure (3/16/2003); IR Miscellaneous Procedure (3/16/2003); IR Spinal Angiogram (3/16/2003); IR Spinal Angiogram (3/16/2003); IR Spinal Angiogram (3/16/2003); IR Miscellaneous Procedure (3/16/2003); IR Miscellaneous Procedure (3/16/2003); IR Spinal Angiogram  "(3/16/2003); and IR Miscellaneous Procedure (3/16/2003).  FAMILY HISTORY: family history is not on file.  SOCIAL HISTORY:   reports that she has never smoked. She has never used smokeless tobacco. She reports that she does not drink alcohol and does not use drugs.  Patient's living condition: lives with family, sister.     Post Discharge Medication Reconciliation Status: discharge medications reconciled, continue medications without change  Current Outpatient Medications   Medication Sig     atenolol (TENORMIN) 25 MG tablet Take 1 tablet (25 mg) by mouth At Bedtime     atorvastatin (LIPITOR) 40 MG tablet Take 1 tablet (40 mg) by mouth At Bedtime     blood glucose test strips [BLOOD GLUCOSE TEST STRIPS] Use 1 each As Directed 2 (two) times a day. Test blood sugar twice a day     blood-glucose meter Misc [BLOOD-GLUCOSE METER MISC] Test blood sugar twice a day     donepezil (ARICEPT) 5 MG tablet Take 1 tablet (5 mg) by mouth At Bedtime     generic lancets [GENERIC LANCETS] Use 1 each As Directed 2 (two) times a day. Test blood sugar twice a day     insulin aspart (NOVOLOG PEN) 100 UNIT/ML pen Inject 3 Units Subcutaneous 3 times daily (with meals)     insulin glargine (LANTUS SOLOSTAR PEN) 100 unit/mL (3 mL) pen [INSULIN GLARGINE (LANTUS SOLOSTAR PEN) 100 UNIT/ML (3 ML) PEN] Inject 16 Units under the skin at bedtime.     pen needle, diabetic 31 gauge x 1/4\" Ndle [PEN NEEDLE, DIABETIC 31 GAUGE X 1/4\" NDLE] Use one daily as directed     No current facility-administered medications for this visit.       ROS:  Unobtainable secondary to aphasia.     Vitals:  /82   Pulse 92   Temp 97.2  F (36.2  C)   Resp 18   Wt 45.3 kg (99 lb 12.8 oz)   SpO2 95%   BMI 18.25 kg/m    Exam:  Physical Exam   General appearance: alert, appears stated age.    Head: Normocephalic, without obvious abnormality, atraumatic, Eyes: sclera anicteric. Mild R sided facial droop.   Lungs: respirations without effort, LSCTA; no wheezing or " rales.   Cardiovascular: S1, S2. Regular rate and rhythm.   ABDOMEN: Globular and soft, non tender.    Extremities: extremities normal, atraumatic, no cyanosis or edema  Skin: Skin color, texture, turgor normal. No rashes or lesions  Neurologic: Unable to assess orientation. Right sided facial droop- mild. RUE and RLE weakness. Does not cooperate with assessment.   Psych: Depressed.     Lab/Diagnostic data:  Recent labs in [facility] reviewed by me today.     ASSESSMENT/PLAN:     (I63.512) Cerebral infarction due to occlusion of left middle  cerebral artery (H)  (I63.512) Acute ischemic left MCA stroke (H)  (primary encounter diagnosis)  Comment: We will need continuous monitoring and assessment and appropriate discharge planning due to the level deficits.  Plan:   -PT and OT.  -Plavix 75 mg daily.  -Atorvastatin 40 mg daily.  -Speech therapy eval and treat.  -Aricept 5 mg daily.  -Melatonin 3 mg nightly.  -Enoxaparin 30 mg daily.     (R29.898) Right leg weakness  (R29.898) Right arm weakness  Plan:   -PT and OT.    Cognitive impairment  Aphasia:  -Eval and treat.    (I10) Benign Essential Hypertension  Comment: Per hospital notes, goal is less than 120/80 however can be liberalized due to patient's age and goals of care.  Plan:   -Monitor blood pressures closely nursing home, update NP for consistent blood pressures greater than 130s over 90s.  -Amlodipine 2.5 mg daily.    Depressed affect  Poor p.o. intake/failure to thrive:  -Dietitian eval and treat.  -ACP eval and treat.  -Monitor, will initiate pharmacologic therapy if no improvement.     Total time spent with patient visit at the skilled nursing facility was >50 min including patient visit and review of past records. Greater than 50% of total time spent with counseling and coordinating care due to poor appetitie, weakness, depressed affect; reviewing multiple hospital records.     Electronically signed by:  Jose De Jesus Mora CNP                              Sincerely,        Jose De Jesus Mora, CNP

## 2021-09-23 ENCOUNTER — TRANSITIONAL CARE UNIT VISIT (OUTPATIENT)
Dept: GERIATRICS | Facility: CLINIC | Age: 71
End: 2021-09-23
Payer: COMMERCIAL

## 2021-09-23 VITALS
SYSTOLIC BLOOD PRESSURE: 126 MMHG | TEMPERATURE: 97.1 F | BODY MASS INDEX: 18.37 KG/M2 | HEART RATE: 88 BPM | DIASTOLIC BLOOD PRESSURE: 74 MMHG | OXYGEN SATURATION: 97 % | HEIGHT: 62 IN | RESPIRATION RATE: 18 BRPM | WEIGHT: 99.8 LBS

## 2021-09-23 DIAGNOSIS — D32.0 MENINGIOMA OF CEREBELLUM (H): ICD-10-CM

## 2021-09-23 DIAGNOSIS — I63.512 ACUTE ISCHEMIC LEFT MCA STROKE (H): Primary | ICD-10-CM

## 2021-09-23 DIAGNOSIS — R29.898 RIGHT ARM WEAKNESS: ICD-10-CM

## 2021-09-23 PROCEDURE — 99310 SBSQ NF CARE HIGH MDM 45: CPT | Performed by: NURSE PRACTITIONER

## 2021-09-23 ASSESSMENT — MIFFLIN-ST. JEOR: SCORE: 915.94

## 2021-09-23 NOTE — LETTER
9/23/2021        RE: Paradise Yuan  [address] [place] [address]        M [facility] GERIATRIC SERVICES  PRIMARY CARE PROVIDER AND CLINIC:  Kodak Smith MD, 1984 [facility] [address] / [place] [address]  Chief Complaint   Patient presents with     RECHECK     left CVA      [place] Medical Record Number:  [id number]  Place of Service where encounter took place:  [facility] ([facility] [[id number]]    Paradise Yuan  is a 71 year old  (1950), admitted to the above facility from  [facility]. Hospital stay 8/2 through 8/21..   HPI: Janette Goldberg has a primary history of type 2 diabetes, hypertension, hyperlipidemia and CKD with a left upper lobectomy and memory problems.  She presented to an outside ED on July 2 with 2 weeks of right-sided weakness and facial droop.  Her CT revealed no acute intracranial process but incidentally showed presumed large partially calcified meningioma on the left side of the posterior fossa.  MRI revealed subacute infarcts and a 3 cm calcified mass in left cerebellum.  She is not a candidate for thrombolytics and was then transferred to [facility].  Course complicated by hypertension and diabetes as well as SVT.  She also had a positive Covid test although had no symptoms. She   She was readmitted to [facility] on 8/23 for increased aphasia and right sided weakness.  This time  with occlusion of the left ICA hand TPA was administered before being taken for thrombectomy.  At the conclusion of the procedure, she was noted to be pulseless on the right upper extremity, she was seen by vascular who recommended monitoring.  She was then taken to inpatient rehabilitation.  She was cleared by speech for thin liquids and regular diet.  She is seen by PT and recommended moderate assist for standing.    TCU course: Janette Goldberg is seen to follow-up on TCU concerns including hypertension and decreased p.o. intake.  She has not been eating well and nursing reports  around 10% eating each meal.  I have put in for dietitian referral as well as ACP for evaluation of situational depression.  When I mention this to her she does voice that she does not want to discuss this, and then asks me to go away.  She does allow me to examine her and listen to her lungs which are clear and then tells me she likely leave.  She is denying pain.  Reviewing vital signs, she is consistently elevated greater than 130/90.  I will increase her amlodipine.  Her sister has visited a few times.     CODE STATUS/ADVANCE DIRECTIVES DISCUSSION:  Prior  CPR/Full code   ALLERGIES: No Known Allergies   PAST MEDICAL HISTORY:   Past Medical History:   Diagnosis Date     Hypertension       PAST SURGICAL HISTORY:   has a past surgical history that includes IR Miscellaneous Procedure (3/8/2003); IR Miscellaneous Procedure (3/8/2003); IR Visceral Angiogram (3/8/2003); IR Miscellaneous Procedure (3/8/2003); IR Miscellaneous Procedure (3/8/2003); IR Miscellaneous Procedure (3/8/2003); IR Visceral Angiogram (3/8/2003); IR Miscellaneous Procedure (3/10/2003); IR Visceral Angiogram (3/10/2003); IR Miscellaneous Procedure (3/11/2003); IR Visceral Angiogram (3/11/2003); IR Miscellaneous Procedure (3/11/2003); IR Miscellaneous Procedure (3/11/2003); IR Miscellaneous Procedure (3/11/2003); IR Miscellaneous Procedure (3/12/2003); IR Miscellaneous Procedure (3/12/2003); IR Miscellaneous Procedure (3/12/2003); IR Miscellaneous Procedure (3/12/2003); IR Spinal Angiogram (3/16/2003); IR Thoracic Aortogram (3/16/2003); IR Spinal Angiogram (3/16/2003); IR Spinal Angiogram (3/16/2003); IR Spinal Angiogram (3/16/2003); IR Miscellaneous Procedure (3/16/2003); IR Miscellaneous Procedure (3/16/2003); IR Miscellaneous Procedure (3/16/2003); IR Miscellaneous Procedure (3/16/2003); IR Miscellaneous Procedure (3/16/2003); IR Spinal Angiogram (3/16/2003); IR Spinal Angiogram (3/16/2003); IR Spinal Angiogram (3/16/2003); IR Miscellaneous  "Procedure (3/16/2003); IR Miscellaneous Procedure (3/16/2003); IR Spinal Angiogram (3/16/2003); and IR Miscellaneous Procedure (3/16/2003).  FAMILY HISTORY: family history is not on file.  SOCIAL HISTORY:   reports that she has never smoked. She has never used smokeless tobacco. She reports that she does not drink alcohol and does not use drugs.  Patient's living condition: lives with family, sister.     Post Discharge Medication Reconciliation Status: discharge medications reconciled, continue medications without change  Current Outpatient Medications   Medication Sig     atenolol (TENORMIN) 25 MG tablet Take 1 tablet (25 mg) by mouth At Bedtime     atorvastatin (LIPITOR) 40 MG tablet Take 1 tablet (40 mg) by mouth At Bedtime     blood glucose test strips [BLOOD GLUCOSE TEST STRIPS] Use 1 each As Directed 2 (two) times a day. Test blood sugar twice a day     blood-glucose meter Misc [BLOOD-GLUCOSE METER MISC] Test blood sugar twice a day     donepezil (ARICEPT) 5 MG tablet Take 1 tablet (5 mg) by mouth At Bedtime     generic lancets [GENERIC LANCETS] Use 1 each As Directed 2 (two) times a day. Test blood sugar twice a day     insulin aspart (NOVOLOG PEN) 100 UNIT/ML pen Inject 3 Units Subcutaneous 3 times daily (with meals)     insulin glargine (LANTUS SOLOSTAR PEN) 100 unit/mL (3 mL) pen [INSULIN GLARGINE (LANTUS SOLOSTAR PEN) 100 UNIT/ML (3 ML) PEN] Inject 16 Units under the skin at bedtime.     pen needle, diabetic 31 gauge x 1/4\" Ndle [PEN NEEDLE, DIABETIC 31 GAUGE X 1/4\" NDLE] Use one daily as directed     No current facility-administered medications for this visit.       ROS:  Unobtainable secondary to aphasia.     Vitals:  /74   Pulse 88   Temp 97.1  F (36.2  C)   Resp 18   Ht 1.575 m (5' 2\")   Wt 45.3 kg (99 lb 12.8 oz)   SpO2 97%   BMI 18.25 kg/m    Exam:  Physical Exam   General appearance: alert, appears stated age.    Head: Normocephalic, without obvious abnormality, atraumatic, Eyes: " sclera anicteric. Mild R sided facial droop.   Lungs: respirations without effort, LSCTA; no wheezing or rales.   Cardiovascular: S1, S2. Regular rate and rhythm.   ABDOMEN: Globular and soft, non tender.    Extremities: extremities normal, atraumatic, no cyanosis or edema  Skin: Skin color, texture, turgor normal. No rashes or lesions  Neurologic: Unable to assess orientation. Right sided facial droop- mild. RUE and RLE weakness. Does not cooperate with assessment.   Psych: Depressed.     Lab/Diagnostic data:  Recent labs in [facility] reviewed by me today.     ASSESSMENT/PLAN:     (I63.512) Cerebral infarction due to occlusion of left middle cerebral artery (H)  (I63.512) Acute ischemic left MCA stroke (H)  (primary encounter diagnosis): Unstable.  Comment: We will need continuous monitoring and assessment and appropriate discharge planning due to the level of deficits.  Plan:   -PT and OT.  -Plavix 75 mg daily.  -Atorvastatin 40 mg daily.  -Speech therapy eval and treat.  -Aricept 5 mg daily.  -Melatonin 3 mg nightly.  -Enoxaparin 30 mg daily.     (R29.898) Right leg weakness  (R29.898) Right arm weakness  Plan:   -PT and OT.    Cognitive impairment  Aphasia:  -Eval and treat.    (I10) Benign Essential Hypertension: Unstable.  Comment: Per hospital notes, goal is less than 120/80 however can be liberalized due to patient's age and goals of care.  Plan:   -Monitor blood pressures closely nursing home, update NP for consistent blood pressures greater than 130s over 90s.  -Increase amlodipine to 5 mg p.o. daily.    Depressed affect  Poor p.o. intake/failure to thrive: Unstable.  -Dietitian eval and treat  -ACP eval and treat.  -Start Remeron 7.5 mg p.o. every night.    Electronically signed by:  Jose De Jesus Mora, BRIELLE                           Sincerely,        Jose De Jesus Mora, CNP

## 2021-09-25 NOTE — PROGRESS NOTES
[facility] GERIATRIC SERVICES  PRIMARY CARE PROVIDER AND CLINIC:  Kodak Smith MD, 1984 [facility] 1 / [place] [address]  Chief Complaint   Patient presents with     RECHECK      [place] Medical Record Number:  [id number]  Place of Service where encounter took place:  [facility] ([facility]) [[id number]]    Paradise Yuan  is a 71 year old  (1950), admitted to the above facility from  [facility]. Hospital stay 8/2 through 8/21..   HPI: Janette Goldberg has a primary history of type 2 diabetes, hypertension, hyperlipidemia and CKD with a left upper lobectomy and memory problems.  She presented to an outside ED on July 2 with 2 weeks of right-sided weakness and facial droop.  Her CT revealed no acute intracranial process but incidentally showed presumed large partially calcified meningioma on the left side of the posterior fossa.  MRI revealed subacute infarcts and a 3 cm calcified mass in left cerebellum.  She is not a candidate for thrombolytics and was then transferred to [facility].  Course complicated by hypertension and diabetes as well as SVT.  She also had a positive Covid test although had no symptoms. She   She was readmitted to [facility] on 8/23 for increased aphasia and right sided weakness.  This time  with occlusion of the left ICA hand TPA was administered before being taken for thrombectomy.  At the conclusion of the procedure, she was noted to be pulseless on the right upper extremity, she was seen by vascular who recommended monitoring.  She was then taken to inpatient rehabilitation.  She was cleared by speech for thin liquids and regular diet.  She is seen by PT and recommended moderate assist for standing.    TCU course: She admitted on the 17th, and remains in bed most of the time.  To the very flat affect, minimal eye contact.  Is not responding to any questions but will nod yes or no.  She is nodding no to any pain.  Per staff she is having minimal p.o.  intake.  Staff is trying to get her sister in whom she lives with prior to this in order to assist with her mood and spirits.  Prior to her recent test lesion, she was ambulating.  Unknown level participation with therapy at this time.  I did not get a chance to speak with him.  Nursing room states that she has been in bed majority of her time.    CODE STATUS/ADVANCE DIRECTIVES DISCUSSION:  Prior  CPR/Full code   ALLERGIES: No Known Allergies   PAST MEDICAL HISTORY:   Past Medical History:   Diagnosis Date     Hypertension       PAST SURGICAL HISTORY:   has a past surgical history that includes IR Miscellaneous Procedure (3/8/2003); IR Miscellaneous Procedure (3/8/2003); IR Visceral Angiogram (3/8/2003); IR Miscellaneous Procedure (3/8/2003); IR Miscellaneous Procedure (3/8/2003); IR Miscellaneous Procedure (3/8/2003); IR Visceral Angiogram (3/8/2003); IR Miscellaneous Procedure (3/10/2003); IR Visceral Angiogram (3/10/2003); IR Miscellaneous Procedure (3/11/2003); IR Visceral Angiogram (3/11/2003); IR Miscellaneous Procedure (3/11/2003); IR Miscellaneous Procedure (3/11/2003); IR Miscellaneous Procedure (3/11/2003); IR Miscellaneous Procedure (3/12/2003); IR Miscellaneous Procedure (3/12/2003); IR Miscellaneous Procedure (3/12/2003); IR Miscellaneous Procedure (3/12/2003); IR Spinal Angiogram (3/16/2003); IR Thoracic Aortogram (3/16/2003); IR Spinal Angiogram (3/16/2003); IR Spinal Angiogram (3/16/2003); IR Spinal Angiogram (3/16/2003); IR Miscellaneous Procedure (3/16/2003); IR Miscellaneous Procedure (3/16/2003); IR Miscellaneous Procedure (3/16/2003); IR Miscellaneous Procedure (3/16/2003); IR Miscellaneous Procedure (3/16/2003); IR Spinal Angiogram (3/16/2003); IR Spinal Angiogram (3/16/2003); IR Spinal Angiogram (3/16/2003); IR Miscellaneous Procedure (3/16/2003); IR Miscellaneous Procedure (3/16/2003); IR Spinal Angiogram (3/16/2003); and IR Miscellaneous Procedure (3/16/2003).  FAMILY HISTORY: family history  "is not on file.  SOCIAL HISTORY:   reports that she has never smoked. She has never used smokeless tobacco. She reports that she does not drink alcohol and does not use drugs.  Patient's living condition: lives with family, sister.     Post Discharge Medication Reconciliation Status: discharge medications reconciled, continue medications without change  Current Outpatient Medications   Medication Sig     atenolol (TENORMIN) 25 MG tablet Take 1 tablet (25 mg) by mouth At Bedtime     atorvastatin (LIPITOR) 40 MG tablet Take 1 tablet (40 mg) by mouth At Bedtime     blood glucose test strips [BLOOD GLUCOSE TEST STRIPS] Use 1 each As Directed 2 (two) times a day. Test blood sugar twice a day     blood-glucose meter Misc [BLOOD-GLUCOSE METER MISC] Test blood sugar twice a day     donepezil (ARICEPT) 5 MG tablet Take 1 tablet (5 mg) by mouth At Bedtime     generic lancets [GENERIC LANCETS] Use 1 each As Directed 2 (two) times a day. Test blood sugar twice a day     insulin aspart (NOVOLOG PEN) 100 UNIT/ML pen Inject 3 Units Subcutaneous 3 times daily (with meals)     insulin glargine (LANTUS SOLOSTAR PEN) 100 unit/mL (3 mL) pen [INSULIN GLARGINE (LANTUS SOLOSTAR PEN) 100 UNIT/ML (3 ML) PEN] Inject 16 Units under the skin at bedtime.     pen needle, diabetic 31 gauge x 1/4\" Ndle [PEN NEEDLE, DIABETIC 31 GAUGE X 1/4\" NDLE] Use one daily as directed     No current facility-administered medications for this visit.       ROS:  Unobtainable secondary to aphasia.     Vitals:  /82   Pulse 92   Temp 97.2  F (36.2  C)   Resp 18   Wt 45.3 kg (99 lb 12.8 oz)   SpO2 95%   BMI 18.25 kg/m    Exam:  Physical Exam   General appearance: alert, appears stated age.    Head: Normocephalic, without obvious abnormality, atraumatic, Eyes: sclera anicteric. Mild R sided facial droop.   Lungs: respirations without effort, LSCTA; no wheezing or rales.   Cardiovascular: S1, S2. Regular rate and rhythm.   ABDOMEN: Globular and soft, non " tender.    Extremities: extremities normal, atraumatic, no cyanosis or edema  Skin: Skin color, texture, turgor normal. No rashes or lesions  Neurologic: Unable to assess orientation. Right sided facial droop- mild. RUE and RLE weakness. Does not cooperate with assessment.   Psych: Depressed.     Lab/Diagnostic data:  Recent labs in [facility] reviewed by me today.     ASSESSMENT/PLAN:     (I63.512) Cerebral infarction due to occlusion of left middle  cerebral artery (H)  (I63.512) Acute ischemic left MCA stroke (H)  (primary encounter diagnosis)  Comment: We will need continuous monitoring and assessment and appropriate discharge planning due to the level deficits.  Plan:   -PT and OT.  -Plavix 75 mg daily.  -Atorvastatin 40 mg daily.  -Speech therapy eval and treat.  -Aricept 5 mg daily.  -Melatonin 3 mg nightly.  -Enoxaparin 30 mg daily.     (R29.898) Right leg weakness  (R29.898) Right arm weakness  Plan:   -PT and OT.    Cognitive impairment  Aphasia:  -Eval and treat.    (I10) Benign Essential Hypertension  Comment: Per hospital notes, goal is less than 120/80 however can be liberalized due to patient's age and goals of care.  Plan:   -Monitor blood pressures closely nursing home, update NP for consistent blood pressures greater than 130s over 90s.  -Amlodipine 2.5 mg daily.    Depressed affect  Poor p.o. intake/failure to thrive:  -Dietitian eval and treat.  -ACP eval and treat.  -Monitor, will initiate pharmacologic therapy if no improvement.     Total time spent with patient visit at the skilled nursing facility was >50 min including patient visit and review of past records. Greater than 50% of total time spent with counseling and coordinating care due to poor appetitie, weakness, depressed affect; reviewing multiple hospital records.     Electronically signed by:  Jose De Jesus Mora, CNP

## 2021-09-27 NOTE — PROGRESS NOTES
[facility] GERIATRIC SERVICES  PRIMARY CARE PROVIDER AND CLINIC:  Kodak Smith MD, 1984 [facility] / [place] [address]  Chief Complaint   Patient presents with     RECHECK     left CVA      [place] Medical Record Number:  [id number]  Place of Service where encounter took place:  [facility] ([facility]) [[id number]]    Paradise Yuan  is a 71 year old  (1950), admitted to the above facility from  [facility]. Hospital stay 8/2 through 8/21..   HPI: Janette Goldberg has a primary history of type 2 diabetes, hypertension, hyperlipidemia and CKD with a left upper lobectomy and memory problems.  She presented to an outside ED on July 2 with 2 weeks of right-sided weakness and facial droop.  Her CT revealed no acute intracranial process but incidentally showed presumed large partially calcified meningioma on the left side of the posterior fossa.  MRI revealed subacute infarcts and a 3 cm calcified mass in left cerebellum.  She is not a candidate for thrombolytics and was then transferred to [facility]  Course complicated by hypertension and diabetes as well as SVT.  She also had a positive Covid test although had no symptoms. She   She was readmitted to [facility] on 8/23 for increased aphasia and right sided weakness.  This time  with occlusion of the left ICA hand TPA was administered before being taken for thrombectomy.  At the conclusion of the procedure, she was noted to be pulseless on the right upper extremity, she was seen by vascular who recommended monitoring.  She was then taken to inpatient rehabilitation.  She was cleared by speech for thin liquids and regular diet.  She is seen by PT and recommended moderate assist for standing.    TCU course: Janette Goldberg is seen to follow-up on TCU concerns including hypertension and decreased p.o. intake.  She has not been eating well and nursing reports around 10% eating each meal.  I have put in for dietitian referral as well as ACP for  evaluation of situational depression.  When I mention this to her she does voice that she does not want to discuss this, and then asks me to go away.  She does allow me to examine her and listen to her lungs which are clear and then tells me she likely leave.  She is denying pain.  Reviewing vital signs, she is consistently elevated greater than 130/90.  I will increase her amlodipine.  Her sister has visited a few times.     CODE STATUS/ADVANCE DIRECTIVES DISCUSSION:  Prior  CPR/Full code   ALLERGIES: No Known Allergies   PAST MEDICAL HISTORY:   Past Medical History:   Diagnosis Date     Hypertension       PAST SURGICAL HISTORY:   has a past surgical history that includes IR Miscellaneous Procedure (3/8/2003); IR Miscellaneous Procedure (3/8/2003); IR Visceral Angiogram (3/8/2003); IR Miscellaneous Procedure (3/8/2003); IR Miscellaneous Procedure (3/8/2003); IR Miscellaneous Procedure (3/8/2003); IR Visceral Angiogram (3/8/2003); IR Miscellaneous Procedure (3/10/2003); IR Visceral Angiogram (3/10/2003); IR Miscellaneous Procedure (3/11/2003); IR Visceral Angiogram (3/11/2003); IR Miscellaneous Procedure (3/11/2003); IR Miscellaneous Procedure (3/11/2003); IR Miscellaneous Procedure (3/11/2003); IR Miscellaneous Procedure (3/12/2003); IR Miscellaneous Procedure (3/12/2003); IR Miscellaneous Procedure (3/12/2003); IR Miscellaneous Procedure (3/12/2003); IR Spinal Angiogram (3/16/2003); IR Thoracic Aortogram (3/16/2003); IR Spinal Angiogram (3/16/2003); IR Spinal Angiogram (3/16/2003); IR Spinal Angiogram (3/16/2003); IR Miscellaneous Procedure (3/16/2003); IR Miscellaneous Procedure (3/16/2003); IR Miscellaneous Procedure (3/16/2003); IR Miscellaneous Procedure (3/16/2003); IR Miscellaneous Procedure (3/16/2003); IR Spinal Angiogram (3/16/2003); IR Spinal Angiogram (3/16/2003); IR Spinal Angiogram (3/16/2003); IR Miscellaneous Procedure (3/16/2003); IR Miscellaneous Procedure (3/16/2003); IR Spinal Angiogram  "(3/16/2003); and IR Miscellaneous Procedure (3/16/2003).  FAMILY HISTORY: family history is not on file.  SOCIAL HISTORY:   reports that she has never smoked. She has never used smokeless tobacco. She reports that she does not drink alcohol and does not use drugs.  Patient's living condition: lives with family, sister.     Post Discharge Medication Reconciliation Status: discharge medications reconciled, continue medications without change  Current Outpatient Medications   Medication Sig     atenolol (TENORMIN) 25 MG tablet Take 1 tablet (25 mg) by mouth At Bedtime     atorvastatin (LIPITOR) 40 MG tablet Take 1 tablet (40 mg) by mouth At Bedtime     blood glucose test strips [BLOOD GLUCOSE TEST STRIPS] Use 1 each As Directed 2 (two) times a day. Test blood sugar twice a day     blood-glucose meter Misc [BLOOD-GLUCOSE METER MISC] Test blood sugar twice a day     donepezil (ARICEPT) 5 MG tablet Take 1 tablet (5 mg) by mouth At Bedtime     generic lancets [GENERIC LANCETS] Use 1 each As Directed 2 (two) times a day. Test blood sugar twice a day     insulin aspart (NOVOLOG PEN) 100 UNIT/ML pen Inject 3 Units Subcutaneous 3 times daily (with meals)     insulin glargine (LANTUS SOLOSTAR PEN) 100 unit/mL (3 mL) pen [INSULIN GLARGINE (LANTUS SOLOSTAR PEN) 100 UNIT/ML (3 ML) PEN] Inject 16 Units under the skin at bedtime.     pen needle, diabetic 31 gauge x 1/4\" Ndle [PEN NEEDLE, DIABETIC 31 GAUGE X 1/4\" NDLE] Use one daily as directed     No current facility-administered medications for this visit.       ROS:  Unobtainable secondary to aphasia.     Vitals:  /74   Pulse 88   Temp 97.1  F (36.2  C)   Resp 18   Ht 1.575 m (5' 2\")   Wt 45.3 kg (99 lb 12.8 oz)   SpO2 97%   BMI 18.25 kg/m    Exam:  Physical Exam   General appearance: alert, appears stated age.    Head: Normocephalic, without obvious abnormality, atraumatic, Eyes: sclera anicteric. Mild R sided facial droop.   Lungs: respirations without effort, " LSCTA; no wheezing or rales.   Cardiovascular: S1, S2. Regular rate and rhythm.   ABDOMEN: Globular and soft, non tender.    Extremities: extremities normal, atraumatic, no cyanosis or edema  Skin: Skin color, texture, turgor normal. No rashes or lesions  Neurologic: Unable to assess orientation. Right sided facial droop- mild. RUE and RLE weakness. Does not cooperate with assessment.   Psych: Depressed.     Lab/Diagnostic data:  Recent labs in [facility] reviewed by me today.     ASSESSMENT/PLAN:     (I63.512) Cerebral infarction due to occlusion of left middle cerebral artery (H)  (I63.512) Acute ischemic left MCA stroke (H)  (primary encounter diagnosis): Unstable.  Comment: We will need continuous monitoring and assessment and appropriate discharge planning due to the level of deficits.  Plan:   -PT and OT.  -Plavix 75 mg daily.  -Atorvastatin 40 mg daily.  -Speech therapy eval and treat.  -Aricept 5 mg daily.  -Melatonin 3 mg nightly.  -Enoxaparin 30 mg daily.     (R29.898) Right leg weakness  (R29.898) Right arm weakness  Plan:   -PT and OT.    Cognitive impairment  Aphasia:  -Eval and treat.    (I10) Benign Essential Hypertension: Unstable.  Comment: Per hospital notes, goal is less than 120/80 however can be liberalized due to patient's age and goals of care.  Plan:   -Monitor blood pressures closely nursing home, update NP for consistent blood pressures greater than 130s over 90s.  -Increase amlodipine to 5 mg p.o. daily.    Depressed affect  Poor p.o. intake/failure to thrive: Unstable.  -Dietitian eval and treat  -ACP eval and treat.  -Start Remeron 7.5 mg p.o. every night.    Electronically signed by:  Jose De Jesus Mora, CNP

## 2021-09-28 ENCOUNTER — TRANSITIONAL CARE UNIT VISIT (OUTPATIENT)
Dept: GERIATRICS | Facility: CLINIC | Age: 71
End: 2021-09-28
Payer: COMMERCIAL

## 2021-09-28 VITALS
BODY MASS INDEX: 18.44 KG/M2 | HEIGHT: 62 IN | RESPIRATION RATE: 16 BRPM | SYSTOLIC BLOOD PRESSURE: 131 MMHG | WEIGHT: 100.2 LBS | TEMPERATURE: 97.6 F | DIASTOLIC BLOOD PRESSURE: 87 MMHG | HEART RATE: 78 BPM | OXYGEN SATURATION: 94 %

## 2021-09-28 DIAGNOSIS — I63.512 ACUTE ISCHEMIC LEFT MCA STROKE (H): Primary | ICD-10-CM

## 2021-09-28 DIAGNOSIS — R41.3 MEMORY PROBLEM: ICD-10-CM

## 2021-09-28 PROCEDURE — 99309 SBSQ NF CARE MODERATE MDM 30: CPT | Performed by: NURSE PRACTITIONER

## 2021-09-28 ASSESSMENT — MIFFLIN-ST. JEOR: SCORE: 917.75

## 2021-09-28 NOTE — LETTER
9/28/2021        RE: Paradise Yuan  [address] [place] [address]        [facility] GERIATRIC SERVICES  PRIMARY CARE PROVIDER AND CLINIC:  Kodak Smith MD, 1984 [facility] [address] / [place] [address]  Chief Complaint   Patient presents with     RECHECK     response to remeron       [place] Medical Record Number:  [id number]  Place of Service where encounter took place:  [facility] ([facility]) [[id number]]    Paradise Yuan  is a 71 year old  (1950), admitted to the above facility from  [facility]. Hospital stay 8/2 through 8/21..   HPI: Janette Goldberg has a primary history of type 2 diabetes, hypertension, hyperlipidemia and CKD with a left upper lobectomy and memory problems.  She presented to an outside ED on July 2 with 2 weeks of right-sided weakness and facial droop.  Her CT revealed no acute intracranial process but incidentally showed presumed large partially calcified meningioma on the left side of the posterior fossa.  MRI revealed subacute infarcts and a 3 cm calcified mass in left cerebellum.  She is not a candidate for thrombolytics and was then transferred to [facility].  Course complicated by hypertension and diabetes as well as SVT.  She also had a positive Covid test although had no symptoms. She   She was readmitted to [facility] on 8/23 for increased aphasia and right sided weakness.  This time  with occlusion of the left ICA hand TPA was administered before being taken for thrombectomy.  At the conclusion of the procedure, she was noted to be pulseless on the right upper extremity, she was seen by vascular who recommended monitoring.  She was then taken to inpatient rehabilitation.  She was cleared by speech for thin liquids and regular diet.  She is seen by PT and recommended moderate assist for standing.    TCU course: Patient seen to follow-up on initiation of Remeron as well as review blood pressures.  Her weight is actually up 2 pounds since her  admission now 100.7 pounds.  Her blood pressures are reviewed and have gone up slightly over the weekend with 2 values of 150 over 80s.   Nursing reports that she continues to decline many ADLs however she is more alert with me today and answers a few of my questions, denying pain.  She allows me to stay in the room with her for a bit.       CODE STATUS/ADVANCE DIRECTIVES DISCUSSION:  Prior  CPR/Full code   ALLERGIES: No Known Allergies   PAST MEDICAL HISTORY:   Past Medical History:   Diagnosis Date     Hypertension       PAST SURGICAL HISTORY:   has a past surgical history that includes IR Miscellaneous Procedure (3/8/2003); IR Miscellaneous Procedure (3/8/2003); IR Visceral Angiogram (3/8/2003); IR Miscellaneous Procedure (3/8/2003); IR Miscellaneous Procedure (3/8/2003); IR Miscellaneous Procedure (3/8/2003); IR Visceral Angiogram (3/8/2003); IR Miscellaneous Procedure (3/10/2003); IR Visceral Angiogram (3/10/2003); IR Miscellaneous Procedure (3/11/2003); IR Visceral Angiogram (3/11/2003); IR Miscellaneous Procedure (3/11/2003); IR Miscellaneous Procedure (3/11/2003); IR Miscellaneous Procedure (3/11/2003); IR Miscellaneous Procedure (3/12/2003); IR Miscellaneous Procedure (3/12/2003); IR Miscellaneous Procedure (3/12/2003); IR Miscellaneous Procedure (3/12/2003); IR Spinal Angiogram (3/16/2003); IR Thoracic Aortogram (3/16/2003); IR Spinal Angiogram (3/16/2003); IR Spinal Angiogram (3/16/2003); IR Spinal Angiogram (3/16/2003); IR Miscellaneous Procedure (3/16/2003); IR Miscellaneous Procedure (3/16/2003); IR Miscellaneous Procedure (3/16/2003); IR Miscellaneous Procedure (3/16/2003); IR Miscellaneous Procedure (3/16/2003); IR Spinal Angiogram (3/16/2003); IR Spinal Angiogram (3/16/2003); IR Spinal Angiogram (3/16/2003); IR Miscellaneous Procedure (3/16/2003); IR Miscellaneous Procedure (3/16/2003); IR Spinal Angiogram (3/16/2003); and IR Miscellaneous Procedure (3/16/2003).  FAMILY HISTORY: family history is  "not on file.  SOCIAL HISTORY:   reports that she has never smoked. She has never used smokeless tobacco. She reports that she does not drink alcohol and does not use drugs.  Patient's living condition: lives with family, sister.     Post Discharge Medication Reconciliation Status: discharge medications reconciled, continue medications without change  Current Outpatient Medications   Medication Sig     atenolol (TENORMIN) 25 MG tablet Take 1 tablet (25 mg) by mouth At Bedtime     atorvastatin (LIPITOR) 40 MG tablet Take 1 tablet (40 mg) by mouth At Bedtime     blood glucose test strips [BLOOD GLUCOSE TEST STRIPS] Use 1 each As Directed 2 (two) times a day. Test blood sugar twice a day     blood-glucose meter Misc [BLOOD-GLUCOSE METER MISC] Test blood sugar twice a day     donepezil (ARICEPT) 5 MG tablet Take 1 tablet (5 mg) by mouth At Bedtime     generic lancets [GENERIC LANCETS] Use 1 each As Directed 2 (two) times a day. Test blood sugar twice a day     insulin aspart (NOVOLOG PEN) 100 UNIT/ML pen Inject 3 Units Subcutaneous 3 times daily (with meals)     insulin glargine (LANTUS SOLOSTAR PEN) 100 unit/mL (3 mL) pen [INSULIN GLARGINE (LANTUS SOLOSTAR PEN) 100 UNIT/ML (3 ML) PEN] Inject 16 Units under the skin at bedtime.     pen needle, diabetic 31 gauge x 1/4\" Ndle [PEN NEEDLE, DIABETIC 31 GAUGE X 1/4\" NDLE] Use one daily as directed     No current facility-administered medications for this visit.       ROS:  Unobtainable secondary to aphasia.     Vitals:  /87   Pulse 78   Temp 97.6  F (36.4  C)   Resp 16   Ht 1.575 m (5' 2\")   Wt 45.5 kg (100 lb 3.2 oz)   SpO2 94%   BMI 18.33 kg/m    Exam:  Physical Exam   General appearance: alert, appears stated age.    Head: Normocephalic, without obvious abnormality, atraumatic, Eyes: sclera anicteric. Mild R sided facial droop.   Lungs: respirations without effort, LSCTA; no wheezing or rales.   Cardiovascular: S1, S2. Regular rate and rhythm.   ABDOMEN: " Globular and soft, non tender.    Extremities: extremities normal, atraumatic, no cyanosis or edema  Skin: Skin color, texture, turgor normal. No rashes or lesions  Neurologic: Unable to assess orientation. Right sided facial droop- mild. RUE and RLE weakness. Does not cooperate with assessment.   Psych: Depressed.     Lab/Diagnostic data:  Recent labs in [facility] reviewed by me today.     ASSESSMENT/PLAN:     (I63.512) Cerebral infarction due to occlusion of left middle cerebral artery (H)  (I63.512) Acute ischemic left MCA stroke (H)  (primary encounter diagnosis): Unstable.  Comment: We will need continuous monitoring and assessment and appropriate discharge planning due to the level of deficits.  Plan:   -PT and OT.  -Plavix 75 mg daily.  -Atorvastatin 40 mg daily.  -Speech therapy eval and treat.  -Aricept 5 mg daily.  -Melatonin 3 mg nightly.  -Enoxaparin 30 mg daily.     (R29.898) Right leg weakness  (R29.898) Right arm weakness  Plan:   -PT and OT.    Cognitive impairment  Aphasia:  -Eval and treat.    (I10) Benign Essential Hypertension: Unstable.  Comment: Per hospital notes, goal is less than 120/80 however can be liberalized due to patient's age and goals of care.  Plan:   -Monitor blood pressures closely nursing home, update NP for consistent blood pressures greater than 130s over 90s.  -Increase amlodipine to 5 mg p.o. daily.    Depressed affect  Poor p.o. intake/failure to thrive: Unstable.  -Dietitian eval and treat  -ACP eval and treat.  -Start Remeron 7.5 mg p.o. every night.    Electronically signed by:  Jose De Jesus Mora [facility] GERIATRIC SERVICES  PRIMARY CARE PROVIDER AND CLINIC:  Kodak Smith MD, 1984 [facility] / [place] [address]  Chief Complaint   Patient presents with     RECHECK     response to remeron       [place] Medical Record Number:  [id number]  Place of Service where encounter took place:  [facility] ([facility]) [[id number]]    Paradise Yuan  is a 71  year old  (1950), admitted to the above facility from  [facility]. Hospital stay 8/2 through 8/21..   HPI: Janette Goldberg has a primary history of type 2 diabetes, hypertension, hyperlipidemia and CKD with a left upper lobectomy and memory problems.  She presented to an outside ED on July 2 with 2 weeks of right-sided weakness and facial droop.  Her CT revealed no acute intracranial process but incidentally showed presumed large partially calcified meningioma on the left side of the posterior fossa.  MRI revealed subacute infarcts and a 3 cm calcified mass in left cerebellum.  She is not a candidate for thrombolytics and was then transferred to [facility]  Course complicated by hypertension and diabetes as well as SVT.  She also had a positive Covid test although had no symptoms. She   She was readmitted to [facility] on 8/23 for increased aphasia and right sided weakness.  This time  with occlusion of the left ICA hand TPA was administered before being taken for thrombectomy.  At the conclusion of the procedure, she was noted to be pulseless on the right upper extremity, she was seen by vascular who recommended monitoring.  She was then taken to inpatient rehabilitation.  She was cleared by speech for thin liquids and regular diet.  She is seen by PT and recommended moderate assist for standing.    TCU course: Janette Goldberg is seen to follow-up on TCU concerns including hypertension and decreased p.o. intake.  She has not been eating well and nursing reports around 10% eating each meal.  I have put in for dietitian referral as well as ACP for evaluation of situational depression.  When I mention this to her she does voice that she does not want to discuss this, and then asks me to go away.  She does allow me to examine her and listen to her lungs which are clear and then tells me she likely leave.  She is denying pain.  Reviewing vital signs, she is consistently elevated greater than  130/90.  I will increase her amlodipine.  Her sister has visited a few times.     CODE STATUS/ADVANCE DIRECTIVES DISCUSSION:  Prior  CPR/Full code   ALLERGIES: No Known Allergies   PAST MEDICAL HISTORY:   Past Medical History:   Diagnosis Date     Hypertension       PAST SURGICAL HISTORY:   has a past surgical history that includes IR Miscellaneous Procedure (3/8/2003); IR Miscellaneous Procedure (3/8/2003); IR Visceral Angiogram (3/8/2003); IR Miscellaneous Procedure (3/8/2003); IR Miscellaneous Procedure (3/8/2003); IR Miscellaneous Procedure (3/8/2003); IR Visceral Angiogram (3/8/2003); IR Miscellaneous Procedure (3/10/2003); IR Visceral Angiogram (3/10/2003); IR Miscellaneous Procedure (3/11/2003); IR Visceral Angiogram (3/11/2003); IR Miscellaneous Procedure (3/11/2003); IR Miscellaneous Procedure (3/11/2003); IR Miscellaneous Procedure (3/11/2003); IR Miscellaneous Procedure (3/12/2003); IR Miscellaneous Procedure (3/12/2003); IR Miscellaneous Procedure (3/12/2003); IR Miscellaneous Procedure (3/12/2003); IR Spinal Angiogram (3/16/2003); IR Thoracic Aortogram (3/16/2003); IR Spinal Angiogram (3/16/2003); IR Spinal Angiogram (3/16/2003); IR Spinal Angiogram (3/16/2003); IR Miscellaneous Procedure (3/16/2003); IR Miscellaneous Procedure (3/16/2003); IR Miscellaneous Procedure (3/16/2003); IR Miscellaneous Procedure (3/16/2003); IR Miscellaneous Procedure (3/16/2003); IR Spinal Angiogram (3/16/2003); IR Spinal Angiogram (3/16/2003); IR Spinal Angiogram (3/16/2003); IR Miscellaneous Procedure (3/16/2003); IR Miscellaneous Procedure (3/16/2003); IR Spinal Angiogram (3/16/2003); and IR Miscellaneous Procedure (3/16/2003).  FAMILY HISTORY: family history is not on file.  SOCIAL HISTORY:   reports that she has never smoked. She has never used smokeless tobacco. She reports that she does not drink alcohol and does not use drugs.  Patient's living condition: lives with family, sister.     Post Discharge Medication  "Reconciliation Status: discharge medications reconciled, continue medications without change  Current Outpatient Medications   Medication Sig     atenolol (TENORMIN) 25 MG tablet Take 1 tablet (25 mg) by mouth At Bedtime     atorvastatin (LIPITOR) 40 MG tablet Take 1 tablet (40 mg) by mouth At Bedtime     blood glucose test strips [BLOOD GLUCOSE TEST STRIPS] Use 1 each As Directed 2 (two) times a day. Test blood sugar twice a day     blood-glucose meter Misc [BLOOD-GLUCOSE METER MISC] Test blood sugar twice a day     donepezil (ARICEPT) 5 MG tablet Take 1 tablet (5 mg) by mouth At Bedtime     generic lancets [GENERIC LANCETS] Use 1 each As Directed 2 (two) times a day. Test blood sugar twice a day     insulin aspart (NOVOLOG PEN) 100 UNIT/ML pen Inject 3 Units Subcutaneous 3 times daily (with meals)     insulin glargine (LANTUS SOLOSTAR PEN) 100 unit/mL (3 mL) pen [INSULIN GLARGINE (LANTUS SOLOSTAR PEN) 100 UNIT/ML (3 ML) PEN] Inject 16 Units under the skin at bedtime.     pen needle, diabetic 31 gauge x 1/4\" Ndle [PEN NEEDLE, DIABETIC 31 GAUGE X 1/4\" NDLE] Use one daily as directed     No current facility-administered medications for this visit.       ROS:  Unobtainable secondary to aphasia.     Vitals:  /87   Pulse 78   Temp 97.6  F (36.4  C)   Resp 16   Ht 1.575 m (5' 2\")   Wt 45.5 kg (100 lb 3.2 oz)   SpO2 94%   BMI 18.33 kg/m    Exam:  Physical Exam   General appearance: alert, appears stated age.    Head: Normocephalic, without obvious abnormality, atraumatic, Eyes: sclera anicteric. Mild R sided facial droop.   Lungs: respirations without effort, LSCTA; no wheezing or rales.   Cardiovascular: S1, S2. Regular rate and rhythm.   ABDOMEN: Globular and soft, non tender.    Extremities: extremities normal, atraumatic, no cyanosis or edema  Skin: Skin color, texture, turgor normal. No rashes or lesions  Neurologic: Unable to assess orientation. Right sided facial droop- mild. RUE and RLE weakness. Does " not cooperate with assessment.   Psych: Depressed.     Lab/Diagnostic data:  Recent labs in [facility] reviewed by me today.     ASSESSMENT/PLAN:     (I63.512) Cerebral infarction due to occlusion of left middle cerebral artery (H)  (I63.512) Acute ischemic left MCA stroke (H)  (primary encounter diagnosis): Unstable.  Comment: We will need continuous monitoring and assessment and appropriate discharge planning due to the level of deficits.  Plan:   -PT and OT.  -Plavix 75 mg daily.  -Atorvastatin 40 mg daily.  -Speech therapy eval and treat.  -Aricept 5 mg daily.  -Melatonin 3 mg nightly.  -Enoxaparin 30 mg daily.     (R29.898) Right leg weakness  (R29.898) Right arm weakness  Plan:   -PT and OT.    Cognitive impairment  Aphasia:  -Eval and treat.    (I10) Benign Essential Hypertension: Unstable.  Comment: Per hospital notes, goal is less than 120/80 however can be liberalized due to patient's age and goals of care.  Plan:   -Monitor blood pressures closely nursing home, update NP for consistent blood pressures greater than 130s over 90s.  -Increase amlodipine to 5 mg p.o. daily.  -CBC and BMP on Thursday.    Depressed affect  Poor p.o. intake/failure to thrive: Unstable.  -Dietitian eval and treat  -ACP eval and treat.  -Start Remeron 7.5 mg p.o. every night.    Electronically signed by:  Jose De Jesus Mora, BRIELLE                         Sincerely,        Jose De Jesus Mora, CNP

## 2021-09-29 ENCOUNTER — LAB REQUISITION (OUTPATIENT)
Dept: LAB | Facility: CLINIC | Age: 71
End: 2021-09-29
Payer: COMMERCIAL

## 2021-09-29 DIAGNOSIS — F33.9 MAJOR DEPRESSIVE DISORDER, RECURRENT, UNSPECIFIED (H): ICD-10-CM

## 2021-09-29 DIAGNOSIS — E11.59 TYPE 2 DIABETES MELLITUS WITH OTHER CIRCULATORY COMPLICATIONS (H): ICD-10-CM

## 2021-09-29 NOTE — PROGRESS NOTES
[facility] GERIATRIC SERVICES  PRIMARY CARE PROVIDER AND CLINIC:  Kodak Smith MD, 1984 [facility] [address] / [place] [address]  Chief Complaint   Patient presents with     RECHECK     response to remeron       [facility] Medical Record Number:  [id number]  Place of Service where encounter took place:  [facility] ([facility]) [[id number]]    Paradise Yuan  is a 71 year old  (1950), admitted to the above facility from  [facility]. Hospital stay 8/2 through 8/21..   HPI: Janette Goldberg has a primary history of type 2 diabetes, hypertension, hyperlipidemia and CKD with a left upper lobectomy and memory problems.  She presented to an outside ED on July 2 with 2 weeks of right-sided weakness and facial droop.  Her CT revealed no acute intracranial process but incidentally showed presumed large partially calcified meningioma on the left side of the posterior fossa.  MRI revealed subacute infarcts and a 3 cm calcified mass in left cerebellum.  She is not a candidate for thrombolytics and was then transferred to [facility]  Course complicated by hypertension and diabetes as well as SVT.  She also had a positive Covid test although had no symptoms. She   She was readmitted to [facility] on 8/23 for increased aphasia and right sided weakness.  This time  with occlusion of the left ICA hand TPA was administered before being taken for thrombectomy.  At the conclusion of the procedure, she was noted to be pulseless on the right upper extremity, she was seen by vascular who recommended monitoring.  She was then taken to inpatient rehabilitation.  She was cleared by speech for thin liquids and regular diet.  She is seen by PT and recommended moderate assist for standing.    TCU course: Patient seen to follow-up on initiation of Remeron as well as review blood pressures.  Her weight is actually up 2 pounds since her admission now 100.7 pounds.  Her blood pressures are reviewed and have gone up slightly  over the weekend with 2 values of 150 over 80s.   Nursing reports that she continues to decline many ADLs however she is more alert with me today and answers a few of my questions, denying pain.  She allows me to stay in the room with her for a bit.       CODE STATUS/ADVANCE DIRECTIVES DISCUSSION:  Prior  CPR/Full code   ALLERGIES: No Known Allergies   PAST MEDICAL HISTORY:   Past Medical History:   Diagnosis Date     Hypertension       PAST SURGICAL HISTORY:   has a past surgical history that includes IR Miscellaneous Procedure (3/8/2003); IR Miscellaneous Procedure (3/8/2003); IR Visceral Angiogram (3/8/2003); IR Miscellaneous Procedure (3/8/2003); IR Miscellaneous Procedure (3/8/2003); IR Miscellaneous Procedure (3/8/2003); IR Visceral Angiogram (3/8/2003); IR Miscellaneous Procedure (3/10/2003); IR Visceral Angiogram (3/10/2003); IR Miscellaneous Procedure (3/11/2003); IR Visceral Angiogram (3/11/2003); IR Miscellaneous Procedure (3/11/2003); IR Miscellaneous Procedure (3/11/2003); IR Miscellaneous Procedure (3/11/2003); IR Miscellaneous Procedure (3/12/2003); IR Miscellaneous Procedure (3/12/2003); IR Miscellaneous Procedure (3/12/2003); IR Miscellaneous Procedure (3/12/2003); IR Spinal Angiogram (3/16/2003); IR Thoracic Aortogram (3/16/2003); IR Spinal Angiogram (3/16/2003); IR Spinal Angiogram (3/16/2003); IR Spinal Angiogram (3/16/2003); IR Miscellaneous Procedure (3/16/2003); IR Miscellaneous Procedure (3/16/2003); IR Miscellaneous Procedure (3/16/2003); IR Miscellaneous Procedure (3/16/2003); IR Miscellaneous Procedure (3/16/2003); IR Spinal Angiogram (3/16/2003); IR Spinal Angiogram (3/16/2003); IR Spinal Angiogram (3/16/2003); IR Miscellaneous Procedure (3/16/2003); IR Miscellaneous Procedure (3/16/2003); IR Spinal Angiogram (3/16/2003); and IR Miscellaneous Procedure (3/16/2003).  FAMILY HISTORY: family history is not on file.  SOCIAL HISTORY:   reports that she has never smoked. She has never used  "smokeless tobacco. She reports that she does not drink alcohol and does not use drugs.  Patient's living condition: lives with family, sister.     Post Discharge Medication Reconciliation Status: discharge medications reconciled, continue medications without change  Current Outpatient Medications   Medication Sig     atenolol (TENORMIN) 25 MG tablet Take 1 tablet (25 mg) by mouth At Bedtime     atorvastatin (LIPITOR) 40 MG tablet Take 1 tablet (40 mg) by mouth At Bedtime     blood glucose test strips [BLOOD GLUCOSE TEST STRIPS] Use 1 each As Directed 2 (two) times a day. Test blood sugar twice a day     blood-glucose meter Misc [BLOOD-GLUCOSE METER MISC] Test blood sugar twice a day     donepezil (ARICEPT) 5 MG tablet Take 1 tablet (5 mg) by mouth At Bedtime     generic lancets [GENERIC LANCETS] Use 1 each As Directed 2 (two) times a day. Test blood sugar twice a day     insulin aspart (NOVOLOG PEN) 100 UNIT/ML pen Inject 3 Units Subcutaneous 3 times daily (with meals)     insulin glargine (LANTUS SOLOSTAR PEN) 100 unit/mL (3 mL) pen [INSULIN GLARGINE (LANTUS SOLOSTAR PEN) 100 UNIT/ML (3 ML) PEN] Inject 16 Units under the skin at bedtime.     pen needle, diabetic 31 gauge x 1/4\" Ndle [PEN NEEDLE, DIABETIC 31 GAUGE X 1/4\" NDLE] Use one daily as directed     No current facility-administered medications for this visit.       ROS:  Unobtainable secondary to aphasia.     Vitals:  /87   Pulse 78   Temp 97.6  F (36.4  C)   Resp 16   Ht 1.575 m (5' 2\")   Wt 45.5 kg (100 lb 3.2 oz)   SpO2 94%   BMI 18.33 kg/m    Exam:  Physical Exam   General appearance: alert, appears stated age.    Head: Normocephalic, without obvious abnormality, atraumatic, Eyes: sclera anicteric. Mild R sided facial droop.   Lungs: respirations without effort, LSCTA; no wheezing or rales.   Cardiovascular: S1, S2. Regular rate and rhythm.   ABDOMEN: Globular and soft, non tender.    Extremities: extremities normal, atraumatic, no cyanosis " or edema  Skin: Skin color, texture, turgor normal. No rashes or lesions  Neurologic: Unable to assess orientation. Right sided facial droop- mild. RUE and RLE weakness. Does not cooperate with assessment.   Psych: Depressed.     Lab/Diagnostic data:  Recent labs in [facility] reviewed by me today.     ASSESSMENT/PLAN:     (I63.512) Cerebral infarction due to occlusion of left middle cerebral artery (H)  (I63.512) Acute ischemic left MCA stroke (H)  (primary encounter diagnosis): Unstable.  Comment: We will need continuous monitoring and assessment and appropriate discharge planning due to the level of deficits.  Plan:   -PT and OT.  -Plavix 75 mg daily.  -Atorvastatin 40 mg daily.  -Speech therapy eval and treat.  -Aricept 5 mg daily.  -Melatonin 3 mg nightly.  -Enoxaparin 30 mg daily.     (R29.898) Right leg weakness  (R29.898) Right arm weakness  Plan:   -PT and OT.    Cognitive impairment  Aphasia:  -Eval and treat.    (I10) Benign Essential Hypertension: Unstable.  Comment: Per hospital notes, goal is less than 120/80 however can be liberalized due to patient's age and goals of care.  Plan:   -Monitor blood pressures closely nursing home, update NP for consistent blood pressures greater than 130s over 90s.  -Increase amlodipine to 5 mg p.o. daily.    Depressed affect  Poor p.o. intake/failure to thrive: Unstable.  -Dietitian eval and treat  -ACP eval and treat.  -Start Remeron 7.5 mg p.o. every night.    Electronically signed by:  Jose De Jesus Mora [facility] GERIATRIC SERVICES  PRIMARY CARE PROVIDER AND CLINIC:  Kodak Smith MD, 1984 [facility] [address] / [place] [address]  Chief Complaint   Patient presents with     RECHECK     response to remeron       [facility] Medical Record Number:  [id number]  Place of Service where encounter took place:  [facility] ([facility]) [[id number]]    Paradise Yuan  is a 71 year old  (1950), admitted to the above facility from  [facility]. Hospital stay 8/2 through 8/21..   HPI: Janette Goldberg has a primary history of type 2 diabetes, hypertension, hyperlipidemia and CKD with a left upper lobectomy and memory problems.  She presented to an outside ED on July 2 with 2 weeks of right-sided weakness and facial droop.  Her CT revealed no acute intracranial process but incidentally showed presumed large partially calcified meningioma on the left side of the posterior fossa.  MRI revealed subacute infarcts and a 3 cm calcified mass in left cerebellum.  She is not a candidate for thrombolytics and was then transferred to [facility].  Course complicated by hypertension and diabetes as well as SVT.  She also had a positive Covid test although had no symptoms. She   She was readmitted to [facility] on 8/23 for increased aphasia and right sided weakness.  This time  with occlusion of the left ICA hand TPA was administered before being taken for thrombectomy.  At the conclusion of the procedure, she was noted to be pulseless on the right upper extremity, she was seen by vascular who recommended monitoring.  She was then taken to inpatient rehabilitation.  She was cleared by speech for thin liquids and regular diet.  She is seen by PT and recommended moderate assist for standing.    TCU course: Janette Goldberg is seen to follow-up on TCU concerns including hypertension and decreased p.o. intake.  She has not been eating well and nursing reports around 10% eating each meal.  I have put in for dietitian referral as well as ACP for evaluation of situational depression.  When I mention this to her she does voice that she does not want to discuss this, and then asks me to go away.  She does allow me to examine her and listen to her lungs which are clear and then tells me she likely leave.  She is denying pain.  Reviewing vital signs, she is consistently elevated greater than 130/90.  I will increase her amlodipine.  Her sister has visited a few times.     CODE  STATUS/ADVANCE DIRECTIVES DISCUSSION:  Prior  CPR/Full code   ALLERGIES: No Known Allergies   PAST MEDICAL HISTORY:   Past Medical History:   Diagnosis Date     Hypertension       PAST SURGICAL HISTORY:   has a past surgical history that includes IR Miscellaneous Procedure (3/8/2003); IR Miscellaneous Procedure (3/8/2003); IR Visceral Angiogram (3/8/2003); IR Miscellaneous Procedure (3/8/2003); IR Miscellaneous Procedure (3/8/2003); IR Miscellaneous Procedure (3/8/2003); IR Visceral Angiogram (3/8/2003); IR Miscellaneous Procedure (3/10/2003); IR Visceral Angiogram (3/10/2003); IR Miscellaneous Procedure (3/11/2003); IR Visceral Angiogram (3/11/2003); IR Miscellaneous Procedure (3/11/2003); IR Miscellaneous Procedure (3/11/2003); IR Miscellaneous Procedure (3/11/2003); IR Miscellaneous Procedure (3/12/2003); IR Miscellaneous Procedure (3/12/2003); IR Miscellaneous Procedure (3/12/2003); IR Miscellaneous Procedure (3/12/2003); IR Spinal Angiogram (3/16/2003); IR Thoracic Aortogram (3/16/2003); IR Spinal Angiogram (3/16/2003); IR Spinal Angiogram (3/16/2003); IR Spinal Angiogram (3/16/2003); IR Miscellaneous Procedure (3/16/2003); IR Miscellaneous Procedure (3/16/2003); IR Miscellaneous Procedure (3/16/2003); IR Miscellaneous Procedure (3/16/2003); IR Miscellaneous Procedure (3/16/2003); IR Spinal Angiogram (3/16/2003); IR Spinal Angiogram (3/16/2003); IR Spinal Angiogram (3/16/2003); IR Miscellaneous Procedure (3/16/2003); IR Miscellaneous Procedure (3/16/2003); IR Spinal Angiogram (3/16/2003); and IR Miscellaneous Procedure (3/16/2003).  FAMILY HISTORY: family history is not on file.  SOCIAL HISTORY:   reports that she has never smoked. She has never used smokeless tobacco. She reports that she does not drink alcohol and does not use drugs.  Patient's living condition: lives with family, sister.     Post Discharge Medication Reconciliation Status: discharge medications reconciled, continue medications without  "change  Current Outpatient Medications   Medication Sig     atenolol (TENORMIN) 25 MG tablet Take 1 tablet (25 mg) by mouth At Bedtime     atorvastatin (LIPITOR) 40 MG tablet Take 1 tablet (40 mg) by mouth At Bedtime     blood glucose test strips [BLOOD GLUCOSE TEST STRIPS] Use 1 each As Directed 2 (two) times a day. Test blood sugar twice a day     blood-glucose meter Misc [BLOOD-GLUCOSE METER MISC] Test blood sugar twice a day     donepezil (ARICEPT) 5 MG tablet Take 1 tablet (5 mg) by mouth At Bedtime     generic lancets [GENERIC LANCETS] Use 1 each As Directed 2 (two) times a day. Test blood sugar twice a day     insulin aspart (NOVOLOG PEN) 100 UNIT/ML pen Inject 3 Units Subcutaneous 3 times daily (with meals)     insulin glargine (LANTUS SOLOSTAR PEN) 100 unit/mL (3 mL) pen [INSULIN GLARGINE (LANTUS SOLOSTAR PEN) 100 UNIT/ML (3 ML) PEN] Inject 16 Units under the skin at bedtime.     pen needle, diabetic 31 gauge x 1/4\" Ndle [PEN NEEDLE, DIABETIC 31 GAUGE X 1/4\" NDLE] Use one daily as directed     No current facility-administered medications for this visit.       ROS:  Unobtainable secondary to aphasia.     Vitals:  /87   Pulse 78   Temp 97.6  F (36.4  C)   Resp 16   Ht 1.575 m (5' 2\")   Wt 45.5 kg (100 lb 3.2 oz)   SpO2 94%   BMI 18.33 kg/m    Exam:  Physical Exam   General appearance: alert, appears stated age.    Head: Normocephalic, without obvious abnormality, atraumatic, Eyes: sclera anicteric. Mild R sided facial droop.   Lungs: respirations without effort, LSCTA; no wheezing or rales.   Cardiovascular: S1, S2. Regular rate and rhythm.   ABDOMEN: Globular and soft, non tender.    Extremities: extremities normal, atraumatic, no cyanosis or edema  Skin: Skin color, texture, turgor normal. No rashes or lesions  Neurologic: Unable to assess orientation. Right sided facial droop- mild. RUE and RLE weakness. Does not cooperate with assessment.   Psych: Depressed.     Lab/Diagnostic data:  Recent " labs in [facility] reviewed by me today.     ASSESSMENT/PLAN:     (I63.512) Cerebral infarction due to occlusion of left middle cerebral artery (H)  (I63.512) Acute ischemic left MCA stroke (H)  (primary encounter diagnosis): Unstable.  Comment: We will need continuous monitoring and assessment and appropriate discharge planning due to the level of deficits.  Plan:   -PT and OT.  -Plavix 75 mg daily.  -Atorvastatin 40 mg daily.  -Speech therapy eval and treat.  -Aricept 5 mg daily.  -Melatonin 3 mg nightly.  -Enoxaparin 30 mg daily.     (R29.898) Right leg weakness  (R29.898) Right arm weakness  Plan:   -PT and OT.    Cognitive impairment  Aphasia:  -Eval and treat.    (I10) Benign Essential Hypertension: Unstable.  Comment: Per hospital notes, goal is less than 120/80 however can be liberalized due to patient's age and goals of care.  Plan:   -Monitor blood pressures closely nursing home, update NP for consistent blood pressures greater than 130s over 90s.  -Increase amlodipine to 5 mg p.o. daily.  -CBC and BMP on Thursday.    Depressed affect  Poor p.o. intake/failure to thrive: Unstable.  -Dietitian eval and treat  -ACP eval and treat.  -Start Remeron 7.5 mg p.o. every night.    Electronically signed by:  Jose De Jesus Mora CNP

## 2021-09-30 ENCOUNTER — TRANSITIONAL CARE UNIT VISIT (OUTPATIENT)
Dept: GERIATRICS | Facility: CLINIC | Age: 71
End: 2021-09-30
Payer: COMMERCIAL

## 2021-09-30 VITALS
HEART RATE: 88 BPM | WEIGHT: 100.2 LBS | HEIGHT: 62 IN | SYSTOLIC BLOOD PRESSURE: 114 MMHG | DIASTOLIC BLOOD PRESSURE: 70 MMHG | BODY MASS INDEX: 18.44 KG/M2 | RESPIRATION RATE: 18 BRPM | TEMPERATURE: 97.9 F | OXYGEN SATURATION: 97 %

## 2021-09-30 DIAGNOSIS — R41.3 MEMORY PROBLEM: ICD-10-CM

## 2021-09-30 DIAGNOSIS — I63.512 ACUTE ISCHEMIC LEFT MCA STROKE (H): Primary | ICD-10-CM

## 2021-09-30 DIAGNOSIS — R62.7 ADULT FAILURE TO THRIVE: ICD-10-CM

## 2021-09-30 PROCEDURE — 99309 SBSQ NF CARE MODERATE MDM 30: CPT | Performed by: NURSE PRACTITIONER

## 2021-09-30 ASSESSMENT — MIFFLIN-ST. JEOR: SCORE: 917.75

## 2021-09-30 NOTE — LETTER
9/30/2021        RE: Paradise Yuan  [address] [place] [address]        [facility] GERIATRIC SERVICES  PRIMARY CARE PROVIDER AND CLINIC:  Kodak Smith MD, 1984 [facility] [address] / [place] [address]  Chief Complaint   Patient presents with     RECHECK      [place] Medical Record Number:  [id number]  Place of Service where encounter took place:  [facility] [[id number]]    Paradise Yuan  is a 71 year old  (1950), admitted to the above facility from  [facility]. Hospital stay 8/2 through 8/21..   HPI: Janette Goldberg has a primary history of type 2 diabetes, hypertension, hyperlipidemia and CKD with a left upper lobectomy and memory problems.  She presented to an outside ED on July 2 with 2 weeks of right-sided weakness and facial droop.  Her CT revealed no acute intracranial process but incidentally showed presumed large partially calcified meningioma on the left side of the posterior fossa.  MRI revealed subacute infarcts and a 3 cm calcified mass in left cerebellum.  She is not a candidate for thrombolytics and was then transferred to [facility].  Course complicated by hypertension and diabetes as well as SVT.  She also had a positive Covid test although had no symptoms. She   She was readmitted to [facility] on 8/23 for increased aphasia and right sided weakness.  This time  with occlusion of the left ICA hand TPA was administered before being taken for thrombectomy.  At the conclusion of the procedure, she was noted to be pulseless on the right upper extremity, she was seen by vascular who recommended monitoring.  She was then taken to inpatient rehabilitation.  She was cleared by speech for thin liquids and regular diet.  She is seen by PT and recommended moderate assist for standing.    TCU course: Patient seen to follow-up on initiation of Remeron as well as review blood pressures.  Her weight is actually up 2 pounds since her admission now 100.7 pounds.   Her blood pressures are reviewed and have gone up slightly over the weekend with 2 values of 150 over 80s.   Nursing reports that she continues to decline many ADLs however she is more alert with me today and answers a few of my questions, denying pain.  She allows me to stay in the room with her for a bit.       CODE STATUS/ADVANCE DIRECTIVES DISCUSSION:  Prior  CPR/Full code   ALLERGIES: No Known Allergies   PAST MEDICAL HISTORY:   Past Medical History:   Diagnosis Date     Hypertension       PAST SURGICAL HISTORY:   has a past surgical history that includes IR Miscellaneous Procedure (3/8/2003); IR Miscellaneous Procedure (3/8/2003); IR Visceral Angiogram (3/8/2003); IR Miscellaneous Procedure (3/8/2003); IR Miscellaneous Procedure (3/8/2003); IR Miscellaneous Procedure (3/8/2003); IR Visceral Angiogram (3/8/2003); IR Miscellaneous Procedure (3/10/2003); IR Visceral Angiogram (3/10/2003); IR Miscellaneous Procedure (3/11/2003); IR Visceral Angiogram (3/11/2003); IR Miscellaneous Procedure (3/11/2003); IR Miscellaneous Procedure (3/11/2003); IR Miscellaneous Procedure (3/11/2003); IR Miscellaneous Procedure (3/12/2003); IR Miscellaneous Procedure (3/12/2003); IR Miscellaneous Procedure (3/12/2003); IR Miscellaneous Procedure (3/12/2003); IR Spinal Angiogram (3/16/2003); IR Thoracic Aortogram (3/16/2003); IR Spinal Angiogram (3/16/2003); IR Spinal Angiogram (3/16/2003); IR Spinal Angiogram (3/16/2003); IR Miscellaneous Procedure (3/16/2003); IR Miscellaneous Procedure (3/16/2003); IR Miscellaneous Procedure (3/16/2003); IR Miscellaneous Procedure (3/16/2003); IR Miscellaneous Procedure (3/16/2003); IR Spinal Angiogram (3/16/2003); IR Spinal Angiogram (3/16/2003); IR Spinal Angiogram (3/16/2003); IR Miscellaneous Procedure (3/16/2003); IR Miscellaneous Procedure (3/16/2003); IR Spinal Angiogram (3/16/2003); and IR Miscellaneous Procedure (3/16/2003).  FAMILY HISTORY: family history is not on file.  SOCIAL HISTORY:    "reports that she has never smoked. She has never used smokeless tobacco. She reports that she does not drink alcohol and does not use drugs.  Patient's living condition: lives with family, sister.     Post Discharge Medication Reconciliation Status: discharge medications reconciled, continue medications without change  Current Outpatient Medications   Medication Sig     atenolol (TENORMIN) 25 MG tablet Take 1 tablet (25 mg) by mouth At Bedtime     atorvastatin (LIPITOR) 40 MG tablet Take 1 tablet (40 mg) by mouth At Bedtime     blood glucose test strips [BLOOD GLUCOSE TEST STRIPS] Use 1 each As Directed 2 (two) times a day. Test blood sugar twice a day     blood-glucose meter Misc [BLOOD-GLUCOSE METER MISC] Test blood sugar twice a day     donepezil (ARICEPT) 5 MG tablet Take 1 tablet (5 mg) by mouth At Bedtime     generic lancets [GENERIC LANCETS] Use 1 each As Directed 2 (two) times a day. Test blood sugar twice a day     insulin aspart (NOVOLOG PEN) 100 UNIT/ML pen Inject 3 Units Subcutaneous 3 times daily (with meals)     insulin glargine (LANTUS SOLOSTAR PEN) 100 unit/mL (3 mL) pen [INSULIN GLARGINE (LANTUS SOLOSTAR PEN) 100 UNIT/ML (3 ML) PEN] Inject 16 Units under the skin at bedtime.     pen needle, diabetic 31 gauge x 1/4\" Ndle [PEN NEEDLE, DIABETIC 31 GAUGE X 1/4\" NDLE] Use one daily as directed     No current facility-administered medications for this visit.       ROS:  Unobtainable secondary to aphasia.     Vitals:  /70   Pulse 88   Temp 97.9  F (36.6  C)   Resp 18   Ht 1.575 m (5' 2\")   Wt 45.5 kg (100 lb 3.2 oz)   SpO2 97%   BMI 18.33 kg/m    Exam:  Physical Exam   General appearance: alert, appears stated age.    Head: Normocephalic, without obvious abnormality, atraumatic, Eyes: sclera anicteric. Mild R sided facial droop.   Lungs: respirations without effort, LSCTA; no wheezing or rales.   Cardiovascular: S1, S2. Regular rate and rhythm.   ABDOMEN: Globular and soft, non tender.  "   Extremities: extremities normal, atraumatic, no cyanosis or edema  Skin: Skin color, texture, turgor normal. No rashes or lesions  Neurologic: Unable to assess orientation. Right sided facial droop- mild. RUE and RLE weakness. Does not cooperate with assessment.   Psych: Depressed.     Lab/Diagnostic data:  Recent labs in [facility] reviewed by me today.     ASSESSMENT/PLAN:     (I63.512) Cerebral infarction due to occlusion of left middle cerebral artery (H)  (I63.512) Acute ischemic left MCA stroke (H)  (primary encounter diagnosis): Unstable.  Comment: We will need continuous monitoring and assessment and appropriate discharge planning due to the level of deficits.  Plan:   -PT and OT.  -Plavix 75 mg daily.  -Atorvastatin 40 mg daily.  -Speech therapy eval and treat.  -Aricept 5 mg daily.  -Melatonin 3 mg nightly.  -Enoxaparin 30 mg daily.     (R29.898) Right leg weakness  (R29.898) Right arm weakness  Plan:   -PT and OT.    Cognitive impairment  Aphasia:  -Eval and treat.    (I10) Benign Essential Hypertension: Unstable.  Comment: Per hospital notes, goal is less than 120/80 however can be liberalized due to patient's age and goals of care.  Plan:   -Monitor blood pressures closely nursing home, update NP for consistent blood pressures greater than 130s over 90s.  -Increase amlodipine to 5 mg p.o. daily.    Depressed affect  Poor p.o. intake/failure to thrive: Unstable.  -Dietitian eval and treat  -ACP eval and treat.  -Start Remeron 7.5 mg p.o. every night.    Electronically signed by:  Jose De Jesus Mora [facility] GERIATRIC SERVICES  PRIMARY CARE PROVIDER AND CLINIC:  Kodak Smith MD, 1984 [facility] [address] / [place] [address]  Chief Complaint   [facility]     [facility]      [place] Medical Record Number:  [id number]  Place of Service where encounter took place:  [facility] [[id number]]    Paradise Yuan  is a 71 year old  (1950), admitted to the above facility from   [facility]. Hospital stay 8/2 through 8/21..   HPI: Janette Goldberg has a primary history of type 2 diabetes, hypertension, hyperlipidemia and CKD with a left upper lobectomy and memory problems.  She presented to an outside ED on July 2 with 2 weeks of right-sided weakness and facial droop.  Her CT revealed no acute intracranial process but incidentally showed presumed large partially calcified meningioma on the left side of the posterior fossa.  MRI revealed subacute infarcts and a 3 cm calcified mass in left cerebellum.  She is not a candidate for thrombolytics and was then transferred to [facility]  Course complicated by hypertension and diabetes as well as SVT.  She also had a positive Covid test although had no symptoms.   She was readmitted to [facility] on 8/23 for increased aphasia and right sided weakness.  This time  with occlusion of the left ICA hand TPA was administered before being taken for thrombectomy.  At the conclusion of the procedure, she was noted to be pulseless on the right upper extremity, she was seen by vascular who recommended monitoring.  She was then taken to inpatient rehabilitation.  She was cleared by speech for thin liquids and regular diet.  She is seen by PT and recommended moderate assist for standing.    TCU course: Janette Goldberg is seen again to follow-up on TCU concerns including adult failure to thrive, hypertension, possible diabetes with A1c 6.2, and stroke follow-up.  Per nursing, she has been very noncompliant with her therapies and is at risk of losing insurance coverage due to nonparticipation.  I spoke with social work to coordinate a meeting with the family to discuss possibility of going home with palliative care given that the patient has no desire to participate in therapy, is eating poorly and will not communicate with staff.   Janette Goldberg did not tell me to leave her bedside today so I did have a discussion with her although it was one-sided;  I did inform her she is at risk of being prematurely discharged if she does not start participating with therapy and that I will be ordering a psychology evaluation for her.  Also encouraged her to take medications as she has been refusing those.  She refused labs this morning so they will come to her again on Monday.  She appears comfortable, she did allow me to examine her, listen to her lungs and palpate abdomen which were all benign.  She does continue to have right-sided weakness.  I suspect a lot of her mood is related to her's recent strokes as well as the drastic change in her abilities and lifestyle so continued encouragement from staff and involvement from family is important.  Increase amlodipine to 5 mg, however unsure of how reliably she is taking this medication.  Blood pressures vary between 120 and 150 systolic.      CODE STATUS/ADVANCE DIRECTIVES DISCUSSION:  Prior  CPR/Full code   ALLERGIES: No Known Allergies   PAST MEDICAL HISTORY:   Past Medical History:   Diagnosis Date     Hypertension       PAST SURGICAL HISTORY:   has a past surgical history that includes IR Miscellaneous Procedure (3/8/2003); IR Miscellaneous Procedure (3/8/2003); IR Visceral Angiogram (3/8/2003); IR Miscellaneous Procedure (3/8/2003); IR Miscellaneous Procedure (3/8/2003); IR Miscellaneous Procedure (3/8/2003); IR Visceral Angiogram (3/8/2003); IR Miscellaneous Procedure (3/10/2003); IR Visceral Angiogram (3/10/2003); IR Miscellaneous Procedure (3/11/2003); IR Visceral Angiogram (3/11/2003); IR Miscellaneous Procedure (3/11/2003); IR Miscellaneous Procedure (3/11/2003); IR Miscellaneous Procedure (3/11/2003); IR Miscellaneous Procedure (3/12/2003); IR Miscellaneous Procedure (3/12/2003); IR Miscellaneous Procedure (3/12/2003); IR Miscellaneous Procedure (3/12/2003); IR Spinal Angiogram (3/16/2003); IR Thoracic Aortogram (3/16/2003); IR Spinal Angiogram (3/16/2003); IR Spinal Angiogram (3/16/2003); IR Spinal Angiogram  "(3/16/2003); IR Miscellaneous Procedure (3/16/2003); IR Miscellaneous Procedure (3/16/2003); IR Miscellaneous Procedure (3/16/2003); IR Miscellaneous Procedure (3/16/2003); IR Miscellaneous Procedure (3/16/2003); IR Spinal Angiogram (3/16/2003); IR Spinal Angiogram (3/16/2003); IR Spinal Angiogram (3/16/2003); IR Miscellaneous Procedure (3/16/2003); IR Miscellaneous Procedure (3/16/2003); IR Spinal Angiogram (3/16/2003); and IR Miscellaneous Procedure (3/16/2003).  FAMILY HISTORY: family history is not on file.  SOCIAL HISTORY:   reports that she has never smoked. She has never used smokeless tobacco. She reports that she does not drink alcohol and does not use drugs.  Patient's living condition: lives with family, sister.     Post Discharge Medication Reconciliation Status: discharge medications reconciled, continue medications without change  Current Outpatient Medications   Medication Sig     atenolol (TENORMIN) 25 MG tablet Take 1 tablet (25 mg) by mouth At Bedtime     atorvastatin (LIPITOR) 40 MG tablet Take 1 tablet (40 mg) by mouth At Bedtime     blood glucose test strips [BLOOD GLUCOSE TEST STRIPS] Use 1 each As Directed 2 (two) times a day. Test blood sugar twice a day     blood-glucose meter Misc [BLOOD-GLUCOSE METER MISC] Test blood sugar twice a day     donepezil (ARICEPT) 5 MG tablet Take 1 tablet (5 mg) by mouth At Bedtime     generic lancets [GENERIC LANCETS] Use 1 each As Directed 2 (two) times a day. Test blood sugar twice a day     insulin aspart (NOVOLOG PEN) 100 UNIT/ML pen Inject 3 Units Subcutaneous 3 times daily (with meals)     insulin glargine (LANTUS SOLOSTAR PEN) 100 unit/mL (3 mL) pen [INSULIN GLARGINE (LANTUS SOLOSTAR PEN) 100 UNIT/ML (3 ML) PEN] Inject 16 Units under the skin at bedtime.     pen needle, diabetic 31 gauge x 1/4\" Ndle [PEN NEEDLE, DIABETIC 31 GAUGE X 1/4\" NDLE] Use one daily as directed     No current facility-administered medications for this visit. " "      ROS:  Unobtainable secondary to aphasia.     Vitals:  /70   Pulse 88   Temp 97.9  F (36.6  C)   Resp 18   Ht 1.575 m (5' 2\")   Wt 45.5 kg (100 lb 3.2 oz)   SpO2 97%   BMI 18.33 kg/m    Exam:  Physical Exam   General appearance: alert, appears stated age.    Head: Normocephalic, without obvious abnormality, atraumatic, Eyes: sclera anicteric. Mild R sided facial droop.   Lungs: respirations without effort, LSCTA; no wheezing or rales.   Cardiovascular: S1, S2. Regular rate and rhythm.   ABDOMEN: Globular and soft, non tender.    Extremities: extremities normal, atraumatic, no cyanosis or edema  Skin: Skin color, texture, turgor normal. No rashes or lesions  Neurologic: Unable to assess orientation. Right sided facial droop- mild. RUE and RLE weakness. Does not cooperate with assessment.   Psych: Depressed.  Avoids eye contact.    Lab/Diagnostic data:  Recent labs in [facility] reviewed by me today.     ASSESSMENT/PLAN:     (I63.512) Cerebral infarction due to occlusion of left middle cerebral artery (H)  (I63.512) Acute ischemic left MCA stroke (H)  (primary encounter diagnosis): Unstable.  Comment: We will need continuous monitoring and assessment and appropriate discharge planning due to the level of deficits.  Plan:   -PT and OT.  -Plavix 75 mg daily.  -Atorvastatin 40 mg daily.  -Speech therapy eval and treat.  -Aricept 5 mg daily.  -Melatonin 3 mg nightly.     (R29.898) Right leg weakness  (R29.898) Right arm weakness  Plan:   -PT and OT.    Cognitive impairment  Aphasia:  -Eval and treat.    (I10) Benign Essential Hypertension: Unstable.  Comment: Per hospital notes, goal is less than 120/80 however can be liberalized due to patient's age and goals of care.  Plan:   -Monitor blood pressures closely nursing home, update NP for consistent blood pressures greater than 130s over 90s.  -Increase amlodipine to 5 mg p.o. daily.  -Refuse labs today, lab will try again on Monday.    Depressed affect  Poor " p.o. intake/failure to thrive: Unstable.  -Dietitian eval and treat  -ACP eval and treat.  -Continue to encourage Remeron 7.5 mg p.o. every night.    Electronically signed by:  Jose De Jesus Mora CNP                       Sincerely,        Jose De Jesus Mora, CNP

## 2021-10-04 NOTE — PROGRESS NOTES
[facility] GERIATRIC SERVICES  PRIMARY CARE PROVIDER AND CLINIC:  Kodak Smith MD, [address] [facility] [address] / [place] [address]  Chief Complaint   Patient presents with     RECHECK      [place] Medical Record Number:  [id number]  Place of Service where encounter took place:  [facility] ([facility]) [[id number]]    Paradise Yuan  is a 71 year old  (1950), admitted to the above facility from  [facility]. Hospital stay 8/2 through 8/21..   HPI: Janette Goldberg has a primary history of type 2 diabetes, hypertension, hyperlipidemia and CKD with a left upper lobectomy and memory problems.  She presented to an outside ED on July 2 with 2 weeks of right-sided weakness and facial droop.  Her CT revealed no acute intracranial process but incidentally showed presumed large partially calcified meningioma on the left side of the posterior fossa.  MRI revealed subacute infarcts and a 3 cm calcified mass in left cerebellum.  She is not a candidate for thrombolytics and was then transferred to [facility]  Course complicated by hypertension and diabetes as well as SVT.  She also had a positive Covid test although had no symptoms. She   She was readmitted to [facility] on 8/23 for increased aphasia and right sided weakness.  This time  with occlusion of the left ICA hand TPA was administered before being taken for thrombectomy.  At the conclusion of the procedure, she was noted to be pulseless on the right upper extremity, she was seen by vascular who recommended monitoring.  She was then taken to inpatient rehabilitation.  She was cleared by speech for thin liquids and regular diet.  She is seen by PT and recommended moderate assist for standing.    TCU course: Patient seen to follow-up on initiation of Remeron as well as review blood pressures.  Her weight is actually up 2 pounds since her admission now 100.7 pounds.  Her blood pressures are reviewed and have gone up slightly over the weekend with 2  values of 150 over 80s.   Nursing reports that she continues to decline many ADLs however she is more alert with me today and answers a few of my questions, denying pain.  She allows me to stay in the room with her for a bit.       CODE STATUS/ADVANCE DIRECTIVES DISCUSSION:  Prior  CPR/Full code   ALLERGIES: No Known Allergies   PAST MEDICAL HISTORY:   Past Medical History:   Diagnosis Date     Hypertension       PAST SURGICAL HISTORY:   has a past surgical history that includes IR Miscellaneous Procedure (3/8/2003); IR Miscellaneous Procedure (3/8/2003); IR Visceral Angiogram (3/8/2003); IR Miscellaneous Procedure (3/8/2003); IR Miscellaneous Procedure (3/8/2003); IR Miscellaneous Procedure (3/8/2003); IR Visceral Angiogram (3/8/2003); IR Miscellaneous Procedure (3/10/2003); IR Visceral Angiogram (3/10/2003); IR Miscellaneous Procedure (3/11/2003); IR Visceral Angiogram (3/11/2003); IR Miscellaneous Procedure (3/11/2003); IR Miscellaneous Procedure (3/11/2003); IR Miscellaneous Procedure (3/11/2003); IR Miscellaneous Procedure (3/12/2003); IR Miscellaneous Procedure (3/12/2003); IR Miscellaneous Procedure (3/12/2003); IR Miscellaneous Procedure (3/12/2003); IR Spinal Angiogram (3/16/2003); IR Thoracic Aortogram (3/16/2003); IR Spinal Angiogram (3/16/2003); IR Spinal Angiogram (3/16/2003); IR Spinal Angiogram (3/16/2003); IR Miscellaneous Procedure (3/16/2003); IR Miscellaneous Procedure (3/16/2003); IR Miscellaneous Procedure (3/16/2003); IR Miscellaneous Procedure (3/16/2003); IR Miscellaneous Procedure (3/16/2003); IR Spinal Angiogram (3/16/2003); IR Spinal Angiogram (3/16/2003); IR Spinal Angiogram (3/16/2003); IR Miscellaneous Procedure (3/16/2003); IR Miscellaneous Procedure (3/16/2003); IR Spinal Angiogram (3/16/2003); and IR Miscellaneous Procedure (3/16/2003).  FAMILY HISTORY: family history is not on file.  SOCIAL HISTORY:   reports that she has never smoked. She has never used smokeless tobacco. She  "reports that she does not drink alcohol and does not use drugs.  Patient's living condition: lives with family, sister.     Post Discharge Medication Reconciliation Status: discharge medications reconciled, continue medications without change  Current Outpatient Medications   Medication Sig     atenolol (TENORMIN) 25 MG tablet Take 1 tablet (25 mg) by mouth At Bedtime     atorvastatin (LIPITOR) 40 MG tablet Take 1 tablet (40 mg) by mouth At Bedtime     blood glucose test strips [BLOOD GLUCOSE TEST STRIPS] Use 1 each As Directed 2 (two) times a day. Test blood sugar twice a day     blood-glucose meter Misc [BLOOD-GLUCOSE METER MISC] Test blood sugar twice a day     donepezil (ARICEPT) 5 MG tablet Take 1 tablet (5 mg) by mouth At Bedtime     generic lancets [GENERIC LANCETS] Use 1 each As Directed 2 (two) times a day. Test blood sugar twice a day     insulin aspart (NOVOLOG PEN) 100 UNIT/ML pen Inject 3 Units Subcutaneous 3 times daily (with meals)     insulin glargine (LANTUS SOLOSTAR PEN) 100 unit/mL (3 mL) pen [INSULIN GLARGINE (LANTUS SOLOSTAR PEN) 100 UNIT/ML (3 ML) PEN] Inject 16 Units under the skin at bedtime.     pen needle, diabetic 31 gauge x 1/4\" Ndle [PEN NEEDLE, DIABETIC 31 GAUGE X 1/4\" NDLE] Use one daily as directed     No current facility-administered medications for this visit.       ROS:  Unobtainable secondary to aphasia.     Vitals:  /70   Pulse 88   Temp 97.9  F (36.6  C)   Resp 18   Ht 1.575 m (5' 2\")   Wt 45.5 kg (100 lb 3.2 oz)   SpO2 97%   BMI 18.33 kg/m    Exam:  Physical Exam   General appearance: alert, appears stated age.    Head: Normocephalic, without obvious abnormality, atraumatic, Eyes: sclera anicteric. Mild R sided facial droop.   Lungs: respirations without effort, LSCTA; no wheezing or rales.   Cardiovascular: S1, S2. Regular rate and rhythm.   ABDOMEN: Globular and soft, non tender.    Extremities: extremities normal, atraumatic, no cyanosis or edema  Skin: Skin " color, texture, turgor normal. No rashes or lesions  Neurologic: Unable to assess orientation. Right sided facial droop- mild. RUE and RLE weakness. Does not cooperate with assessment.   Psych: Depressed.     Lab/Diagnostic data:  Recent labs in [facility] reviewed by me today.     ASSESSMENT/PLAN:     (I63.512) Cerebral infarction due to occlusion of left middle cerebral artery (H)  (I63.512) Acute ischemic left MCA stroke (H)  (primary encounter diagnosis): Unstable.  Comment: We will need continuous monitoring and assessment and appropriate discharge planning due to the level of deficits.  Plan:   -PT and OT.  -Plavix 75 mg daily.  -Atorvastatin 40 mg daily.  -Speech therapy eval and treat.  -Aricept 5 mg daily.  -Melatonin 3 mg nightly.  -Enoxaparin 30 mg daily.     (R29.898) Right leg weakness  (R29.898) Right arm weakness  Plan:   -PT and OT.    Cognitive impairment  Aphasia:  -Eval and treat.    (I10) Benign Essential Hypertension: Unstable.  Comment: Per hospital notes, goal is less than 120/80 however can be liberalized due to patient's age and goals of care.  Plan:   -Monitor blood pressures closely nursing home, update NP for consistent blood pressures greater than 130s over 90s.  -Increase amlodipine to 5 mg p.o. daily.    Depressed affect  Poor p.o. intake/failure to thrive: Unstable.  -Dietitian eval and treat  -ACP eval and treat.  -Start Remeron 7.5 mg p.o. every night.    Electronically signed by:  Jose De Jesus Mora [facility] GERIATRIC SERVICES  PRIMARY CARE PROVIDER AND CLINIC:  Kodak Smith MD, [address] [address] / [place] [address]  Chief Complaint   Patient presents with     RECHECK      [place] Medical Record Number:  [id number]  Place of Service where encounter took place:  [facility] ([facility]) [[id number]]    Paradise Yuan  is a 71 year old  (1950), admitted to the above facility from  [facility]. Hospital stay 8/2 through 8/21..   HPI:  Janette Goldberg has a primary history of type 2 diabetes, hypertension, hyperlipidemia and CKD with a left upper lobectomy and memory problems.  She presented to an outside ED on July 2 with 2 weeks of right-sided weakness and facial droop.  Her CT revealed no acute intracranial process but incidentally showed presumed large partially calcified meningioma on the left side of the posterior fossa.  MRI revealed subacute infarcts and a 3 cm calcified mass in left cerebellum.  She is not a candidate for thrombolytics and was then transferred to [facility].  Course complicated by hypertension and diabetes as well as SVT.  She also had a positive Covid test although had no symptoms.   She was readmitted to [facility] on 8/23 for increased aphasia and right sided weakness.  This time  with occlusion of the left ICA hand TPA was administered before being taken for thrombectomy.  At the conclusion of the procedure, she was noted to be pulseless on the right upper extremity, she was seen by vascular who recommended monitoring.  She was then taken to inpatient rehabilitation.  She was cleared by speech for thin liquids and regular diet.  She is seen by PT and recommended moderate assist for standing.    TCU course: Janette Goldberg is seen again to follow-up on TCU concerns including adult failure to thrive, hypertension, possible diabetes with A1c 6.2, and stroke follow-up.  Per nursing, she has been very noncompliant with her therapies and is at risk of losing insurance coverage due to nonparticipation.  I spoke with social work to coordinate a meeting with the family to discuss possibility of going home with palliative care given that the patient has no desire to participate in therapy, is eating poorly and will not communicate with staff.   Janette Goldberg did not tell me to leave her bedside today so I did have a discussion with her although it was one-sided; I did inform her she is at risk of being prematurely discharged if she does not  start participating with therapy and that I will be ordering a psychology evaluation for her.  Also encouraged her to take medications as she has been refusing those.  She refused labs this morning so they will come to her again on Monday.  She appears comfortable, she did allow me to examine her, listen to her lungs and palpate abdomen which were all benign.  She does continue to have right-sided weakness.  I suspect a lot of her mood is related to her's recent strokes as well as the drastic change in her abilities and lifestyle so continued encouragement from staff and involvement from family is important.  Increase amlodipine to 5 mg, however unsure of how reliably she is taking this medication.  Blood pressures vary between 120 and 150 systolic.      CODE STATUS/ADVANCE DIRECTIVES DISCUSSION:  Prior  CPR/Full code   ALLERGIES: No Known Allergies   PAST MEDICAL HISTORY:   Past Medical History:   Diagnosis Date     Hypertension       PAST SURGICAL HISTORY:   has a past surgical history that includes IR Miscellaneous Procedure (3/8/2003); IR Miscellaneous Procedure (3/8/2003); IR Visceral Angiogram (3/8/2003); IR Miscellaneous Procedure (3/8/2003); IR Miscellaneous Procedure (3/8/2003); IR Miscellaneous Procedure (3/8/2003); IR Visceral Angiogram (3/8/2003); IR Miscellaneous Procedure (3/10/2003); IR Visceral Angiogram (3/10/2003); IR Miscellaneous Procedure (3/11/2003); IR Visceral Angiogram (3/11/2003); IR Miscellaneous Procedure (3/11/2003); IR Miscellaneous Procedure (3/11/2003); IR Miscellaneous Procedure (3/11/2003); IR Miscellaneous Procedure (3/12/2003); IR Miscellaneous Procedure (3/12/2003); IR Miscellaneous Procedure (3/12/2003); IR Miscellaneous Procedure (3/12/2003); IR Spinal Angiogram (3/16/2003); IR Thoracic Aortogram (3/16/2003); IR Spinal Angiogram (3/16/2003); IR Spinal Angiogram (3/16/2003); IR Spinal Angiogram (3/16/2003); IR Miscellaneous Procedure (3/16/2003); IR Miscellaneous Procedure  "(3/16/2003); IR Miscellaneous Procedure (3/16/2003); IR Miscellaneous Procedure (3/16/2003); IR Miscellaneous Procedure (3/16/2003); IR Spinal Angiogram (3/16/2003); IR Spinal Angiogram (3/16/2003); IR Spinal Angiogram (3/16/2003); IR Miscellaneous Procedure (3/16/2003); IR Miscellaneous Procedure (3/16/2003); IR Spinal Angiogram (3/16/2003); and IR Miscellaneous Procedure (3/16/2003).  FAMILY HISTORY: family history is not on file.  SOCIAL HISTORY:   reports that she has never smoked. She has never used smokeless tobacco. She reports that she does not drink alcohol and does not use drugs.  Patient's living condition: lives with family, sister.     Post Discharge Medication Reconciliation Status: discharge medications reconciled, continue medications without change  Current Outpatient Medications   Medication Sig     atenolol (TENORMIN) 25 MG tablet Take 1 tablet (25 mg) by mouth At Bedtime     atorvastatin (LIPITOR) 40 MG tablet Take 1 tablet (40 mg) by mouth At Bedtime     blood glucose test strips [BLOOD GLUCOSE TEST STRIPS] Use 1 each As Directed 2 (two) times a day. Test blood sugar twice a day     blood-glucose meter Misc [BLOOD-GLUCOSE METER MISC] Test blood sugar twice a day     donepezil (ARICEPT) 5 MG tablet Take 1 tablet (5 mg) by mouth At Bedtime     generic lancets [GENERIC LANCETS] Use 1 each As Directed 2 (two) times a day. Test blood sugar twice a day     insulin aspart (NOVOLOG PEN) 100 UNIT/ML pen Inject 3 Units Subcutaneous 3 times daily (with meals)     insulin glargine (LANTUS SOLOSTAR PEN) 100 unit/mL (3 mL) pen [INSULIN GLARGINE (LANTUS SOLOSTAR PEN) 100 UNIT/ML (3 ML) PEN] Inject 16 Units under the skin at bedtime.     pen needle, diabetic 31 gauge x 1/4\" Ndle [PEN NEEDLE, DIABETIC 31 GAUGE X 1/4\" NDLE] Use one daily as directed     No current facility-administered medications for this visit.       ROS:  Unobtainable secondary to aphasia.     Vitals:  /70   Pulse 88   Temp 97.9  F " "(36.6  C)   Resp 18   Ht 1.575 m (5' 2\")   Wt 45.5 kg (100 lb 3.2 oz)   SpO2 97%   BMI 18.33 kg/m    Exam:  Physical Exam   General appearance: alert, appears stated age.    Head: Normocephalic, without obvious abnormality, atraumatic, Eyes: sclera anicteric. Mild R sided facial droop.   Lungs: respirations without effort, LSCTA; no wheezing or rales.   Cardiovascular: S1, S2. Regular rate and rhythm.   ABDOMEN: Globular and soft, non tender.    Extremities: extremities normal, atraumatic, no cyanosis or edema  Skin: Skin color, texture, turgor normal. No rashes or lesions  Neurologic: Unable to assess orientation. Right sided facial droop- mild. RUE and RLE weakness. Does not cooperate with assessment.   Psych: Depressed.  Avoids eye contact.    Lab/Diagnostic data:  Recent labs in [facility] reviewed by me today.     ASSESSMENT/PLAN:     (I63.512) Cerebral infarction due to occlusion of left middle cerebral artery (H)  (I63.512) Acute ischemic left MCA stroke (H)  (primary encounter diagnosis): Unstable.  Comment: We will need continuous monitoring and assessment and appropriate discharge planning due to the level of deficits.  Plan:   -PT and OT.  -Plavix 75 mg daily.  -Atorvastatin 40 mg daily.  -Speech therapy eval and treat.  -Aricept 5 mg daily.  -Melatonin 3 mg nightly.     (R29.898) Right leg weakness  (R29.898) Right arm weakness  Plan:   -PT and OT.    Cognitive impairment  Aphasia:  -Eval and treat.    (I10) Benign Essential Hypertension: Unstable.  Comment: Per hospital notes, goal is less than 120/80 however can be liberalized due to patient's age and goals of care.  Plan:   -Monitor blood pressures closely nursing home, update NP for consistent blood pressures greater than 130s over 90s.  -Increase amlodipine to 5 mg p.o. daily.  -Refuse labs today, lab will try again on Monday.    Depressed affect  Poor p.o. intake/failure to thrive: Unstable.  -Dietitian eval and treat  -ACP eval and " treat.  -Continue to encourage Remeron 7.5 mg p.o. every night.    Electronically signed by:  Jose De Jesus Mora CNP

## 2021-10-05 ENCOUNTER — TRANSITIONAL CARE UNIT VISIT (OUTPATIENT)
Dept: GERIATRICS | Facility: CLINIC | Age: 71
End: 2021-10-05
Payer: COMMERCIAL

## 2021-10-05 VITALS
RESPIRATION RATE: 16 BRPM | HEART RATE: 108 BPM | SYSTOLIC BLOOD PRESSURE: 111 MMHG | WEIGHT: 98.6 LBS | HEIGHT: 62 IN | OXYGEN SATURATION: 95 % | DIASTOLIC BLOOD PRESSURE: 72 MMHG | BODY MASS INDEX: 18.14 KG/M2 | TEMPERATURE: 97.2 F

## 2021-10-05 DIAGNOSIS — E11.69 TYPE 2 DIABETES MELLITUS WITH OTHER SPECIFIED COMPLICATION, WITHOUT LONG-TERM CURRENT USE OF INSULIN (H): ICD-10-CM

## 2021-10-05 DIAGNOSIS — K59.01 SLOW TRANSIT CONSTIPATION: ICD-10-CM

## 2021-10-05 DIAGNOSIS — I10 PRIMARY HYPERTENSION: ICD-10-CM

## 2021-10-05 DIAGNOSIS — Z86.73 RECENT CEREBROVASCULAR ACCIDENT (CVA): Primary | ICD-10-CM

## 2021-10-05 DIAGNOSIS — I69.351 HEMIPARESIS AFFECTING RIGHT SIDE AS LATE EFFECT OF STROKE (H): ICD-10-CM

## 2021-10-05 PROCEDURE — 99309 SBSQ NF CARE MODERATE MDM 30: CPT | Performed by: INTERNAL MEDICINE

## 2021-10-05 RX ORDER — PANTOPRAZOLE SODIUM 40 MG/1
40 TABLET, DELAYED RELEASE ORAL DAILY
COMMUNITY
End: 2021-11-04

## 2021-10-05 RX ORDER — LIDOCAINE 4 G/G
1 PATCH TOPICAL EVERY 24 HOURS
COMMUNITY
End: 2021-11-04

## 2021-10-05 RX ORDER — AMOXICILLIN 250 MG
1 CAPSULE ORAL 2 TIMES DAILY
COMMUNITY
End: 2024-01-30

## 2021-10-05 RX ORDER — AMLODIPINE BESYLATE 5 MG/1
5 TABLET ORAL DAILY
COMMUNITY
End: 2021-11-04

## 2021-10-05 RX ORDER — LANOLIN ALCOHOL/MO/W.PET/CERES
1 CREAM (GRAM) TOPICAL AT BEDTIME
COMMUNITY
End: 2021-11-04

## 2021-10-05 RX ORDER — ACETAMINOPHEN 325 MG/1
650 TABLET ORAL EVERY 6 HOURS PRN
COMMUNITY
End: 2024-01-30

## 2021-10-05 RX ORDER — MIRTAZAPINE 7.5 MG/1
7.5 TABLET, FILM COATED ORAL AT BEDTIME
COMMUNITY
End: 2021-11-04

## 2021-10-05 RX ORDER — CLOPIDOGREL BISULFATE 75 MG/1
75 TABLET ORAL DAILY
COMMUNITY
End: 2021-11-04

## 2021-10-05 ASSESSMENT — MIFFLIN-ST. JEOR: SCORE: 910.5

## 2021-10-05 NOTE — LETTER
10/5/2021        RE: Paradise Yuan  [address] [place] [address]        [facility] GERIATRICS  TCU Episodic Visit   October 5, 2021      Chief Complaint   Patient presents with     Hospital F/U       HPI:    Paradise Yuan is a 71 year old  (1950), who is being seen today for an episodic care visit at [facility] where she is doing rehab after a hospitalization at [facility] from 8/23/21 to 9/17/21 with a CVA. Of note, she had a left MCA CVA in July. On this hospitalization, she presented with increasing R sided weakness and aphasia. She received tPA as well as 2 unit PRBCs. PTA glargine was discontinued, atenolol changed to metoprolol and she is new on amlodipine. At the TCU, amlodipine was increased on 9/30 and she was started on mirtazapine.     Today, she is seen in her room with her older sister who is encouraging her to eat mashed potatoes and gravy. Ms. Yuan is aphasic. Her sister comes daily and reports Ms. Yuan eats better and participates more with therapy when she is there encouraging her. No concerns today per discussion with nursing.     ALLERGIES: Patient has no known allergies.    Past Medical, Surgical, Family and Social History reviewed and updated in EPIC.    MEDICATIONS  Current Outpatient Medications   Medication Sig Dispense Refill     acetaminophen (TYLENOL) 325 MG tablet Take 650 mg by mouth every 6 hours as needed for mild pain       amLODIPine (NORVASC) 5 MG tablet Take 5 mg by mouth daily       atorvastatin (LIPITOR) 40 MG tablet Take 1 tablet (40 mg) by mouth At Bedtime 90 tablet 3     clopidogrel (PLAVIX) 75 MG tablet Take 75 mg by mouth daily       donepezil (ARICEPT) 5 MG tablet Take 1 tablet (5 mg) by mouth At Bedtime 30 tablet 3     Lidocaine (LIDOCARE) 4 % Patch Place 1 patch onto the skin every 24 hours To prevent lidocaine toxicity, patient should be patch free for 12 hrs daily.       melatonin 3 MG tablet Take 1 mg by mouth At Bedtime        "mirtazapine (REMERON) 7.5 MG tablet Take 7.5 mg by mouth At Bedtime       pantoprazole (PROTONIX) 40 MG EC tablet Take 40 mg by mouth daily       senna-docusate (SENOKOT-S/PERICOLACE) 8.6-50 MG tablet Take 1 tablet by mouth 2 times daily       Medications reviewed:  Medications reconciled to facility chart and changes were made to reflect current medications as identified as above med list. Below are the changes that were made:   Medications stopped since last EPIC medication reconciliation:   Medications Discontinued During This Encounter   Medication Reason     atenolol (TENORMIN) 25 MG tablet      blood glucose test strips      blood-glucose meter Misc      generic lancets      insulin aspart (NOVOLOG PEN) 100 UNIT/ML pen      insulin glargine (LANTUS SOLOSTAR PEN) 100 unit/mL (3 mL) pen      pen needle, diabetic 31 gauge x 1/4\" Ndle      Medications started since last [facility] medication reconciliation:  Orders Placed This Encounter   Medications     acetaminophen (TYLENOL) 325 MG tablet     Sig: Take 650 mg by mouth every 6 hours as needed for mild pain     amLODIPine (NORVASC) 5 MG tablet     Sig: Take 5 mg by mouth daily     clopidogrel (PLAVIX) 75 MG tablet     Sig: Take 75 mg by mouth daily     Lidocaine (LIDOCARE) 4 % Patch     Sig: Place 1 patch onto the skin every 24 hours To prevent lidocaine toxicity, patient should be patch free for 12 hrs daily.     melatonin 3 MG tablet     Sig: Take 1 mg by mouth At Bedtime     pantoprazole (PROTONIX) 40 MG EC tablet     Sig: Take 40 mg by mouth daily     mirtazapine (REMERON) 7.5 MG tablet     Sig: Take 7.5 mg by mouth At Bedtime     senna-docusate (SENOKOT-S/PERICOLACE) 8.6-50 MG tablet     Sig: Take 1 tablet by mouth 2 times daily       REVIEW OF SYSTEMS:  Unable to be obtained due to cognitive impairment.     PHYSICAL EXAM:  /72   Pulse 108   Temp 97.2  F (36.2  C)   Resp 16   Ht 1.575 m (5' 2\")   Wt 44.7 kg (98 lb 9.6 oz)   SpO2 95%   BMI 18.03 kg/m  "   Gen: sitting in wheelchair, alert and in no acute distress  Card: RRR, S1, S2, no murmurs  Resp: lungs clear to auscultation bilaterally, no crackles or wheezes  Ext: no LE edema  Neuro: R hemiparesis     LABS & IMAGING  Recent Labs and Imaging:  Reviewed as per Epic    ASSESSMENT/PLAN:    Left MCA CVA  Right Hemiparesis  Aphasia  Had a CVA in July, then again in April s/p tPA. Is ambulating at a wheelchair level. Sister involved and trying to motivate her to eat and do rehab.   -- atorvastatin 40 mg at bedtime, clopidogrel 75 mg daily  -- donepezil 5 mg at bedtime   -- new on mirtazapine 7.5 mg on 9/30  -- PT/OT/SLP  -- follow up with neurology as scheduled     DM, Type II  Insulin discontinued during hospitalization. Sugars 130s-150s at TCU  -- OK to follow sugars PRN    HTN  SBPs 120s. During hospitalization atenolol changed to metoprolol and she is new on amlodipine, which was increased on 9/30.   -- amlodipine 5 mg daily   -- follow BPs and adjust medications as needed    Slow Transit Constipation  -- Senna-S 1 tab BID   -- adjust bowel regimen as needed      Electronically signed by  Mari Prieto MD                          Sincerely,        Mari Prieto MD

## 2021-10-05 NOTE — PROGRESS NOTES
[facility] GERIATRICS  TCU Episodic Visit   October 5, 2021      Chief Complaint   Patient presents with     Hospital F/U       HPI:    Paradise Yuan is a 71 year old  (1950), who is being seen today for an episodic care visit at [facility] where she is doing rehab after a hospitalization at [facility] from 8/23/21 to 9/17/21 with a CVA. Of note, she had a left MCA CVA in July. On this hospitalization, she presented with increasing R sided weakness and aphasia. She received tPA as well as 2 unit PRBCs. PTA glargine was discontinued, atenolol changed to metoprolol and she is new on amlodipine. At the TCU, amlodipine was increased on 9/30 and she was started on mirtazapine.     Today, she is seen in her room with her older sister who is encouraging her to eat mashed potatoes and gravy. Ms. Yuan is aphasic. Her sister comes daily and reports Ms. Yuan eats better and participates more with therapy when she is there encouraging her. No concerns today per discussion with nursing.     ALLERGIES: Patient has no known allergies.    Past Medical, Surgical, Family and Social History reviewed and updated in EPIC.    MEDICATIONS  Current Outpatient Medications   Medication Sig Dispense Refill     acetaminophen (TYLENOL) 325 MG tablet Take 650 mg by mouth every 6 hours as needed for mild pain       amLODIPine (NORVASC) 5 MG tablet Take 5 mg by mouth daily       atorvastatin (LIPITOR) 40 MG tablet Take 1 tablet (40 mg) by mouth At Bedtime 90 tablet 3     clopidogrel (PLAVIX) 75 MG tablet Take 75 mg by mouth daily       donepezil (ARICEPT) 5 MG tablet Take 1 tablet (5 mg) by mouth At Bedtime 30 tablet 3     Lidocaine (LIDOCARE) 4 % Patch Place 1 patch onto the skin every 24 hours To prevent lidocaine toxicity, patient should be patch free for 12 hrs daily.       melatonin 3 MG tablet Take 1 mg by mouth At Bedtime       mirtazapine (REMERON) 7.5 MG tablet Take 7.5 mg by mouth At Bedtime       pantoprazole  "(PROTONIX) 40 MG EC tablet Take 40 mg by mouth daily       senna-docusate (SENOKOT-S/PERICOLACE) 8.6-50 MG tablet Take 1 tablet by mouth 2 times daily       Medications reviewed:  Medications reconciled to facility chart and changes were made to reflect current medications as identified as above med list. Below are the changes that were made:   Medications stopped since last EPIC medication reconciliation:   Medications Discontinued During This Encounter   Medication Reason     atenolol (TENORMIN) 25 MG tablet      blood glucose test strips      blood-glucose meter Misc      generic lancets      insulin aspart (NOVOLOG PEN) 100 UNIT/ML pen      insulin glargine (LANTUS SOLOSTAR PEN) 100 unit/mL (3 mL) pen      pen needle, diabetic 31 gauge x 1/4\" Ndle      Medications started since last [facility] medication reconciliation:  Orders Placed This Encounter   Medications     acetaminophen (TYLENOL) 325 MG tablet     Sig: Take 650 mg by mouth every 6 hours as needed for mild pain     amLODIPine (NORVASC) 5 MG tablet     Sig: Take 5 mg by mouth daily     clopidogrel (PLAVIX) 75 MG tablet     Sig: Take 75 mg by mouth daily     Lidocaine (LIDOCARE) 4 % Patch     Sig: Place 1 patch onto the skin every 24 hours To prevent lidocaine toxicity, patient should be patch free for 12 hrs daily.     melatonin 3 MG tablet     Sig: Take 1 mg by mouth At Bedtime     pantoprazole (PROTONIX) 40 MG EC tablet     Sig: Take 40 mg by mouth daily     mirtazapine (REMERON) 7.5 MG tablet     Sig: Take 7.5 mg by mouth At Bedtime     senna-docusate (SENOKOT-S/PERICOLACE) 8.6-50 MG tablet     Sig: Take 1 tablet by mouth 2 times daily       REVIEW OF SYSTEMS:  Unable to be obtained due to cognitive impairment.     PHYSICAL EXAM:  /72   Pulse 108   Temp 97.2  F (36.2  C)   Resp 16   Ht 1.575 m (5' 2\")   Wt 44.7 kg (98 lb 9.6 oz)   SpO2 95%   BMI 18.03 kg/m    Gen: sitting in wheelchair, alert and in no acute distress  Card: RRR, S1, S2, no " murmurs  Resp: lungs clear to auscultation bilaterally, no crackles or wheezes  Ext: no LE edema  Neuro: R hemiparesis     LABS & IMAGING  Recent Labs and Imaging:  Reviewed as per Epic    ASSESSMENT/PLAN:    Left MCA CVA  Right Hemiparesis  Aphasia  Had a CVA in July, then again in April s/p tPA. Is ambulating at a wheelchair level. Sister involved and trying to motivate her to eat and do rehab.   -- atorvastatin 40 mg at bedtime, clopidogrel 75 mg daily  -- donepezil 5 mg at bedtime   -- new on mirtazapine 7.5 mg on 9/30  -- PT/OT/SLP  -- follow up with neurology as scheduled     DM, Type II  Insulin discontinued during hospitalization. Sugars 130s-150s at TCU  -- OK to follow sugars PRN    HTN  SBPs 120s. During hospitalization atenolol changed to metoprolol and she is new on amlodipine, which was increased on 9/30.   -- amlodipine 5 mg daily   -- follow BPs and adjust medications as needed    Slow Transit Constipation  -- Senna-S 1 tab BID   -- adjust bowel regimen as needed      Electronically signed by  Mari Prieto MD

## 2021-10-07 ENCOUNTER — TRANSITIONAL CARE UNIT VISIT (OUTPATIENT)
Dept: GERIATRICS | Facility: CLINIC | Age: 71
End: 2021-10-07
Payer: COMMERCIAL

## 2021-10-07 VITALS
WEIGHT: 98.6 LBS | HEIGHT: 62 IN | HEART RATE: 94 BPM | TEMPERATURE: 97.7 F | SYSTOLIC BLOOD PRESSURE: 104 MMHG | BODY MASS INDEX: 18.14 KG/M2 | OXYGEN SATURATION: 96 % | RESPIRATION RATE: 18 BRPM | DIASTOLIC BLOOD PRESSURE: 59 MMHG

## 2021-10-07 DIAGNOSIS — D32.0 MENINGIOMA OF CEREBELLUM (H): ICD-10-CM

## 2021-10-07 DIAGNOSIS — R29.898 RIGHT LEG WEAKNESS: Primary | ICD-10-CM

## 2021-10-07 DIAGNOSIS — I63.512 CEREBRAL INFARCTION DUE TO OCCLUSION OF LEFT MIDDLE CEREBRAL ARTERY (H): ICD-10-CM

## 2021-10-07 PROCEDURE — 99310 SBSQ NF CARE HIGH MDM 45: CPT | Performed by: NURSE PRACTITIONER

## 2021-10-07 ASSESSMENT — MIFFLIN-ST. JEOR: SCORE: 910.5

## 2021-10-07 NOTE — LETTER
10/7/2021        RE: Paradise Yuan  [address] [place] [address]        [facility] GERIATRIC SERVICES  PRIMARY CARE PROVIDER AND CLINIC:  Kodak Smith MD, [address] [facility] [address] / [place] [address]  Chief Complaint   Patient presents with     RECHECK      [place] Medical Record Number:  [id number]  Place of Service where encounter took place:  [facility] ([facility]) [[id number]]    Paradise Yuan  is a 71 year old  (1950), admitted to the above facility from  [facility]. Hospital stay 8/2 through 8/21..   HPI: Janette Goldberg has a primary history of type 2 diabetes, hypertension, hyperlipidemia and CKD with a left upper lobectomy and memory problems.  She presented to an outside ED on July 2 with 2 weeks of right-sided weakness and facial droop.  Her CT revealed no acute intracranial process but incidentally showed presumed large partially calcified meningioma on the left side of the posterior fossa.  MRI revealed subacute infarcts and a 3 cm calcified mass in left cerebellum.  She is not a candidate for thrombolytics and was then transferred to [facility].  Course complicated by hypertension and diabetes as well as SVT.  She also had a positive Covid test although had no symptoms.   She was readmitted to [facility] on 8/23 for increased aphasia and right sided weakness.  This time  with occlusion of the left ICA hand TPA was administered before being taken for thrombectomy.  At the conclusion of the procedure, she was noted to be pulseless on the right upper extremity, she was seen by vascular who recommended monitoring.  She was then taken to inpatient rehabilitation.  She was cleared by speech for thin liquids and regular diet.  She is seen by PT and recommended moderate assist for standing.    [facility] course: Mercedes is seen today with her sister who does the majority of share in the conversation. She is seen for wheelchair evaluation as well as to discuss discharge  planning. JOSE thinks she will be cut off from her insurance in the next week due to non compliance with therapy. She has participated a few times now. Nursing is also reporting some improved appetite especially when her sister is visiting. Her weight has been stable, however. 99, 100, 98lbs.     Majority of time spent was discussing potential discharge with her sister; JOSE was under the impression that she would be able to discharge to her sister's house, her sister tells me today that she would like her to stay here as long as possible to get the help she needs and that she would be unable to care for her to this extent at home.  She also tells me she will be getting surgery done on her eyes on 18 October.  She does not drive and her son helps get her to and from visiting the care center.  She like Richa to be in the care center as long as possible.  I did mention that participation in therapy is important and  her sister tries to encourage Janette Goldberg to participate.  She does not seem to grasp that she would be unsafe to discharge to home alone in any capacity even likely after she completes therapy.  Janette Goldberg did not speak during this conversation but she did allow me to examine her and listen to her lungs which were clear.  Pressures have improved slightly with amlodipine increased to 5 daily.  Also updated social work and nurse manager regarding the conversation and that further discharge planning may need to happen in order to ensure we had a safe plan; possible long-term care placement.      CODE STATUS/ADVANCE DIRECTIVES DISCUSSION:  Prior  CPR/Full code   ALLERGIES: No Known Allergies   PAST MEDICAL HISTORY:   Past Medical History:   Diagnosis Date     Hypertension       PAST SURGICAL HISTORY:   has a past surgical history that includes IR Miscellaneous Procedure (3/8/2003); IR Miscellaneous Procedure (3/8/2003); IR Visceral Angiogram (3/8/2003); IR Miscellaneous Procedure (3/8/2003); IR Miscellaneous  Procedure (3/8/2003); IR Miscellaneous Procedure (3/8/2003); IR Visceral Angiogram (3/8/2003); IR Miscellaneous Procedure (3/10/2003); IR Visceral Angiogram (3/10/2003); IR Miscellaneous Procedure (3/11/2003); IR Visceral Angiogram (3/11/2003); IR Miscellaneous Procedure (3/11/2003); IR Miscellaneous Procedure (3/11/2003); IR Miscellaneous Procedure (3/11/2003); IR Miscellaneous Procedure (3/12/2003); IR Miscellaneous Procedure (3/12/2003); IR Miscellaneous Procedure (3/12/2003); IR Miscellaneous Procedure (3/12/2003); IR Spinal Angiogram (3/16/2003); IR Thoracic Aortogram (3/16/2003); IR Spinal Angiogram (3/16/2003); IR Spinal Angiogram (3/16/2003); IR Spinal Angiogram (3/16/2003); IR Miscellaneous Procedure (3/16/2003); IR Miscellaneous Procedure (3/16/2003); IR Miscellaneous Procedure (3/16/2003); IR Miscellaneous Procedure (3/16/2003); IR Miscellaneous Procedure (3/16/2003); IR Spinal Angiogram (3/16/2003); IR Spinal Angiogram (3/16/2003); IR Spinal Angiogram (3/16/2003); IR Miscellaneous Procedure (3/16/2003); IR Miscellaneous Procedure (3/16/2003); IR Spinal Angiogram (3/16/2003); and IR Miscellaneous Procedure (3/16/2003).  FAMILY HISTORY: family history is not on file.  SOCIAL HISTORY:   reports that she has never smoked. She has never used smokeless tobacco. She reports that she does not drink alcohol and does not use drugs.  Patient's living condition: lives with family, sister.     Post Discharge Medication Reconciliation Status: discharge medications reconciled, continue medications without change  Current Outpatient Medications   Medication Sig     acetaminophen (TYLENOL) 325 MG tablet Take 650 mg by mouth every 6 hours as needed for mild pain     amLODIPine (NORVASC) 5 MG tablet Take 5 mg by mouth daily     atorvastatin (LIPITOR) 40 MG tablet Take 1 tablet (40 mg) by mouth At Bedtime     clopidogrel (PLAVIX) 75 MG tablet Take 75 mg by mouth daily     donepezil (ARICEPT) 5 MG tablet Take 1 tablet (5 mg)  "by mouth At Bedtime     Lidocaine (LIDOCARE) 4 % Patch Place 1 patch onto the skin every 24 hours To prevent lidocaine toxicity, patient should be patch free for 12 hrs daily.     melatonin 3 MG tablet Take 1 mg by mouth At Bedtime     mirtazapine (REMERON) 7.5 MG tablet Take 7.5 mg by mouth At Bedtime     pantoprazole (PROTONIX) 40 MG EC tablet Take 40 mg by mouth daily     senna-docusate (SENOKOT-S/PERICOLACE) 8.6-50 MG tablet Take 1 tablet by mouth 2 times daily     No current facility-administered medications for this visit.       ROS:  Unobtainable secondary to aphasia.     Vitals:  /59   Pulse 94   Temp 97.7  F (36.5  C)   Resp 18   Ht 1.575 m (5' 2\")   Wt 44.7 kg (98 lb 9.6 oz)   SpO2 96%   BMI 18.03 kg/m    Exam:  Physical Exam   General appearance: alert, appears stated age.    Head: Normocephalic, without obvious abnormality, atraumatic, Eyes: sclera anicteric. Mild R sided facial droop.   Lungs: respirations without effort, LSCTA; no wheezing or rales.   Cardiovascular: S1, S2. Regular rate and rhythm.   ABDOMEN: Globular and soft, non tender.    Extremities: extremities normal, atraumatic, no cyanosis or edema  Skin: Skin color, texture, turgor normal. No rashes or lesions  Neurologic: Unable to assess orientation. Right sided facial droop- mild. RUE and RLE weakness. Does not cooperate with assessment.   Psych: Depressed.  Avoids eye contact.    Lab/Diagnostic data:  Recent labs in [facility] reviewed by me today.     ASSESSMENT/PLAN:     (I63.512) Cerebral infarction due to occlusion of left middle cerebral artery (H)  (I63.512) Acute ischemic left MCA stroke (H)  (primary encounter diagnosis): Unstable.  Comment: We will need continuous monitoring and assessment and appropriate discharge planning due to the level of deficits.  Plan:   -PT and OT.  -Plavix 75 mg daily.  -Atorvastatin 40 mg daily.  -Speech therapy eval and treat.  -Aricept 5 mg daily.  -Melatonin 3 mg nightly.     (R28.597) Right " leg weakness  (R29.898) Right arm weakness  Plan:   -PT and OT.    Wheelchair requirement:   Patient has mobility limitations impairing his ability to participate in MR ADLS; mobility limitations cannot be sufficiently resolved with cane or walker, patient or caregiver are able to safely use manual wheelchair and patients functional mobility can be resolved by use of manual wheelchair. Her living space does have adequate ability to maneuver the wheelchair.     Cognitive impairment  Aphasia:  -Eval and treat.    (I10) Benign Essential Hypertension: Unstable.  Comment: Per hospital notes, goal is less than 120/80 however can be liberalized due to patient's age and goals of care.  Plan:   -Monitor blood pressures closely nursing home, update NP for consistent blood pressures greater than 130s over 90s.  -Increase amlodipine to 5 mg p.o. daily.  -Refuse labs today, lab will try again on Monday.    Depressed affect  Poor p.o. intake/failure to thrive: Unstable.  -Dietitian eval and treat  -ACP eval and treat.  -Continue to encourage Remeron 7.5 mg p.o. every night.    Greater than 35 minutes with greater than 50% spent face-to-face with patient, patient sister, social work and nursing discussing discharge planning, therapy participation and wheelchair face-to-face.    Electronically signed by:  Jose De Jesus Mora CNP                     Sincerely,        Jose De Jesus Mora CNP

## 2021-10-11 NOTE — PROGRESS NOTES
[facility] GERIATRIC SERVICES  PRIMARY CARE PROVIDER AND CLINIC:  Kodak Smith MD, [address] [facility] [address] / [place] [address]  Chief Complaint   Patient presents with     RECHECK      [place] Medical Record Number:  [id number]  Place of Service where encounter took place:  [facility] ([facility]) [[id number]]    Paradise Yuan  is a 71 year old  (1950), admitted to the above facility from  [facility]. Hospital stay 8/2 through 8/21..   HPI: Janette Goldberg has a primary history of type 2 diabetes, hypertension, hyperlipidemia and CKD with a left upper lobectomy and memory problems.  She presented to an outside ED on July 2 with 2 weeks of right-sided weakness and facial droop.  Her CT revealed no acute intracranial process but incidentally showed presumed large partially calcified meningioma on the left side of the posterior fossa.  MRI revealed subacute infarcts and a 3 cm calcified mass in left cerebellum.  She is not a candidate for thrombolytics and was then transferred to [facility]  Course complicated by hypertension and diabetes as well as SVT.  She also had a positive Covid test although had no symptoms.   She was readmitted to [facility] on 8/23 for increased aphasia and right sided weakness.  This time  with occlusion of the left ICA hand TPA was administered before being taken for thrombectomy.  At the conclusion of the procedure, she was noted to be pulseless on the right upper extremity, she was seen by vascular who recommended monitoring.  She was then taken to inpatient rehabilitation.  She was cleared by speech for thin liquids and regular diet.  She is seen by PT and recommended moderate assist for standing.    [facility] course: Mercedes is seen today with her sister who does the majority of share in the conversation. She is seen for wheelchair evaluation as well as to discuss discharge planning. SW thinks she will be cut off from her insurance in the next week due to non  compliance with therapy. She has participated a few times now. Nursing is also reporting some improved appetite especially when her sister is visiting. Her weight has been stable, however. 99, 100, 98lbs.     Majority of time spent was discussing potential discharge with her sister; SW was under the impression that she would be able to discharge to her sister's house, her sister tells me today that she would like her to stay here as long as possible to get the help she needs and that she would be unable to care for her to this extent at home.  She also tells me she will be getting surgery done on her eyes on 18 October.  She does not drive and her son helps get her to and from visiting the care center.  She like Richa to be in the care center as long as possible.  I did mention that participation in therapy is important and  her sister tries to encourage Janette Goldberg to participate.  She does not seem to grasp that she would be unsafe to discharge to home alone in any capacity even likely after she completes therapy.  Janette Goldberg did not speak during this conversation but she did allow me to examine her and listen to her lungs which were clear.  Pressures have improved slightly with amlodipine increased to 5 daily.  Also updated social work and nurse manager regarding the conversation and that further discharge planning may need to happen in order to ensure we had a safe plan; possible long-term care placement.      CODE STATUS/ADVANCE DIRECTIVES DISCUSSION:  Prior  CPR/Full code   ALLERGIES: No Known Allergies   PAST MEDICAL HISTORY:   Past Medical History:   Diagnosis Date     Hypertension       PAST SURGICAL HISTORY:   has a past surgical history that includes IR Miscellaneous Procedure (3/8/2003); IR Miscellaneous Procedure (3/8/2003); IR Visceral Angiogram (3/8/2003); IR Miscellaneous Procedure (3/8/2003); IR Miscellaneous Procedure (3/8/2003); IR Miscellaneous Procedure (3/8/2003); IR Visceral Angiogram (3/8/2003);  IR Miscellaneous Procedure (3/10/2003); IR Visceral Angiogram (3/10/2003); IR Miscellaneous Procedure (3/11/2003); IR Visceral Angiogram (3/11/2003); IR Miscellaneous Procedure (3/11/2003); IR Miscellaneous Procedure (3/11/2003); IR Miscellaneous Procedure (3/11/2003); IR Miscellaneous Procedure (3/12/2003); IR Miscellaneous Procedure (3/12/2003); IR Miscellaneous Procedure (3/12/2003); IR Miscellaneous Procedure (3/12/2003); IR Spinal Angiogram (3/16/2003); IR Thoracic Aortogram (3/16/2003); IR Spinal Angiogram (3/16/2003); IR Spinal Angiogram (3/16/2003); IR Spinal Angiogram (3/16/2003); IR Miscellaneous Procedure (3/16/2003); IR Miscellaneous Procedure (3/16/2003); IR Miscellaneous Procedure (3/16/2003); IR Miscellaneous Procedure (3/16/2003); IR Miscellaneous Procedure (3/16/2003); IR Spinal Angiogram (3/16/2003); IR Spinal Angiogram (3/16/2003); IR Spinal Angiogram (3/16/2003); IR Miscellaneous Procedure (3/16/2003); IR Miscellaneous Procedure (3/16/2003); IR Spinal Angiogram (3/16/2003); and IR Miscellaneous Procedure (3/16/2003).  FAMILY HISTORY: family history is not on file.  SOCIAL HISTORY:   reports that she has never smoked. She has never used smokeless tobacco. She reports that she does not drink alcohol and does not use drugs.  Patient's living condition: lives with family, sister.     Post Discharge Medication Reconciliation Status: discharge medications reconciled, continue medications without change  Current Outpatient Medications   Medication Sig     acetaminophen (TYLENOL) 325 MG tablet Take 650 mg by mouth every 6 hours as needed for mild pain     amLODIPine (NORVASC) 5 MG tablet Take 5 mg by mouth daily     atorvastatin (LIPITOR) 40 MG tablet Take 1 tablet (40 mg) by mouth At Bedtime     clopidogrel (PLAVIX) 75 MG tablet Take 75 mg by mouth daily     donepezil (ARICEPT) 5 MG tablet Take 1 tablet (5 mg) by mouth At Bedtime     Lidocaine (LIDOCARE) 4 % Patch Place 1 patch onto the skin every 24  "hours To prevent lidocaine toxicity, patient should be patch free for 12 hrs daily.     melatonin 3 MG tablet Take 1 mg by mouth At Bedtime     mirtazapine (REMERON) 7.5 MG tablet Take 7.5 mg by mouth At Bedtime     pantoprazole (PROTONIX) 40 MG EC tablet Take 40 mg by mouth daily     senna-docusate (SENOKOT-S/PERICOLACE) 8.6-50 MG tablet Take 1 tablet by mouth 2 times daily     No current facility-administered medications for this visit.       ROS:  Unobtainable secondary to aphasia.     Vitals:  /59   Pulse 94   Temp 97.7  F (36.5  C)   Resp 18   Ht 1.575 m (5' 2\")   Wt 44.7 kg (98 lb 9.6 oz)   SpO2 96%   BMI 18.03 kg/m    Exam:  Physical Exam   General appearance: alert, appears stated age.    Head: Normocephalic, without obvious abnormality, atraumatic, Eyes: sclera anicteric. Mild R sided facial droop.   Lungs: respirations without effort, LSCTA; no wheezing or rales.   Cardiovascular: S1, S2. Regular rate and rhythm.   ABDOMEN: Globular and soft, non tender.    Extremities: extremities normal, atraumatic, no cyanosis or edema  Skin: Skin color, texture, turgor normal. No rashes or lesions  Neurologic: Unable to assess orientation. Right sided facial droop- mild. RUE and RLE weakness. Does not cooperate with assessment.   Psych: Depressed.  Avoids eye contact.    Lab/Diagnostic data:  Recent labs in [facility] reviewed by me today.     ASSESSMENT/PLAN:     (I63.512) Cerebral infarction due to occlusion of left middle cerebral artery (H)  (I63.512) Acute ischemic left MCA stroke (H)  (primary encounter diagnosis): Unstable.  Comment: We will need continuous monitoring and assessment and appropriate discharge planning due to the level of deficits.  Plan:   -PT and OT.  -Plavix 75 mg daily.  -Atorvastatin 40 mg daily.  -Speech therapy eval and treat.  -Aricept 5 mg daily.  -Melatonin 3 mg nightly.     (R29.898) Right leg weakness  (R29.898) Right arm weakness  Plan:   -PT and OT.    Wheelchair requirement: "   Patient has mobility limitations impairing his ability to participate in MR ADLS; mobility limitations cannot be sufficiently resolved with cane or walker, patient or caregiver are able to safely use manual wheelchair and patients functional mobility can be resolved by use of manual wheelchair. Her living space does have adequate ability to maneuver the wheelchair.     Cognitive impairment  Aphasia:  -Eval and treat.    (I10) Benign Essential Hypertension: Unstable.  Comment: Per hospital notes, goal is less than 120/80 however can be liberalized due to patient's age and goals of care.  Plan:   -Monitor blood pressures closely nursing home, update NP for consistent blood pressures greater than 130s over 90s.  -Increase amlodipine to 5 mg p.o. daily.  -Refuse labs today, lab will try again on Monday.    Depressed affect  Poor p.o. intake/failure to thrive: Unstable.  -Dietitian eval and treat  -ACP eval and treat.  -Continue to encourage Remeron 7.5 mg p.o. every night.    Greater than 35 minutes with greater than 50% spent face-to-face with patient, patient sister, social work and nursing discussing discharge planning, therapy participation and wheelchair face-to-face.    Electronically signed by:  Jose De Jesus Mora, CNP

## 2021-10-12 ASSESSMENT — MIFFLIN-ST. JEOR: SCORE: 914.13

## 2021-10-14 ENCOUNTER — DISCHARGE SUMMARY NURSING HOME (OUTPATIENT)
Dept: GERIATRICS | Facility: CLINIC | Age: 71
End: 2021-10-14
Payer: COMMERCIAL

## 2021-10-14 VITALS
HEART RATE: 98 BPM | WEIGHT: 99.4 LBS | BODY MASS INDEX: 18.29 KG/M2 | SYSTOLIC BLOOD PRESSURE: 131 MMHG | RESPIRATION RATE: 19 BRPM | TEMPERATURE: 98.1 F | DIASTOLIC BLOOD PRESSURE: 80 MMHG | OXYGEN SATURATION: 94 % | HEIGHT: 62 IN

## 2021-10-14 DIAGNOSIS — E11.9 TYPE 2 DIABETES MELLITUS TREATED WITHOUT INSULIN (H): ICD-10-CM

## 2021-10-14 DIAGNOSIS — R29.898 RIGHT LEG WEAKNESS: ICD-10-CM

## 2021-10-14 DIAGNOSIS — R29.898 RIGHT ARM WEAKNESS: Primary | ICD-10-CM

## 2021-10-14 DIAGNOSIS — I63.512 ACUTE ISCHEMIC LEFT MCA STROKE (H): ICD-10-CM

## 2021-10-14 PROCEDURE — 99316 NF DSCHRG MGMT 30 MIN+: CPT | Performed by: NURSE PRACTITIONER

## 2021-10-14 NOTE — LETTER
10/12/2021        RE: Paradise Yuan  [address] [place] [address]        [facility] GERIATRIC SERVICES DISCHARGE SUMMARY  PATIENT'S NAME: Paradise Yuan  YOB: 1950  MEDICAL RECORD NUMBER:  [id number]  Place of Service where encounter took place:  [facility] ([facility]) [[id number]]    PRIMARY CARE PROVIDER AND CLINIC RESPONSIBLE AFTER TRANSFER:   Kodak Smith MD, [address] / [place] [address]     Transferring providers: Jose De Jesus Mora CNP, Mari Prieto MD  Recent Hospitalization/ED:  [facility]  stay 8/30/21 to 9/17/21.  Date of SNF Admission: September 17, 2021  Date of SNF (anticipated) Discharge: October 15, 2021  Discharged to: with family: sister.  Cognitive Scores: unable to assess.  Physical Function: Wheelchair dependent and Pivot transfers  DME: Wheelchair and with R sided lap tray due to R sided weakness. Extended tub bench.     CODE STATUS/ADVANCE DIRECTIVES DISCUSSION:  Prior FULL CODE.  ALLERGIES: Patient has no known allergies.    NURSING FACILITY COURSE   Medication Changes/Rationale:     Added remeron for appetite, depression.     Amlodipine for htn.     Summary of nursing facility stay:     Janette Goldberg has a primary history of type 2 diabetes, hypertension, hyperlipidemia and CKD with a left upper lobectomy and memory problems.  She presented to an outside ED on July 2 with 2 weeks of right-sided weakness and facial droop.  Her CT revealed no acute intracranial process but incidentally showed presumed large partially calcified meningioma on the left side of the posterior fossa.  MRI revealed subacute infarcts and a 3 cm calcified mass in left cerebellum.  She is not a candidate for thrombolytics and was then transferred to [facility].  Course complicated by hypertension and diabetes as well as SVT.  She also had a positive Covid test although had no symptoms.   She was readmitted to [facility] on 8/23 for increased aphasia and right  sided weakness.  This time  with occlusion of the left ICA hand TPA was administered before being taken for thrombectomy.  At the conclusion of the procedure, she was noted to be pulseless on the right upper extremity, she was seen by vascular who recommended monitoring.  She was then taken to inpatient rehabilitation.  She was cleared by speech for thin liquids and regular diet.  She is seen by PT and recommended moderate assist for standing.     TCU course: Mercedes is seen today with her sister who does the majority of share in the conversation. She is seen for wheelchair evaluation as well as to discuss discharge planning. SW thinks she will be cut off from her insurance in the next week due to non compliance with therapy. She has participated a few times now. Nursing is also reporting some improved appetite especially when her sister is visiting. Her weight has been stable, however. 99, 100, 98lbs.     For much of her TCU stay she declined therapies and was often noncompliant with medications.  Minimal p.o. intake however when her sister came she did eat and did maintain her weight at 99 pounds.  We did start Remeron for depression and poor appetite.  I did order psychology evaluation however did not see any results from this she likely declined to participate. She did begin to participate more when her sister began visiting daily. No c/o pain. BG were checked twice daily and <200. No medications for this at this time.     (R29.068) Right arm weakness  (primary encounter diagnosis)              Plan: Requires lap tray for R sided weakness.               (R29.258) Right leg weakness              (I63.512) Acute ischemic left MCA stroke (H)              (E11.9) Type 2 diabetes mellitus treated without insulin (H)              Comment: Less than 200.         Discharge Medications:    Current Outpatient Medications   Medication Sig Dispense Refill     acetaminophen (TYLENOL) 325 MG tablet Take 650 mg by mouth every  "6 hours as needed for mild pain       amLODIPine (NORVASC) 5 MG tablet Take 5 mg by mouth daily       atorvastatin (LIPITOR) 40 MG tablet Take 1 tablet (40 mg) by mouth At Bedtime 90 tablet 3     clopidogrel (PLAVIX) 75 MG tablet Take 75 mg by mouth daily       donepezil (ARICEPT) 5 MG tablet Take 1 tablet (5 mg) by mouth At Bedtime 30 tablet 3     Lidocaine (LIDOCARE) 4 % Patch Place 1 patch onto the skin every 24 hours To prevent lidocaine toxicity, patient should be patch free for 12 hrs daily.       melatonin 3 MG tablet Take 1 mg by mouth At Bedtime       mirtazapine (REMERON) 7.5 MG tablet Take 7.5 mg by mouth At Bedtime       pantoprazole (PROTONIX) 40 MG EC tablet Take 40 mg by mouth daily       senna-docusate (SENOKOT-S/PERICOLACE) 8.6-50 MG tablet Take 1 tablet by mouth 2 times daily          Controlled medications:   not applicable/none     Past Medical History:   Past Medical History:   Diagnosis Date     Hypertension      Physical Exam:   Vitals: /80   Pulse 98   Temp 98.1  F (36.7  C)   Resp 19   Ht 1.575 m (5' 2\")   Wt 45.1 kg (99 lb 6.4 oz)   SpO2 94%   BMI 18.18 kg/m    BMI= Body mass index is 18.18 kg/m .     Physical Exam   General appearance: alert. Does not make eye contact or participate in conversation.    Head: Normocephalic, without obvious abnormality, atraumatic, Eyes: sclera anicteric.  Lungs: respirations without effort, LSCTA; no wheezing or rales.   Cardiovascular: S1, S2. Regular rate and rhythm.   ABDOMEN: non tender.   Extremities: extremities normal, atraumatic, no cyanosis or edema  Skin: Skin color, texture, turgor normal. No rashes or lesions  Neurologic:oriented. R sided weakness > Left.   Psych: Flat affect. Does not interact.     SNF labs: Recent labs in [facility] reviewed by me today.      Last Comprehensive Metabolic Panel:  Sodium   Date Value Ref Range Status   08/19/2021 139 133 - 144 mmol/L Final     Potassium   Date Value Ref Range Status   08/19/2021 4.6 3.4 - " 5.3 mmol/L Final     Chloride   Date Value Ref Range Status   08/19/2021 106 94 - 109 mmol/L Final     Carbon Dioxide (CO2)   Date Value Ref Range Status   08/19/2021 30 20 - 32 mmol/L Final     Anion Gap   Date Value Ref Range Status   08/19/2021 3 3 - 14 mmol/L Final     Glucose   Date Value Ref Range Status   08/19/2021 126 (H) 70 - 99 mg/dL Final     GLUCOSE BY METER POCT   Date Value Ref Range Status   08/20/2021 92 70 - 99 mg/dL Final     Urea Nitrogen   Date Value Ref Range Status   08/19/2021 23 7 - 30 mg/dL Final     Creatinine   Date Value Ref Range Status   08/19/2021 1.19 (H) 0.52 - 1.04 mg/dL Final     GFR Estimate   Date Value Ref Range Status   08/19/2021 46 (L) >60 mL/min/1.73m2 Final     Comment:     As of July 11, 2021, eGFR is calculated by the CKD-EPI creatinine equation, without race adjustment. eGFR can be influenced by muscle mass, exercise, and diet. The reported eGFR is an estimation only and is only applicable if the renal function is stable.   04/20/2021 47 (L) >60 mL/min/1.73m2 Final     Calcium   Date Value Ref Range Status   08/19/2021 8.8 8.5 - 10.1 mg/dL Final     Lab Results   Component Value Date    WBC 7.6 08/19/2021     Lab Results   Component Value Date    RBC 3.45 08/19/2021     Lab Results   Component Value Date    HGB 10.0 08/19/2021     Lab Results   Component Value Date    HCT 33.3 08/19/2021     No components found for: MCT  Lab Results   Component Value Date    MCV 97 08/19/2021     Lab Results   Component Value Date    MCH 29.0 08/19/2021     Lab Results   Component Value Date    MCHC 30.0 08/19/2021     Lab Results   Component Value Date    RDW 14.6 08/19/2021     Lab Results   Component Value Date     08/19/2021       DISCHARGE PLAN:    Follow up labs: per PCP.    Medical Follow Up:      Follow up with primary care provider in 1 weeks     Discharge Services: Home Care:  Occupational Therapy, Physical Therapy, Registered Nurse and Home Health Aide    Discharge  Instructions Verbalized to Patient at Discharge:     None    TOTAL DISCHARGE TIME:   Greater than 30 minutes  Electronically signed by:  Jose De Jesus Mora CNP     Documentation of Face to Face and Certification for Home Health Services    I certify that patient: Paradise is under my care and that I, or a nurse practitioner or physician's assistant working with me, had a face-to-face encounter that meets the physician face-to-face encounter requirements with this patient on: 10/14/21.    This encounter with the patient was in whole, or in part, for the following medical condition, which is the primary reason for home health care: RN, PT, HHA, OT    I certify that, based on my findings, the following services are medically necessary home health services: Nursing, Occupational Therapy and Physical Therapy.    My clinical findings support the need for the above services because: RN required for medication management, PT and OT required for continued functional improvment in home setting .     Further, I certify that my clinical findings support that this patient is homebound (i.e. absences from home require considerable and taxing effort and are for medical reasons or Faith services or infrequently or of short duration when for other reasons) because: Requires assistance of another person or specialized equipment to access medical services because patient: Range of motion limitations prevents ability to exit home safely. and Requires supervision of another for safe transfer...    Based on the above findings. I certify that this patient is confined to the home and needs intermittent skilled nursing care, physical therapy and/or speech therapy.  The patient is under my care, and I have initiated the establishment of the plan of care.  This patient will be followed by a physician who will periodically review the plan of care.          Patient has mobility limitations impairing his ability to participate in MR ADLS;  mobility limitations cannot be sufficiently resolved with cane or walker, patient or caregiver are able to safely use manual wheelchair and patients functional mobility can be resolved by use of manual wheelchair.   Also requires a R sided lap tray for R sided weakness.     Patient will require a extended tub bench for safe showering and completion of ADLs due to weakness and inability to stand safely in the shower, this is necessary to prevent falls and allow the patient to shower or bathe with assistance.                   Sincerely,        Jose De Jesus Mora, CNP

## 2021-10-16 ENCOUNTER — HEALTH MAINTENANCE LETTER (OUTPATIENT)
Age: 71
End: 2021-10-16

## 2021-10-19 ENCOUNTER — TRANSITIONAL CARE UNIT VISIT (OUTPATIENT)
Dept: GERIATRICS | Facility: CLINIC | Age: 71
End: 2021-10-19
Payer: COMMERCIAL

## 2021-10-19 ENCOUNTER — MEDICAL CORRESPONDENCE (OUTPATIENT)
Dept: HEALTH INFORMATION MANAGEMENT | Facility: CLINIC | Age: 71
End: 2021-10-19

## 2021-10-19 VITALS
HEART RATE: 104 BPM | SYSTOLIC BLOOD PRESSURE: 145 MMHG | OXYGEN SATURATION: 98 % | RESPIRATION RATE: 16 BRPM | BODY MASS INDEX: 18.5 KG/M2 | DIASTOLIC BLOOD PRESSURE: 80 MMHG | TEMPERATURE: 96.9 F | HEIGHT: 62 IN | WEIGHT: 100.5 LBS

## 2021-10-19 DIAGNOSIS — I63.512 ACUTE ISCHEMIC LEFT MCA STROKE (H): ICD-10-CM

## 2021-10-19 DIAGNOSIS — E11.9 TYPE 2 DIABETES MELLITUS TREATED WITHOUT INSULIN (H): ICD-10-CM

## 2021-10-19 DIAGNOSIS — I63.512 CEREBRAL INFARCTION DUE TO OCCLUSION OF LEFT MIDDLE CEREBRAL ARTERY (H): Primary | ICD-10-CM

## 2021-10-19 PROCEDURE — 99309 SBSQ NF CARE MODERATE MDM 30: CPT | Performed by: NURSE PRACTITIONER

## 2021-10-19 ASSESSMENT — MIFFLIN-ST. JEOR: SCORE: 919.12

## 2021-10-19 NOTE — LETTER
10/19/2021        RE: Paradise Yuan  [address] [place] [address]        [facility] GERIATRIC SERVICES  PRIMARY CARE PROVIDER AND CLINIC:  Kodak Smith MD, [address] [facility] [address] / [place] [address]  Chief Complaint   Patient presents with     SHUNECK      [facility] Medical Record Number:  [id number]  Place of Service where encounter took place:  [facility] [[id number]]    Paradise Yuan  is a 71 year old  (1950), admitted to the above facility from  [facility]. Hospital stay 8/2 through 8/21..   HPI: Janette Goldberg has a primary history of type 2 diabetes, hypertension, hyperlipidemia and CKD with a left upper lobectomy and memory problems.  She presented to an outside ED on July 2 with 2 weeks of right-sided weakness and facial droop.  Her CT revealed no acute intracranial process but incidentally showed presumed large partially calcified meningioma on the left side of the posterior fossa.  MRI revealed subacute infarcts and a 3 cm calcified mass in left cerebellum.  She is not a candidate for thrombolytics and was then transferred to [facility]  Course complicated by hypertension and diabetes as well as SVT.  She also had a positive Covid test although had no symptoms.   She was readmitted to [facility] on 8/23 for increased aphasia and right sided weakness.  This time  with occlusion of the left ICA hand TPA was administered before being taken for thrombectomy.  At the conclusion of the procedure, she was noted to be pulseless on the right upper extremity, she was seen by vascular who recommended monitoring.  She was then taken to inpatient rehabilitation.  She was cleared by speech for thin liquids and regular diet.  She is seen by PT and recommended moderate assist for standing.    TCU: Janette Goldberg is seen regarding updates to appeal as she is able to say with therapy for another week.  Per nursing and her sister, she has been more compliant with therapy  and is participating.  She is still very focused on getting home eventually and remains with a very flat affect and persistent is most of the time occasional during the visit.  She has no lower extremity swelling, denies any acute pain or symptoms, and is breathing comfortably on room air.  Blood sugars are reviewed and are satisfactory, generally less than 200.  Blood pressures have also improved, generally less than 140/90.  She reports sleeping well.  Her weights have been stable despite poor appetite.  She is maintaining her admission weight at 98 to 100 pounds.      CODE STATUS/ADVANCE DIRECTIVES DISCUSSION:  Prior  CPR/Full code   ALLERGIES: No Known Allergies   PAST MEDICAL HISTORY:   Past Medical History:   Diagnosis Date     Hypertension       PAST SURGICAL HISTORY:   has a past surgical history that includes IR Miscellaneous Procedure (3/8/2003); IR Miscellaneous Procedure (3/8/2003); IR Visceral Angiogram (3/8/2003); IR Miscellaneous Procedure (3/8/2003); IR Miscellaneous Procedure (3/8/2003); IR Miscellaneous Procedure (3/8/2003); IR Visceral Angiogram (3/8/2003); IR Miscellaneous Procedure (3/10/2003); IR Visceral Angiogram (3/10/2003); IR Miscellaneous Procedure (3/11/2003); IR Visceral Angiogram (3/11/2003); IR Miscellaneous Procedure (3/11/2003); IR Miscellaneous Procedure (3/11/2003); IR Miscellaneous Procedure (3/11/2003); IR Miscellaneous Procedure (3/12/2003); IR Miscellaneous Procedure (3/12/2003); IR Miscellaneous Procedure (3/12/2003); IR Miscellaneous Procedure (3/12/2003); IR Spinal Angiogram (3/16/2003); IR Thoracic Aortogram (3/16/2003); IR Spinal Angiogram (3/16/2003); IR Spinal Angiogram (3/16/2003); IR Spinal Angiogram (3/16/2003); IR Miscellaneous Procedure (3/16/2003); IR Miscellaneous Procedure (3/16/2003); IR Miscellaneous Procedure (3/16/2003); IR Miscellaneous Procedure (3/16/2003); IR Miscellaneous Procedure (3/16/2003); IR Spinal Angiogram (3/16/2003); IR Spinal Angiogram  "(3/16/2003); IR Spinal Angiogram (3/16/2003); IR Miscellaneous Procedure (3/16/2003); IR Miscellaneous Procedure (3/16/2003); IR Spinal Angiogram (3/16/2003); and IR Miscellaneous Procedure (3/16/2003).  FAMILY HISTORY: family history is not on file.  SOCIAL HISTORY:   reports that she has never smoked. She has never used smokeless tobacco. She reports that she does not drink alcohol and does not use drugs.  Patient's living condition: lives with family, sister.     Post Discharge Medication Reconciliation Status: discharge medications reconciled, continue medications without change  Current Outpatient Medications   Medication Sig     acetaminophen (TYLENOL) 325 MG tablet Take 650 mg by mouth every 6 hours as needed for mild pain     amLODIPine (NORVASC) 5 MG tablet Take 5 mg by mouth daily     atorvastatin (LIPITOR) 40 MG tablet Take 1 tablet (40 mg) by mouth At Bedtime     clopidogrel (PLAVIX) 75 MG tablet Take 75 mg by mouth daily     donepezil (ARICEPT) 5 MG tablet Take 1 tablet (5 mg) by mouth At Bedtime     Lidocaine (LIDOCARE) 4 % Patch Place 1 patch onto the skin every 24 hours To prevent lidocaine toxicity, patient should be patch free for 12 hrs daily.     melatonin 3 MG tablet Take 1 mg by mouth At Bedtime     mirtazapine (REMERON) 7.5 MG tablet Take 7.5 mg by mouth At Bedtime     pantoprazole (PROTONIX) 40 MG EC tablet Take 40 mg by mouth daily     senna-docusate (SENOKOT-S/PERICOLACE) 8.6-50 MG tablet Take 1 tablet by mouth 2 times daily     No current facility-administered medications for this visit.       ROS:  Unobtainable secondary to aphasia.     Vitals:  BP (!) 145/80   Pulse 104   Temp 96.9  F (36.1  C)   Resp 16   Ht 1.575 m (5' 2\")   Wt 45.6 kg (100 lb 8 oz)   SpO2 98%   BMI 18.38 kg/m    Exam:  Physical Exam   General appearance: alert, appears stated age.    Head: Normocephalic, without obvious abnormality, atraumatic, Eyes: sclera anicteric. Mild R sided facial droop.   Lungs: " respirations without effort, LSCTA; no wheezing or rales.   Cardiovascular: S1, S2. Regular rate and rhythm.   ABDOMEN: Globular and soft, non tender.    Extremities: extremities normal, atraumatic, no cyanosis or edema  Skin: Skin color, texture, turgor normal. No rashes or lesions  Neurologic: Unable to assess orientation. Right sided facial droop- mild. RUE and RLE weakness. Does not cooperate with assessment.   Psych: Depressed.  Avoids eye contact.    Lab/Diagnostic data:  Recent labs in [facility] reviewed by me today.     ASSESSMENT/PLAN:     (I63.512) Cerebral infarction due to occlusion of left middle cerebral artery (H)  (I63.512) Acute ischemic left MCA stroke (H)  (primary encounter diagnosis): Unstable.  Comment: We will need continuous monitoring and assessment and appropriate discharge planning due to the level of deficits.  Plan:   -PT and OT.  -Plavix 75 mg daily.  -Atorvastatin 40 mg daily.  -Speech therapy eval and treat.  -Aricept 5 mg daily.  -Melatonin 3 mg nightly.     (R29.898) Right leg weakness  (R29.898) Right arm weakness  Plan:   -PT and OT.    Cognitive impairment  Aphasia:  -Eval and treat.    (I10) Benign Essential Hypertension: Unstable.  Comment: Per hospital notes, goal is less than 120/80 however can be liberalized due to patient's age and goals of care.  Plan:   -Monitor blood pressures; have been <140/90.   -Amlodipine to 5 mg p.o. daily.    Depressed affect  Poor p.o. intake/failure to thrive: Maintaining admission weight 98 200 pounds.  -Dietitian eval and treat  -ACP eval and treat.  -Continue to encourage Remeron 7.5 mg p.o. every night.    Electronically signed by:  Jose De Jesus Mora, BRIELLE                   Sincerely,        Jose De Jesus Mora, CNP

## 2021-10-21 ENCOUNTER — TRANSITIONAL CARE UNIT VISIT (OUTPATIENT)
Dept: GERIATRICS | Facility: CLINIC | Age: 71
End: 2021-10-21
Payer: COMMERCIAL

## 2021-10-21 VITALS
TEMPERATURE: 98.5 F | DIASTOLIC BLOOD PRESSURE: 76 MMHG | RESPIRATION RATE: 16 BRPM | WEIGHT: 100.5 LBS | OXYGEN SATURATION: 95 % | BODY MASS INDEX: 18.5 KG/M2 | SYSTOLIC BLOOD PRESSURE: 119 MMHG | HEIGHT: 62 IN | HEART RATE: 95 BPM

## 2021-10-21 DIAGNOSIS — E11.9 TYPE 2 DIABETES MELLITUS TREATED WITHOUT INSULIN (H): ICD-10-CM

## 2021-10-21 DIAGNOSIS — D32.0 MENINGIOMA OF CEREBELLUM (H): Primary | ICD-10-CM

## 2021-10-21 DIAGNOSIS — I63.512 ACUTE ISCHEMIC LEFT MCA STROKE (H): ICD-10-CM

## 2021-10-21 DIAGNOSIS — I10 ESSENTIAL HYPERTENSION, BENIGN: ICD-10-CM

## 2021-10-21 PROCEDURE — 99309 SBSQ NF CARE MODERATE MDM 30: CPT | Performed by: NURSE PRACTITIONER

## 2021-10-21 PROCEDURE — 99207 PR CDG-CUT & PASTE-POTENTIAL IMPACT ON LEVEL: CPT | Performed by: NURSE PRACTITIONER

## 2021-10-21 ASSESSMENT — MIFFLIN-ST. JEOR: SCORE: 919.12

## 2021-10-21 NOTE — LETTER
10/21/2021        RE: Paradise Yuan  [address] [place] [address]        [facility] GERIATRIC SERVICES  PRIMARY CARE PROVIDER AND CLINIC:  Kodak Smith MD, 1984 [facility] [address] / [place] [address]  Chief Complaint   Patient presents with     SHUNECK      [facility] Medical Record Number:  [id number]  Place of Service where encounter took place:  [facility] ([facility]) [[id number]]    Paradise Yuan  is a 71 year old  (1950), admitted to the above facility from  [facility]. Hospital stay 8/2 through 8/21..   HPI: Janette Goldberg has a primary history of type 2 diabetes, hypertension, hyperlipidemia and CKD with a left upper lobectomy and memory problems.  She presented to an outside ED on July 2 with 2 weeks of right-sided weakness and facial droop.  Her CT revealed no acute intracranial process but incidentally showed presumed large partially calcified meningioma on the left side of the posterior fossa.  MRI revealed subacute infarcts and a 3 cm calcified mass in left cerebellum.  She is not a candidate for thrombolytics and was then transferred to [facility]  Course complicated by hypertension and diabetes as well as SVT.  She also had a positive Covid test although had no symptoms.   She was readmitted to [facility] on 8/23 for increased aphasia and right sided weakness.  This time  with occlusion of the left ICA hand TPA was administered before being taken for thrombectomy.  At the conclusion of the procedure, she was noted to be pulseless on the right upper extremity, she was seen by vascular who recommended monitoring.  She was then taken to inpatient rehabilitation.  She was cleared by speech for thin liquids and regular diet.  She is seen by PT and recommended moderate assist for standing.    TCU: Shahid was seen briefly today to acknowledge that she has remained in the facility after submitting an appeal for continued therapy.  She is apparently now much more  compliant with therapies.  Per her sister, she eats more when her sister helps feed her.  Sister has been coming daily to help her with therapies and help to get her to engage with staff.  She continues to avoid eye contact and has minimal response to questions; his sister does most of the talking.  She is denying pain and continues to maintain her admission weight, vital signs reviewed and stable.  Blood sugars also reviewed and stable.      CODE STATUS/ADVANCE DIRECTIVES DISCUSSION:  Prior  CPR/Full code   ALLERGIES: No Known Allergies   PAST MEDICAL HISTORY:   Past Medical History:   Diagnosis Date     Hypertension       PAST SURGICAL HISTORY:   has a past surgical history that includes IR Miscellaneous Procedure (3/8/2003); IR Miscellaneous Procedure (3/8/2003); IR Visceral Angiogram (3/8/2003); IR Miscellaneous Procedure (3/8/2003); IR Miscellaneous Procedure (3/8/2003); IR Miscellaneous Procedure (3/8/2003); IR Visceral Angiogram (3/8/2003); IR Miscellaneous Procedure (3/10/2003); IR Visceral Angiogram (3/10/2003); IR Miscellaneous Procedure (3/11/2003); IR Visceral Angiogram (3/11/2003); IR Miscellaneous Procedure (3/11/2003); IR Miscellaneous Procedure (3/11/2003); IR Miscellaneous Procedure (3/11/2003); IR Miscellaneous Procedure (3/12/2003); IR Miscellaneous Procedure (3/12/2003); IR Miscellaneous Procedure (3/12/2003); IR Miscellaneous Procedure (3/12/2003); IR Spinal Angiogram (3/16/2003); IR Thoracic Aortogram (3/16/2003); IR Spinal Angiogram (3/16/2003); IR Spinal Angiogram (3/16/2003); IR Spinal Angiogram (3/16/2003); IR Miscellaneous Procedure (3/16/2003); IR Miscellaneous Procedure (3/16/2003); IR Miscellaneous Procedure (3/16/2003); IR Miscellaneous Procedure (3/16/2003); IR Miscellaneous Procedure (3/16/2003); IR Spinal Angiogram (3/16/2003); IR Spinal Angiogram (3/16/2003); IR Spinal Angiogram (3/16/2003); IR Miscellaneous Procedure (3/16/2003); IR Miscellaneous Procedure (3/16/2003); IR Spinal  "Angiogram (3/16/2003); and IR Miscellaneous Procedure (3/16/2003).  FAMILY HISTORY: family history is not on file.  SOCIAL HISTORY:   reports that she has never smoked. She has never used smokeless tobacco. She reports that she does not drink alcohol and does not use drugs.  Patient's living condition: lives with family, sister.     Post Discharge Medication Reconciliation Status: discharge medications reconciled, continue medications without change  Current Outpatient Medications   Medication Sig     acetaminophen (TYLENOL) 325 MG tablet Take 650 mg by mouth every 6 hours as needed for mild pain     amLODIPine (NORVASC) 5 MG tablet Take 5 mg by mouth daily     atorvastatin (LIPITOR) 40 MG tablet Take 1 tablet (40 mg) by mouth At Bedtime     clopidogrel (PLAVIX) 75 MG tablet Take 75 mg by mouth daily     donepezil (ARICEPT) 5 MG tablet Take 1 tablet (5 mg) by mouth At Bedtime     Lidocaine (LIDOCARE) 4 % Patch Place 1 patch onto the skin every 24 hours To prevent lidocaine toxicity, patient should be patch free for 12 hrs daily.     melatonin 3 MG tablet Take 1 mg by mouth At Bedtime     mirtazapine (REMERON) 7.5 MG tablet Take 7.5 mg by mouth At Bedtime     pantoprazole (PROTONIX) 40 MG EC tablet Take 40 mg by mouth daily     senna-docusate (SENOKOT-S/PERICOLACE) 8.6-50 MG tablet Take 1 tablet by mouth 2 times daily     No current facility-administered medications for this visit.       ROS:  Unobtainable secondary to aphasia.     Vitals:  /76   Pulse 95   Temp 98.5  F (36.9  C)   Resp 16   Ht 1.575 m (5' 2\")   Wt 45.6 kg (100 lb 8 oz)   SpO2 95%   BMI 18.38 kg/m    Exam:  Physical Exam   General appearance: alert, appears stated age.    Head: Normocephalic, without obvious abnormality, atraumatic, Eyes: sclera anicteric. Mild R sided facial droop.   Lungs: respirations without effort, LSCTA; no wheezing or rales.   Cardiovascular: S1, S2. Regular rate and rhythm.   ABDOMEN: Globular and soft, non " tender.    Extremities: extremities normal, atraumatic, no cyanosis or edema  Skin: Skin color, texture, turgor normal. No rashes or lesions  Neurologic: Unable to assess orientation. Right sided facial droop- mild. RUE and RLE weakness. Does not cooperate with assessment.   Psych: Depressed.  Avoids eye contact.    Lab/Diagnostic data:  Recent labs in [facility] reviewed by me today.     ASSESSMENT/PLAN:     (I63.512) Cerebral infarction due to occlusion of left middle cerebral artery (H)  (I63.512) Acute ischemic left MCA stroke (H)  (primary encounter diagnosis): Unstable.  Comment: We will need continuous monitoring and assessment and appropriate discharge planning due to the level of deficits.  Plan:   -PT and OT.  -Plavix 75 mg daily.  -Atorvastatin 40 mg daily.  -Speech therapy eval and treat.  -Aricept 5 mg daily.  -Melatonin 3 mg nightly.     (R29.898) Right leg weakness  (R29.898) Right arm weakness  Plan:   -PT and OT.    Cognitive impairment  Aphasia:  -Eval and treat.    (I10) Benign Essential Hypertension: Unstable.  Comment: Per hospital notes, goal is less than 120/80 however can be liberalized due to patient's age and goals of care.  Plan:   -Monitor blood pressures; have been <140/90.   -Amlodipine to 5 mg p.o. daily.  She is an alert.     Depressed affect  Poor p.o. intake/failure to thrive: Maintaining admission weight 98 pounds.  -Dietitian eval and treat  -ACP eval and treat.  -Continue to encourage Remeron 7.5 mg p.o. every night.    Electronically signed by:  Jose De Jesus Mora CNP                 Sincerely,        Jose De Jesus Mora, CNP

## 2021-10-22 NOTE — PROGRESS NOTES
[facility] GERIATRIC SERVICES  PRIMARY CARE PROVIDER AND CLINIC:  Kodak Smith MD, 1984 [facility] [address] / [place] [address]  Chief Complaint   Patient presents with     RECHECK      [facility] Medical Record Number:  [id number]  Place of Service where encounter took place:  [facility] ([facility]) [[id number]]    Paradise Yuan  is a 71 year old  (1950), admitted to the above facility from  [facility]. Hospital stay 8/2 through 8/21..   HPI: Janette Goldberg has a primary history of type 2 diabetes, hypertension, hyperlipidemia and CKD with a left upper lobectomy and memory problems.  She presented to an outside ED on July 2 with 2 weeks of right-sided weakness and facial droop.  Her CT revealed no acute intracranial process but incidentally showed presumed large partially calcified meningioma on the left side of the posterior fossa.  MRI revealed subacute infarcts and a 3 cm calcified mass in left cerebellum.  She is not a candidate for thrombolytics and was then transferred to [facility]  Course complicated by hypertension and diabetes as well as SVT.  She also had a positive Covid test although had no symptoms.   She was readmitted to [facility] on 8/23 for increased aphasia and right sided weakness.  This time  with occlusion of the left ICA hand TPA was administered before being taken for thrombectomy.  At the conclusion of the procedure, she was noted to be pulseless on the right upper extremity, she was seen by vascular who recommended monitoring.  She was then taken to inpatient rehabilitation.  She was cleared by speech for thin liquids and regular diet.  She is seen by PT and recommended moderate assist for standing.    TCU: Janette Goldberg is seen regarding updates to appeal as she is able to say with therapy for another week.  Per nursing and her sister, she has been more compliant with therapy and is participating.  She is still very focused on getting home eventually and remains  with a very flat affect and persistent is most of the time occasional during the visit.  She has no lower extremity swelling, denies any acute pain or symptoms, and is breathing comfortably on room air.  Blood sugars are reviewed and are satisfactory, generally less than 200.  Blood pressures have also improved, generally less than 140/90.  She reports sleeping well.  Her weights have been stable despite poor appetite.  She is maintaining her admission weight at 98 to 100 pounds.      CODE STATUS/ADVANCE DIRECTIVES DISCUSSION:  Prior  CPR/Full code   ALLERGIES: No Known Allergies   PAST MEDICAL HISTORY:   Past Medical History:   Diagnosis Date     Hypertension       PAST SURGICAL HISTORY:   has a past surgical history that includes IR Miscellaneous Procedure (3/8/2003); IR Miscellaneous Procedure (3/8/2003); IR Visceral Angiogram (3/8/2003); IR Miscellaneous Procedure (3/8/2003); IR Miscellaneous Procedure (3/8/2003); IR Miscellaneous Procedure (3/8/2003); IR Visceral Angiogram (3/8/2003); IR Miscellaneous Procedure (3/10/2003); IR Visceral Angiogram (3/10/2003); IR Miscellaneous Procedure (3/11/2003); IR Visceral Angiogram (3/11/2003); IR Miscellaneous Procedure (3/11/2003); IR Miscellaneous Procedure (3/11/2003); IR Miscellaneous Procedure (3/11/2003); IR Miscellaneous Procedure (3/12/2003); IR Miscellaneous Procedure (3/12/2003); IR Miscellaneous Procedure (3/12/2003); IR Miscellaneous Procedure (3/12/2003); IR Spinal Angiogram (3/16/2003); IR Thoracic Aortogram (3/16/2003); IR Spinal Angiogram (3/16/2003); IR Spinal Angiogram (3/16/2003); IR Spinal Angiogram (3/16/2003); IR Miscellaneous Procedure (3/16/2003); IR Miscellaneous Procedure (3/16/2003); IR Miscellaneous Procedure (3/16/2003); IR Miscellaneous Procedure (3/16/2003); IR Miscellaneous Procedure (3/16/2003); IR Spinal Angiogram (3/16/2003); IR Spinal Angiogram (3/16/2003); IR Spinal Angiogram (3/16/2003); IR Miscellaneous Procedure (3/16/2003); IR  "Miscellaneous Procedure (3/16/2003); IR Spinal Angiogram (3/16/2003); and IR Miscellaneous Procedure (3/16/2003).  FAMILY HISTORY: family history is not on file.  SOCIAL HISTORY:   reports that she has never smoked. She has never used smokeless tobacco. She reports that she does not drink alcohol and does not use drugs.  Patient's living condition: lives with family, sister.     Post Discharge Medication Reconciliation Status: discharge medications reconciled, continue medications without change  Current Outpatient Medications   Medication Sig     acetaminophen (TYLENOL) 325 MG tablet Take 650 mg by mouth every 6 hours as needed for mild pain     amLODIPine (NORVASC) 5 MG tablet Take 5 mg by mouth daily     atorvastatin (LIPITOR) 40 MG tablet Take 1 tablet (40 mg) by mouth At Bedtime     clopidogrel (PLAVIX) 75 MG tablet Take 75 mg by mouth daily     donepezil (ARICEPT) 5 MG tablet Take 1 tablet (5 mg) by mouth At Bedtime     Lidocaine (LIDOCARE) 4 % Patch Place 1 patch onto the skin every 24 hours To prevent lidocaine toxicity, patient should be patch free for 12 hrs daily.     melatonin 3 MG tablet Take 1 mg by mouth At Bedtime     mirtazapine (REMERON) 7.5 MG tablet Take 7.5 mg by mouth At Bedtime     pantoprazole (PROTONIX) 40 MG EC tablet Take 40 mg by mouth daily     senna-docusate (SENOKOT-S/PERICOLACE) 8.6-50 MG tablet Take 1 tablet by mouth 2 times daily     No current facility-administered medications for this visit.       ROS:  Unobtainable secondary to aphasia.     Vitals:  BP (!) 145/80   Pulse 104   Temp 96.9  F (36.1  C)   Resp 16   Ht 1.575 m (5' 2\")   Wt 45.6 kg (100 lb 8 oz)   SpO2 98%   BMI 18.38 kg/m    Exam:  Physical Exam   General appearance: alert, appears stated age.    Head: Normocephalic, without obvious abnormality, atraumatic, Eyes: sclera anicteric. Mild R sided facial droop.   Lungs: respirations without effort, LSCTA; no wheezing or rales.   Cardiovascular: S1, S2. Regular rate " and rhythm.   ABDOMEN: Globular and soft, non tender.    Extremities: extremities normal, atraumatic, no cyanosis or edema  Skin: Skin color, texture, turgor normal. No rashes or lesions  Neurologic: Unable to assess orientation. Right sided facial droop- mild. RUE and RLE weakness. Does not cooperate with assessment.   Psych: Depressed.  Avoids eye contact.    Lab/Diagnostic data:  Recent labs in [facility] reviewed by me today.     ASSESSMENT/PLAN:     (I63.512) Cerebral infarction due to occlusion of left middle cerebral artery (H)  (I63.512) Acute ischemic left MCA stroke (H)  (primary encounter diagnosis): Unstable.  Comment: We will need continuous monitoring and assessment and appropriate discharge planning due to the level of deficits.  Plan:   -PT and OT.  -Plavix 75 mg daily.  -Atorvastatin 40 mg daily.  -Speech therapy eval and treat.  -Aricept 5 mg daily.  -Melatonin 3 mg nightly.     (R29.898) Right leg weakness  (R29.898) Right arm weakness  Plan:   -PT and OT.    Cognitive impairment  Aphasia:  -Eval and treat.    (I10) Benign Essential Hypertension: Unstable.  Comment: Per hospital notes, goal is less than 120/80 however can be liberalized due to patient's age and goals of care.  Plan:   -Monitor blood pressures; have been <140/90.   -Amlodipine to 5 mg p.o. daily.    Depressed affect  Poor p.o. intake/failure to thrive: Maintaining admission weight 98 200 pounds.  -Dietitian eval and treat  -ACP eval and treat.  -Continue to encourage Remeron 7.5 mg p.o. every night.    Electronically signed by:  Jose De Jesus Mora, CNP

## 2021-10-25 NOTE — PROGRESS NOTES
[facility] GERIATRIC SERVICES  PRIMARY CARE PROVIDER AND CLINIC:  Kodak Smith MD, 1984 [facility] [address] / [place] [address]  Chief Complaint   Patient presents with     RECHECK      [facility] Medical Record Number:  [id number]  Place of Service where encounter took place:  [facility] ([facility]) [[id number]]    Paradise Yuan  is a 71 year old  (1950), admitted to the above facility from  [facility]. Hospital stay 8/2 through 8/21..   HPI: Janette Goldberg has a primary history of type 2 diabetes, hypertension, hyperlipidemia and CKD with a left upper lobectomy and memory problems.  She presented to an outside ED on July 2 with 2 weeks of right-sided weakness and facial droop.  Her CT revealed no acute intracranial process but incidentally showed presumed large partially calcified meningioma on the left side of the posterior fossa.  MRI revealed subacute infarcts and a 3 cm calcified mass in left cerebellum.  She is not a candidate for thrombolytics and was then transferred to [facility]  Course complicated by hypertension and diabetes as well as SVT.  She also had a positive Covid test although had no symptoms.   She was readmitted to [facility] on 8/23 for increased aphasia and right sided weakness.  This time  with occlusion of the left ICA hand TPA was administered before being taken for thrombectomy.  At the conclusion of the procedure, she was noted to be pulseless on the right upper extremity, she was seen by vascular who recommended monitoring.  She was then taken to inpatient rehabilitation.  She was cleared by speech for thin liquids and regular diet.  She is seen by PT and recommended moderate assist for standing.    TCU: Shahid was seen briefly today to acknowledge that she has remained in the facility after submitting an appeal for continued therapy.  She is apparently now much more compliant with therapies.  Per her sister, she eats more when her sister helps feed her.   Sister has been coming daily to help her with therapies and help to get her to engage with staff.  She continues to avoid eye contact and has minimal response to questions; his sister does most of the talking.  She is denying pain and continues to maintain her admission weight, vital signs reviewed and stable.  Blood sugars also reviewed and stable.      CODE STATUS/ADVANCE DIRECTIVES DISCUSSION:  Prior  CPR/Full code   ALLERGIES: No Known Allergies   PAST MEDICAL HISTORY:   Past Medical History:   Diagnosis Date     Hypertension       PAST SURGICAL HISTORY:   has a past surgical history that includes IR Miscellaneous Procedure (3/8/2003); IR Miscellaneous Procedure (3/8/2003); IR Visceral Angiogram (3/8/2003); IR Miscellaneous Procedure (3/8/2003); IR Miscellaneous Procedure (3/8/2003); IR Miscellaneous Procedure (3/8/2003); IR Visceral Angiogram (3/8/2003); IR Miscellaneous Procedure (3/10/2003); IR Visceral Angiogram (3/10/2003); IR Miscellaneous Procedure (3/11/2003); IR Visceral Angiogram (3/11/2003); IR Miscellaneous Procedure (3/11/2003); IR Miscellaneous Procedure (3/11/2003); IR Miscellaneous Procedure (3/11/2003); IR Miscellaneous Procedure (3/12/2003); IR Miscellaneous Procedure (3/12/2003); IR Miscellaneous Procedure (3/12/2003); IR Miscellaneous Procedure (3/12/2003); IR Spinal Angiogram (3/16/2003); IR Thoracic Aortogram (3/16/2003); IR Spinal Angiogram (3/16/2003); IR Spinal Angiogram (3/16/2003); IR Spinal Angiogram (3/16/2003); IR Miscellaneous Procedure (3/16/2003); IR Miscellaneous Procedure (3/16/2003); IR Miscellaneous Procedure (3/16/2003); IR Miscellaneous Procedure (3/16/2003); IR Miscellaneous Procedure (3/16/2003); IR Spinal Angiogram (3/16/2003); IR Spinal Angiogram (3/16/2003); IR Spinal Angiogram (3/16/2003); IR Miscellaneous Procedure (3/16/2003); IR Miscellaneous Procedure (3/16/2003); IR Spinal Angiogram (3/16/2003); and IR Miscellaneous Procedure (3/16/2003).  FAMILY HISTORY: family  "history is not on file.  SOCIAL HISTORY:   reports that she has never smoked. She has never used smokeless tobacco. She reports that she does not drink alcohol and does not use drugs.  Patient's living condition: lives with family, sister.     Post Discharge Medication Reconciliation Status: discharge medications reconciled, continue medications without change  Current Outpatient Medications   Medication Sig     acetaminophen (TYLENOL) 325 MG tablet Take 650 mg by mouth every 6 hours as needed for mild pain     amLODIPine (NORVASC) 5 MG tablet Take 5 mg by mouth daily     atorvastatin (LIPITOR) 40 MG tablet Take 1 tablet (40 mg) by mouth At Bedtime     clopidogrel (PLAVIX) 75 MG tablet Take 75 mg by mouth daily     donepezil (ARICEPT) 5 MG tablet Take 1 tablet (5 mg) by mouth At Bedtime     Lidocaine (LIDOCARE) 4 % Patch Place 1 patch onto the skin every 24 hours To prevent lidocaine toxicity, patient should be patch free for 12 hrs daily.     melatonin 3 MG tablet Take 1 mg by mouth At Bedtime     mirtazapine (REMERON) 7.5 MG tablet Take 7.5 mg by mouth At Bedtime     pantoprazole (PROTONIX) 40 MG EC tablet Take 40 mg by mouth daily     senna-docusate (SENOKOT-S/PERICOLACE) 8.6-50 MG tablet Take 1 tablet by mouth 2 times daily     No current facility-administered medications for this visit.       ROS:  Unobtainable secondary to aphasia.     Vitals:  /76   Pulse 95   Temp 98.5  F (36.9  C)   Resp 16   Ht 1.575 m (5' 2\")   Wt 45.6 kg (100 lb 8 oz)   SpO2 95%   BMI 18.38 kg/m    Exam:  Physical Exam   General appearance: alert, appears stated age.    Head: Normocephalic, without obvious abnormality, atraumatic, Eyes: sclera anicteric. Mild R sided facial droop.   Lungs: respirations without effort, LSCTA; no wheezing or rales.   Cardiovascular: S1, S2. Regular rate and rhythm.   ABDOMEN: Globular and soft, non tender.    Extremities: extremities normal, atraumatic, no cyanosis or edema  Skin: Skin color, " texture, turgor normal. No rashes or lesions  Neurologic: Unable to assess orientation. Right sided facial droop- mild. RUE and RLE weakness. Does not cooperate with assessment.   Psych: Depressed.  Avoids eye contact.    Lab/Diagnostic data:  Recent labs in [facility] reviewed by me today.     ASSESSMENT/PLAN:     (I63.512) Cerebral infarction due to occlusion of left middle cerebral artery (H)  (I63.512) Acute ischemic left MCA stroke (H)  (primary encounter diagnosis): Unstable.  Comment: We will need continuous monitoring and assessment and appropriate discharge planning due to the level of deficits.  Plan:   -PT and OT.  -Plavix 75 mg daily.  -Atorvastatin 40 mg daily.  -Speech therapy eval and treat.  -Aricept 5 mg daily.  -Melatonin 3 mg nightly.     (R29.898) Right leg weakness  (R29.898) Right arm weakness  Plan:   -PT and OT.    Cognitive impairment  Aphasia:  -Eval and treat.    (I10) Benign Essential Hypertension: Unstable.  Comment: Per hospital notes, goal is less than 120/80 however can be liberalized due to patient's age and goals of care.  Plan:   -Monitor blood pressures; have been <140/90.   -Amlodipine to 5 mg p.o. daily.  She is an alert.     Depressed affect  Poor p.o. intake/failure to thrive: Maintaining admission weight 98 pounds.  -Dietitian eval and treat  -ACP eval and treat.  -Continue to encourage Remeron 7.5 mg p.o. every night.    Electronically signed by:  Jose De Jesus Mora CNP

## 2021-10-26 DIAGNOSIS — I69.851: ICD-10-CM

## 2021-10-26 DIAGNOSIS — I63.512 ACUTE ISCHEMIC LEFT MCA STROKE (H): Primary | ICD-10-CM

## 2021-11-03 ENCOUNTER — TELEPHONE (OUTPATIENT)
Dept: FAMILY MEDICINE | Facility: CLINIC | Age: 71
End: 2021-11-03

## 2021-11-03 DIAGNOSIS — R41.3 MEMORY PROBLEM: ICD-10-CM

## 2021-11-03 RX ORDER — CLOPIDOGREL BISULFATE 75 MG/1
75 TABLET ORAL DAILY
Status: CANCELLED | OUTPATIENT
Start: 2021-11-03

## 2021-11-03 RX ORDER — MIRTAZAPINE 7.5 MG/1
7.5 TABLET, FILM COATED ORAL AT BEDTIME
Status: CANCELLED | OUTPATIENT
Start: 2021-11-03

## 2021-11-03 RX ORDER — LANOLIN ALCOHOL/MO/W.PET/CERES
1 CREAM (GRAM) TOPICAL AT BEDTIME
Status: CANCELLED | OUTPATIENT
Start: 2021-11-03

## 2021-11-03 RX ORDER — LIDOCAINE 4 G/G
1 PATCH TOPICAL EVERY 24 HOURS
Status: CANCELLED | OUTPATIENT
Start: 2021-11-03

## 2021-11-03 RX ORDER — AMLODIPINE BESYLATE 5 MG/1
5 TABLET ORAL DAILY
Status: CANCELLED | OUTPATIENT
Start: 2021-11-03

## 2021-11-03 RX ORDER — DONEPEZIL HYDROCHLORIDE 5 MG/1
5 TABLET, FILM COATED ORAL AT BEDTIME
Qty: 30 TABLET | Refills: 3 | Status: CANCELLED | OUTPATIENT
Start: 2021-11-03

## 2021-11-03 NOTE — TELEPHONE ENCOUNTER
Reason for Call:  Home Health Care    Adelaida with [facility] called regarding (reason for call): Verbal Order for start of home care    Is MD willing to f/u with HC regarding services and orders?    Pt Provider: Dr. Smith    Phone Number Homecare Nurse can be reached at: [phone]    Can we leave a detailed message on this number? YES, confidential line.    Phone number patient can be reached at: Cell number on file:    Telephone Information:   Mobile [phone]       Best Time: anytime    Call taken on 11/3/2021 at 11:37 AM by Brea Leon

## 2021-11-03 NOTE — TELEPHONE ENCOUNTER
Reason for Call:  Home Health Care    Mercedez with [facility] called regarding (reason for call): verbal orders and prescriptions        Orders are needed for this patient. PT OT Skilled Nursing  HHA   Medical Social Worker    PT: eval and treat    OT: eval and treat    Speech: eval and treat    HHA: 1x9    Medical Social Workder: 3 visit to help with housing    Skilled Nursing: 1x9, 4 prn    Nurse states patient wasn't send with meds from TCU and she's been out of meds for 5 days. Can Dr. Smith send the following prescriptions to [id number] - [place], [place] - [address] AT [facility] [address] :    amLODIPine (NORVASC) 5 MG tablet  clopidogrel (PLAVIX) 75 MG tablet   donepezil (ARICEPT) 5 MG tablet   Lidocaine (LIDOCARE) 4 % Patch  melatonin 3 MG tablet  mirtazapine (REMERON) 7.5 MG tablet   pantoprazole (PROTONIX) 40 MG EC tablet          Pt Provider: Sarah    Phone Number Homecare Nurse can be reached at: [phone]    Can we leave a detailed message on this number? YES    Phone number patient can be reached at: Other phone number:      Best Time: any    Call taken on 11/3/2021 at 4:14 PM by Mukesh Ahumada

## 2021-11-04 DIAGNOSIS — I63.512 ACUTE ISCHEMIC LEFT MCA STROKE (H): ICD-10-CM

## 2021-11-04 DIAGNOSIS — R41.3 MEMORY PROBLEM: ICD-10-CM

## 2021-11-04 RX ORDER — LIDOCAINE 4 G/G
1 PATCH TOPICAL EVERY 24 HOURS
Qty: 90 PATCH | Refills: 3 | Status: SHIPPED | OUTPATIENT
Start: 2021-11-04 | End: 2024-01-30

## 2021-11-04 RX ORDER — AMLODIPINE BESYLATE 5 MG/1
5 TABLET ORAL DAILY
Qty: 90 TABLET | Refills: 3 | Status: SHIPPED | OUTPATIENT
Start: 2021-11-04 | End: 2024-01-30

## 2021-11-04 RX ORDER — CLOPIDOGREL BISULFATE 75 MG/1
75 TABLET ORAL DAILY
Qty: 90 TABLET | Refills: 3 | Status: SHIPPED | OUTPATIENT
Start: 2021-11-04

## 2021-11-04 RX ORDER — MIRTAZAPINE 7.5 MG/1
7.5 TABLET, FILM COATED ORAL AT BEDTIME
Qty: 90 TABLET | Refills: 3 | Status: SHIPPED | OUTPATIENT
Start: 2021-11-04 | End: 2024-01-30

## 2021-11-04 RX ORDER — DONEPEZIL HYDROCHLORIDE 5 MG/1
5 TABLET, FILM COATED ORAL AT BEDTIME
Qty: 90 TABLET | Refills: 3 | Status: SHIPPED | OUTPATIENT
Start: 2021-11-04

## 2021-11-04 RX ORDER — ATORVASTATIN CALCIUM 40 MG/1
40 TABLET, FILM COATED ORAL AT BEDTIME
Qty: 90 TABLET | Refills: 3 | Status: SHIPPED | OUTPATIENT
Start: 2021-11-04 | End: 2024-01-30

## 2021-11-04 RX ORDER — LANOLIN ALCOHOL/MO/W.PET/CERES
3 CREAM (GRAM) TOPICAL AT BEDTIME
Qty: 90 TABLET | Refills: 3 | Status: SHIPPED | OUTPATIENT
Start: 2021-11-04 | End: 2024-01-30

## 2021-11-04 RX ORDER — PANTOPRAZOLE SODIUM 40 MG/1
40 TABLET, DELAYED RELEASE ORAL DAILY
Qty: 90 TABLET | Refills: 3 | Status: SHIPPED | OUTPATIENT
Start: 2021-11-04 | End: 2024-01-30

## 2021-11-04 NOTE — TELEPHONE ENCOUNTER
The patient was discharged without medication so had not taken any medicine since discharge. RN notified orders approved.

## 2021-11-04 NOTE — TELEPHONE ENCOUNTER
Prescription for amlodipine, Plavix, Lipitor, Aricept, melatonin, Remeron, Protonix and lidocaine patch sent to pharmacy.    Dr. Kodak Smith  11/4/2021 12:44 PM

## 2021-11-05 ENCOUNTER — TELEPHONE (OUTPATIENT)
Dept: FAMILY MEDICINE | Facility: CLINIC | Age: 71
End: 2021-11-05
Payer: COMMERCIAL

## 2021-11-05 ENCOUNTER — TELEPHONE (OUTPATIENT)
Dept: FAMILY MEDICINE | Facility: CLINIC | Age: 71
End: 2021-11-05

## 2021-11-05 NOTE — TELEPHONE ENCOUNTER
Adelaida from  [facility] called again about this pt.  JOSEFA is aware of why there are mutliple calls on this pt for .      Adelaida is requesting orders for :    Speech therapy once a week for 5 weeks with 2 PRN visits in addition to the PT ordered  Below by Shanice  [facility]  agency.      Does PCP approve these orders?  Thanks.     Call back number is [phone] for Adelaida.     Call taken on 11/5/21 at 1145 am by Lorna Enriquez CMA, CMT

## 2021-11-05 NOTE — TELEPHONE ENCOUNTER
Requested orders approved.  Can you please schedule a long telephone visit ( 40 minutes )for this patient, first available slot ? Okay to use reserved.  It will be great if the nephew and sister can be present during the telephone visit.    Thank you,    Dr. Kodak Smith  11/5/2021 11:51 AM

## 2021-11-05 NOTE — TELEPHONE ENCOUNTER
Reason for Call:  Other, FYI to PCP    Detailed comments: HC Speech Therapist Nini called to report pulse and respiratory rate from yesterday's visit. All vitals were normal but pulse was at 102-104 and respiratory rate was at 22-24.    Phone Number Fish can be reached at: Other phone number:  [phone]    Best Time: anytime    Can we leave a detailed message on this number? YES, no call back needed.     Call taken on 11/5/2021 at 8:51 AM by Brea Leon

## 2021-11-05 NOTE — TELEPHONE ENCOUNTER
Reason for Call: Request for an order     Order or referral being requested: Verbal order for Physical Therapy      Date needed: as soon as possible    Has the patient been seen by the PCP for this problem? Not Applicable    Additional comments: orders needed  1x a week for 1 week  3x a week for 2 weeks  2x a week for 2 weeks  2 prn  To work on Strength,balance, and mobility    Phone number Patient can be reached at:  Shanice at [facility] [phone]    Best Time:  Days    Can we leave a detailed message on this number?  YES-confidential    Call taken on 11/5/2021 at 11:13 AM by Magnus Goldsmith

## 2021-11-11 ENCOUNTER — TELEPHONE (OUTPATIENT)
Dept: FAMILY MEDICINE | Facility: CLINIC | Age: 71
End: 2021-11-11
Payer: COMMERCIAL

## 2021-11-11 NOTE — TELEPHONE ENCOUNTER
Reason for Call: Request for an order     Order or referral being requested: Verbal order for OT    Date needed: as soon as possible    Has the patient been seen by the PCP for this problem? YES    Additional comments: See form [facility] requesting for Verbal orders on Pt for OT  1x a week for 1 week  2x a week for 3 weeks  2 PRN    Phone number Patient can be reached at:  Other phone number:  [phone]    Best Time:  ANY    Can we leave a detailed message on this number?  YES    Call taken on 11/11/2021 at 3:09 PM by Magnus Goldsmith

## 2021-11-12 NOTE — TELEPHONE ENCOUNTER
CMT has attempted multiple calls and phone line is not working. If provider returns call okay to relay MD agreement with verbal order request as below. Thank you.

## 2021-11-16 ENCOUNTER — VIRTUAL VISIT (OUTPATIENT)
Dept: FAMILY MEDICINE | Facility: CLINIC | Age: 71
End: 2021-11-16
Payer: COMMERCIAL

## 2021-11-16 ENCOUNTER — MEDICAL CORRESPONDENCE (OUTPATIENT)
Dept: HEALTH INFORMATION MANAGEMENT | Facility: CLINIC | Age: 71
End: 2021-11-16

## 2021-11-16 DIAGNOSIS — E11.9 TYPE 2 DIABETES MELLITUS TREATED WITHOUT INSULIN (H): ICD-10-CM

## 2021-11-16 DIAGNOSIS — I10 ESSENTIAL HYPERTENSION, BENIGN: ICD-10-CM

## 2021-11-16 DIAGNOSIS — N18.30 STAGE 3 CHRONIC KIDNEY DISEASE, UNSPECIFIED WHETHER STAGE 3A OR 3B CKD (H): ICD-10-CM

## 2021-11-16 DIAGNOSIS — I63.512 ACUTE ISCHEMIC LEFT MCA STROKE (H): Primary | ICD-10-CM

## 2021-11-16 PROCEDURE — 99213 OFFICE O/P EST LOW 20 MIN: CPT | Mod: TEL | Performed by: FAMILY MEDICINE

## 2021-11-16 NOTE — PROGRESS NOTES
Paradise is a 71 year old who is being evaluated via a billable telephone visit.      What phone number would you like to be contacted at? [phone]  How would you like to obtain your AVS? Mail a copy        Subjective   Paradise is a 71 year old who has history of diabetes, hypertension and stroke in July 2021.  She was admitted in July and was discharged to TCU.  She was discharged from TCU in October.  History mainly obtained from her nephew.  She has home care RN visiting her every day.  She is not taking any medications for diabetes currently.  She was on Lantus before admission to hospital due to stroke.  There are not checking her blood sugar at home.  Her last A1c was 6.4 in 8/2021.  She has home PT OT.  She needs help with ambulation.  Still having significant right-sided weakness.  Speech is understandable per her nephew.  She can ask what she needs.  Her sister is with her 24/7.  I was able to review all her medications with the RN who was present during the telephone visit.  Medication list updated in chart. (Except not using Lantus currently).        Review of Systems   No fever or URI symptoms.  No new problems since discharge from TCU.      Objective           Vitals:  No vitals were obtained today due to virtual visit.    Physical Exam   Unable to perform physical exam due to nature of the visit.    Acute ischemic left MCA stroke (H)  Continue home PT OT.    Type 2 diabetes mellitus treated without insulin (H)  Not using insulin currently.  We will check CMP and A1c and restart insulin if indicated.  Advised sister to check fasting blood sugar daily.  We will schedule telephone visit in a few weeks.  - Comprehensive metabolic panel (BMP + Alb, Alk Phos, ALT, AST, Total. Bili, TP)  - Hemoglobin A1c  - Lipid panel    Stage 3 chronic kidney disease, unspecified whether stage 3a or 3b CKD (H)  Recheck labs.  Order will be faxed to home care agency at   - CBC with platelets and  differential    Benign Essential Hypertension  Continue current medication.  No abnormal blood pressure reported by RN.            Phone call duration: 17  minutes

## 2021-11-19 NOTE — PROGRESS NOTES
"[facility]  Section of General Neurology  New Patient  Virtual Visit      Paradise Yuan MRN# [id number]   Age: 71 year old YOB: 1950     Requesting physician: Kodak Jerez     Reason for Consultation: history of stroke        History of Presenting Symptoms:   Paradise Yuan is a 71 year old female who presents today for evaluation of history of stroke    She is home in [facility]    She had recent inpatient rehabilitation admission after presenting with with right hemiparesis, impaired mobility, impaired balance, impaired cognition  due to left middle cerebral artery stroke and is  s/p TPA and thrombectomy. Admission to acute inpatient rehab was on 8/30/2021.   Prior to this she was recently admitted to [facility] (July 22- Aug 2) for acute left MCA stroke-\"multifocal infarcts\", after pt presented to PCP with 2 week hx of weakness/slurred speech.  Problem list from initial hospitalization/DC summary included:   \"Subacute stroke in left MCA territory, multifocal infarcts  Controlled diabetes mellitus type 2  Essential hypertension  Paroxysm of SVT  Hyperlipidemia  History of left upper lobectomy  Asymptomatic COVID-19 infection, tested positive on 7/30/2021\"    She was to be on extended cardiac monitoring to be sent to Dr. Dobbs in cardiology.  I saw a note in the chart describing this data as Sinus rhythm  a epsiode of SVT noted on day 26.  I do not have access to this note/their full plan.  I am suspicious based on MRI data that the likely mechanism of stroke is embolism, however.      Dr Mora in geriatrics has an excellent summary of her recent PMH noting:  \"Paradise Yuan  is a 71 year old  (1950), admitted to the above facility from  [facility]. Hospital stay 8/2 through 8/21..   HPI: Janette Goldberg has a primary history of type 2 diabetes, hypertension, hyperlipidemia and CKD with a left upper lobectomy and " "memory problems.  She presented to an outside ED on July 2 with 2 weeks of right-sided weakness and facial droop.  Her CT revealed no acute intracranial process but incidentally showed presumed large partially calcified meningioma on the left side of the posterior fossa.  MRI revealed subacute infarcts and a 3 cm calcified mass in left cerebellum.  She is not a candidate for thrombolytics and was then transferred to [facility].  Course complicated by hypertension and diabetes as well as SVT.  She also had a positive Covid test although had no symptoms.   She was readmitted to [facility] on 8/23 for increased aphasia and right sided weakness.  This time  with occlusion of the left ICA hand TPA was administered before being taken for thrombectomy.  At the conclusion of the procedure, she was noted to be pulseless on the right upper extremity, she was seen by vascular who recommended monitoring.  She was then taken to inpatient rehabilitation.  She was cleared by speech for thin liquids and regular diet.  She is seen by PT and recommended moderate assist for standing.     TCU: Shahid was seen briefly today to acknowledge that she has remained in the facility after submitting an appeal for continued therapy.  She is apparently now much more compliant with therapies.  Per her sister, she eats more when her sister helps feed her.  Sister has been coming daily to help her with therapies and help to get her to engage with staff.  She continues to avoid eye contact and has minimal response to questions; his sister does most of the talking.  She is denying pain and continues to maintain her admission weight, vital signs reviewed and stable.  Blood sugars also reviewed and stable.\"    She saw Dr. Villagran in Neurology out of [facility] on 11/19 of note.     Plan at that time was: \"Assessment:    Left ICA occlusion and acute left MCA ischemic stroke August 2021. Has stroke in July 2021 with residual left-sided weakness " "already.. Status post thrombectomy and tPA treatment.     Hypertension     Hyperlipidemia    Diabetes mellitus    Plans:    Continue Plavix    In working with Primary to keep blood pressure below 135/85, LDL below 70 and A1c below 7.     Check cardiac event monitoring for 30 days.     Follow up with neurology in 6 month\"      Family thinks she is in a reasonable place right now.  She is happy and often laughing in the video.  Seen with her son on video.          Past Medical History:     Patient Active Problem List   Diagnosis     Stomatitis Herpetiformis     Benign Essential Hypertension     Type 2 diabetes mellitus treated without insulin (H)     Hyperlipidemia     Memory problem     Chronic kidney disease, stage 3     Facial droop     Right leg weakness     Right arm weakness     Cerebral infarction due to occlusion of left middle cerebral artery (H)     Left cerebellar meningioma     Fecal incontinence     Infection due to 2019 novel coronavirus     Acute ischemic left MCA stroke (H)     Past Medical History:   Diagnosis Date     Hypertension         Past Surgical History:     Past Surgical History:   Procedure Laterality Date     IR MISCELLANEOUS PROCEDURE  3/8/2003     IR MISCELLANEOUS PROCEDURE  3/8/2003     IR MISCELLANEOUS PROCEDURE  3/8/2003     IR MISCELLANEOUS PROCEDURE  3/8/2003     IR MISCELLANEOUS PROCEDURE  3/8/2003     IR MISCELLANEOUS PROCEDURE  3/10/2003     IR MISCELLANEOUS PROCEDURE  3/11/2003     IR MISCELLANEOUS PROCEDURE  3/11/2003     IR MISCELLANEOUS PROCEDURE  3/11/2003     IR MISCELLANEOUS PROCEDURE  3/11/2003     IR MISCELLANEOUS PROCEDURE  3/12/2003     IR MISCELLANEOUS PROCEDURE  3/12/2003     IR MISCELLANEOUS PROCEDURE  3/12/2003     IR MISCELLANEOUS PROCEDURE  3/12/2003     IR MISCELLANEOUS PROCEDURE  3/16/2003     IR MISCELLANEOUS PROCEDURE  3/16/2003     IR MISCELLANEOUS PROCEDURE  3/16/2003     IR MISCELLANEOUS PROCEDURE  3/16/2003     IR MISCELLANEOUS PROCEDURE  3/16/2003     IR " "MISCELLANEOUS PROCEDURE  3/16/2003     IR MISCELLANEOUS PROCEDURE  3/16/2003     IR MISCELLANEOUS PROCEDURE  3/16/2003     IR SPINAL ANGIOGRAM  3/16/2003     IR SPINAL ANGIOGRAM  3/16/2003     IR SPINAL ANGIOGRAM  3/16/2003     IR SPINAL ANGIOGRAM  3/16/2003     IR SPINAL ANGIOGRAM  3/16/2003     IR SPINAL ANGIOGRAM  3/16/2003     IR SPINAL ANGIOGRAM  3/16/2003     IR SPINAL ANGIOGRAM  3/16/2003     IR THORACIC AORTOGRAM  3/16/2003     IR VISCERAL ANGIOGRAM  3/8/2003     IR VISCERAL ANGIOGRAM  3/8/2003     IR VISCERAL ANGIOGRAM  3/10/2003     IR VISCERAL ANGIOGRAM  3/11/2003        Social History:     Social History     Tobacco Use     Smoking status: Never Smoker     Smokeless tobacco: Never Used   Substance Use Topics     Alcohol use: No     Drug use: No        Family History:     Family History   Problem Relation Age of Onset     Breast Cancer No family hx of         Medications:     Current Outpatient Medications   Medication Sig     atenolol (TENORMIN) 25 MG tablet Take 1 tablet (25 mg) by mouth At Bedtime     atorvastatin (LIPITOR) 40 MG tablet Take 1 tablet (40 mg) by mouth At Bedtime     blood glucose test strips [BLOOD GLUCOSE TEST STRIPS] Use 1 each As Directed 2 (two) times a day. Test blood sugar twice a day     blood-glucose meter Misc [BLOOD-GLUCOSE METER MISC] Test blood sugar twice a day     donepezil (ARICEPT) 5 MG tablet Take 1 tablet (5 mg) by mouth At Bedtime     generic lancets [GENERIC LANCETS] Use 1 each As Directed 2 (two) times a day. Test blood sugar twice a day     insulin aspart (NOVOLOG PEN) 100 UNIT/ML pen Inject 3 Units Subcutaneous 3 times daily (with meals)     insulin glargine (LANTUS SOLOSTAR PEN) 100 unit/mL (3 mL) pen [INSULIN GLARGINE (LANTUS SOLOSTAR PEN) 100 UNIT/ML (3 ML) PEN] Inject 16 Units under the skin at bedtime.     pantoprazole (PROTONIX) 40 MG EC tablet Take 1 tablet (40 mg) by mouth every morning (before breakfast)     pen needle, diabetic 31 gauge x 1/4\" Ndle [PEN " "NEEDLE, DIABETIC 31 GAUGE X 1/4\" NDLE] Use one daily as directed     No current facility-administered medications for this visit.        Allergies:   No Known Allergies     Review of Systems:   As noted above     Physical Exam:   Unable to get reliable exam on video, see recent in person 11/18 neurology exam.  English appears to be a second language limiting language assessment as well.          Data: Pertinent prior to visit   Imaging:  CT head 7/22/21    IMPRESSION:  1.  Presumed large partially calcified meningioma in the left side of the posterior fossa measuring 3.4 x 3.0 cm.  2.  No CT evidence for acute intracranial process.  3.  Brain atrophy and presumed chronic microvascular ischemic changes as above.        Procedures:  TTE 7.23.21  Interpretation Summary     1. Normal left ventricular size and systolic performance with a visually  estimated ejection fraction of 55-60%.  2. No significant valvular heart disease is identified on this study.  3. Normal right ventricular size and systolic performance.    MRI Brain      IMPRESSION:   1.  Moderately large acute infarcts in the left MCA territory and extensive infarct in the left PCA territory.   2.  Punctate infarcts in the right precentral gyrus and right superior parietal lobule.   3.  Petechial hemorrhage noted within the left PCA territory infarct. No gross hemorrhagic transformation.   4.  Thrombosed left PCA noted on susceptibility weighted axial images 33/80.   5.  Large calcified presumed meningioma in the left posterior fossa    I personally reviewed the above imaging and agree with the findings in the report.     CTA H/N     HEAD CTA:   1.  Fetal origin left PCA with intraluminal thrombus contributing to abrupt occlusion at the proximal left P2 segment. Contrast opacification in the distal left PCA branch vessels is minimal.     2.  Left internal carotid artery is now patent. Marked interval improvement in opacification in the left MCA distribution " with only a moderate residual narrowing in the proximal left M2 branch supplying the anterior left MCA distribution.     NECK CTA:   1.  Improved patency of the left internal carotid artery, with no significant left internal carotid artery stenosis.     2.  No high-grade stenosis of the neck vessels by NASCET criteria.     3.  Redemonstrated diffuse osteopenia with unchanged mild height loss along the previously described superior endplate compression fracture at C7.     CT Angio Neck Head W IV Cont 8/23/21:  IMPRESSION:   HEAD CT:  1.  Hyperdense intraluminal thrombus in the left PCA distribution is redemonstrated but decreased in size and conspicuity since previous. This is accompanied by hypoattenuation obscuring the gray-white junction in the left PCA distribution with involvement of the medial posterior left temporal lobe and the inferior left occipital lobe. Overall appearance is compatible with an acute to early subacute left PCA distribution infarct.     2.  Previously described areas of hyperdensity in the cortical gray matter of the left insula, the left caudate and left basal ganglia, and cortical gray matter of the lateral left frontal lobe and posterior lateral left temporal lobe are redemonstrated but slightly less conspicuous. This likely reflects contrast staining in the setting of an evolving left MCA distribution infarct. However, a small amount of petechial microhemorrhage is possible and continued surveillance imaging is advised.     3.  Peripherally calcified mass in the left posterior fossa, potentially reflecting a meningioma, is unchanged.     4.  Multifocal encephalomalacia is unchanged and described above.     HEAD CTA:   1.  Fetal origin left PCA with intraluminal thrombus contributing to abrupt occlusion at the proximal left P2 segment. Contrast opacification in the distal left PCA branch vessels is minimal.     2.  Left internal carotid artery is now patent. Marked interval improvement  in opacification in the left MCA distribution with only a moderate residual narrowing in the proximal left M2 branch supplying the anterior left MCA distribution.     NECK CTA:  1.  Improved patency of the left internal carotid artery, with no significant left internal carotid artery stenosis.     2.  No high-grade stenosis of the neck vessels by NASCET criteria.     3.  Redemonstrated diffuse osteopenia with unchanged mild height loss along the previously described superior endplate compression fracture at C7.     Findings and impression discussed with Dr. Poole by phone at 1450 hours on 08/23/2021.               Assessment and Plan:   Paradise Yuan  is a 71 year old female with a history of primary f type 2 diabetes, hypertension, hyperlipidemia and memory problems among other PMH as above.  She presents after having a stroke in July and August (s/p tpa in August after left ICA occlusion seen) of 2021 with full work up as above, currently unrevealing for clear cause.   She is recovering well.  She in fact recently saw a neurologist on 11/18 in this regard.  Not clear to family why she was double scheduled.  Nevertheless, I did review with them my understanding of her strokes, my interpretation of her MRI results and that I do agree with Dr. Villagran's plan given how embolic the strokes appear.  One stroke may have been confounded by COVID, though family isn't sure if this was simply an erroneously positive test as patient had virus in December 2020 as well.   Overall would want to further work up possible cardioembolic source as is the plan currently.       Stroke plan:  --MRI Brain--reviewed  --Mechanism of stroke: idiopathic-- likely embolic  --CTA H/N--Reviewed  --Echo (TTE) --Reviewed as above  --Statin--Lipitor 40 mg   --Aspirin--On plavix (to continue plavix) for presumed aspirin failure/serial strokes though confounded by COVID-19 infection in one case reportedly  --PT/OT/SLP : To continue to work with as  needed  --PCP follow up for risk factors (blood pressure, LDL, A1C)  --Agree with cardiac event monitor  --Already saw Dr. Villagran on 11/19 and has a plan for follow up.  Told them I would just keep one neurologist and that it is fine to stick with her as they were able to be physically seen in person by her/she is closer to them in [facility].  Not clear why this was double scheduled.  They can reach out to me with any questions.          Kian Adler MD   of Neurology   [facility]      The total time of this encounter today amounted to 16 minutes of video and 45 minutes in total. This time included time spent with the patient, prep work including extensive chart review ordering tests, and performing post visit documentation.

## 2021-11-22 ENCOUNTER — VIRTUAL VISIT (OUTPATIENT)
Dept: NEUROLOGY | Facility: CLINIC | Age: 71
End: 2021-11-22
Payer: COMMERCIAL

## 2021-11-22 DIAGNOSIS — I63.512 ACUTE ISCHEMIC LEFT MCA STROKE (H): Primary | ICD-10-CM

## 2021-11-22 PROCEDURE — 99215 OFFICE O/P EST HI 40 MIN: CPT | Mod: 95 | Performed by: STUDENT IN AN ORGANIZED HEALTH CARE EDUCATION/TRAINING PROGRAM

## 2021-11-22 NOTE — LETTER
"    11/22/2021         RE: Paradise Yuan  [address] [place] [address]        Dear Colleague,    Thank you for referring your patient, Paradise Yuan, to the [facility] [place]. Please see a copy of my visit note below.    [facility]  Section of General Neurology  New Patient  Virtual Visit      Paradise Yuan MRN# [id number]   Age: 71 year old YOB: 1950     Requesting physician: Kodak Jerez     Reason for Consultation: history of stroke        History of Presenting Symptoms:   Paradise Yuan is a 71 year old female who presents today for evaluation of history of stroke    She is home in [facility]    She had recent inpatient rehabilitation admission after presenting with with right hemiparesis, impaired mobility, impaired balance, impaired cognition  due to left middle cerebral artery stroke and is  s/p TPA and thrombectomy. Admission to acute inpatient rehab was on 8/30/2021.   Prior to this she was recently admitted to [facility] (July 22- Aug 2) for acute left MCA stroke-\"multifocal infarcts\", after pt presented to PCP with 2 week hx of weakness/slurred speech.  Problem list from initial hospitalization/DC summary included:   \"Subacute stroke in left MCA territory, multifocal infarcts  Controlled diabetes mellitus type 2  Essential hypertension  Paroxysm of SVT  Hyperlipidemia  History of left upper lobectomy  Asymptomatic COVID-19 infection, tested positive on 7/30/2021\"    She was to be on extended cardiac monitoring to be sent to Dr. Dobbs in cardiology.  I saw a note in the chart describing this data as Sinus rhythm  a epsiode of SVT noted on day 26.  I do not have access to this note/their full plan.  I am suspicious based on MRI data that the likely mechanism of stroke is embolism, however.      Dr Mora in geriatrics has an excellent summary of her recent PMH noting:  \"Paradise Yuan  is a " 71 year old  (1950), admitted to the above facility from  [facility]. Hospital stay 8/2 through 8/21..   HPI: Janette Goldberg has a primary history of type 2 diabetes, hypertension, hyperlipidemia and CKD with a left upper lobectomy and memory problems.  She presented to an outside ED on July 2 with 2 weeks of right-sided weakness and facial droop.  Her CT revealed no acute intracranial process but incidentally showed presumed large partially calcified meningioma on the left side of the posterior fossa.  MRI revealed subacute infarcts and a 3 cm calcified mass in left cerebellum.  She is not a candidate for thrombolytics and was then transferred to [facility].  Course complicated by hypertension and diabetes as well as SVT.  She also had a positive Covid test although had no symptoms.   She was readmitted to [facility] on 8/23 for increased aphasia and right sided weakness.  This time  with occlusion of the left ICA hand TPA was administered before being taken for thrombectomy.  At the conclusion of the procedure, she was noted to be pulseless on the right upper extremity, she was seen by vascular who recommended monitoring.  She was then taken to inpatient rehabilitation.  She was cleared by speech for thin liquids and regular diet.  She is seen by PT and recommended moderate assist for standing.     TCU: Shahid was seen briefly today to acknowledge that she has remained in the facility after submitting an appeal for continued therapy.  She is apparently now much more compliant with therapies.  Per her sister, she eats more when her sister helps feed her.  Sister has been coming daily to help her with therapies and help to get her to engage with staff.  She continues to avoid eye contact and has minimal response to questions; his sister does most of the talking.  She is denying pain and continues to maintain her admission weight, vital signs reviewed and stable.  Blood sugars also  "reviewed and stable.\"    She saw Dr. Villagran in Neurology out of health [facility] on 11/19 of note.     Plan at that time was: \"Assessment:    Left ICA occlusion and acute left MCA ischemic stroke August 2021. Has stroke in July 2021 with residual left-sided weakness already.. Status post thrombectomy and tPA treatment.     Hypertension     Hyperlipidemia    Diabetes mellitus    Plans:    Continue Plavix    In working with Primary to keep blood pressure below 135/85, LDL below 70 and A1c below 7.     Check cardiac event monitoring for 30 days.     Follow up with neurology in 6 month\"      Family thinks she is in a reasonable place right now.  She is happy and often laughing in the video.  Seen with her son on video.          Past Medical History:     Patient Active Problem List   Diagnosis     Stomatitis Herpetiformis     Benign Essential Hypertension     Type 2 diabetes mellitus treated without insulin (H)     Hyperlipidemia     Memory problem     Chronic kidney disease, stage 3     Facial droop     Right leg weakness     Right arm weakness     Cerebral infarction due to occlusion of left middle cerebral artery (H)     Left cerebellar meningioma     Fecal incontinence     Infection due to 2019 novel coronavirus     Acute ischemic left MCA stroke (H)     Past Medical History:   Diagnosis Date     Hypertension         Past Surgical History:     Past Surgical History:   Procedure Laterality Date     IR MISCELLANEOUS PROCEDURE  3/8/2003     IR MISCELLANEOUS PROCEDURE  3/8/2003     IR MISCELLANEOUS PROCEDURE  3/8/2003     IR MISCELLANEOUS PROCEDURE  3/8/2003     IR MISCELLANEOUS PROCEDURE  3/8/2003     IR MISCELLANEOUS PROCEDURE  3/10/2003     IR MISCELLANEOUS PROCEDURE  3/11/2003     IR MISCELLANEOUS PROCEDURE  3/11/2003     IR MISCELLANEOUS PROCEDURE  3/11/2003     IR MISCELLANEOUS PROCEDURE  3/11/2003     IR MISCELLANEOUS PROCEDURE  3/12/2003     IR MISCELLANEOUS PROCEDURE  3/12/2003     IR MISCELLANEOUS PROCEDURE  " 3/12/2003     IR MISCELLANEOUS PROCEDURE  3/12/2003     IR MISCELLANEOUS PROCEDURE  3/16/2003     IR MISCELLANEOUS PROCEDURE  3/16/2003     IR MISCELLANEOUS PROCEDURE  3/16/2003     IR MISCELLANEOUS PROCEDURE  3/16/2003     IR MISCELLANEOUS PROCEDURE  3/16/2003     IR MISCELLANEOUS PROCEDURE  3/16/2003     IR MISCELLANEOUS PROCEDURE  3/16/2003     IR MISCELLANEOUS PROCEDURE  3/16/2003     IR SPINAL ANGIOGRAM  3/16/2003     IR SPINAL ANGIOGRAM  3/16/2003     IR SPINAL ANGIOGRAM  3/16/2003     IR SPINAL ANGIOGRAM  3/16/2003     IR SPINAL ANGIOGRAM  3/16/2003     IR SPINAL ANGIOGRAM  3/16/2003     IR SPINAL ANGIOGRAM  3/16/2003     IR SPINAL ANGIOGRAM  3/16/2003     IR THORACIC AORTOGRAM  3/16/2003     IR VISCERAL ANGIOGRAM  3/8/2003     IR VISCERAL ANGIOGRAM  3/8/2003     IR VISCERAL ANGIOGRAM  3/10/2003     IR VISCERAL ANGIOGRAM  3/11/2003        Social History:     Social History     Tobacco Use     Smoking status: Never Smoker     Smokeless tobacco: Never Used   Substance Use Topics     Alcohol use: No     Drug use: No        Family History:     Family History   Problem Relation Age of Onset     Breast Cancer No family hx of         Medications:     Current Outpatient Medications   Medication Sig     atenolol (TENORMIN) 25 MG tablet Take 1 tablet (25 mg) by mouth At Bedtime     atorvastatin (LIPITOR) 40 MG tablet Take 1 tablet (40 mg) by mouth At Bedtime     blood glucose test strips [BLOOD GLUCOSE TEST STRIPS] Use 1 each As Directed 2 (two) times a day. Test blood sugar twice a day     blood-glucose meter Misc [BLOOD-GLUCOSE METER MISC] Test blood sugar twice a day     donepezil (ARICEPT) 5 MG tablet Take 1 tablet (5 mg) by mouth At Bedtime     generic lancets [GENERIC LANCETS] Use 1 each As Directed 2 (two) times a day. Test blood sugar twice a day     insulin aspart (NOVOLOG PEN) 100 UNIT/ML pen Inject 3 Units Subcutaneous 3 times daily (with meals)     insulin glargine (LANTUS SOLOSTAR PEN) 100 unit/mL (3 mL)  "pen [INSULIN GLARGINE (LANTUS SOLOSTAR PEN) 100 UNIT/ML (3 ML) PEN] Inject 16 Units under the skin at bedtime.     pantoprazole (PROTONIX) 40 MG EC tablet Take 1 tablet (40 mg) by mouth every morning (before breakfast)     pen needle, diabetic 31 gauge x 1/4\" Ndle [PEN NEEDLE, DIABETIC 31 GAUGE X 1/4\" NDLE] Use one daily as directed     No current facility-administered medications for this visit.        Allergies:   No Known Allergies     Review of Systems:   As noted above     Physical Exam:   Unable to get reliable exam on video, see recent in person 11/18 neurology exam.  English appears to be a second language limiting language assessment as well.          Data: Pertinent prior to visit   Imaging:  CT head 7/22/21    IMPRESSION:  1.  Presumed large partially calcified meningioma in the left side of the posterior fossa measuring 3.4 x 3.0 cm.  2.  No CT evidence for acute intracranial process.  3.  Brain atrophy and presumed chronic microvascular ischemic changes as above.        Procedures:  TTE 7.23.21  Interpretation Summary     1. Normal left ventricular size and systolic performance with a visually  estimated ejection fraction of 55-60%.  2. No significant valvular heart disease is identified on this study.  3. Normal right ventricular size and systolic performance.    MRI Brain      IMPRESSION:   1.  Moderately large acute infarcts in the left MCA territory and extensive infarct in the left PCA territory.   2.  Punctate infarcts in the right precentral gyrus and right superior parietal lobule.   3.  Petechial hemorrhage noted within the left PCA territory infarct. No gross hemorrhagic transformation.   4.  Thrombosed left PCA noted on susceptibility weighted axial images 33/80.   5.  Large calcified presumed meningioma in the left posterior fossa    I personally reviewed the above imaging and agree with the findings in the report.     CTA H/N     HEAD CTA:   1.  Fetal origin left PCA with intraluminal " thrombus contributing to abrupt occlusion at the proximal left P2 segment. Contrast opacification in the distal left PCA branch vessels is minimal.     2.  Left internal carotid artery is now patent. Marked interval improvement in opacification in the left MCA distribution with only a moderate residual narrowing in the proximal left M2 branch supplying the anterior left MCA distribution.     NECK CTA:   1.  Improved patency of the left internal carotid artery, with no significant left internal carotid artery stenosis.     2.  No high-grade stenosis of the neck vessels by NASCET criteria.     3.  Redemonstrated diffuse osteopenia with unchanged mild height loss along the previously described superior endplate compression fracture at C7.     CT Angio Neck Head W IV Cont 8/23/21:  IMPRESSION:   HEAD CT:  1.  Hyperdense intraluminal thrombus in the left PCA distribution is redemonstrated but decreased in size and conspicuity since previous. This is accompanied by hypoattenuation obscuring the gray-white junction in the left PCA distribution with involvement of the medial posterior left temporal lobe and the inferior left occipital lobe. Overall appearance is compatible with an acute to early subacute left PCA distribution infarct.     2.  Previously described areas of hyperdensity in the cortical gray matter of the left insula, the left caudate and left basal ganglia, and cortical gray matter of the lateral left frontal lobe and posterior lateral left temporal lobe are redemonstrated but slightly less conspicuous. This likely reflects contrast staining in the setting of an evolving left MCA distribution infarct. However, a small amount of petechial microhemorrhage is possible and continued surveillance imaging is advised.     3.  Peripherally calcified mass in the left posterior fossa, potentially reflecting a meningioma, is unchanged.     4.  Multifocal encephalomalacia is unchanged and described above.     HEAD CTA:    1.  Fetal origin left PCA with intraluminal thrombus contributing to abrupt occlusion at the proximal left P2 segment. Contrast opacification in the distal left PCA branch vessels is minimal.     2.  Left internal carotid artery is now patent. Marked interval improvement in opacification in the left MCA distribution with only a moderate residual narrowing in the proximal left M2 branch supplying the anterior left MCA distribution.     NECK CTA:  1.  Improved patency of the left internal carotid artery, with no significant left internal carotid artery stenosis.     2.  No high-grade stenosis of the neck vessels by NASCET criteria.     3.  Redemonstrated diffuse osteopenia with unchanged mild height loss along the previously described superior endplate compression fracture at C7.     Findings and impression discussed with Dr. Poole by phone at 1450 hours on 08/23/2021.               Assessment and Plan:   Paradise Yuan  is a 71 year old female with a history of primary f type 2 diabetes, hypertension, hyperlipidemia and memory problems among other PMH as above.  She presents after having a stroke in July and August (s/p tpa in August after left ICA occlusion seen) of 2021 with full work up as above, currently unrevealing for clear cause.   She is recovering well.  She in fact recently saw a neurologist on 11/18 in this regard.  Not clear to family why she was double scheduled.  Nevertheless, I did review with them my understanding of her strokes, my interpretation of her MRI results and that I do agree with Dr. Villagran's plan given how embolic the strokes appear.  One stroke may have been confounded by COVID, though family isn't sure if this was simply an erroneously positive test as patient had virus in December 2020 as well.   Overall would want to further work up possible cardioembolic source as is the plan currently.       Stroke plan:  --MRI Brain--reviewed  --Mechanism of stroke: idiopathic-- likely  embolic  --CTA H/N--Reviewed  --Echo (TTE) --Reviewed as above  --Statin--Lipitor 40 mg   --Aspirin--On plavix (to continue plavix) for presumed aspirin failure/serial strokes though confounded by COVID-19 infection in one case reportedly  --PT/OT/SLP : To continue to work with as needed  --PCP follow up for risk factors (blood pressure, LDL, A1C)  --Agree with cardiac event monitor  --Already saw Dr. Villagran on 11/19 and has a plan for follow up.  Told them I would just keep one neurologist and that it is fine to stick with her as they were able to be physically seen in person by her/she is closer to them in [facility].  Not clear why this was double scheduled.  They can reach out to me with any questions.          Kian Adler MD   of Neurology   [facility]      The total time of this encounter today amounted to 16 minutes of video and 45 minutes in total. This time included time spent with the patient, prep work including extensive chart review ordering tests, and performing post visit documentation.      Paradise is a 71 year old who is being evaluated via a billable video visit.      How would you like to obtain your AVS? Mail a copy  If the video visit is dropped, the invitation should be resent by: Text to cell phone: [phone]  Will anyone else be joining your video visit? No                Again, thank you for allowing me to participate in the care of your patient.        Sincerely,        Howard Adler MD     normal

## 2021-11-22 NOTE — PROGRESS NOTES
Paradise is a 71 year old who is being evaluated via a billable video visit.      How would you like to obtain your AVS? Mail a copy  If the video visit is dropped, the invitation should be resent by: Text to cell phone: [phone]  Will anyone else be joining your video visit? No

## 2021-11-22 NOTE — PATIENT INSTRUCTIONS
Reviewed Dr. Villagran's plan with them.  Agree with it.  I would get that cardiac monitor and continue plavix.  I encouraged them to check in with their primary care physician too to make sure all of her current medications looked good and were correct.

## 2021-11-23 ENCOUNTER — MEDICAL CORRESPONDENCE (OUTPATIENT)
Dept: HEALTH INFORMATION MANAGEMENT | Facility: CLINIC | Age: 71
End: 2021-11-23
Payer: COMMERCIAL

## 2021-11-26 ENCOUNTER — LAB (OUTPATIENT)
Dept: LAB | Facility: CLINIC | Age: 71
End: 2021-11-26
Payer: COMMERCIAL

## 2021-11-26 LAB
ALBUMIN SERPL-MCNC: 3.7 G/DL (ref 3.5–5)
ALP SERPL-CCNC: 89 U/L (ref 45–120)
ALT SERPL W P-5'-P-CCNC: 13 U/L (ref 0–45)
ANION GAP SERPL CALCULATED.3IONS-SCNC: 13 MMOL/L (ref 5–18)
AST SERPL W P-5'-P-CCNC: 16 U/L (ref 0–40)
BASOPHILS # BLD AUTO: 0.1 10E3/UL (ref 0–0.2)
BASOPHILS NFR BLD AUTO: 1 %
BILIRUB SERPL-MCNC: 0.9 MG/DL (ref 0–1)
BUN SERPL-MCNC: 14 MG/DL (ref 8–28)
CALCIUM SERPL-MCNC: 9.7 MG/DL (ref 8.5–10.5)
CHLORIDE BLD-SCNC: 102 MMOL/L (ref 98–107)
CHOLEST SERPL-MCNC: 130 MG/DL
CO2 SERPL-SCNC: 24 MMOL/L (ref 22–31)
CREAT SERPL-MCNC: 0.87 MG/DL (ref 0.6–1.1)
EOSINOPHIL # BLD AUTO: 0.1 10E3/UL (ref 0–0.7)
EOSINOPHIL NFR BLD AUTO: 2 %
ERYTHROCYTE [DISTWIDTH] IN BLOOD BY AUTOMATED COUNT: 12.4 % (ref 10–15)
FASTING STATUS PATIENT QL REPORTED: ABNORMAL
GFR SERPL CREATININE-BSD FRML MDRD: 67 ML/MIN/1.73M2
GLUCOSE BLD-MCNC: 172 MG/DL (ref 70–125)
HCT VFR BLD AUTO: 46.8 % (ref 35–47)
HDLC SERPL-MCNC: 38 MG/DL
HGB BLD-MCNC: 14.9 G/DL (ref 11.7–15.7)
IMM GRANULOCYTES # BLD: 0 10E3/UL
IMM GRANULOCYTES NFR BLD: 0 %
LDLC SERPL CALC-MCNC: 68 MG/DL
LYMPHOCYTES # BLD AUTO: 1.6 10E3/UL (ref 0.8–5.3)
LYMPHOCYTES NFR BLD AUTO: 29 %
MCH RBC QN AUTO: 28.7 PG (ref 26.5–33)
MCHC RBC AUTO-ENTMCNC: 31.8 G/DL (ref 31.5–36.5)
MCV RBC AUTO: 90 FL (ref 78–100)
MONOCYTES # BLD AUTO: 0.5 10E3/UL (ref 0–1.3)
MONOCYTES NFR BLD AUTO: 9 %
NEUTROPHILS # BLD AUTO: 3.1 10E3/UL (ref 1.6–8.3)
NEUTROPHILS NFR BLD AUTO: 59 %
NRBC # BLD AUTO: 0 10E3/UL
NRBC BLD AUTO-RTO: 0 /100
PLATELET # BLD AUTO: 386 10E3/UL (ref 150–450)
POTASSIUM BLD-SCNC: 3.8 MMOL/L (ref 3.5–5)
PROT SERPL-MCNC: 7.6 G/DL (ref 6–8)
RBC # BLD AUTO: 5.19 10E6/UL (ref 3.8–5.2)
SODIUM SERPL-SCNC: 139 MMOL/L (ref 136–145)
TRIGL SERPL-MCNC: 121 MG/DL
WBC # BLD AUTO: 5.4 10E3/UL (ref 4–11)

## 2021-11-26 PROCEDURE — 83036 HEMOGLOBIN GLYCOSYLATED A1C: CPT | Performed by: FAMILY MEDICINE

## 2021-11-26 PROCEDURE — 80061 LIPID PANEL: CPT | Performed by: FAMILY MEDICINE

## 2021-11-26 PROCEDURE — 80053 COMPREHEN METABOLIC PANEL: CPT | Performed by: FAMILY MEDICINE

## 2021-11-26 PROCEDURE — 36415 COLL VENOUS BLD VENIPUNCTURE: CPT | Performed by: FAMILY MEDICINE

## 2021-11-26 PROCEDURE — 85025 COMPLETE CBC W/AUTO DIFF WBC: CPT | Performed by: FAMILY MEDICINE

## 2021-11-27 LAB — HBA1C MFR BLD: 8.8 %

## 2021-12-13 ENCOUNTER — TELEPHONE (OUTPATIENT)
Dept: FAMILY MEDICINE | Facility: CLINIC | Age: 71
End: 2021-12-13
Payer: COMMERCIAL

## 2021-12-13 NOTE — TELEPHONE ENCOUNTER
Reason for Call:  Home Health Care    Juan with [facility] called regarding (reason for call): Verbal Orders    Orders are needed for this patient.     PT:  2x a week for 4 weeks    OT: 1x a week for 3 weeks    With 2 prn    Pt Provider: Dr. Smith    Phone Number Homecare Nurse can be reached at: [phone]    Can we leave a detailed message on this number? YES    Phone number patient can be reached at: Home number on file [phone] (home)    Best Time: any    Call taken on 12/13/2021 at 10:38 AM by Magnus Goldsmith

## 2021-12-21 ENCOUNTER — MEDICAL CORRESPONDENCE (OUTPATIENT)
Dept: HEALTH INFORMATION MANAGEMENT | Facility: CLINIC | Age: 71
End: 2021-12-21
Payer: COMMERCIAL

## 2021-12-28 ENCOUNTER — VIRTUAL VISIT (OUTPATIENT)
Dept: FAMILY MEDICINE | Facility: CLINIC | Age: 71
End: 2021-12-28
Payer: COMMERCIAL

## 2021-12-28 DIAGNOSIS — Z86.73 H/O: STROKE: ICD-10-CM

## 2021-12-28 DIAGNOSIS — I10 ESSENTIAL HYPERTENSION, BENIGN: ICD-10-CM

## 2021-12-28 DIAGNOSIS — I48.91 ATRIAL FIBRILLATION, UNSPECIFIED TYPE (H): ICD-10-CM

## 2021-12-28 DIAGNOSIS — E11.9 TYPE 2 DIABETES MELLITUS TREATED WITHOUT INSULIN (H): Primary | ICD-10-CM

## 2021-12-28 DIAGNOSIS — N18.30 STAGE 3 CHRONIC KIDNEY DISEASE, UNSPECIFIED WHETHER STAGE 3A OR 3B CKD (H): ICD-10-CM

## 2021-12-28 PROCEDURE — 99213 OFFICE O/P EST LOW 20 MIN: CPT | Mod: TEL | Performed by: FAMILY MEDICINE

## 2021-12-28 NOTE — PROGRESS NOTES
Paradise is a 71 year old who is being evaluated via a billable telephone visit.      What phone number would you like to be contacted at? [place] : [phone]  How would you like to obtain your AVS? Mail a copy        Subjective       HPI Paradise is a 71 year old with history of diabetes, hypertension, hyperlipidemia and stroke in July 2021.  I spoke to the sister who is her primary caregiver.  Patient is in the shower with the help of her aide at the time of telephone visit.  Sister states patient is improving significantly.  She is walking with a cane now.  She is able to tell her sister when she needs to use bathroom.  She is eating much better.  She is talking a lot more than before.  Sister checks her blood sugar daily and states it is always been less than 150 ranges from 130s to 150s.  She was on insulin, Metformin was discontinued due to CKD.  Sister states she has not been using her insulin after the hospital discharge.  Her last A1c was 8.8 last month.  She is receiving home PT OT, strength is improving per sister.  She has appointment to see neurology on 1/4/2022.  No new concerns per sister since the patient is improving in the recent months.        Review of Systems       Objective           Vitals:  No vitals were obtained today due to virtual visit.    Physical Exam   Unable to perform physical exam due to the nature of the visit.    Type 2 diabetes mellitus treated without insulin (H)  Last A1c 8.8 last month.  Will restart insulin 10 units at night.  We will plan in person visit and labs in a few months.  - insulin glargine (LANTUS PEN) 100 UNIT/ML pen; Inject 10 Units Subcutaneous At Bedtime    Benign Essential Hypertension  Continue current medication.    Stage 3 chronic kidney disease, unspecified whether stage 3a or 3b CKD (H)  Normal creatinine last month.  We will continue to hold Metformin.    H/O: stroke  Strength is improving.  She has appointment to see neurology on 1/4/2022.  Advised  to keep that appointment.    Atrial fibrillation, unspecified type (H)  Last 7 monitor per chart review on 10/31/2021 with sinus rhythm and one episode of SVT.  Unclear if she has follow-up appointment with cardiology.        Phone call duration: 12  minutes

## 2021-12-29 ENCOUNTER — MEDICAL CORRESPONDENCE (OUTPATIENT)
Dept: HEALTH INFORMATION MANAGEMENT | Facility: CLINIC | Age: 71
End: 2021-12-29
Payer: COMMERCIAL

## 2021-12-31 ENCOUNTER — MEDICAL CORRESPONDENCE (OUTPATIENT)
Dept: HEALTH INFORMATION MANAGEMENT | Facility: CLINIC | Age: 71
End: 2021-12-31
Payer: COMMERCIAL

## 2022-01-04 ENCOUNTER — MEDICAL CORRESPONDENCE (OUTPATIENT)
Dept: HEALTH INFORMATION MANAGEMENT | Facility: CLINIC | Age: 72
End: 2022-01-04
Payer: COMMERCIAL

## 2022-01-05 ENCOUNTER — IMMUNIZATION (OUTPATIENT)
Dept: NURSING | Facility: CLINIC | Age: 72
End: 2022-01-05
Payer: COMMERCIAL

## 2022-01-05 PROCEDURE — 0054A COVID-19,PF,PFIZER (12+ YRS): CPT

## 2022-01-05 PROCEDURE — 91305 COVID-19,PF,PFIZER (12+ YRS): CPT

## 2022-01-07 ENCOUNTER — MEDICAL CORRESPONDENCE (OUTPATIENT)
Dept: HEALTH INFORMATION MANAGEMENT | Facility: CLINIC | Age: 72
End: 2022-01-07
Payer: COMMERCIAL

## 2022-03-04 ENCOUNTER — VIRTUAL VISIT (OUTPATIENT)
Dept: FAMILY MEDICINE | Facility: CLINIC | Age: 72
End: 2022-03-04
Payer: COMMERCIAL

## 2022-03-04 DIAGNOSIS — E11.9 TYPE 2 DIABETES MELLITUS TREATED WITHOUT INSULIN (H): Primary | ICD-10-CM

## 2022-03-04 PROCEDURE — 99207 PR NO SHOW FOR SCHEDULED APPT: CPT | Mod: 25 | Performed by: FAMILY MEDICINE

## 2022-03-04 NOTE — PROGRESS NOTES
Called multiple times.   [facility] states pt is out of town.  Will reschedule.     Dr. Kodak Smith  3/4/2022 3:24 PM

## 2022-05-20 NOTE — PLAN OF CARE
Discharge Planner Post-Acute Rehab SLP:      Discharge Plan: Home with sister and adult nephew. Home care vs OP PT pending progress. Will need to confirm home set-up with sister.     Precautions: Fall, cognition, language deficits, swallowing     Current Status:  Communication:Mild dysarthria. Mild-mod receptive and expressive language tasks  Cognition: Memory deficits noted at baseline per family report. Not formally assessed.  Swallow: Recommend easy to chew (7) diet and thin liquids with upright position, slow rate, alternate solids and liquids, and small bites/sips.      Assessment: Persistent and significant right neglect with pen/paper work. Pt required max cues to pay attention to the right side of pages.     Other Barriers to Discharge (Family Training, etc): None anticipated from SLP       88yo F w HFpEF, Afib (on Eliquis), HTN, DM2, CKD3, AS s/p TAVR (09/2021) aw hypoxia, acute diastolic CHF. Improved on diuresis but worsened again requiring more iv Lasix. Now stabilizing, off oxygen, transitioned to po Bumex.     Pt hesitant to go to rehab or home. Long discussion held with patient and her daughter Claudia at bedside today. Daughter and grandson would prefer pt to go home as she has a full time aide and PT already in place (lives at [facility] with various resources available). Pt still unsure and feels nervous about going home.     Advised pt and family to discuss this and reach a decision soon.

## 2022-05-22 ENCOUNTER — HEALTH MAINTENANCE LETTER (OUTPATIENT)
Age: 72
End: 2022-05-22

## 2022-07-17 ENCOUNTER — HEALTH MAINTENANCE LETTER (OUTPATIENT)
Age: 72
End: 2022-07-17

## 2022-09-25 ENCOUNTER — HEALTH MAINTENANCE LETTER (OUTPATIENT)
Age: 72
End: 2022-09-25

## 2023-02-04 ENCOUNTER — HEALTH MAINTENANCE LETTER (OUTPATIENT)
Age: 73
End: 2023-02-04

## 2023-06-04 ENCOUNTER — HEALTH MAINTENANCE LETTER (OUTPATIENT)
Age: 73
End: 2023-06-04

## 2023-06-13 NOTE — TELEPHONE ENCOUNTER
CMT called and left detailed message to approve order request per Dr. Smith.     Patient scheduled for 40 minute phone visit 11/16/21 @ 1:40 PM. Thanks.    PACU

## 2023-08-05 ENCOUNTER — HEALTH MAINTENANCE LETTER (OUTPATIENT)
Age: 73
End: 2023-08-05

## 2023-11-21 ENCOUNTER — TELEPHONE (OUTPATIENT)
Dept: FAMILY MEDICINE | Facility: CLINIC | Age: 73
End: 2023-11-21
Payer: COMMERCIAL

## 2023-11-21 NOTE — TELEPHONE ENCOUNTER
Patient Quality Outreach    Patient is due for the following:   Diabetes -  A1C, Eye Exam, Microalbumin, Diabetic Follow-Up Visit, and Foot Exam  Physical Annual Wellness Visit      Topic Date Due    Zoster (Shingles) Vaccine (1 of 2) Never done    Diptheria Tetanus Pertussis (DTAP/TDAP/TD) Vaccine (2 - Td or Tdap) 05/24/2022    Flu Vaccine (1) 09/01/2023    COVID-19 Vaccine (4 - 2023-24 season) 09/01/2023       Next Steps:   Patient was scheduled for Medicare Annual Wellness Visit     Type of outreach:    Phone, spoke to patient/parent. And appt was made     Next Steps:  Reach out within 90 days via Phone.    Max number of attempts reached: No. Will try again in 90 days if patient still on fail list.    Questions for provider review:    None           Deidre Rivers MA  Chart routed to Care Team.

## 2023-12-07 NOTE — ADDENDUM NOTE
Addendum Note by Kodak Smith MD at 5/16/2019  9:40 AM     Author: Kodak Smith MD Service: -- Author Type: Physician    Filed: 5/16/2019 12:24 PM Encounter Date: 5/16/2019 Status: Signed    : Kodak Smith MD (Physician)    Addended by: KODAK SMITH on: 5/16/2019 12:24 PM        Modules accepted: Orders         Disab forms received via [employer] no auth on file [employer] message sent to pt.

## 2024-01-30 ENCOUNTER — OFFICE VISIT (OUTPATIENT)
Dept: FAMILY MEDICINE | Facility: CLINIC | Age: 74
End: 2024-01-30
Payer: COMMERCIAL

## 2024-01-30 VITALS
WEIGHT: 90.2 LBS | SYSTOLIC BLOOD PRESSURE: 168 MMHG | TEMPERATURE: 98.7 F | BODY MASS INDEX: 16.5 KG/M2 | OXYGEN SATURATION: 96 % | HEART RATE: 97 BPM | DIASTOLIC BLOOD PRESSURE: 90 MMHG

## 2024-01-30 DIAGNOSIS — Z86.73 H/O: STROKE: ICD-10-CM

## 2024-01-30 DIAGNOSIS — E11.9 TYPE 2 DIABETES MELLITUS TREATED WITHOUT INSULIN (H): ICD-10-CM

## 2024-01-30 DIAGNOSIS — G81.91 RIGHT HEMIPARESIS (H): ICD-10-CM

## 2024-01-30 DIAGNOSIS — Z91.148 NONCOMPLIANCE WITH MEDICATION REGIMEN: ICD-10-CM

## 2024-01-30 DIAGNOSIS — R15.9 URINARY AND FECAL INCONTINENCE: ICD-10-CM

## 2024-01-30 DIAGNOSIS — I10 ESSENTIAL HYPERTENSION: ICD-10-CM

## 2024-01-30 DIAGNOSIS — Z13.0 SCREENING FOR DEFICIENCY ANEMIA: ICD-10-CM

## 2024-01-30 DIAGNOSIS — R32 URINARY AND FECAL INCONTINENCE: ICD-10-CM

## 2024-01-30 DIAGNOSIS — Z00.00 ANNUAL PHYSICAL EXAM: Primary | ICD-10-CM

## 2024-01-30 LAB
HBA1C MFR BLD: 7.8 % (ref 0–5.6)
HGB BLD-MCNC: 14.2 G/DL (ref 11.7–15.7)

## 2024-01-30 PROCEDURE — 83036 HEMOGLOBIN GLYCOSYLATED A1C: CPT | Performed by: FAMILY MEDICINE

## 2024-01-30 PROCEDURE — 99213 OFFICE O/P EST LOW 20 MIN: CPT | Mod: 25 | Performed by: FAMILY MEDICINE

## 2024-01-30 PROCEDURE — 85018 HEMOGLOBIN: CPT | Performed by: FAMILY MEDICINE

## 2024-01-30 PROCEDURE — 36415 COLL VENOUS BLD VENIPUNCTURE: CPT | Performed by: FAMILY MEDICINE

## 2024-01-30 PROCEDURE — 80061 LIPID PANEL: CPT | Performed by: FAMILY MEDICINE

## 2024-01-30 PROCEDURE — 80048 BASIC METABOLIC PNL TOTAL CA: CPT | Performed by: FAMILY MEDICINE

## 2024-01-30 PROCEDURE — G0438 PPPS, INITIAL VISIT: HCPCS | Performed by: FAMILY MEDICINE

## 2024-01-30 RX ORDER — RESPIRATORY SYNCYTIAL VIRUS VACCINE 120MCG/0.5
0.5 KIT INTRAMUSCULAR ONCE
Qty: 1 EACH | Refills: 0 | Status: CANCELLED | OUTPATIENT
Start: 2024-01-30 | End: 2024-01-30

## 2024-01-30 RX ORDER — AMLODIPINE BESYLATE 5 MG/1
5 TABLET ORAL DAILY
Qty: 90 TABLET | Refills: 3 | Status: SHIPPED | OUTPATIENT
Start: 2024-01-30 | End: 2024-03-26

## 2024-01-30 RX ORDER — LANCETS
EACH MISCELLANEOUS
Qty: 200 EACH | Refills: 11 | Status: SHIPPED | OUTPATIENT
Start: 2024-01-30

## 2024-01-30 RX ORDER — ATORVASTATIN CALCIUM 40 MG/1
40 TABLET, FILM COATED ORAL AT BEDTIME
Qty: 90 TABLET | Refills: 3 | Status: SHIPPED | OUTPATIENT
Start: 2024-01-30

## 2024-01-30 ASSESSMENT — ENCOUNTER SYMPTOMS
COUGH: 0
HEARTBURN: 0
WEAKNESS: 1
FEVER: 0
SORE THROAT: 0
FREQUENCY: 1
DYSURIA: 0
DIARRHEA: 0
MYALGIAS: 0
ABDOMINAL PAIN: 0
EYE PAIN: 0
DIZZINESS: 0
PALPITATIONS: 0
PARESTHESIAS: 0
BREAST MASS: 0
JOINT SWELLING: 0
SHORTNESS OF BREATH: 0
CHILLS: 0
HEMATURIA: 0
CONSTIPATION: 0
HEMATOCHEZIA: 1
ARTHRALGIAS: 0
NAUSEA: 0
NERVOUS/ANXIOUS: 0
HEADACHES: 0

## 2024-01-30 ASSESSMENT — PATIENT HEALTH QUESTIONNAIRE - PHQ9
SUM OF ALL RESPONSES TO PHQ QUESTIONS 1-9: 0
SUM OF ALL RESPONSES TO PHQ QUESTIONS 1-9: 0
10. IF YOU CHECKED OFF ANY PROBLEMS, HOW DIFFICULT HAVE THESE PROBLEMS MADE IT FOR YOU TO DO YOUR WORK, TAKE CARE OF THINGS AT HOME, OR GET ALONG WITH OTHER PEOPLE: NOT DIFFICULT AT ALL

## 2024-01-30 ASSESSMENT — ACTIVITIES OF DAILY LIVING (ADL)
CURRENT_FUNCTION: BATHING REQUIRES ASSISTANCE
CURRENT_FUNCTION: MONEY MANAGEMENT REQUIRES ASSISTANCE
CURRENT_FUNCTION: PREPARING MEALS REQUIRES ASSISTANCE
CURRENT_FUNCTION: TRANSPORTATION REQUIRES ASSISTANCE
CURRENT_FUNCTION: HOUSEWORK REQUIRES ASSISTANCE
CURRENT_FUNCTION: MEDICATION ADMINISTRATION REQUIRES ASSISTANCE
CURRENT_FUNCTION: TELEPHONE REQUIRES ASSISTANCE
CURRENT_FUNCTION: LAUNDRY REQUIRES ASSISTANCE

## 2024-01-30 NOTE — PROGRESS NOTES
"Preventive Care Visit  [facility] RASHAUN Smith MD, Family Medicine  Jan 30, 2024      SUBJECTIVE:   Paradise is a 73 year old female here for physical.  She had stroke in July 2021, multiple hospital admissions after the stroke.  Last physical therapy visit was in 2/2022 per chart review.  Still have right-sided hemiparesis.  She is left-handed.  She is here with her sister who is her primary caregiver.  History states she stopped taking all her medications for more than a year.  They went back to the [facility] and came back 2 months ago.  She refused to take medications.  They are not checking her blood sugar.  She is in the process of getting PCA.  He has history of diabetes, hypertension, memory deficit, urinary and bowel incontinence.          1/30/2024     1:03 PM   Additional Questions   Roomed by Brii POPE   Accompanied by sister     Are you in the first 12 months of your Medicare coverage?  No    Healthy Habits:     In general, how would you rate your overall health?  Excellent    Frequency of exercise:  None    Do you usually eat at least 4 servings of fruit and vegetables a day, include whole grains    & fiber and avoid regularly eating high fat or \"junk\" foods?  Yes    Taking medications regularly:  Yes    Medication side effects:  None and Other    Ability to successfully perform activities of daily living:  Telephone requires assistance, transportation requires assistance, preparing meals requires assistance, housework requires assistance, bathing requires assistance, laundry requires assistance, medication administration requires assistance and money management requires assistance    Home Safety:  No safety concerns identified    Hearing Impairment:  No hearing concerns    In the past 6 months, have you been bothered by leaking of urine? Yes    In general, how would you rate your overall mental or emotional health?  Good    Additional concerns today:  No    Today's PHQ-9 Score: "       1/30/2024    12:54 PM   PHQ-9 SCORE   PHQ-9 Total Score MyChart 0   PHQ-9 Total Score 0         Have you ever done Advance Care Planning? (For example, a Health Directive, POLST, or a discussion with a medical provider or your loved ones about your wishes): No, advance care planning information given to patient to review.  Patient plans to discuss their wishes with loved ones or provider.         Fall risk  Fallen 2 or more times in the past year?: No  Any fall with injury in the past year?: No    Cognitive Screening   1) Repeat 3 items (Leader, Season, Table)    2) Clock draw: ABNORMAL unable to complete  3) 3 item recall: Recalls NO objects   Results: 0 items recalled: PROBABLE COGNITIVE IMPAIRMENT, **INFORM PROVIDER**    Mini-CogTM Copyright S Enrico. Licensed by the author for use in [facility]; reprinted with permission ([email or web address]). All rights reserved.          Reviewed and updated as needed this visit by clinical staff   Tobacco  Allergies               Reviewed and updated as needed this visit by Provider                  Social History     Tobacco Use    Smoking status: Never     Passive exposure: Never    Smokeless tobacco: Never   Substance Use Topics    Alcohol use: No             1/30/2024     1:01 PM   Alcohol Use   Prescreen: >3 drinks/day or >7 drinks/week? No     Do you have a current opioid prescription? No  Do you use any other controlled substances or medications that are not prescribed by a provider? None        Current providers sharing in care for this patient include:   Patient Care Team:  Kodak Smith MD as PCP - General (Family Practice)  Kodak Smith MD as Assigned PCP    The following health maintenance items are reviewed in Epic and correct as of today:  Health Maintenance   Topic Date Due    DEXA  Never done    ANNUAL REVIEW OF HM ORDERS  Never done    DEPRESSION ACTION PLAN  Never done    URINALYSIS  Never done    ZOSTER IMMUNIZATION (1 of 2) Never done    RSV  VACCINE (Pregnancy & 60+) (1 - 1-dose 60+ series) Never done    MEDICARE ANNUAL WELLNESS VISIT  Never done    EYE EXAM  07/21/2018    COLORECTAL CANCER SCREENING  01/08/2019    MICROALBUMIN  10/27/2021    MAMMO SCREENING  03/16/2022    DTAP/TDAP/TD IMMUNIZATION (2 - Td or Tdap) 05/24/2022    A1C  05/26/2022    DIABETIC FOOT EXAM  07/22/2022    BMP  11/26/2022    LIPID  11/26/2022    HEMOGLOBIN  11/26/2022    INFLUENZA VACCINE (1) 09/01/2023    COVID-19 Vaccine (4 - 2023-24 season) 09/01/2023    PHQ-9  07/30/2024    FALL RISK ASSESSMENT  01/30/2025    ADVANCE CARE PLANNING  10/27/2025    HEPATITIS C SCREENING  Completed    Pneumococcal Vaccine: 65+ Years  Completed    IPV IMMUNIZATION  Aged Out    HPV IMMUNIZATION  Aged Out    MENINGITIS IMMUNIZATION  Aged Out    RSV MONOCLONAL ANTIBODY  Aged Out           Review of Systems   Constitutional:  Negative for chills and fever.   HENT:  Negative for congestion, ear pain, hearing loss and sore throat.    Eyes:  Positive for visual disturbance. Negative for pain.   Respiratory:  Negative for cough and shortness of breath.    Cardiovascular:  Negative for chest pain and palpitations.   Gastrointestinal:  Negative for abdominal pain, constipation, diarrhea and nausea.   Genitourinary:  Positive for frequency and urgency. Negative for dysuria, genital sores, hematuria, pelvic pain, vaginal bleeding and vaginal discharge.   Musculoskeletal:  Negative for arthralgias, joint swelling and myalgias.   Skin:  Negative for rash.   Neurological:  Positive for weakness. Negative for dizziness and headaches.   Psychiatric/Behavioral:  The patient is not nervous/anxious.         OBJECTIVE:     Vitals:    01/30/24 1305 01/30/24 1314   BP: (!) 168/96 (!) 168/90   Pulse: 97    Temp: 98.7  F (37.1  C)    TempSrc: Oral    SpO2: 96%    Weight: 40.9 kg (90 lb 3.2 oz)       Physical Exam  GENERAL: alert, awake.  Sitting comfortably in a wheelchair.  Slow to response to questions but able to  answer simple questions.  NECK: Supple  RESP: lungs clear to auscultation - no rales, rhonchi or wheezes  CV: regular rates and rhythm and no murmur, click or rub  ABDOMEN: soft, nontender, no hepatosplenomegaly, no masses and bowel sounds normal  MS: 3/5 both upper and lower right extremities.  Diabetic foot exam: no trophic changes or ulcerative lesions      ASSESSMENT / PLAN:     Annual physical exam    Type 2 diabetes mellitus treated without insulin (H)  A1c pending at the time of the visit.  No medication for more than a year per sister.  Sent new prescription for meter and testing supplies.  Restart Lantus at 10 units, titrate up if needed.    - HEMOGLOBIN A1C  - BASIC METABOLIC PANEL  - Lipid panel reflex to direct LDL Non-fasting  - insulin glargine (LANTUS PEN) 100 UNIT/ML pen; Inject 10 Units Subcutaneous at bedtime  - blood glucose monitoring (NO BRAND SPECIFIED) meter device kit; Use to test blood sugar two times daily or as directed. Preferred blood glucose meter OR supplies to accompany: Blood Glucose Monitor Brands: per insurance.  - blood glucose (NO BRAND SPECIFIED) test strip; Use to test blood sugar 2  times daily or as directed. To accompany: Blood Glucose Monitor Brands: per insurance.  - thin (NO BRAND SPECIFIED) lancets; Use with lanceting device twice a day. To accompany: Blood Glucose Monitor Brands: per insurance.  - Lipid Profile    Urinary and fecal incontinence  Wears diaper.    H/O: stroke  In 7/2021.    Right hemiparesis (H)  From stroke in 2021.    Noncompliance with medication regimen   Sister reports that patient refuses to take medications.  No medications for more than a year.  Not checking blood sugar either.  Restarted Lantus, Lipitor and amlodipine.    Hypertension  No medication for more than a year.  Restarted amlodipine.  Labs pending at the time of the visit.  Follow-up in 6 weeks.        Counseling       Healthy diet/nutrition        She reports that she has never smoked.  She has never been exposed to tobacco smoke. She has never used smokeless tobacco.            Signed Electronically by: Kodak Smith MD    This transcription uses voice recognition software, which may contain typographical errors.

## 2024-01-31 LAB
ANION GAP SERPL CALCULATED.3IONS-SCNC: 12 MMOL/L (ref 7–15)
BUN SERPL-MCNC: 14 MG/DL (ref 8–23)
CALCIUM SERPL-MCNC: 9.4 MG/DL (ref 8.8–10.2)
CHLORIDE SERPL-SCNC: 101 MMOL/L (ref 98–107)
CHOLEST SERPL-MCNC: 201 MG/DL
CREAT SERPL-MCNC: 0.74 MG/DL (ref 0.51–0.95)
DEPRECATED HCO3 PLAS-SCNC: 26 MMOL/L (ref 22–29)
EGFRCR SERPLBLD CKD-EPI 2021: 84 ML/MIN/1.73M2
FASTING STATUS PATIENT QL REPORTED: ABNORMAL
GLUCOSE SERPL-MCNC: 123 MG/DL (ref 70–99)
HDLC SERPL-MCNC: 56 MG/DL
LDLC SERPL CALC-MCNC: 125 MG/DL
NONHDLC SERPL-MCNC: 145 MG/DL
POTASSIUM SERPL-SCNC: 4.1 MMOL/L (ref 3.4–5.3)
SODIUM SERPL-SCNC: 139 MMOL/L (ref 135–145)
TRIGL SERPL-MCNC: 101 MG/DL

## 2024-02-15 ENCOUNTER — TELEPHONE (OUTPATIENT)
Dept: MAMMOGRAPHY | Facility: CLINIC | Age: 74
End: 2024-02-15
Payer: COMMERCIAL

## 2024-02-15 NOTE — TELEPHONE ENCOUNTER
Patient Quality Outreach    Patient is due for the following:   Breast Cancer Screening - Mammogram    Next Steps:   Patient was scheduled for mammogram    Type of outreach:    Phone, spoke to patient/parent. 3/26 at 2:00, sister scheduled    Next Steps:  Reach out within 90 days via Phone.    Max number of attempts reached: No. Will try again in 90 days if patient still on fail list.    Questions for provider review:    None           ZENOBIA Ching  Chart routed to Care Team.

## 2024-03-26 ENCOUNTER — OFFICE VISIT (OUTPATIENT)
Dept: FAMILY MEDICINE | Facility: CLINIC | Age: 74
End: 2024-03-26
Payer: COMMERCIAL

## 2024-03-26 VITALS
TEMPERATURE: 98.4 F | DIASTOLIC BLOOD PRESSURE: 82 MMHG | OXYGEN SATURATION: 92 % | HEART RATE: 120 BPM | RESPIRATION RATE: 22 BRPM | SYSTOLIC BLOOD PRESSURE: 156 MMHG

## 2024-03-26 DIAGNOSIS — Z91.148 NONCOMPLIANCE WITH MEDICATION REGIMEN: ICD-10-CM

## 2024-03-26 DIAGNOSIS — I48.91 ATRIAL FIBRILLATION, UNSPECIFIED TYPE (H): ICD-10-CM

## 2024-03-26 DIAGNOSIS — E78.5 HYPERLIPIDEMIA, UNSPECIFIED HYPERLIPIDEMIA TYPE: ICD-10-CM

## 2024-03-26 DIAGNOSIS — E11.9 TYPE 2 DIABETES MELLITUS TREATED WITHOUT INSULIN (H): Primary | ICD-10-CM

## 2024-03-26 DIAGNOSIS — Z00.00 HEALTHCARE MAINTENANCE: ICD-10-CM

## 2024-03-26 DIAGNOSIS — G81.91 RIGHT HEMIPARESIS (H): ICD-10-CM

## 2024-03-26 DIAGNOSIS — I10 ESSENTIAL HYPERTENSION: ICD-10-CM

## 2024-03-26 PROCEDURE — 99214 OFFICE O/P EST MOD 30 MIN: CPT | Performed by: FAMILY MEDICINE

## 2024-03-26 RX ORDER — RESPIRATORY SYNCYTIAL VIRUS VACCINE 120MCG/0.5
0.5 KIT INTRAMUSCULAR ONCE
Qty: 1 EACH | Refills: 0 | Status: CANCELLED | OUTPATIENT
Start: 2024-03-26 | End: 2024-03-26

## 2024-03-26 RX ORDER — AMLODIPINE BESYLATE 5 MG/1
5 TABLET ORAL DAILY
Qty: 90 TABLET | Refills: 3 | Status: SHIPPED | OUTPATIENT
Start: 2024-03-26

## 2024-03-26 NOTE — PROGRESS NOTES
Type 2 diabetes mellitus treated without insulin (H)  Last A1c was 7.8 on 1/30/2024.  Restarted metformin at that time.  No changes today.    Chronic kidney disease, stage 3 (H)  Normal creatinine in 1/24.    Right hemiparesis (H)  Residual symptoms from stroke in 2021.  Recommended physical therapy but sister states they are going back to the [facility] next month.  They will get physical therapy when they get there.  Will be staying in the [facility] for 1 year.    Essential hypertension  Amlodipine was restarted at last visit but sister states that they did not get it from the pharmacy.  New prescription resent today.  Advised to pick it up today.  - amLODIPine (NORVASC) 5 MG tablet; Take 1 tablet (5 mg) by mouth daily    Atrial fibrillation, unspecified type (H)  Normal rate and rhythm today.    Hyperlipidemia, unspecified hyperlipidemia type  Restarted Lipitor 2 months ago.  Last  in 1/24.    Healthcare maintenance  Declined colonoscopy.  Declined DEXA scan.  Will discuss again when she comes back.    Noncompliance with medication regimen  And is refusing to take medication.  Stopped all medication for more than a year, metformin, Lipitor and amlodipine restarted 2 months ago.  Going back to the [facility] next month, will be there for 1 year.  She will make appointment when she comes back.    Kristin Ghotra is a 74 year old female with history of diabetes, hypertension, hyperlipidemia, history of stroke in 2021, right hemiparesis and history of medication noncompliance here for follow-up.  She was last seen here 2 months ago after not being seen for more than a year and a half.  She stopped taking all her medications at that time.  Metformin, Lipitor and amlodipine were restarted.  Patient declined to take other medications at that time.  She is here with her sister who is her primary caregiver.  Sister states she is at her baseline with no new concerns since last visit.  She is taking only  metformin and atorvastatin, they did not  amlodipine prescription.  She is not checking blood sugar at home, sister states she does not like to check her sugar.  There are going back to the [facility] next month, plan to be there for a year.        3/26/2024     1:17 PM   Additional Questions   Roomed by david lopez   Accompanied by sister     History of Present Illness       Diabetes:   She presents for follow up of diabetes.    She is not checking blood glucose.         She has no concerns regarding her diabetes at this time.   She is not experiencing numbness or burning in feet, excessive thirst, blurry vision, weight changes or redness, sores or blisters on feet. The patient has not had a diabetic eye exam in the last 12 months.          Hypertension: She presents for follow up of hypertension.  She does not check blood pressure  regularly outside of the clinic. Outside blood pressures have been over 140/90. She does not follow a low salt diet.     Reason for visit:  Diabetes  blood pressure         Review of Systems  CONSTITUTIONAL:NEGATIVE  for chills, malaise, and weight loss  RESP: NEGATIVE for significant cough or SOB  CV: NEGATIVE for chest pain/chest pressure      Objective    BP (!) 164/88   Pulse 120   Temp 98.4  F (36.9  C) (Oral)   Resp 22   SpO2 92%   There is no height or weight on file to calculate BMI.  Physical Exam   GENERAL: Alert and awake.  Answers simple questions.  Sitting comfortably in a wheelchair.  NECK: Supple  RESP: lungs clear to auscultation - no rales, rhonchi or wheezes  CV: regular rate and rhythm, normal S1 S2  ABDOMEN: soft, nontender, no hepatosplenomegaly, no masses and bowel sounds normal  MS: RUE exam shows 3/5 strength and RLE exam shows 3/5 strength      This transcription uses voice recognition software, which may contain typographical errors.      Signed Electronically by: Kodak Smith MD

## 2024-05-24 ENCOUNTER — TELEPHONE (OUTPATIENT)
Dept: FAMILY MEDICINE | Facility: CLINIC | Age: 74
End: 2024-05-24

## 2024-05-24 NOTE — TELEPHONE ENCOUNTER
Patient Quality Outreach    Patient is due for the following:   Diabetes -  A1C  Hypertension -  BP check  Breast Cancer Screening - Mammogram    Next Steps:   Schedule a office visit for HTN follow up/DM check after 7/30/24. Can also do mammogram too.    Type of outreach:    Phone, left message for patient/parent to call back.      Questions for provider review:    None           Kendy Snowden MA

## 2024-07-20 ENCOUNTER — HEALTH MAINTENANCE LETTER (OUTPATIENT)
Age: 74
End: 2024-07-20

## 2024-09-05 ENCOUNTER — TELEPHONE (OUTPATIENT)
Dept: FAMILY MEDICINE | Facility: CLINIC | Age: 74
End: 2024-09-05
Payer: COMMERCIAL

## 2024-09-05 NOTE — TELEPHONE ENCOUNTER
Patient Quality Outreach    Patient is due for the following:   Diabetes -  Diabetic Follow-Up Visit  Hypertension -  Hypertension follow-up visit  Breast Cancer Screening - Mammogram      Topic Date Due    Zoster (Shingles) Vaccine (1 of 2) Never done       Next Steps:   Schedule a office visit for DM and HTN    Type of outreach:    Phone, left message for patient/parent to call back.    Next Steps:  Reach out within 90 days via Letter.    Max number of attempts reached: No. Will try again in 90 days if patient still on fail list.    Questions for provider review:    None           Nini Bruno MA

## 2024-09-28 ENCOUNTER — HEALTH MAINTENANCE LETTER (OUTPATIENT)
Age: 74
End: 2024-09-28

## 2024-10-10 NOTE — TELEPHONE ENCOUNTER
Patient Quality Outreach    Patient is due for the following:   Diabetes -  A1C  Hypertension -  BP check  Breast Cancer Screening - Mammogram    Next Steps:   Schedule a office visit for Raphael broussard HTN/DM/mammogram.    Type of outreach:    Sent [employer] message.      Questions for provider review:    None           Kendy Snowden MA

## 2024-12-31 ENCOUNTER — PATIENT OUTREACH (OUTPATIENT)
Dept: CARE COORDINATION | Facility: CLINIC | Age: 74
End: 2024-12-31
Payer: COMMERCIAL

## 2025-01-14 ENCOUNTER — PATIENT OUTREACH (OUTPATIENT)
Dept: CARE COORDINATION | Facility: CLINIC | Age: 75
End: 2025-01-14
Payer: COMMERCIAL

## 2025-03-15 ENCOUNTER — HEALTH MAINTENANCE LETTER (OUTPATIENT)
Age: 75
End: 2025-03-15

## 2025-04-05 ENCOUNTER — HEALTH MAINTENANCE LETTER (OUTPATIENT)
Age: 75
End: 2025-04-05

## 2025-04-15 ENCOUNTER — TELEPHONE (OUTPATIENT)
Dept: FAMILY MEDICINE | Facility: CLINIC | Age: 75
End: 2025-04-15
Payer: COMMERCIAL

## 2025-04-15 NOTE — LETTER
April 15, 2025      Paradise Yuan  [address] [place] [address]      Your healthcare team cares about your health. To provide you with the best care, we have reviewed your chart and based on our findings, we see that you are due to:     Schedule a DIABETIC FOLLOW UP appointment for Office Visit. Patients with diabetes should see their provider regularly.  Schedule a DIABETIC EYE EXAM.  This exam is done with an optometrist, annually. You can schedule this appointment with your eye doctor.  If you need a referral, please let us know.  HYPERTENSION FOLLOW UP: Office Visit  PREVENTATIVE VISIT: Annual Medicare Wellness:Schedule an Annual Medicare Wellness Exam. Please call your [facility] clinic to set up your appointment.    If you have already completed these items, please contact the clinic via phone or Mychart so your care team can review and update your records.  Thank you for choosing [facility] for your healthcare needs. For any questions, concerns, or to schedule an appointment please contact the clinic.     Healthy Regards,    Your [facility] Care Team

## 2025-04-15 NOTE — TELEPHONE ENCOUNTER
Patient Quality Outreach    Patient is due for the following:   Diabetes -  A1C, LDL (Fasting), Eye Exam, and Diabetic Follow-Up Visit  Hypertension -  BP check  Breast Cancer Screening - Mammogram  Physical Annual Wellness Visit      Topic Date Due    Zoster (Shingles) Vaccine (1 of 2) Never done       Action(s) Taken:   Schedule a office visit for HTN  and   Annual Wellness Visit    Type of outreach:    Phone, left message for patient/parent to call back. and Sent letter.    Questions for provider review:    None         Nini Bruno MA  Chart routed to None.

## 2025-07-29 ENCOUNTER — PATIENT OUTREACH (OUTPATIENT)
Dept: CARE COORDINATION | Facility: CLINIC | Age: 75
End: 2025-07-29
Payer: COMMERCIAL

## 2025-08-09 ENCOUNTER — HEALTH MAINTENANCE LETTER (OUTPATIENT)
Age: 75
End: 2025-08-09